# Patient Record
Sex: MALE | Race: WHITE | Employment: OTHER | URBAN - METROPOLITAN AREA
[De-identification: names, ages, dates, MRNs, and addresses within clinical notes are randomized per-mention and may not be internally consistent; named-entity substitution may affect disease eponyms.]

---

## 2017-01-19 ENCOUNTER — TRANSFERRED RECORDS (OUTPATIENT)
Dept: HEALTH INFORMATION MANAGEMENT | Facility: CLINIC | Age: 57
End: 2017-01-19

## 2017-01-20 DIAGNOSIS — K21.00 GASTROESOPHAGEAL REFLUX DISEASE WITH ESOPHAGITIS: ICD-10-CM

## 2017-01-20 DIAGNOSIS — M51.369 DDD (DEGENERATIVE DISC DISEASE), LUMBAR: Primary | ICD-10-CM

## 2017-01-20 NOTE — TELEPHONE ENCOUNTER
gabapentin (NEURONTIN) 600 MG tablet      Last Written Prescription Date: 2/19/16  Last Quantity: 90, # refills: 1  Last Office Visit with [facility], [facility] or [facility] prescribing provider: 11/29/16   Next 5 appointments (look out 90 days)     Feb 07, 2017 10:00 AM   [facility] Visit with Cat Shaw MD   [facility]    [address] [place] [id number] [phone]                   CREATININE   Date Value Ref Range Status   04/21/2016 0.96 mg/dL Final     AST       23   3/26/2010  ALT       26   3/26/2010  BP Readings from Last 3 Encounters:   11/29/16 116/80   11/04/16 106/80   10/04/16 122/75

## 2017-01-23 DIAGNOSIS — M51.369 DDD (DEGENERATIVE DISC DISEASE), LUMBAR: Primary | ICD-10-CM

## 2017-01-23 RX ORDER — DIAZEPAM 5 MG
5 TABLET ORAL
Qty: 30 TABLET | Refills: 0 | Status: SHIPPED | OUTPATIENT
Start: 2017-01-23 | End: 2017-02-07

## 2017-01-23 RX ORDER — GABAPENTIN 600 MG/1
TABLET ORAL
Qty: 90 TABLET | Refills: 5 | Status: SHIPPED | OUTPATIENT
Start: 2017-01-23 | End: 2017-08-09

## 2017-01-23 NOTE — TELEPHONE ENCOUNTER
diazepam       Last Written Prescription Date:  11/4/16  Last Fill Quantity: 30,   # refills: 0  Last Office Visit with [facility], [facility] or [facility] prescribing provider: 11/29/16  Future Office visit:    Next 5 appointments (look out 90 days)     Feb 07, 2017 10:00 AM   [facility] Visit with Cat Shaw MD   [facility]    [address] [place] [id number] [phone]                   Routing refill request to provider for review/approval because:  Drug not on the [facility], [facility] or [facility] refill protocol or controlled substance

## 2017-01-23 NOTE — TELEPHONE ENCOUNTER
Routing refill request to provider for review/approval because:  Drug not on the [facility] refill protocol   Marjorie Grajeda RN

## 2017-01-25 PROBLEM — K21.00 GASTROESOPHAGEAL REFLUX DISEASE WITH ESOPHAGITIS: Status: ACTIVE | Noted: 2017-01-25

## 2017-01-25 RX ORDER — OMEPRAZOLE 40 MG/1
CAPSULE, DELAYED RELEASE ORAL
Qty: 30 CAPSULE | Refills: 11 | Status: SHIPPED | OUTPATIENT
Start: 2017-01-25 | End: 2018-01-17

## 2017-01-25 NOTE — TELEPHONE ENCOUNTER
Routing refill request to provider for review/approval because:  Dx does not match protocol  Kiera Hebert RN

## 2017-01-25 NOTE — TELEPHONE ENCOUNTER
omeprazole      Last Written Prescription Date: 11/29/16  Last Fill Quantity: 30,  # refills: 2   Last Office Visit with [facility] or [facility] prescribing provider: 11/29/16 Dr. Shaw                                           Next 5 appointments (look out 90 days)     Feb 07, 2017 10:00 AM   Office Visit with Cat Shaw MD   [facility]    [address] [place] [id number] [phone]

## 2017-02-07 ENCOUNTER — OFFICE VISIT (OUTPATIENT)
Dept: FAMILY MEDICINE | Facility: CLINIC | Age: 57
End: 2017-02-07
Payer: COMMERCIAL

## 2017-02-07 VITALS
HEART RATE: 66 BPM | SYSTOLIC BLOOD PRESSURE: 132 MMHG | OXYGEN SATURATION: 99 % | BODY MASS INDEX: 36.91 KG/M2 | DIASTOLIC BLOOD PRESSURE: 86 MMHG | WEIGHT: 272.5 LBS | TEMPERATURE: 97.7 F | HEIGHT: 72 IN | RESPIRATION RATE: 16 BRPM

## 2017-02-07 DIAGNOSIS — G89.4 CHRONIC PAIN SYNDROME: Primary | ICD-10-CM

## 2017-02-07 DIAGNOSIS — Z98.1 S/P LUMBAR FUSION: ICD-10-CM

## 2017-02-07 DIAGNOSIS — M51.369 DDD (DEGENERATIVE DISC DISEASE), LUMBAR: ICD-10-CM

## 2017-02-07 PROCEDURE — 99213 OFFICE O/P EST LOW 20 MIN: CPT | Performed by: FAMILY MEDICINE

## 2017-02-07 RX ORDER — HYDROMORPHONE HYDROCHLORIDE 4 MG/1
8 TABLET ORAL 4 TIMES DAILY PRN
Qty: 180 TABLET | Refills: 0 | Status: SHIPPED | OUTPATIENT
Start: 2017-02-13 | End: 2017-03-07

## 2017-02-07 RX ORDER — TRIAMCINOLONE ACETONIDE 1 MG/G
CREAM TOPICAL 3 TIMES DAILY PRN
Qty: 30 G | Refills: 0 | Status: SHIPPED | OUTPATIENT
Start: 2017-02-07 | End: 2017-10-04

## 2017-02-07 RX ORDER — MORPHINE SULFATE 30 MG/1
30 TABLET, FILM COATED, EXTENDED RELEASE ORAL 3 TIMES DAILY
Qty: 90 TABLET | Refills: 0 | Status: SHIPPED | OUTPATIENT
Start: 2017-02-13 | End: 2017-03-07

## 2017-02-07 RX ORDER — AZITHROMYCIN 250 MG/1
TABLET, FILM COATED ORAL
COMMUNITY
Start: 2017-01-21 | End: 2017-03-07

## 2017-02-07 RX ORDER — DIAZEPAM 5 MG
5 TABLET ORAL
Qty: 30 TABLET | Refills: 0 | Status: SHIPPED | OUTPATIENT
Start: 2017-02-13 | End: 2017-03-07

## 2017-02-07 ASSESSMENT — PAIN SCALES - GENERAL: PAINLEVEL: MODERATE PAIN (5)

## 2017-02-07 ASSESSMENT — ANXIETY QUESTIONNAIRES
2. NOT BEING ABLE TO STOP OR CONTROL WORRYING: NEARLY EVERY DAY
IF YOU CHECKED OFF ANY PROBLEMS ON THIS QUESTIONNAIRE, HOW DIFFICULT HAVE THESE PROBLEMS MADE IT FOR YOU TO DO YOUR WORK, TAKE CARE OF THINGS AT HOME, OR GET ALONG WITH OTHER PEOPLE: EXTREMELY DIFFICULT
1. FEELING NERVOUS, ANXIOUS, OR ON EDGE: NEARLY EVERY DAY
7. FEELING AFRAID AS IF SOMETHING AWFUL MIGHT HAPPEN: NEARLY EVERY DAY
6. BECOMING EASILY ANNOYED OR IRRITABLE: SEVERAL DAYS
GAD7 TOTAL SCORE: 19
3. WORRYING TOO MUCH ABOUT DIFFERENT THINGS: NEARLY EVERY DAY
5. BEING SO RESTLESS THAT IT IS HARD TO SIT STILL: NEARLY EVERY DAY

## 2017-02-07 ASSESSMENT — PATIENT HEALTH QUESTIONNAIRE - PHQ9: 5. POOR APPETITE OR OVEREATING: NEARLY EVERY DAY

## 2017-02-07 NOTE — Clinical Note
Please abstract the following data from this visit with this patient into the appropriate field in Epic:  Colonoscopy done on this date: 1/15/17 (approximately), by this group: [facility], results were normal.

## 2017-02-07 NOTE — PROGRESS NOTES
SUBJECTIVE:                                                    Oleg Ford is a 56 year old male who presents to clinic today for the following health issues:      Depression Followup    Status since last visit: Stable up and down    See PHQ-9 for current symptoms.  Other associated symptoms: restless    Complicating factors:   Significant life event:  Surgery - 1/30/17   Current substance abuse:  None  Anxiety or Panic symptoms:  No    PHQ-9  English PHQ-9   Any Language            Chronic Pain Follow-Up       Type / Location of Pain: back  Analgesia/pain control:       Recent changes:  Up and down      Overall control: Tolerable with discomfort  Activity level/function:      Daily activities:  Some days    Work:  NA  Adverse effects:  No  Adherance    Taking medication as directed?  Yes    Participating in other treatments: None  Risk Factors:    Sleep:  Poor    Mood/anxiety:  Up and down    Recent family or social stressors:  none noted    Other aggravating factors: none  PHQ-9 SCORE 7/18/2016 8/10/2016 9/9/2016   Total Score - - -   Total Score MyChart - - -   Total Score 26 27 27     GINI-7 SCORE 7/18/2016 8/10/2016 9/9/2016   Total Score - - -   Total Score 21 21 21     Encounter-Level CSA - 12/1/15:               Controlled Substance Agreement - Scan on 12/14/2015 11:49 AM : CONTROLLED SUBSTANCE AGREEMENT (below)                 Amount of exercise or physical activity: None - trying to stay active    Problems taking medications regularly: does forget doses - has situations where he forgot if he took doses as well    Medication side effects: Constipation, Diarrhea, can't concentrate, confused - depends on what he is taking    Diet: regular (no restrictions)    SUBJECTIVE:  Here today in follow-up of chronic back pain. Generally doing well overall. Medications do make him constipated and after some bright red blood per rectum the patient was seen through the emergency department last month. Full  colonoscopy performed revealing a few benign polyps, but otherwise normal. Had a recent procedure to remove the battery of his spinal stimulator from the back to the front. Time to get his staples out.    Review of systems otherwise negative.  Past medical, family, and social history reviewed and updated in chart.    OBJECTIVE:  /86 mmHg  Pulse 66  Temp(Src) 97.7  F (36.5  C) (Oral)  Resp 16  Ht 1.829 m (6')  Wt 123.605 kg (272 lb 8 oz)  BMI 36.95 kg/m2  SpO2 99%  Alert, pleasant, upbeat, and in no apparent discomfort.  S1 and S2 normal, no murmurs, clicks, gallops or rubs. Regular rate and rhythm. Chest is clear; no wheezes or rales. No edema or JVD.  3 separate well-healing incisions with staples in place. 35 staples removed without difficulty  Past labs reviewed with the patient.     ASSESSMENT / PLAN:  (G89.4) Chronic pain syndrome  (primary encounter diagnosis)  Comment: Stable. Refilled   Plan: HYDROmorphone (DILAUDID) 4 MG tablet, morphine         (MS CONTIN) 30 MG 12 hr tablet            (Z98.1) S/P lumbar fusion  Comment: Stable. Refilled.  Plan: HYDROmorphone (DILAUDID) 4 MG tablet, morphine         (MS CONTIN) 30 MG 12 hr tablet            (M51.36) DDD (degenerative disc disease), lumbar  Comment:   Plan: diazepam (VALIUM) 5 MG tablet, triamcinolone         (KENALOG) 0.1 % cream            Follow up 1 month   SMaureen Shaw MD    (Chart documentation completed in part with Dragon voice-recognition software.  Even though reviewed some grammatical, spelling, and word errors may remain.)

## 2017-02-07 NOTE — NURSING NOTE
Chief Complaint   Patient presents with     Recheck Medication       Initial /86 mmHg  Pulse 66  Temp(Src) 97.7  F (36.5  C) (Oral)  Resp 16  Ht 1.829 m (6')  Wt 123.605 kg (272 lb 8 oz)  BMI 36.95 kg/m2  SpO2 99% Estimated body mass index is 36.95 kg/(m^2) as calculated from the following:    Height as of this encounter: 1.829 m (6').    Weight as of this encounter: 123.605 kg (272 lb 8 oz).  Medication Reconciliation: complete     Will Nati READ

## 2017-02-08 ASSESSMENT — PATIENT HEALTH QUESTIONNAIRE - PHQ9: SUM OF ALL RESPONSES TO PHQ QUESTIONS 1-9: 27

## 2017-02-08 ASSESSMENT — ANXIETY QUESTIONNAIRES: GAD7 TOTAL SCORE: 19

## 2017-03-07 ENCOUNTER — OFFICE VISIT (OUTPATIENT)
Dept: FAMILY MEDICINE | Facility: CLINIC | Age: 57
End: 2017-03-07
Payer: MEDICAID

## 2017-03-07 VITALS
OXYGEN SATURATION: 99 % | HEIGHT: 72 IN | RESPIRATION RATE: 16 BRPM | HEART RATE: 68 BPM | DIASTOLIC BLOOD PRESSURE: 82 MMHG | WEIGHT: 285.4 LBS | TEMPERATURE: 97.8 F | BODY MASS INDEX: 38.66 KG/M2 | SYSTOLIC BLOOD PRESSURE: 118 MMHG

## 2017-03-07 DIAGNOSIS — Z98.1 S/P LUMBAR FUSION: ICD-10-CM

## 2017-03-07 DIAGNOSIS — G89.4 CHRONIC PAIN SYNDROME: ICD-10-CM

## 2017-03-07 DIAGNOSIS — M51.369 DDD (DEGENERATIVE DISC DISEASE), LUMBAR: ICD-10-CM

## 2017-03-07 PROCEDURE — 99213 OFFICE O/P EST LOW 20 MIN: CPT | Performed by: FAMILY MEDICINE

## 2017-03-07 RX ORDER — DIAZEPAM 5 MG
5 TABLET ORAL
Qty: 30 TABLET | Refills: 0 | Status: SHIPPED | OUTPATIENT
Start: 2017-03-10 | End: 2017-04-04

## 2017-03-07 RX ORDER — MORPHINE SULFATE 30 MG/1
30 TABLET, FILM COATED, EXTENDED RELEASE ORAL 3 TIMES DAILY
Qty: 90 TABLET | Refills: 0 | Status: SHIPPED | OUTPATIENT
Start: 2017-03-10 | End: 2017-04-04

## 2017-03-07 RX ORDER — HYDROMORPHONE HYDROCHLORIDE 4 MG/1
8 TABLET ORAL 4 TIMES DAILY PRN
Qty: 180 TABLET | Refills: 0 | Status: SHIPPED | OUTPATIENT
Start: 2017-03-10 | End: 2017-04-04

## 2017-03-07 ASSESSMENT — PAIN SCALES - GENERAL: PAINLEVEL: MODERATE PAIN (5)

## 2017-03-07 ASSESSMENT — ANXIETY QUESTIONNAIRES
3. WORRYING TOO MUCH ABOUT DIFFERENT THINGS: NEARLY EVERY DAY
5. BEING SO RESTLESS THAT IT IS HARD TO SIT STILL: NEARLY EVERY DAY
7. FEELING AFRAID AS IF SOMETHING AWFUL MIGHT HAPPEN: NEARLY EVERY DAY
1. FEELING NERVOUS, ANXIOUS, OR ON EDGE: NEARLY EVERY DAY
GAD7 TOTAL SCORE: 21
6. BECOMING EASILY ANNOYED OR IRRITABLE: NEARLY EVERY DAY
IF YOU CHECKED OFF ANY PROBLEMS ON THIS QUESTIONNAIRE, HOW DIFFICULT HAVE THESE PROBLEMS MADE IT FOR YOU TO DO YOUR WORK, TAKE CARE OF THINGS AT HOME, OR GET ALONG WITH OTHER PEOPLE: VERY DIFFICULT
2. NOT BEING ABLE TO STOP OR CONTROL WORRYING: NEARLY EVERY DAY

## 2017-03-07 ASSESSMENT — PATIENT HEALTH QUESTIONNAIRE - PHQ9: 5. POOR APPETITE OR OVEREATING: NEARLY EVERY DAY

## 2017-03-07 NOTE — NURSING NOTE
Chief Complaint   Patient presents with     Recheck Medication       Initial /82 (BP Location: Right arm, Patient Position: Right side, Cuff Size: Adult Regular)  Pulse 68  Temp 97.8  F (36.6  C) (Oral)  Resp 16  Ht 1.829 m (6')  Wt 129.5 kg (285 lb 6.4 oz)  SpO2 99%  BMI 38.71 kg/m2 Estimated body mass index is 38.71 kg/(m^2) as calculated from the following:    Height as of this encounter: 1.829 m (6').    Weight as of this encounter: 129.5 kg (285 lb 6.4 oz).  Medication Reconciliation: complete     Bhupendra Damian MA

## 2017-03-07 NOTE — MR AVS SNAPSHOT
After Visit Summary   3/7/2017    Oleg Ford    MRN: [id number]           Patient Information     Date Of Birth          1960        Visit Information        Provider Department      3/7/2017 10:00 AM Cat Shaw MD [facility]        Today's Diagnoses     Chronic pain syndrome        S/P lumbar fusion        DDD (degenerative disc disease), lumbar           Follow-ups after your visit        Follow-up notes from your care team     Return in about 1 month (around 4/7/2017).      Your next 10 appointments already scheduled     Mar 07, 2017 10:00 AM CST   [facility]   [facility]    [address] [facility] [id number] [phone]            Apr 04, 2017  9:40 AM CDT   Office Visit with Cat Shaw MD   [facility]    [address] [place] [id number] [phone]           Bring a current list of meds and any records pertaining to this visit.  For Physicals, please bring immunization records and any forms needing to be filled out.  Please arrive 10 minutes early to complete paperwork.              Who to contact     If you have questions or need follow up information about today's clinic visit or your schedule please contact [facility] directly at [phone].  Normal or non-critical lab and imaging results will be communicated to you by MyChart, letter or phone within 4 business days after the clinic has received the results. If you do not hear from us within 7 days, please contact the clinic through MyChart or phone. If you have a critical or abnormal lab result, we will notify you by phone as soon as possible.  Submit refill requests through Epivios or call your pharmacy and they will forward the refill request to us. Please allow 3 business days for your refill to be completed.           Additional Information About Your Visit        Lookinhotelshart Information     Escom gives you secure access to your electronic health record. If you see a primary care provider, you can also send messages to your care team and make appointments. If you have questions, please call your primary care clinic.  If you do not have a primary care provider, please call [phone] and they will assist you.        Care EveryWhere ID     This is your Care EveryWhere ID. This could be used by other organizations to access your [facility] medical records  [id number]        Your Vitals Were     Pulse Temperature Respirations Height Pulse Oximetry BMI (Body Mass Index)    68 97.8  F (36.6  C) (Oral) 16 1.829 m (6') 99% 38.71 kg/m2       Blood Pressure from Last 3 Encounters:   03/07/17 118/82   02/07/17 132/86   11/29/16 116/80    Weight from Last 3 Encounters:   03/07/17 129.5 kg (285 lb 6.4 oz)   02/07/17 123.6 kg (272 lb 8 oz)   11/29/16 116.9 kg (257 lb 11.2 oz)              Today, you had the following     No orders found for display         Where to get your medicines      Some of these will need a paper prescription and others can be bought over the counter.  Ask your nurse if you have questions.     Bring a paper prescription for each of these medications     diazepam 5 MG tablet    HYDROmorphone 4 MG tablet    morphine 30 MG 12 hr tablet          Primary Care Provider Office Phone # Fax #    Cat Tenzin Shaw [phone]       [address] [address]        Thank you!     Thank you for choosing [facility]  for your care. Our goal is always to provide you with excellent care. Hearing back from our patients is one way we can continue to improve our services. Please take a few minutes to complete the written survey that you may receive in the mail after your visit with us. Thank you!             Your Updated Medication List - Protect others  around you: Learn how to safely use, store and throw away your medicines at www.disposemymeds.org.          This list is accurate as of: 3/7/17  9:57 AM.  Always use your most recent med list.                   Brand Name Dispense Instructions for use    ABILIFY 10 MG tablet   Generic drug:  ARIPiprazole      Take 10 mg by mouth daily       buPROPion 150 MG 24 hr tablet    WELLBUTRIN XL     Take 450 mg by mouth       cyclobenzaprine 10 MG tablet    FLEXERIL    90 tablet    Take 1 tablet (10 mg) by mouth 3 times daily as needed for muscle spasms       diazepam 5 MG tablet   Start taking on:  3/10/2017    VALIUM    30 tablet    Take 1 tablet (5 mg) by mouth nightly as needed       EPINEPHrine 0.3 MG/0.3ML injection     1 each    Inject 0.3 mLs (0.3 mg) into the muscle once as needed for anaphylaxis       gabapentin 600 MG tablet    NEURONTIN    90 tablet    TAKE ONE TABLET BY MOUTH THREE TIMES DAILY       HYDROmorphone 4 MG tablet   Start taking on:  3/10/2017    DILAUDID    180 tablet    Take 2 tablets (8 mg) by mouth 4 times daily as needed for moderate to severe pain (every 4-6hour prn)       hydrOXYzine 50 MG capsule    VISTARIL     Take 50 mg by mouth       LAMICTAL 150 MG tablet   Generic drug:  lamoTRIgine      Take 250 mg by mouth daily       morphine 30 MG 12 hr tablet   Start taking on:  3/10/2017    MS CONTIN    90 tablet    Take 1 tablet (30 mg) by mouth 3 times daily       omeprazole 40 MG capsule    priLOSEC    30 capsule    TAKE 1 CAPSULE (40 MG) BY MOUTH DAILY TAKE 30-60 MINUTES BEFORE A MEAL.       ondansetron 4 MG ODT tab    ZOFRAN-ODT    120 tablet    Place 1-2 tablets (4-8 mg) under the tongue 3 times daily as needed for nausea       rOPINIRole 0.25 MG tablet    REQUIP    60 tablet    Take 1-2 tablets (0.25-0.5 mg) by mouth At Bedtime       traZODone 150 MG tablet    DESYREL     Take 150 mg by mouth       triamcinolone 0.1 % cream    KENALOG    30 g    Apply topically 3 times daily as needed (itch)        zolpidem 10 MG tablet    AMBIEN    30 tablet    Take 1 tablet (10 mg) by mouth nightly as needed for sleep

## 2017-03-08 ASSESSMENT — ANXIETY QUESTIONNAIRES: GAD7 TOTAL SCORE: 21

## 2017-03-08 ASSESSMENT — PATIENT HEALTH QUESTIONNAIRE - PHQ9: SUM OF ALL RESPONSES TO PHQ QUESTIONS 1-9: 27

## 2017-03-10 DIAGNOSIS — M51.369 DDD (DEGENERATIVE DISC DISEASE), LUMBAR: ICD-10-CM

## 2017-03-10 RX ORDER — GABAPENTIN 600 MG/1
600 TABLET ORAL 3 TIMES DAILY
Qty: 90 TABLET | Refills: 5 | Status: CANCELLED | OUTPATIENT
Start: 2017-03-10

## 2017-03-10 NOTE — TELEPHONE ENCOUNTER
Gabapentin      Last Written Prescription Date:  01/23/17  Last Fill Quantity: 90,   # refills: 5  Last Office Visit with [facility], [facility] or [facility] prescribing provider: 03/7/17 Dr. Shaw    Future Office visit:    Next 5 appointments (look out 90 days)     Apr 04, 2017  9:40 AM CDT   Office Visit with Cat Shaw MD   [facility]    [address] [place] [id number] [phone]                   Routing refill request to provider for review/approval because:  Drug not on the [facility], [facility] or [facility] refill protocol or controlled substance    Ins PREFERS CAPLETS VS TABLETS, NEED NEW SCRIPT FOR CAPSULES.

## 2017-03-15 ENCOUNTER — TRANSFERRED RECORDS (OUTPATIENT)
Dept: HEALTH INFORMATION MANAGEMENT | Facility: CLINIC | Age: 57
End: 2017-03-15

## 2017-03-29 DIAGNOSIS — K21.00 GASTROESOPHAGEAL REFLUX DISEASE WITH ESOPHAGITIS: ICD-10-CM

## 2017-03-29 NOTE — TELEPHONE ENCOUNTER
omeprazole (PRILOSEC) 40 MG capsule      Last Written Prescription Date: 1/25/17  Last Fill Quantity: 30 capsules,  # refills: 11   Last Office Visit with [facility], [facility] or [facility] prescribing provider: 3/7/17 Dr. Shaw                                           Next 5 appointments (look out 90 days)     Apr 04, 2017  9:40 AM CDT   Office Visit with Cat Shaw MD   [facility]    [address] [place] [id number]   [phone]                    Mell Peguero

## 2017-04-01 RX ORDER — OMEPRAZOLE 40 MG/1
CAPSULE, DELAYED RELEASE ORAL
Qty: 30 CAPSULE | Refills: 11 | OUTPATIENT
Start: 2017-04-01

## 2017-04-02 NOTE — TELEPHONE ENCOUNTER
The following prescriptions was sent to [facility] HOLLIE Reddy:   omeprazole (PRILOSEC) 40 MG capsule 30 capsule 11 1/25/2017  No   Sig: TAKE 1 CAPSULE (40 MG) BY MOUTH DAILY TAKE 30-60 MINUTES BEFORE A MEAL.   Class: E-Prescribe   Order: [id number]   E-Prescribing Status: Receipt confirmed by pharmacy (1/25/2017  2:16 PM CST)     Request denied.  Too soon to fill  Marjorie Grajeda RN

## 2017-04-04 ENCOUNTER — OFFICE VISIT (OUTPATIENT)
Dept: FAMILY MEDICINE | Facility: CLINIC | Age: 57
End: 2017-04-04
Payer: MEDICAID

## 2017-04-04 VITALS
TEMPERATURE: 97.7 F | RESPIRATION RATE: 16 BRPM | DIASTOLIC BLOOD PRESSURE: 86 MMHG | WEIGHT: 280.8 LBS | SYSTOLIC BLOOD PRESSURE: 124 MMHG | HEART RATE: 72 BPM | HEIGHT: 72 IN | OXYGEN SATURATION: 99 % | BODY MASS INDEX: 38.03 KG/M2

## 2017-04-04 DIAGNOSIS — Z98.1 S/P LUMBAR FUSION: ICD-10-CM

## 2017-04-04 DIAGNOSIS — F33.2 MAJOR DEPRESSIVE DISORDER, RECURRENT, SEVERE WITHOUT PSYCHOTIC FEATURES (H): ICD-10-CM

## 2017-04-04 DIAGNOSIS — G89.4 CHRONIC PAIN SYNDROME: Primary | ICD-10-CM

## 2017-04-04 DIAGNOSIS — M51.369 DDD (DEGENERATIVE DISC DISEASE), LUMBAR: ICD-10-CM

## 2017-04-04 PROCEDURE — 99213 OFFICE O/P EST LOW 20 MIN: CPT | Performed by: FAMILY MEDICINE

## 2017-04-04 RX ORDER — DIAZEPAM 5 MG
5 TABLET ORAL
Qty: 30 TABLET | Refills: 0 | Status: SHIPPED | OUTPATIENT
Start: 2017-04-07 | End: 2017-04-12

## 2017-04-04 RX ORDER — HYDROMORPHONE HYDROCHLORIDE 4 MG/1
8 TABLET ORAL 4 TIMES DAILY PRN
Qty: 180 TABLET | Refills: 0 | Status: SHIPPED | OUTPATIENT
Start: 2017-04-07 | End: 2017-05-02

## 2017-04-04 RX ORDER — MORPHINE SULFATE 30 MG/1
30 TABLET, FILM COATED, EXTENDED RELEASE ORAL 3 TIMES DAILY
Qty: 90 TABLET | Refills: 0 | Status: SHIPPED | OUTPATIENT
Start: 2017-04-07 | End: 2017-05-02

## 2017-04-04 ASSESSMENT — ANXIETY QUESTIONNAIRES
GAD7 TOTAL SCORE: 17
1. FEELING NERVOUS, ANXIOUS, OR ON EDGE: NEARLY EVERY DAY
3. WORRYING TOO MUCH ABOUT DIFFERENT THINGS: NEARLY EVERY DAY
2. NOT BEING ABLE TO STOP OR CONTROL WORRYING: NEARLY EVERY DAY
5. BEING SO RESTLESS THAT IT IS HARD TO SIT STILL: SEVERAL DAYS
6. BECOMING EASILY ANNOYED OR IRRITABLE: SEVERAL DAYS
IF YOU CHECKED OFF ANY PROBLEMS ON THIS QUESTIONNAIRE, HOW DIFFICULT HAVE THESE PROBLEMS MADE IT FOR YOU TO DO YOUR WORK, TAKE CARE OF THINGS AT HOME, OR GET ALONG WITH OTHER PEOPLE: VERY DIFFICULT
7. FEELING AFRAID AS IF SOMETHING AWFUL MIGHT HAPPEN: NEARLY EVERY DAY

## 2017-04-04 ASSESSMENT — PATIENT HEALTH QUESTIONNAIRE - PHQ9: 5. POOR APPETITE OR OVEREATING: NEARLY EVERY DAY

## 2017-04-04 ASSESSMENT — PAIN SCALES - GENERAL: PAINLEVEL: EXTREME PAIN (8)

## 2017-04-04 NOTE — MR AVS SNAPSHOT
After Visit Summary   4/4/2017    Oleg Ford    MRN: [id number]           Patient Information     Date Of Birth          1960        Visit Information        Provider Department      4/4/2017 9:40 AM Cat Shaw MD [facility]        Today's Diagnoses     Chronic pain syndrome    -  1    Major depressive disorder, recurrent, severe without psychotic features (H)        S/P lumbar fusion        DDD (degenerative disc disease), lumbar           Follow-ups after your visit        Follow-up notes from your care team     Return in about 1 month (around 5/4/2017).      Your next 10 appointments already scheduled     May 02, 2017 10:20 AM CDT   SHORT with Cat Shaw MD   [facility]    [address] [place] [id number] [phone]              Who to contact     If you have questions or need follow up information about today's clinic visit or your schedule please contact [facility] directly at [phone].  Normal or non-critical lab and imaging results will be communicated to you by Rapid Pathogen Screeninghart, letter or phone within 4 business days after the clinic has received the results. If you do not hear from us within 7 days, please contact the clinic through Rapid Pathogen Screeninghart or phone. If you have a critical or abnormal lab result, we will notify you by phone as soon as possible.  Submit refill requests through Texifter or call your pharmacy and they will forward the refill request to us. Please allow 3 business days for your refill to be completed.          Additional Information About Your Visit        MyChart Information     [employer]ifter gives you secure access to your electronic health record. If you see a primary care provider, you can also send messages to your care team and make appointments. If you have questions, please call your primary care clinic.  If you do not have a primary care provider,  please call [phone] and they will assist you.        Care EveryWhere ID     This is your Care EveryWhere ID. This could be used by other organizations to access your [facility] medical records  [id number]        Your Vitals Were     Pulse Temperature Respirations Height Pulse Oximetry BMI (Body Mass Index)    72 97.7  F (36.5  C) (Oral) 16 1.829 m (6') 99% 38.08 kg/m2       Blood Pressure from Last 3 Encounters:   04/04/17 124/86   03/07/17 118/82   02/07/17 132/86    Weight from Last 3 Encounters:   04/04/17 127.4 kg (280 lb 12.8 oz)   03/07/17 129.5 kg (285 lb 6.4 oz)   02/07/17 123.6 kg (272 lb 8 oz)              Today, you had the following     No orders found for display         Where to get your medicines      Some of these will need a paper prescription and others can be bought over the counter.  Ask your nurse if you have questions.     Bring a paper prescription for each of these medications     diazepam 5 MG tablet    HYDROmorphone 4 MG tablet    morphine 30 MG 12 hr tablet          Primary Care Provider Office Phone # Fax #    Cat Tenzin Shaw [phone]       [place] [id number]        Thank you!     Thank you for choosing [facility]  for your care. Our goal is always to provide you with excellent care. Hearing back from our patients is one way we can continue to improve our services. Please take a few minutes to complete the written survey that you may receive in the mail after your visit with us. Thank you!             Your Updated Medication List - Protect others around you: Learn how to safely use, store and throw away your medicines at www.disposemymeds.org.          This list is accurate as of: 4/4/17 11:15 AM.  Always use your most recent med list.                   Brand Name Dispense Instructions for use    ABILIFY 10 MG tablet   Generic drug:  ARIPiprazole      Take 10 mg by mouth daily        buPROPion 150 MG 24 hr tablet    WELLBUTRIN XL     Take 450 mg by mouth       cyclobenzaprine 10 MG tablet    FLEXERIL    90 tablet    Take 1 tablet (10 mg) by mouth 3 times daily as needed for muscle spasms       diazepam 5 MG tablet   Start taking on:  4/7/2017    VALIUM    30 tablet    Take 1 tablet (5 mg) by mouth nightly as needed       EPINEPHrine 0.3 MG/0.3ML injection     1 each    Inject 0.3 mLs (0.3 mg) into the muscle once as needed for anaphylaxis       gabapentin 600 MG tablet    NEURONTIN    90 tablet    TAKE ONE TABLET BY MOUTH THREE TIMES DAILY       HYDROmorphone 4 MG tablet   Start taking on:  4/7/2017    DILAUDID    180 tablet    Take 2 tablets (8 mg) by mouth 4 times daily as needed for moderate to severe pain (every 4-6hour prn)       hydrOXYzine 50 MG capsule    VISTARIL     Take 50 mg by mouth       LAMICTAL 150 MG tablet   Generic drug:  lamoTRIgine      Take 250 mg by mouth daily       morphine 30 MG 12 hr tablet   Start taking on:  4/7/2017    MS CONTIN    90 tablet    Take 1 tablet (30 mg) by mouth 3 times daily       omeprazole 40 MG capsule    priLOSEC    30 capsule    TAKE 1 CAPSULE (40 MG) BY MOUTH DAILY TAKE 30-60 MINUTES BEFORE A MEAL.       ondansetron 4 MG ODT tab    ZOFRAN-ODT    120 tablet    Place 1-2 tablets (4-8 mg) under the tongue 3 times daily as needed for nausea       rOPINIRole 0.25 MG tablet    REQUIP    60 tablet    Take 1-2 tablets (0.25-0.5 mg) by mouth At Bedtime       traZODone 150 MG tablet    DESYREL     Take 150 mg by mouth       triamcinolone 0.1 % cream    KENALOG    30 g    Apply topically 3 times daily as needed (itch)       zolpidem 10 MG tablet    AMBIEN    30 tablet    Take 1 tablet (10 mg) by mouth nightly as needed for sleep

## 2017-04-04 NOTE — NURSING NOTE
Chief Complaint   Patient presents with     Recheck Medication       Initial /86 (BP Location: Right arm, Patient Position: Right side, Cuff Size: Adult Large)  Pulse 72  Temp 97.7  F (36.5  C) (Oral)  Resp 16  Ht 1.829 m (6')  Wt 127.4 kg (280 lb 12.8 oz)  SpO2 99%  BMI 38.08 kg/m2 Estimated body mass index is 38.08 kg/(m^2) as calculated from the following:    Height as of this encounter: 1.829 m (6').    Weight as of this encounter: 127.4 kg (280 lb 12.8 oz).  Medication Reconciliation: complete     Bhupendra Damian MA

## 2017-04-04 NOTE — PROGRESS NOTES
SUBJECTIVE:                                                    Oleg Ford is a 57 year old male who presents to clinic today for the following health issues:      Depression Followup    Status since last visit: Stable     See PHQ-9 for current symptoms.  Other associated symptoms: Anxiety    Complicating factors:   Significant life event:  No   Current substance abuse:  None  Anxiety or Panic symptoms:  Yes-  Up and down    PHQ-9  English PHQ-9   Any Language            Chronic Pain Follow-Up       Type / Location of Pain: back  Analgesia/pain control:       Recent changes:  Up and down      Overall control: Tolerable with discomfort  Activity level/function:      Daily activities:  Most days    Work:  not applicable  Adverse effects:  No  Adherance    Taking medication as directed?  Yes    Participating in other treatments: None  Risk Factors:    Sleep:  Fair    Mood/anxiety:  controlled    Recent family or social stressors:  None    Other aggravating factors: none  PHQ-9 SCORE 9/9/2016 2/7/2017 3/7/2017   Total Score - - -   Total Score MyChart - - -   Total Score 27 27 27     GINI-7 SCORE 9/9/2016 2/7/2017 3/7/2017   Total Score - - -   Total Score 21 19 21     Encounter-Level CSA - 12/01/2015:                 Controlled Substance Agreement - Scan on 12/14/2015 11:49 AM : CONTROLLED SUBSTANCE AGREEMENT (below)               SUBJECTIVE:  Here today in follow-up of chronic back pain. Things in this area fairly stable. He recently saw his surgeon, Dr. Vasquez. He is now on the list for an SI joint fusion. Probably cannot take place for 3-4 months due to backlog. The hope is that this will really stabilize his situation and reduce his pain. Still trying to stay as physically active as possible. Has upcoming follow-up with his psychiatrist who he sees through [facility]. He thinks his medications for depression are pretty good right now.  Most days he is feeling fairly stable if not up beat.    Review of systems  otherwise negative.  Past medical, family, and social history reviewed and updated in chart.    OBJECTIVE:  /86 (BP Location: Right arm, Patient Position: Right side, Cuff Size: Adult Large)  Pulse 72  Temp 97.7  F (36.5  C) (Oral)  Resp 16  Ht 1.829 m (6')  Wt 127.4 kg (280 lb 12.8 oz)  SpO2 99%  BMI 38.08 kg/m2  Alert, pleasant, upbeat, and in no apparent discomfort.  S1 and S2 normal, no murmurs, clicks, gallops or rubs. Regular rate and rhythm. Chest is clear; no wheezes or rales. No edema or JVD.  Past labs reviewed with the patient.     ASSESSMENT / PLAN:  (G89.4) Chronic pain syndrome  (primary encounter diagnosis)  Comment: Stable on current dosage. Refilled.  Plan: HYDROmorphone (DILAUDID) 4 MG tablet, morphine         (MS CONTIN) 30 MG 12 hr tablet            (F33.2) Major depressive disorder, recurrent, severe without psychotic features (H)  Comment: Doing actually well on his current regimen. Continue active follow-up with his psychiatrist as his situations fairly severe  Plan:     (Z98.1) S/P lumbar fusion  Comment: Stable. Refilled.  Plan: HYDROmorphone (DILAUDID) 4 MG tablet, morphine         (MS CONTIN) 30 MG 12 hr tablet            (M51.36) DDD (degenerative disc disease), lumbar  Comment: Stable. Refilled.  Plan: diazepam (VALIUM) 5 MG tablet            Follow up 1 month  SMaureen Shaw MD    (Chart documentation completed in part with Dragon voice-recognition software.  Even though reviewed some grammatical, spelling, and word errors may remain.)

## 2017-04-05 ASSESSMENT — PATIENT HEALTH QUESTIONNAIRE - PHQ9: SUM OF ALL RESPONSES TO PHQ QUESTIONS 1-9: 24

## 2017-04-05 ASSESSMENT — ANXIETY QUESTIONNAIRES: GAD7 TOTAL SCORE: 17

## 2017-04-07 DIAGNOSIS — R11.0 NAUSEA: ICD-10-CM

## 2017-04-07 NOTE — TELEPHONE ENCOUNTER
ondansetron      Last Written Prescription Date: 11/4/16  Last Fill Quantity: 120,  # refills: 0   Last Office Visit with [facility], P or [facility] prescribing provider: 4/4/17 Dr. Shaw                                           Next 5 appointments (look out 90 days)     May 02, 2017 10:20 AM CDT   SHORT with Cat Shaw MD   [facility] ([facility])    [address] [place] [id number]   [phone]                  Mery QUEVEDO)

## 2017-04-11 RX ORDER — ONDANSETRON 4 MG/1
4-8 TABLET, ORALLY DISINTEGRATING ORAL 3 TIMES DAILY PRN
Qty: 120 TABLET | Refills: 0 | Status: SHIPPED | OUTPATIENT
Start: 2017-04-11 | End: 2017-04-27

## 2017-04-12 DIAGNOSIS — M51.369 DDD (DEGENERATIVE DISC DISEASE), LUMBAR: ICD-10-CM

## 2017-04-12 RX ORDER — DIAZEPAM 5 MG
5 TABLET ORAL
Qty: 30 TABLET | Refills: 0 | Status: SHIPPED | OUTPATIENT
Start: 2017-04-12 | End: 2017-05-02

## 2017-04-12 NOTE — TELEPHONE ENCOUNTER
..Reason for Call:  Medication or medication refill:    Do you use a [facility]?  Name of the pharmacy and phone number for the current request:  [facility] [phone]/fax [phone]    Name of the medication requested: diazepam    Other request: sent request last week 04-04 and 04-07    Can we leave a detailed message on this number? YES    Phone number patient can be reached at:   phone number:  [phone]    Best Time: anytime    Call taken on 4/12/2017 at 10:14 AM by Magy Rodriguez

## 2017-04-12 NOTE — TELEPHONE ENCOUNTER
Valium      Last Written Prescription Date: 04/07/17  Last Fill Quantity: 30,  # refills: 0   Last Office Visit with [facility], [facility] or [facility] prescribing provider: 04/04/17                                         Next 5 appointments (look out 90 days)     May 02, 2017 10:20 AM CDT   SHORT with Cat Shaw MD   [facility] ([facility])    [address] [place] [id number]   [phone]                    Routing refill request to provider for review/approval because:  Drug not on the [facility] refill protocol   Marjorie Grajeda RN

## 2017-04-27 DIAGNOSIS — R11.0 NAUSEA: ICD-10-CM

## 2017-04-27 NOTE — TELEPHONE ENCOUNTER
ondansetron (ZOFRAN-ODT) 4 MG ODT tab      Last Written Prescription Date: 4/11/17  Last Fill Quantity: 120,  # refills: 0   Last Office Visit with [facility], P or [facility] prescribing provider: 4/4/17 Dr. Shaw                                           Next 5 appointments (look out 90 days)     May 02, 2017 10:20 AM CDT   SHORT with Cat Shaw MD   [facility] ([facility])    [address] [place] [id number]   [phone]                 Mell Peguero

## 2017-05-01 RX ORDER — ONDANSETRON 4 MG/1
TABLET, ORALLY DISINTEGRATING ORAL
Qty: 20 TABLET | Refills: 0 | Status: SHIPPED | OUTPATIENT
Start: 2017-05-01 | End: 2017-06-02

## 2017-05-01 NOTE — TELEPHONE ENCOUNTER
Prescription approved per [facility] Refill Protocol.    Kiera Hebert RN

## 2017-05-02 ENCOUNTER — OFFICE VISIT (OUTPATIENT)
Dept: FAMILY MEDICINE | Facility: CLINIC | Age: 57
End: 2017-05-02
Payer: MEDICAID

## 2017-05-02 VITALS
WEIGHT: 282.8 LBS | DIASTOLIC BLOOD PRESSURE: 86 MMHG | OXYGEN SATURATION: 95 % | RESPIRATION RATE: 16 BRPM | HEART RATE: 80 BPM | TEMPERATURE: 98.7 F | BODY MASS INDEX: 38.31 KG/M2 | SYSTOLIC BLOOD PRESSURE: 128 MMHG | HEIGHT: 72 IN

## 2017-05-02 DIAGNOSIS — G89.4 CHRONIC PAIN SYNDROME: ICD-10-CM

## 2017-05-02 DIAGNOSIS — M51.369 DDD (DEGENERATIVE DISC DISEASE), LUMBAR: Primary | ICD-10-CM

## 2017-05-02 PROCEDURE — 99213 OFFICE O/P EST LOW 20 MIN: CPT | Performed by: FAMILY MEDICINE

## 2017-05-02 RX ORDER — HYDROMORPHONE HYDROCHLORIDE 4 MG/1
8 TABLET ORAL 4 TIMES DAILY PRN
Qty: 180 TABLET | Refills: 0 | Status: SHIPPED | OUTPATIENT
Start: 2017-05-02 | End: 2017-06-02

## 2017-05-02 RX ORDER — DIAZEPAM 5 MG
5 TABLET ORAL
Qty: 30 TABLET | Refills: 0 | Status: SHIPPED | OUTPATIENT
Start: 2017-05-02 | End: 2017-06-02

## 2017-05-02 RX ORDER — MORPHINE SULFATE 30 MG/1
30 TABLET, FILM COATED, EXTENDED RELEASE ORAL 3 TIMES DAILY
Qty: 90 TABLET | Refills: 0 | Status: SHIPPED | OUTPATIENT
Start: 2017-05-02 | End: 2017-06-02

## 2017-05-02 ASSESSMENT — ANXIETY QUESTIONNAIRES
7. FEELING AFRAID AS IF SOMETHING AWFUL MIGHT HAPPEN: NEARLY EVERY DAY
IF YOU CHECKED OFF ANY PROBLEMS ON THIS QUESTIONNAIRE, HOW DIFFICULT HAVE THESE PROBLEMS MADE IT FOR YOU TO DO YOUR WORK, TAKE CARE OF THINGS AT HOME, OR GET ALONG WITH OTHER PEOPLE: EXTREMELY DIFFICULT
6. BECOMING EASILY ANNOYED OR IRRITABLE: NEARLY EVERY DAY
1. FEELING NERVOUS, ANXIOUS, OR ON EDGE: NEARLY EVERY DAY
2. NOT BEING ABLE TO STOP OR CONTROL WORRYING: NEARLY EVERY DAY
5. BEING SO RESTLESS THAT IT IS HARD TO SIT STILL: NEARLY EVERY DAY
3. WORRYING TOO MUCH ABOUT DIFFERENT THINGS: NEARLY EVERY DAY
GAD7 TOTAL SCORE: 21

## 2017-05-02 ASSESSMENT — PATIENT HEALTH QUESTIONNAIRE - PHQ9: 5. POOR APPETITE OR OVEREATING: NEARLY EVERY DAY

## 2017-05-02 ASSESSMENT — PAIN SCALES - GENERAL: PAINLEVEL: SEVERE PAIN (6)

## 2017-05-02 NOTE — PROGRESS NOTES
SUBJECTIVE:                                                    Oleg Ford is a 57 year old male who presents to clinic today for the following health issues:      Depression Followup    Status since last visit: Stable     See PHQ-9 for current symptoms.  Other associated symptoms: trouble sleeping    Complicating factors:   Significant life event:  No   Current substance abuse:  None  Anxiety or Panic symptoms:  No    PHQ-9  English PHQ-9   Any Language          Chronic Pain Follow-Up       Type / Location of Pain: back, R knee  Analgesia/pain control:       Recent changes:  Worse - possibly related to activity      Overall control: Tolerable with discomfort  Activity level/function:      Daily activities:  Unable to perform most daily activities some days    Work:  not applicable  Adverse effects:  No  Adherance    Taking medication as directed?  Yes    Participating in other treatments: PT - R knee  Risk Factors:    Sleep:  Poor    Mood/anxiety:  Up and down    Recent family or social stressors:  none noted    Other aggravating factors: none  PHQ-9 SCORE 2/7/2017 3/7/2017 4/4/2017   Total Score - - -   Total Score MyChart - - -   Total Score 27 27 24     GINI-7 SCORE 2/7/2017 3/7/2017 4/4/2017   Total Score - - -   Total Score 19 21 17     Encounter-Level CSA - 12/01/2015:                 Controlled Substance Agreement - Scan on 12/14/2015 11:49 AM : CONTROLLED SUBSTANCE AGREEMENT (below)               SUBJECTIVE:  Here today in follow-up of chronic back pain. Things in this area fairly stable. He recently saw his surgeon, Dr. Vasquez. He is now on the list for an SI joint fusion. Probably cannot take place for 3-4 months due to backlog. The hope is that this will really stabilize his situation and reduce his pain. Still trying to stay as physically active as possible. Has upcoming follow-up with his psychiatrist who he sees through [facility]. He thinks his medications for depression are pretty good right now.   Most days he is feeling fairly stable if not up beat.       Review of systems otherwise negative.  Past medical, family, and social history reviewed and updated in chart.    OBJECTIVE:  /86 (BP Location: Right arm, Patient Position: Right side, Cuff Size: Adult Regular)  Pulse 80  Temp 98.7  F (37.1  C) (Oral)  Resp 16  Ht 1.829 m (6')  Wt 128.3 kg (282 lb 12.8 oz)  SpO2 95%  BMI 38.35 kg/m2  Alert, pleasant, upbeat, and in no apparent discomfort.  S1 and S2 normal, no murmurs, clicks, gallops or rubs. Regular rate and rhythm. Chest is clear; no wheezes or rales. No edema or JVD.  Past labs reviewed with the patient.     ASSESSMENT / PLAN:  (M51.36) DDD (degenerative disc disease), lumbar  (primary encounter diagnosis)  Comment: doing well current dosage - refilled   Plan: HYDROmorphone (DILAUDID) 4 MG tablet, morphine         (MS CONTIN) 30 MG 12 hr tablet, diazepam         (VALIUM) 5 MG tablet            (G89.4) Chronic pain syndrome  Comment: as above   Plan: HYDROmorphone (DILAUDID) 4 MG tablet, morphine         (MS CONTIN) 30 MG 12 hr tablet, diazepam         (VALIUM) 5 MG tablet            Follow up 1 month   SANTIAGO Shaw MD    (Chart documentation completed in part with Dragon voice-recognition software.  Even though reviewed some grammatical, spelling, and word errors may remain.)

## 2017-05-02 NOTE — NURSING NOTE
Chief Complaint   Patient presents with     Recheck Medication       Initial /86 (BP Location: Right arm, Patient Position: Right side, Cuff Size: Adult Regular)  Pulse 80  Temp 98.7  F (37.1  C) (Oral)  Resp 16  Ht 1.829 m (6')  Wt 128.3 kg (282 lb 12.8 oz)  SpO2 95%  BMI 38.35 kg/m2 Estimated body mass index is 38.35 kg/(m^2) as calculated from the following:    Height as of this encounter: 1.829 m (6').    Weight as of this encounter: 128.3 kg (282 lb 12.8 oz).  Medication Reconciliation: complete   Bhupendra Damian MA

## 2017-05-02 NOTE — MR AVS SNAPSHOT
After Visit Summary   5/2/2017    Oleg Ford    MRN: [id number]           Patient Information     Date Of Birth          1960        Visit Information        Provider Department      5/2/2017 10:20 AM Cat Shaw MD [facility]        Today's Diagnoses     DDD (degenerative disc disease), lumbar    -  1    Chronic pain syndrome           Follow-ups after your visit        Follow-up notes from your care team     Return in about 1 month (around 6/2/2017).      Your next 10 appointments already scheduled     Jun 06, 2017 10:00 AM CDT   [facility]    [address] [facility] [address] [phone]              Who to contact     If you have questions or need follow up information about today's clinic visit or your schedule please contact [facility] directly at [phone].  Normal or non-critical lab and imaging results will be communicated to you by MyChart, letter or phone within 4 business days after the clinic has received the results. If you do not hear from us within 7 days, please contact the clinic through [employer] or phone. If you have a critical or abnormal lab result, we will notify you by phone as soon as possible.  Submit refill requests through [employer] or call your pharmacy and they will forward the refill request to us. Please allow 3 business days for your refill to be completed.          Additional Information About Your Visit        MyChart Information     [employer] gives you secure access to your electronic health record. If you see a primary care provider, you can also send messages to your care team and make appointments. If you have questions, please call your primary care clinic.  If you do not have a primary care provider, please call [phone] and they will assist you.        Care EveryWhere ID     This is your Care EveryWhere  ID. This could be used by other organizations to access your [place] medical records  [id number]        Your Vitals Were     Pulse Temperature Respirations Height Pulse Oximetry BMI (Body Mass Index)    80 98.7  F (37.1  C) (Oral) 16 1.829 m (6') 95% 38.35 kg/m2       Blood Pressure from Last 3 Encounters:   05/02/17 128/86   04/04/17 124/86   03/07/17 118/82    Weight from Last 3 Encounters:   05/02/17 128.3 kg (282 lb 12.8 oz)   04/04/17 127.4 kg (280 lb 12.8 oz)   03/07/17 129.5 kg (285 lb 6.4 oz)              Today, you had the following     No orders found for display         Where to get your medicines      Some of these will need a paper prescription and others can be bought over the counter.  Ask your nurse if you have questions.     Bring a paper prescription for each of these medications     diazepam 5 MG tablet    HYDROmorphone 4 MG tablet    morphine 30 MG 12 hr tablet          Primary Care Provider Office Phone # Fax #    Cat Tenzin Shaw [phone]       [place] [id number]        Thank you!     Thank you for choosing [facility]  for your care. Our goal is always to provide you with excellent care. Hearing back from our patients is one way we can continue to improve our services. Please take a few minutes to complete the written survey that you may receive in the mail after your visit with us. Thank you!             Your Updated Medication List - Protect others around you: Learn how to safely use, store and throw away your medicines at www.disposemymeds.org.          This list is accurate as of: 5/2/17 11:03 AM.  Always use your most recent med list.                   Brand Name Dispense Instructions for use    ABILIFY 10 MG tablet   Generic drug:  ARIPiprazole      Take 10 mg by mouth daily       buPROPion 150 MG 24 hr tablet    WELLBUTRIN XL     Take 450 mg by mouth       cyclobenzaprine 10 MG tablet     FLEXERIL    90 tablet    Take 1 tablet (10 mg) by mouth 3 times daily as needed for muscle spasms       diazepam 5 MG tablet    VALIUM    30 tablet    Take 1 tablet (5 mg) by mouth nightly as needed       EPINEPHrine 0.3 MG/0.3ML injection     1 each    Inject 0.3 mLs (0.3 mg) into the muscle once as needed for anaphylaxis       gabapentin 600 MG tablet    NEURONTIN    90 tablet    TAKE ONE TABLET BY MOUTH THREE TIMES DAILY       HYDROmorphone 4 MG tablet    DILAUDID    180 tablet    Take 2 tablets (8 mg) by mouth 4 times daily as needed for moderate to severe pain (every 4-6hour prn)       hydrOXYzine 50 MG capsule    VISTARIL     Take 50 mg by mouth       LAMICTAL 150 MG tablet   Generic drug:  lamoTRIgine      Take 250 mg by mouth daily       morphine 30 MG 12 hr tablet    MS CONTIN    90 tablet    Take 1 tablet (30 mg) by mouth 3 times daily       omeprazole 40 MG capsule    priLOSEC    30 capsule    TAKE 1 CAPSULE (40 MG) BY MOUTH DAILY TAKE 30-60 MINUTES BEFORE A MEAL.       ondansetron 4 MG ODT tab    ZOFRAN-ODT    20 tablet    PLACE 1 OR 2 TABLETS UNDER THE TONGUE 3 TIMES A DAY AS NEEDED FOR NAUSEA       rOPINIRole 0.25 MG tablet    REQUIP    60 tablet    Take 1-2 tablets (0.25-0.5 mg) by mouth At Bedtime       traZODone 150 MG tablet    DESYREL     Take 150 mg by mouth       triamcinolone 0.1 % cream    KENALOG    30 g    Apply topically 3 times daily as needed (itch)       zolpidem 10 MG tablet    AMBIEN    30 tablet    Take 1 tablet (10 mg) by mouth nightly as needed for sleep

## 2017-05-03 ASSESSMENT — ANXIETY QUESTIONNAIRES: GAD7 TOTAL SCORE: 21

## 2017-05-03 ASSESSMENT — PATIENT HEALTH QUESTIONNAIRE - PHQ9: SUM OF ALL RESPONSES TO PHQ QUESTIONS 1-9: 26

## 2017-05-10 ENCOUNTER — TRANSFERRED RECORDS (OUTPATIENT)
Dept: HEALTH INFORMATION MANAGEMENT | Facility: CLINIC | Age: 57
End: 2017-05-10

## 2017-05-22 DIAGNOSIS — M51.369 DDD (DEGENERATIVE DISC DISEASE), LUMBAR: ICD-10-CM

## 2017-05-22 RX ORDER — CYCLOBENZAPRINE HCL 10 MG
TABLET ORAL
Qty: 90 TABLET | Refills: 5 | Status: SHIPPED | OUTPATIENT
Start: 2017-05-22 | End: 2017-11-14

## 2017-05-22 NOTE — TELEPHONE ENCOUNTER
cyclobenzaprine (FLEXERIL) 10 MG tablet      Last Written Prescription Date:  10/26/16  Last Fill Quantity: 90,   # refills: 5  Last Office Visit with [facility], [facility] or [facility] prescribing provider: 5/2/17 Dr. Shaw    Future Office visit:    Next 5 appointments (look out 90 days)     Jun 02, 2017 10:00 AM CDT   SHORT with Cat Shaw MD   [facility] ([address] [id number] [phone]                   Routing refill request to provider for review/approval because:  Drug not on the [facility], [facility] or [facility] refill protocol or controlled substance

## 2017-06-02 ENCOUNTER — OFFICE VISIT (OUTPATIENT)
Dept: FAMILY MEDICINE | Facility: CLINIC | Age: 57
End: 2017-06-02
Payer: MEDICAID

## 2017-06-02 VITALS
BODY MASS INDEX: 38.28 KG/M2 | RESPIRATION RATE: 12 BRPM | HEIGHT: 72 IN | TEMPERATURE: 97.6 F | OXYGEN SATURATION: 98 % | DIASTOLIC BLOOD PRESSURE: 88 MMHG | WEIGHT: 282.6 LBS | SYSTOLIC BLOOD PRESSURE: 118 MMHG | HEART RATE: 82 BPM

## 2017-06-02 DIAGNOSIS — M51.369 DDD (DEGENERATIVE DISC DISEASE), LUMBAR: Primary | ICD-10-CM

## 2017-06-02 DIAGNOSIS — G89.4 CHRONIC PAIN SYNDROME: ICD-10-CM

## 2017-06-02 DIAGNOSIS — R11.0 NAUSEA: ICD-10-CM

## 2017-06-02 PROCEDURE — 99213 OFFICE O/P EST LOW 20 MIN: CPT | Performed by: FAMILY MEDICINE

## 2017-06-02 RX ORDER — MORPHINE SULFATE 30 MG/1
30 TABLET, FILM COATED, EXTENDED RELEASE ORAL 3 TIMES DAILY
Qty: 90 TABLET | Refills: 0 | Status: SHIPPED | OUTPATIENT
Start: 2017-06-02 | End: 2017-07-05

## 2017-06-02 RX ORDER — DIAZEPAM 5 MG
5 TABLET ORAL
Qty: 30 TABLET | Refills: 0 | Status: SHIPPED | OUTPATIENT
Start: 2017-06-02 | End: 2017-07-05

## 2017-06-02 RX ORDER — HYDROMORPHONE HYDROCHLORIDE 4 MG/1
8 TABLET ORAL 4 TIMES DAILY PRN
Qty: 180 TABLET | Refills: 0 | Status: SHIPPED | OUTPATIENT
Start: 2017-06-02 | End: 2017-07-05

## 2017-06-02 NOTE — PROGRESS NOTES
SUBJECTIVE:                                                    Oleg Ford is a 57 year old male who presents to clinic today for the following health issues:      Back Pain Follow Up       Description:   Location of pain:  right  Character of pain: sharp and burning  Pain radiation: radiates into the right buttocks and and leg  Since last visit, pain is:  worsened  New numbness or weakness in legs, not attributed to pain:  YES    Intensity: Currently 8/10    History:   Pain interferes with job: Not applicable  Therapies tried without relief: ibuprofen  Therapies tried with relief: dilaudid           SUBJECTIVE:  Here today in follow-up of chronic back pain. Well-known significant degeneration including SI dysfunction. Still awaiting insurance approval on SI joint fusion. Pain medication is helping and making him functional. Able to fish, walk, perform other activities with the help of the medication. No significant side effects.    Review of systems otherwise negative.  Past medical, family, and social history reviewed and updated in chart.    OBJECTIVE:  /88 (BP Location: Right arm, Patient Position: Chair, Cuff Size: Adult Regular)  Pulse 82  Temp 97.6  F (36.4  C) (Oral)  Resp 12  Ht 1.829 m (6')  Wt 128.2 kg (282 lb 9.6 oz)  SpO2 98%  BMI 38.33 kg/m2  Alert, pleasant, upbeat, and in no apparent discomfort.  S1 and S2 normal, no murmurs, clicks, gallops or rubs. Regular rate and rhythm. Chest is clear; no wheezes or rales. No edema or JVD.  Past labs reviewed with the patient.     ASSESSMENT / PLAN:  (M51.36) DDD (degenerative disc disease), lumbar  (primary encounter diagnosis)  Comment: Doing well on current dosage. Continue  Plan: HYDROmorphone (DILAUDID) 4 MG tablet, morphine         (MS CONTIN) 30 MG 12 hr tablet, diazepam         (VALIUM) 5 MG tablet            (G89.4) Chronic pain syndrome  Comment:   Plan: HYDROmorphone (DILAUDID) 4 MG tablet, morphine         (MS CONTIN) 30 MG 12 hr  tablet, diazepam         (VALIUM) 5 MG tablet            Follow up one month  SANTIAGO Shaw MD    (Chart documentation completed in part with Dragon voice-recognition software.  Even though reviewed some grammatical, spelling, and word errors may remain.)

## 2017-06-02 NOTE — TELEPHONE ENCOUNTER
ondansetron (ZOFRAN-ODT) 4 MG ODT tab      Last Written Prescription Date: 5/1/17  Last Fill Quantity: 20,  # refills: 0   Last Office Visit with [facility], [facility] or [facility] prescribing provider: 5/2/17                                         Next 5 appointments (look out 90 days)     Jun 02, 2017 10:00 AM CDT   [facility] Cat Shaw MD   [facility]    [address] [place] [id number] [phone]

## 2017-06-02 NOTE — MR AVS SNAPSHOT
After Visit Summary   6/2/2017    Oleg Ford    MRN: [id number]           Patient Information     Date Of Birth          1960        Visit Information        Provider Department      6/2/2017 10:00 AM Cat Shaw MD [facility]        Today's Diagnoses     DDD (degenerative disc disease), lumbar    -  1    Chronic pain syndrome           Follow-ups after your visit        Follow-up notes from your care team     Return in about 1 month (around 7/2/2017).      Your next 10 appointments already scheduled     Jul 05, 2017 10:00 AM CDT   [facility]    [address] [place] [id number] [phone]           Bring a current list of meds and any records pertaining to this visit.  For Physicals, please bring immunization records and any forms needing to be filled out.  Please arrive 10 minutes early to complete paperwork.              Who to contact     If you have questions or need follow up information about today's clinic visit or your schedule please contact [facility] directly at [phone].  Normal or non-critical lab and imaging results will be communicated to you by MyChart, letter or phone within 4 business days after the clinic has received the results. If you do not hear from us within 7 days, please contact the clinic through [employer] or phone. If you have a critical or abnormal lab result, we will notify you by phone as soon as possible.  Submit refill requests through [employer] or call your pharmacy and they will forward the refill request to us. Please allow 3 business days for your refill to be completed.          Additional Information About Your Visit        MyChart Information     [employer] gives you secure access to your electronic health record. If you see a primary care provider, you can also send messages to your care team and  make appointments. If you have questions, please call your primary care clinic.  If you do not have a primary care provider, please call [phone] and they will assist you.        Care EveryWhere ID     This is your Care EveryWhere ID. This could be used by other organizations to access your [place] [facility] medical records  [id number]        Your Vitals Were     Pulse Temperature Respirations Height Pulse Oximetry BMI (Body Mass Index)    82 97.6  F (36.4  C) (Oral) 12 1.829 m (6') 98% 38.33 kg/m2       Blood Pressure from Last 3 Encounters:   06/02/17 118/88   05/02/17 128/86   04/04/17 124/86    Weight from Last 3 Encounters:   06/02/17 128.2 kg (282 lb 9.6 oz)   05/02/17 128.3 kg (282 lb 12.8 oz)   04/04/17 127.4 kg (280 lb 12.8 oz)              Today, you had the following     No orders found for display         Where to get your medicines      Some of these will need a paper prescription and others can be bought over the counter.  Ask your nurse if you have questions.     Bring a paper prescription for each of these medications     diazepam 5 MG tablet    HYDROmorphone 4 MG tablet    morphine 30 MG 12 hr tablet          Primary Care Provider Office Phone # Fax #    Cat Tenzin Shaw [phone]       [address] [address]        Thank you!     Thank you for choosing [facility]  for your care. Our goal is always to provide you with excellent care. Hearing back from our patients is one way we can continue to improve our services. Please take a few minutes to complete the written survey that you may receive in the mail after your visit with us. Thank you!             Your Updated Medication List - Protect others around you: Learn how to safely use, store and throw away your medicines at www.disposemymeds.org.          This list is accurate as of: 6/2/17 10:33 AM.  Always use your most recent med list.                   Brand Name  Dispense Instructions for use    ABILIFY 10 MG tablet   Generic drug:  ARIPiprazole      Take 10 mg by mouth daily       buPROPion 150 MG 24 hr tablet    WELLBUTRIN XL     Take 450 mg by mouth       cyclobenzaprine 10 MG tablet    FLEXERIL    90 tablet    TAKE ONE TABLET BY MOUTH 3 TIMES A DAY AS NEEDED FOR MUSCLE SPASMS       diazepam 5 MG tablet    VALIUM    30 tablet    Take 1 tablet (5 mg) by mouth nightly as needed       EPINEPHrine 0.3 MG/0.3ML injection     1 each    Inject 0.3 mLs (0.3 mg) into the muscle once as needed for anaphylaxis       gabapentin 600 MG tablet    NEURONTIN    90 tablet    TAKE ONE TABLET BY MOUTH THREE TIMES DAILY       HYDROmorphone 4 MG tablet    DILAUDID    180 tablet    Take 2 tablets (8 mg) by mouth 4 times daily as needed for moderate to severe pain (every 4-6hour prn)       hydrOXYzine 50 MG capsule    VISTARIL     Take 50 mg by mouth       LAMICTAL 150 MG tablet   Generic drug:  lamoTRIgine      Take 250 mg by mouth daily       morphine 30 MG 12 hr tablet    MS CONTIN    90 tablet    Take 1 tablet (30 mg) by mouth 3 times daily       omeprazole 40 MG capsule    priLOSEC    30 capsule    TAKE 1 CAPSULE (40 MG) BY MOUTH DAILY TAKE 30-60 MINUTES BEFORE A MEAL.       ondansetron 4 MG ODT tab    ZOFRAN-ODT    20 tablet    PLACE 1 OR 2 TABLETS UNDER THE TONGUE 3 TIMES A DAY AS NEEDED FOR NAUSEA       rOPINIRole 0.25 MG tablet    REQUIP    60 tablet    Take 1-2 tablets (0.25-0.5 mg) by mouth At Bedtime       traZODone 150 MG tablet    DESYREL     Take 150 mg by mouth       triamcinolone 0.1 % cream    KENALOG    30 g    Apply topically 3 times daily as needed (itch)       zolpidem 10 MG tablet    AMBIEN    30 tablet    Take 1 tablet (10 mg) by mouth nightly as needed for sleep

## 2017-06-02 NOTE — NURSING NOTE
Chief Complaint   Patient presents with     Back Pain       Initial /88 (BP Location: Right arm, Patient Position: Chair, Cuff Size: Adult Regular)  Pulse 82  Temp 97.6  F (36.4  C) (Oral)  Resp 12  Ht 1.829 m (6')  Wt 128.2 kg (282 lb 9.6 oz)  SpO2 98%  BMI 38.33 kg/m2 Estimated body mass index is 38.33 kg/(m^2) as calculated from the following:    Height as of this encounter: 1.829 m (6').    Weight as of this encounter: 128.2 kg (282 lb 9.6 oz).  Medication Reconciliation: complete     Chapis Javier

## 2017-06-06 RX ORDER — ONDANSETRON 4 MG/1
TABLET, ORALLY DISINTEGRATING ORAL
Qty: 20 TABLET | Refills: 1 | Status: SHIPPED | OUTPATIENT
Start: 2017-06-06 | End: 2017-09-15

## 2017-07-05 ENCOUNTER — OFFICE VISIT (OUTPATIENT)
Dept: FAMILY MEDICINE | Facility: CLINIC | Age: 57
End: 2017-07-05
Payer: MEDICAID

## 2017-07-05 VITALS
DIASTOLIC BLOOD PRESSURE: 80 MMHG | RESPIRATION RATE: 16 BRPM | WEIGHT: 281.5 LBS | OXYGEN SATURATION: 97 % | TEMPERATURE: 98 F | HEIGHT: 72 IN | BODY MASS INDEX: 38.13 KG/M2 | HEART RATE: 76 BPM | SYSTOLIC BLOOD PRESSURE: 126 MMHG

## 2017-07-05 DIAGNOSIS — G89.4 CHRONIC PAIN SYNDROME: ICD-10-CM

## 2017-07-05 DIAGNOSIS — M51.369 DDD (DEGENERATIVE DISC DISEASE), LUMBAR: ICD-10-CM

## 2017-07-05 DIAGNOSIS — Z23 NEED FOR TDAP VACCINATION: Primary | ICD-10-CM

## 2017-07-05 PROCEDURE — 90715 TDAP VACCINE 7 YRS/> IM: CPT | Performed by: FAMILY MEDICINE

## 2017-07-05 PROCEDURE — 90471 IMMUNIZATION ADMIN: CPT | Performed by: FAMILY MEDICINE

## 2017-07-05 PROCEDURE — 99213 OFFICE O/P EST LOW 20 MIN: CPT | Mod: 25 | Performed by: FAMILY MEDICINE

## 2017-07-05 RX ORDER — DIAZEPAM 5 MG
5 TABLET ORAL
Qty: 30 TABLET | Refills: 0 | Status: SHIPPED | OUTPATIENT
Start: 2017-07-05 | End: 2017-08-09

## 2017-07-05 RX ORDER — MORPHINE SULFATE 30 MG/1
30 TABLET, FILM COATED, EXTENDED RELEASE ORAL 3 TIMES DAILY
Qty: 90 TABLET | Refills: 0 | Status: SHIPPED | OUTPATIENT
Start: 2017-07-05 | End: 2017-08-09

## 2017-07-05 RX ORDER — HYDROMORPHONE HYDROCHLORIDE 4 MG/1
8 TABLET ORAL 4 TIMES DAILY PRN
Qty: 180 TABLET | Refills: 0 | Status: SHIPPED | OUTPATIENT
Start: 2017-07-05 | End: 2017-08-09

## 2017-07-05 ASSESSMENT — PATIENT HEALTH QUESTIONNAIRE - PHQ9: 5. POOR APPETITE OR OVEREATING: NEARLY EVERY DAY

## 2017-07-05 ASSESSMENT — ANXIETY QUESTIONNAIRES
IF YOU CHECKED OFF ANY PROBLEMS ON THIS QUESTIONNAIRE, HOW DIFFICULT HAVE THESE PROBLEMS MADE IT FOR YOU TO DO YOUR WORK, TAKE CARE OF THINGS AT HOME, OR GET ALONG WITH OTHER PEOPLE: EXTREMELY DIFFICULT
GAD7 TOTAL SCORE: 20
7. FEELING AFRAID AS IF SOMETHING AWFUL MIGHT HAPPEN: NEARLY EVERY DAY
3. WORRYING TOO MUCH ABOUT DIFFERENT THINGS: NEARLY EVERY DAY
2. NOT BEING ABLE TO STOP OR CONTROL WORRYING: NEARLY EVERY DAY
5. BEING SO RESTLESS THAT IT IS HARD TO SIT STILL: NEARLY EVERY DAY
1. FEELING NERVOUS, ANXIOUS, OR ON EDGE: NEARLY EVERY DAY
6. BECOMING EASILY ANNOYED OR IRRITABLE: MORE THAN HALF THE DAYS

## 2017-07-05 ASSESSMENT — PAIN SCALES - GENERAL: PAINLEVEL: SEVERE PAIN (7)

## 2017-07-05 NOTE — NURSING NOTE
Screening Questionnaire for Adult Immunization    Are you sick today?   No   Do you have allergies to medications, food, a vaccine component or latex?   No   Have you ever had a serious reaction after receiving a vaccination?   No   Do you have a long-term health problem with heart disease, lung disease, asthma, kidney disease, metabolic disease (e.g. diabetes), anemia, or other blood disorder?   No   Do you have cancer, leukemia, HIV/AIDS, or any other immune system problem?   No   In the past 3 months, have you taken medications that affect  your immune system, such as prednisone, other steroids, or anticancer drugs; drugs for the treatment of rheumatoid arthritis, Crohn s disease, or psoriasis; or have you had radiation treatments?   No   Have you had a seizure, or a brain or other nervous system problem?   No   During the past year, have you received a transfusion of blood or blood     products, or been given immune (gamma) globulin or antiviral drug?   No   For women: Are you pregnant or is there a chance you could become        pregnant during the next month?   No   Have you received any vaccinations in the past 4 weeks?   No     Immunization questionnaire answers were all negative.      MNVFC doesn't apply on this patient    Per orders of Dr. Shaw, injection of tdap given by Jeovanny Damian. Patient instructed to remain in clinic for 15 minutes afterwards, and to report any adverse reaction to me immediately.       Screening performed by Jeovanny Damian on 7/5/2017 at 10:31 AM.

## 2017-07-05 NOTE — NURSING NOTE
Chief Complaint   Patient presents with     RECHECK     L knee surgery on 630/17 -   [facility] [place], [place]       Initial /80 (BP Location: Right arm, Patient Position: Right side, Cuff Size: Adult Regular)  Pulse 76  Temp 98  F (36.7  C) (Oral)  Resp 16  Ht 1.829 m (6')  Wt 127.7 kg (281 lb 8 oz)  SpO2 97%  BMI 38.18 kg/m2 Estimated body mass index is 38.18 kg/(m^2) as calculated from the following:    Height as of this encounter: 1.829 m (6').    Weight as of this encounter: 127.7 kg (281 lb 8 oz).  Medication Reconciliation: complete     Bhupendra Damian MA

## 2017-07-05 NOTE — PROGRESS NOTES
SUBJECTIVE:                                                    Oleg Ford is a 57 year old male who presents to clinic today for the following health issues:      Chronic Pain Follow-Up       Type / Location of Pain: L knee, back  Analgesia/pain control:       Recent changes:  same      Overall control: Tolerable with discomfort  Activity level/function:      Daily activities:  Unable to perform most daily activities - chores, hobbies, social activities, driving    Work:  not applicable  Adverse effects:  No  Adherance    Taking medication as directed?  Yes    Participating in other treatments: None   Risk Factors:    Sleep:  Fair    Mood/anxiety:  controlled    Recent family or social stressors:  none noted    Other aggravating factors: none  PHQ-9 SCORE 3/7/2017 4/4/2017 5/2/2017   Total Score MyChart - - -   Total Score 27 24 26     GINI-7 SCORE 3/7/2017 4/4/2017 5/2/2017   Total Score - - -   Total Score 21 17 21     Encounter-Level CSA - 12/01/2015:                 Controlled Substance Agreement - Scan on 12/14/2015 11:49 AM : CONTROLLED SUBSTANCE AGREEMENT (below)            SUBJECTIVE:  Here today in follow-up of chronic back pain related to significant lumbar degeneration and SI joint dysfunction. The surgeon, Dr. Vasquez, that he thought was going to operate on his SI joint is evidently retired. Luckily the patient was able to find another individual out of [place] to perform similar procedures and is undergoing evaluation for this.    Review of systems otherwise negative.  Past medical, family, and social history reviewed and updated in chart.    OBJECTIVE:  /80 (BP Location: Right arm, Patient Position: Right side, Cuff Size: Adult Regular)  Pulse 76  Temp 98  F (36.7  C) (Oral)  Resp 16  Ht 1.829 m (6')  Wt 127.7 kg (281 lb 8 oz)  SpO2 97%  BMI 38.18 kg/m2  Alert, pleasant, upbeat, and in no apparent discomfort.  S1 and S2 normal, no murmurs, clicks, gallops or rubs. Regular rate and  rhythm. Chest is clear; no wheezes or rales. No edema or JVD.  Past labs reviewed with the patient.     ASSESSMENT / PLAN:  (G89.4) Chronic pain syndrome  Comment: Doing well on current dosage of medication. Refilled.  Plan: HYDROmorphone (DILAUDID) 4 MG tablet, morphine         (MS CONTIN) 30 MG 12 hr tablet, diazepam         (VALIUM) 5 MG tablet            (M51.36) DDD (degenerative disc disease), lumbar  Comment: As above  Plan: HYDROmorphone (DILAUDID) 4 MG tablet, morphine         (MS CONTIN) 30 MG 12 hr tablet, diazepam         (VALIUM) 5 MG tablet             Follow up monthly  S. Tenzin Shaw MD    (Chart documentation completed in part with Dragon voice-recognition software.  Even though reviewed some grammatical, spelling, and word errors may remain.)

## 2017-07-05 NOTE — MR AVS SNAPSHOT
After Visit Summary   7/5/2017    Oleg Ford    MRN: [id number]           Patient Information     Date Of Birth          1960        Visit Information        Provider Department      7/5/2017 10:00 AM Cat Shaw MD [facility]        Today's Diagnoses     Need for Tdap vaccination    -  1    Chronic pain syndrome        DDD (degenerative disc disease), lumbar           Follow-ups after your visit        Follow-up notes from your care team     Return in about 1 month (around 8/5/2017).      Your next 10 appointments already scheduled     Aug 09, 2017 10:00 AM CDT   Office Visit with Cat Shaw MD   [facility]    [address] [place] [id number] [phone]           Bring a current list of meds and any records pertaining to this visit.  For Physicals, please bring immunization records and any forms needing to be filled out.  Please arrive 10 minutes early to complete paperwork.              Who to contact     If you have questions or need follow up information about today's clinic visit or your schedule please contact [facility] directly at [phone].  Normal or non-critical lab and imaging results will be communicated to you by MyChart, letter or phone within 4 business days after the clinic has received the results. If you do not hear from us within 7 days, please contact the clinic through [employer] or phone. If you have a critical or abnormal lab result, we will notify you by phone as soon as possible.  Submit refill requests through [employer] or call your pharmacy and they will forward the refill request to us. Please allow 3 business days for your refill to be completed.          Additional Information About Your Visit        MyChart Information     [employer] gives you secure access to your electronic health record. If you see a primary care provider, you can also send  messages to your care team and make appointments. If you have questions, please call your primary care clinic.  If you do not have a primary care provider, please call [phone] and they will assist you.        Care EveryWhere ID     This is your Care EveryWhere ID. This could be used by other organizations to access your [facility] medical records  [id number]        Your Vitals Were     Pulse Temperature Respirations Height Pulse Oximetry BMI (Body Mass Index)    76 98  F (36.7  C) (Oral) 16 1.829 m (6') 97% 38.18 kg/m2       Blood Pressure from Last 3 Encounters:   07/05/17 126/80   06/02/17 118/88   05/02/17 128/86    Weight from Last 3 Encounters:   07/05/17 127.7 kg (281 lb 8 oz)   06/02/17 128.2 kg (282 lb 9.6 oz)   05/02/17 128.3 kg (282 lb 12.8 oz)              We Performed the Following     TDAP (ADACEL)     VACCINE ADMINISTRATION, INITIAL          Where to get your medicines      Some of these will need a paper prescription and others can be bought over the counter.  Ask your nurse if you have questions.     Bring a paper prescription for each of these medications     diazepam 5 MG tablet    HYDROmorphone 4 MG tablet    morphine 30 MG 12 hr tablet          Primary Care Provider Office Phone # Fax #    Cat Tenzin Shaw [phone]       [place] [address]        Equal Access to Services     [facility] : Hadii mayuri daigleo Soelizabeth, waaxda luqadaha, qaybta kaalmada lina, kamala smith. So [facility] [phone].    ATENCIÓN: Si habla español, tiene a gongora disposición servicios gratuitos de asistencia lingüística. Llame al [phone].    We comply with applicable federal civil rights laws and [place] laws. We do not discriminate on the basis of race, color, national origin, age, disability sex, sexual orientation or gender identity.            Thank you!     Thank you for choosing [facility]  for your care. Our goal is always to provide you with excellent care. Hearing back from our patients is one way we can continue to improve our services. Please take a few minutes to complete the written survey that you may receive in the mail after your visit with us. Thank you!             Your Updated Medication List - Protect others around you: Learn how to safely use, store and throw away your medicines at www.disposemymeds.org.          This list is accurate as of: 7/5/17 10:29 AM.  Always use your most recent med list.                   Brand Name Dispense Instructions for use Diagnosis    ABILIFY 10 MG tablet   Generic drug:  ARIPiprazole      Take 10 mg by mouth daily        buPROPion 150 MG 24 hr tablet    WELLBUTRIN XL     Take 450 mg by mouth        cyclobenzaprine 10 MG tablet    FLEXERIL    90 tablet    TAKE ONE TABLET BY MOUTH 3 TIMES A DAY AS NEEDED FOR MUSCLE SPASMS    DDD (degenerative disc disease), lumbar       diazepam 5 MG tablet    VALIUM    30 tablet    Take 1 tablet (5 mg) by mouth nightly as needed    DDD (degenerative disc disease), lumbar, Chronic pain syndrome       EPINEPHrine 0.3 MG/0.3ML injection     1 each    Inject 0.3 mLs (0.3 mg) into the muscle once as needed for anaphylaxis    Bee sting allergy       gabapentin 600 MG tablet    NEURONTIN    90 tablet    TAKE ONE TABLET BY MOUTH THREE TIMES DAILY    DDD (degenerative disc disease), lumbar       HYDROmorphone 4 MG tablet    DILAUDID    180 tablet    Take 2 tablets (8 mg) by mouth 4 times daily as needed for moderate to severe pain (every 4-6hour prn)    Chronic pain syndrome, DDD (degenerative disc disease), lumbar       hydrOXYzine 50 MG capsule    VISTARIL     Take 50 mg by mouth        LAMICTAL 150 MG tablet   Generic drug:  lamoTRIgine      Take 250 mg by mouth daily        morphine 30 MG 12 hr tablet    MS CONTIN    90 tablet    Take 1 tablet (30 mg) by mouth 3 times daily    Chronic pain syndrome, DDD  (degenerative disc disease), lumbar       omeprazole 40 MG capsule    priLOSEC    30 capsule    TAKE 1 CAPSULE (40 MG) BY MOUTH DAILY TAKE 30-60 MINUTES BEFORE A MEAL.    Gastroesophageal reflux disease with esophagitis       ondansetron 4 MG ODT tab    ZOFRAN-ODT    20 tablet    PLACE 1 OR 2 TABLETS UNDER THE TONGUE 3 TIMES A DAY AS NEEDED FOR NAUSEA    Nausea       rOPINIRole 0.25 MG tablet    REQUIP    60 tablet    Take 1-2 tablets (0.25-0.5 mg) by mouth At Bedtime    RLS (restless legs syndrome)       traZODone 150 MG tablet    DESYREL     Take 150 mg by mouth        triamcinolone 0.1 % cream    KENALOG    30 g    Apply topically 3 times daily as needed (itch)    DDD (degenerative disc disease), lumbar       zolpidem 10 MG tablet    AMBIEN    30 tablet    Take 1 tablet (10 mg) by mouth nightly as needed for sleep    Insomnia, unspecified insomnia

## 2017-07-06 ASSESSMENT — ANXIETY QUESTIONNAIRES: GAD7 TOTAL SCORE: 20

## 2017-07-06 ASSESSMENT — PATIENT HEALTH QUESTIONNAIRE - PHQ9: SUM OF ALL RESPONSES TO PHQ QUESTIONS 1-9: 24

## 2017-08-09 ENCOUNTER — OFFICE VISIT (OUTPATIENT)
Dept: FAMILY MEDICINE | Facility: CLINIC | Age: 57
End: 2017-08-09
Payer: MEDICAID

## 2017-08-09 VITALS
HEART RATE: 87 BPM | WEIGHT: 282.2 LBS | SYSTOLIC BLOOD PRESSURE: 120 MMHG | HEIGHT: 72 IN | OXYGEN SATURATION: 97 % | DIASTOLIC BLOOD PRESSURE: 88 MMHG | BODY MASS INDEX: 38.22 KG/M2 | TEMPERATURE: 97.9 F

## 2017-08-09 DIAGNOSIS — M51.369 DDD (DEGENERATIVE DISC DISEASE), LUMBAR: ICD-10-CM

## 2017-08-09 DIAGNOSIS — G89.4 CHRONIC PAIN SYNDROME: Primary | ICD-10-CM

## 2017-08-09 PROCEDURE — 99213 OFFICE O/P EST LOW 20 MIN: CPT | Performed by: FAMILY MEDICINE

## 2017-08-09 RX ORDER — MORPHINE SULFATE 30 MG/1
30 TABLET, FILM COATED, EXTENDED RELEASE ORAL 3 TIMES DAILY
Qty: 90 TABLET | Refills: 0 | Status: SHIPPED | OUTPATIENT
Start: 2017-08-09 | End: 2017-09-06

## 2017-08-09 RX ORDER — GABAPENTIN 600 MG/1
600 TABLET ORAL 3 TIMES DAILY
Qty: 90 TABLET | Refills: 5 | Status: SHIPPED | OUTPATIENT
Start: 2017-08-09 | End: 2017-09-25

## 2017-08-09 RX ORDER — HYDROMORPHONE HYDROCHLORIDE 4 MG/1
8 TABLET ORAL 4 TIMES DAILY PRN
Qty: 180 TABLET | Refills: 0 | Status: SHIPPED | OUTPATIENT
Start: 2017-08-09 | End: 2017-09-06

## 2017-08-09 RX ORDER — DIAZEPAM 5 MG
5 TABLET ORAL
Qty: 30 TABLET | Refills: 0 | Status: SHIPPED | OUTPATIENT
Start: 2017-08-09 | End: 2017-09-06

## 2017-08-09 ASSESSMENT — PAIN SCALES - GENERAL: PAINLEVEL: NO PAIN (0)

## 2017-08-09 NOTE — LETTER
[address] [address] [phone]        August 9, 2017  Oleg Ford  1960        To whom it may concern:    I am the primary physician for Mr. Oleg Ford, and appeared for him for the past 10 years.  In regards to his mental health status as it relates to upcoming surgical procedures, he has a long history of significant depression. But this is actually under very good control on his current regimen of medications. Much of his depression is directly related to the amount of pain and dysfunction from his chronic back issues. The proposed surgical procedures, if successful, will likely help contribute to improvement in overall affect. I feel there are no mental health restrictions for any upcoming surgical procedures.        Sincerely,        SANTIAGO Shaw M.D.

## 2017-08-09 NOTE — PROGRESS NOTES
SUBJECTIVE:                                                    Oleg Ford is a 57 year old male who presents to clinic today for the following health issues:      Medication Followup of all medication    Taking Medication as prescribed: yes    Side Effects:  None    Medication Helping Symptoms:  yes     SUBJECTIVE:  Here today in follow-up on chronic back issues. The ball is actually rolling on SI joint fusion. Recently had bilateral SI joint injections these have been successful in relieving some of the pain. There is a system of prior authorization that he will need to go through including an attestation from me on his mental health status. Things are doing well on his current regimen of medications.  No side effects. Take medications as prescribed    Review of systems otherwise negative.  Past medical, family, and social history reviewed and updated in chart.    OBJECTIVE:  /88 (BP Location: Right arm, Patient Position: Chair, Cuff Size: Adult Large)  Pulse 87  Temp 97.9  F (36.6  C) (Oral)  Ht 1.829 m (6')  Wt 128 kg (282 lb 3.2 oz)  SpO2 97%  BMI 38.27 kg/m2  Alert, pleasant, upbeat, and in no apparent discomfort.  S1 and S2 normal, no murmurs, clicks, gallops or rubs. Regular rate and rhythm. Chest is clear; no wheezes or rales. No edema or JVD.  Past labs reviewed with the patient.     ASSESSMENT / PLAN:  (G89.4) Chronic pain syndrome  (primary encounter diagnosis)  Comment: Doing well on current regimen. Continu  Plan: morphine (MS CONTIN) 30 MG 12 hr tablet,         HYDROmorphone (DILAUDID) 4 MG tablet, diazepam         (VALIUM) 5 MG tablet            (M51.36) DDD (degenerative disc disease), lumbar  Comment: As above  Plan: morphine (MS CONTIN) 30 MG 12 hr tablet,         HYDROmorphone (DILAUDID) 4 MG tablet, diazepam         (VALIUM) 5 MG tablet, gabapentin (NEURONTIN)         600 MG tablet            Follow up monthly  SMaureen Shaw MD    (Chart documentation completed in part  with Dragon voice-recognition software.  Even though reviewed some grammatical, spelling, and word errors may remain.)

## 2017-08-09 NOTE — MR AVS SNAPSHOT
After Visit Summary   8/9/2017    Oleg Ford    MRN: [id number]           Patient Information     Date Of Birth          1960        Visit Information        Provider Department      8/9/2017 10:00 AM Cat Shaw MD [facility]        Today's Diagnoses     Chronic pain syndrome    -  1    DDD (degenerative disc disease), lumbar           Follow-ups after your visit        Follow-up notes from your care team     Return in about 1 month (around 9/9/2017).      Your next 10 appointments already scheduled     Sep 06, 2017 10:00 AM CDT   [facility] with Cat Shaw MD   [facility]    [address] [place] [address] [phone]           Bring a current list of meds and any records pertaining to this visit. For Physicals, please bring immunization records and any forms needing to be filled out. Please arrive 10 minutes early to complete paperwork.              Who to contact     If you have questions or need follow up information about today's clinic visit or your schedule please contact [facility] directly at [phone].  Normal or non-critical lab and imaging results will be communicated to you by MyChart, letter or phone within 4 business days after the clinic has received the results. If you do not hear from us within 7 days, please contact the clinic through [employer] or phone. If you have a critical or abnormal lab result, we will notify you by phone as soon as possible.  Submit refill requests through [employer] or call your pharmacy and they will forward the refill request to us. Please allow 3 business days for your refill to be completed.          Additional Information About Your Visit        MyChart Information     [employer] gives you secure access to your electronic health record. If you see a primary care provider, you can also send messages to your care team and  make appointments. If you have questions, please call your primary care clinic.  If you do not have a primary care provider, please call [phone] and they will assist you.        Care EveryWhere ID     This is your Care EveryWhere ID. This could be used by other organizations to access your [place] medical records  [id number]        Your Vitals Were     Pulse Temperature Height Pulse Oximetry BMI (Body Mass Index)       87 97.9  F (36.6  C) (Oral) 1.829 m (6') 97% 38.27 kg/m2        Blood Pressure from Last 3 Encounters:   08/09/17 120/88   07/05/17 126/80   06/02/17 118/88    Weight from Last 3 Encounters:   08/09/17 128 kg (282 lb 3.2 oz)   07/05/17 127.7 kg (281 lb 8 oz)   06/02/17 128.2 kg (282 lb 9.6 oz)              Today, you had the following     No orders found for display         Today's Medication Changes          These changes are accurate as of: 8/9/17 11:17 AM.  If you have any questions, ask your nurse or doctor.               These medicines have changed or have updated prescriptions.        Dose/Directions    gabapentin 600 MG tablet   Commonly known as:  NEURONTIN   This may have changed:  See the new instructions.   Used for:  DDD (degenerative disc disease), lumbar   Changed by:  Cat Shaw MD        Dose:  600 mg   Take 1 tablet (600 mg) by mouth 3 times daily   Quantity:  90 tablet   Refills:  5            Where to get your medicines      These medications were sent to [facility]/pharmacy #[id number] [place], [place] - [address] [facility] [address], [address] [place], [facility] [address]     Phone:  [phone]     gabapentin 600 MG tablet         Some of these will need a paper prescription and others can be bought over the counter.  Ask your nurse if you have questions.     Bring a paper prescription for each of these medications     diazepam 5 MG tablet    HYDROmorphone 4 MG tablet    morphine 30 MG 12 hr tablet                Primary Care Provider  Office Phone # Fax #    Cat Tenzin Shaw [phone] [address] [facility] [address]        Equal Access to Services     ALANA HANSON : Hadarmando mayuri dumas coco Soelizabeth, waaxda luqadaha, qaybta kaalmada lina, kamala pérez laBethderick smith. So [facility] [phone].    ATENCIÓN: Si habla español, tiene a gongora disposición servicios gratuitos de asistencia lingüística. Llame al [phone].    We comply with applicable federal civil rights laws and [place] laws. We do not discriminate on the basis of race, color, national origin, age, disability sex, sexual orientation or gender identity.            Thank you!     Thank you for choosing [facility]  for your care. Our goal is always to provide you with excellent care. Hearing back from our patients is one way we can continue to improve our services. Please take a few minutes to complete the written survey that you may receive in the mail after your visit with us. Thank you!             Your Updated Medication List - Protect others around you: Learn how to safely use, store and throw away your medicines at www.disposemymeds.org.          This list is accurate as of: 8/9/17 11:17 AM.  Always use your most recent med list.                   Brand Name Dispense Instructions for use Diagnosis    ABILIFY 10 MG tablet   Generic drug:  ARIPiprazole      Take 10 mg by mouth daily        buPROPion 150 MG 24 hr tablet    WELLBUTRIN XL     Take 450 mg by mouth        cyclobenzaprine 10 MG tablet    FLEXERIL    90 tablet    TAKE ONE TABLET BY MOUTH 3 TIMES A DAY AS NEEDED FOR MUSCLE SPASMS    DDD (degenerative disc disease), lumbar       diazepam 5 MG tablet    VALIUM    30 tablet    Take 1 tablet (5 mg) by mouth nightly as needed    DDD (degenerative disc disease), lumbar, Chronic pain syndrome       EPINEPHrine 0.3 MG/0.3ML injection 2-pack    EPIPEN/ADRENACLICK/or ANY BX GENERIC EQUIV    1 each    Inject 0.3 mLs  (0.3 mg) into the muscle once as needed for anaphylaxis    Bee sting allergy       gabapentin 600 MG tablet    NEURONTIN    90 tablet    Take 1 tablet (600 mg) by mouth 3 times daily    DDD (degenerative disc disease), lumbar       HYDROmorphone 4 MG tablet    DILAUDID    180 tablet    Take 2 tablets (8 mg) by mouth 4 times daily as needed for moderate to severe pain (every 4-6hour prn)    Chronic pain syndrome, DDD (degenerative disc disease), lumbar       hydrOXYzine 50 MG capsule    VISTARIL     Take 50 mg by mouth        LAMICTAL 150 MG tablet   Generic drug:  lamoTRIgine      Take 250 mg by mouth daily        morphine 30 MG 12 hr tablet    MS CONTIN    90 tablet    Take 1 tablet (30 mg) by mouth 3 times daily    Chronic pain syndrome, DDD (degenerative disc disease), lumbar       omeprazole 40 MG capsule    priLOSEC    30 capsule    TAKE 1 CAPSULE (40 MG) BY MOUTH DAILY TAKE 30-60 MINUTES BEFORE A MEAL.    Gastroesophageal reflux disease with esophagitis       ondansetron 4 MG ODT tab    ZOFRAN-ODT    20 tablet    PLACE 1 OR 2 TABLETS UNDER THE TONGUE 3 TIMES A DAY AS NEEDED FOR NAUSEA    Nausea       rOPINIRole 0.25 MG tablet    REQUIP    60 tablet    Take 1-2 tablets (0.25-0.5 mg) by mouth At Bedtime    RLS (restless legs syndrome)       traZODone 150 MG tablet    DESYREL     Take 150 mg by mouth        triamcinolone 0.1 % cream    KENALOG    30 g    Apply topically 3 times daily as needed (itch)    DDD (degenerative disc disease), lumbar       zolpidem 10 MG tablet    AMBIEN    30 tablet    Take 1 tablet (10 mg) by mouth nightly as needed for sleep    Insomnia, unspecified insomnia

## 2017-09-06 ENCOUNTER — OFFICE VISIT (OUTPATIENT)
Dept: FAMILY MEDICINE | Facility: CLINIC | Age: 57
End: 2017-09-06
Payer: MEDICAID

## 2017-09-06 VITALS
HEIGHT: 72 IN | RESPIRATION RATE: 16 BRPM | OXYGEN SATURATION: 98 % | SYSTOLIC BLOOD PRESSURE: 120 MMHG | WEIGHT: 287 LBS | HEART RATE: 76 BPM | DIASTOLIC BLOOD PRESSURE: 78 MMHG | TEMPERATURE: 98.1 F | BODY MASS INDEX: 38.87 KG/M2

## 2017-09-06 DIAGNOSIS — G89.4 CHRONIC PAIN SYNDROME: ICD-10-CM

## 2017-09-06 DIAGNOSIS — M51.369 DDD (DEGENERATIVE DISC DISEASE), LUMBAR: ICD-10-CM

## 2017-09-06 PROCEDURE — 99213 OFFICE O/P EST LOW 20 MIN: CPT | Performed by: FAMILY MEDICINE

## 2017-09-06 RX ORDER — MORPHINE SULFATE 30 MG/1
30 TABLET, FILM COATED, EXTENDED RELEASE ORAL 3 TIMES DAILY
Qty: 90 TABLET | Refills: 0 | Status: SHIPPED | OUTPATIENT
Start: 2017-09-06 | End: 2017-09-25

## 2017-09-06 RX ORDER — HYDROMORPHONE HYDROCHLORIDE 4 MG/1
8 TABLET ORAL 4 TIMES DAILY PRN
Qty: 180 TABLET | Refills: 0 | Status: SHIPPED | OUTPATIENT
Start: 2017-09-06 | End: 2017-09-25

## 2017-09-06 RX ORDER — DIAZEPAM 5 MG
5 TABLET ORAL
Qty: 30 TABLET | Refills: 0 | Status: SHIPPED | OUTPATIENT
Start: 2017-09-06 | End: 2017-09-25

## 2017-09-06 ASSESSMENT — ANXIETY QUESTIONNAIRES
1. FEELING NERVOUS, ANXIOUS, OR ON EDGE: NEARLY EVERY DAY
2. NOT BEING ABLE TO STOP OR CONTROL WORRYING: NEARLY EVERY DAY
IF YOU CHECKED OFF ANY PROBLEMS ON THIS QUESTIONNAIRE, HOW DIFFICULT HAVE THESE PROBLEMS MADE IT FOR YOU TO DO YOUR WORK, TAKE CARE OF THINGS AT HOME, OR GET ALONG WITH OTHER PEOPLE: EXTREMELY DIFFICULT
GAD7 TOTAL SCORE: 20
3. WORRYING TOO MUCH ABOUT DIFFERENT THINGS: NEARLY EVERY DAY
5. BEING SO RESTLESS THAT IT IS HARD TO SIT STILL: MORE THAN HALF THE DAYS
7. FEELING AFRAID AS IF SOMETHING AWFUL MIGHT HAPPEN: NEARLY EVERY DAY
6. BECOMING EASILY ANNOYED OR IRRITABLE: NEARLY EVERY DAY

## 2017-09-06 ASSESSMENT — PATIENT HEALTH QUESTIONNAIRE - PHQ9
SUM OF ALL RESPONSES TO PHQ QUESTIONS 1-9: 22
5. POOR APPETITE OR OVEREATING: NEARLY EVERY DAY

## 2017-09-06 ASSESSMENT — PAIN SCALES - GENERAL: PAINLEVEL: MODERATE PAIN (5)

## 2017-09-06 NOTE — MR AVS SNAPSHOT
After Visit Summary   9/6/2017    Oleg Ford    MRN: [id number]           Patient Information     Date Of Birth          1960        Visit Information        Provider Department      9/6/2017 10:00 AM Cat Shaw MD [facility]        Today's Diagnoses     Chronic pain syndrome        DDD (degenerative disc disease), lumbar           Follow-ups after your visit        Follow-up notes from your care team     Return in about 1 month (around 10/6/2017).      Your next 10 appointments already scheduled     Oct 09, 2017 10:00 AM CDT   Office Visit with Cat Shaw MD   [facility] ([facility])    [address] [facility] [address] [phone]           Bring a current list of meds and any records pertaining to this visit. For Physicals, please bring immunization records and any forms needing to be filled out. Please arrive 10 minutes early to complete paperwork.              Who to contact     If you have questions or need follow up information about today's clinic visit or your schedule please contact [facility] directly at [phone].  Normal or non-critical lab and imaging results will be communicated to you by MyChart, letter or phone within 4 business days after the clinic has received the results. If you do not hear from us within 7 days, please contact the clinic through [employer] or phone. If you have a critical or abnormal lab result, we will notify you by phone as soon as possible.  Submit refill requests through [employer] or call your pharmacy and they will forward the refill request to us. Please allow 3 business days for your refill to be completed.          Additional Information About Your Visit        MyChart Information     [employer] gives you secure access to your electronic health record. If you see a primary care provider, you can also send messages to your care team and make  appointments. If you have questions, please call your primary care clinic.  If you do not have a primary care provider, please call [phone] and they will assist you.        Care EveryWhere ID     This is your Care EveryWhere ID. This could be used by other organizations to access your [place] medical records  [id number]        Your Vitals Were     Pulse Temperature Respirations Height Pulse Oximetry BMI (Body Mass Index)    76 98.1  F (36.7  C) (Oral) 16 6' (1.829 m) 98% 38.92 kg/m2       Blood Pressure from Last 3 Encounters:   09/06/17 120/78   08/09/17 120/88   07/05/17 126/80    Weight from Last 3 Encounters:   09/06/17 287 lb (130.2 kg)   08/09/17 282 lb 3.2 oz (128 kg)   07/05/17 281 lb 8 oz (127.7 kg)              Today, you had the following     No orders found for display         Where to get your medicines      Some of these will need a paper prescription and others can be bought over the counter.  Ask your nurse if you have questions.     Bring a paper prescription for each of these medications     diazepam 5 MG tablet    HYDROmorphone 4 MG tablet    morphine 30 MG 12 hr tablet          Primary Care Provider Office Phone # Fax #    Cat Tenzin Shaw [phone] [address] [facility] [address]        Equal Access to Services     ALANA HANSON : Hadii mayuri ku hadasho Soomaali, waaxda luqadaha, qaybta kaalmada kamala mills. So [facility] [phone].    ATENCIÓN: Si habla español, tiene a gongora disposición servicios gratuitos de asistencia lingüística. Llame al [phone].    We comply with applicable federal civil rights laws and [place] laws. We do not discriminate on the basis of race, color, national origin, age, disability sex, sexual orientation or gender identity.            Thank you!     Thank you for choosing [facility]  for your care. Our goal is always to provide you with excellent care. Hearing back  from our patients is one way we can continue to improve our services. Please take a few minutes to complete the written survey that you may receive in the mail after your visit with us. Thank you!             Your Updated Medication List - Protect others around you: Learn how to safely use, store and throw away your medicines at www.disposemymeds.org.          This list is accurate as of: 9/6/17 10:20 AM.  Always use your most recent med list.                   Brand Name Dispense Instructions for use Diagnosis    ABILIFY 10 MG tablet   Generic drug:  ARIPiprazole      Take 10 mg by mouth daily        buPROPion 150 MG 24 hr tablet    WELLBUTRIN XL     Take 450 mg by mouth        cyclobenzaprine 10 MG tablet    FLEXERIL    90 tablet    TAKE ONE TABLET BY MOUTH 3 TIMES A DAY AS NEEDED FOR MUSCLE SPASMS    DDD (degenerative disc disease), lumbar       diazepam 5 MG tablet    VALIUM    30 tablet    Take 1 tablet (5 mg) by mouth nightly as needed    DDD (degenerative disc disease), lumbar, Chronic pain syndrome       EPINEPHrine 0.3 MG/0.3ML injection 2-pack    EPIPEN/ADRENACLICK/or ANY BX GENERIC EQUIV    1 each    Inject 0.3 mLs (0.3 mg) into the muscle once as needed for anaphylaxis    Bee sting allergy       gabapentin 600 MG tablet    NEURONTIN    90 tablet    Take 1 tablet (600 mg) by mouth 3 times daily    DDD (degenerative disc disease), lumbar       HYDROmorphone 4 MG tablet    DILAUDID    180 tablet    Take 2 tablets (8 mg) by mouth 4 times daily as needed for moderate to severe pain (every 4-6hour prn)    Chronic pain syndrome, DDD (degenerative disc disease), lumbar       hydrOXYzine 50 MG capsule    VISTARIL     Take 50 mg by mouth        LAMICTAL 150 MG tablet   Generic drug:  lamoTRIgine      Take 250 mg by mouth daily        morphine 30 MG 12 hr tablet    MS CONTIN    90 tablet    Take 1 tablet (30 mg) by mouth 3 times daily    Chronic pain syndrome, DDD (degenerative disc disease), lumbar        omeprazole 40 MG capsule    priLOSEC    30 capsule    TAKE 1 CAPSULE (40 MG) BY MOUTH DAILY TAKE 30-60 MINUTES BEFORE A MEAL.    Gastroesophageal reflux disease with esophagitis       ondansetron 4 MG ODT tab    ZOFRAN-ODT    20 tablet    PLACE 1 OR 2 TABLETS UNDER THE TONGUE 3 TIMES A DAY AS NEEDED FOR NAUSEA    Nausea       rOPINIRole 0.25 MG tablet    REQUIP    60 tablet    Take 1-2 tablets (0.25-0.5 mg) by mouth At Bedtime    RLS (restless legs syndrome)       traZODone 150 MG tablet    DESYREL     Take 150 mg by mouth        triamcinolone 0.1 % cream    KENALOG    30 g    Apply topically 3 times daily as needed (itch)    DDD (degenerative disc disease), lumbar       zolpidem 10 MG tablet    AMBIEN    30 tablet    Take 1 tablet (10 mg) by mouth nightly as needed for sleep    Insomnia, unspecified insomnia

## 2017-09-06 NOTE — PROGRESS NOTES
SUBJECTIVE:   Oleg Ford is a 57 year old male who presents to clinic today for the following health issues:      Chronic Pain Follow-Up       Type / Location of Pain: back  Analgesia/pain control:       Recent changes:  same      Overall control: Tolerable with discomfort  Activity level/function:      Daily activities:  Able to do all daily activities    Work:  not applicable  Adverse effects:  No  Adherance    Taking medication as directed?  Yes    Participating in other treatments: None  Risk Factors:    Sleep:  Fair    Mood/anxiety:  same    Recent family or social stressors:  none noted    Other aggravating factors: none  PHQ-9 SCORE 4/4/2017 5/2/2017 7/5/2017   Total Score - - -   Total Score MyChart - - -   Total Score 24 26 24     GINI-7 SCORE 4/4/2017 5/2/2017 7/5/2017   Total Score - - -   Total Score 17 21 20     Encounter-Level CSA - 12/01/2015:          Controlled Substance Agreement - Scan on 12/14/2015 11:49 AM : CONTROLLED SUBSTANCE AGREEMENT (below)              SUBJECTIVE:  Here today in follow-up of chronic back issues. Doing well on current regimen of pain medication. Will likely hear about approval or denial for SI joint fusion in the next couple of weeks.  Continue seeing his therapist for depression and anxiety. Overall doing well    Review of systems otherwise negative.  Past medical, family, and social history reviewed and updated in chart.    OBJECTIVE:  /78 (BP Location: Right arm, Patient Position: Right side, Cuff Size: Adult Regular)  Pulse 76  Temp 98.1  F (36.7  C) (Oral)  Resp 16  Ht 6' (1.829 m)  Wt 287 lb (130.2 kg)  SpO2 98%  BMI 38.92 kg/m2  Alert, pleasant, upbeat, and in no apparent discomfort.  S1 and S2 normal, no murmurs, clicks, gallops or rubs. Regular rate and rhythm. Chest is clear; no wheezes or rales. No edema or JVD.  Past labs reviewed with the patient.     ASSESSMENT / PLAN:  (G89.4) Chronic pain syndrome  Comment: Stable on current regimen.  Continue  Plan: morphine (MS CONTIN) 30 MG 12 hr tablet,         HYDROmorphone (DILAUDID) 4 MG tablet, diazepam         (VALIUM) 5 MG tablet            (M51.36) DDD (degenerative disc disease), lumbar  Comment: As above. Upcoming surgery likely  Plan: morphine (MS CONTIN) 30 MG 12 hr tablet,         HYDROmorphone (DILAUDID) 4 MG tablet, diazepam         (VALIUM) 5 MG tablet            Follow up one month  S. Tenzin Shaw MD    (Chart documentation completed in part with Dragon voice-recognition software.  Even though reviewed some grammatical, spelling, and word errors may remain.)

## 2017-09-06 NOTE — NURSING NOTE
Chief Complaint   Patient presents with     Recheck Medication       Initial /78 (BP Location: Right arm, Patient Position: Right side, Cuff Size: Adult Regular)  Pulse 76  Temp 98.1  F (36.7  C) (Oral)  Resp 16  Ht 1.829 m (6')  Wt 130.2 kg (287 lb)  SpO2 98%  BMI 38.92 kg/m2 Estimated body mass index is 38.92 kg/(m^2) as calculated from the following:    Height as of this encounter: 1.829 m (6').    Weight as of this encounter: 130.2 kg (287 lb).  Medication Reconciliation: complete     Will Nati READ

## 2017-09-07 ASSESSMENT — ANXIETY QUESTIONNAIRES: GAD7 TOTAL SCORE: 20

## 2017-09-15 DIAGNOSIS — R11.0 NAUSEA: ICD-10-CM

## 2017-09-15 NOTE — TELEPHONE ENCOUNTER
ondansetron (ZOFRAN-ODT) 4 MG ODT tab      Last Written Prescription Date: 6/6/17  Last Fill Quantity: 20,  # refills: 1   Last Office Visit with [facility] prescribing provider: 9/6/17 Dr. Shaw                                         Next 5 appointments (look out 90 days)     Sep 25, 2017  3:40 PM CDT   Pre-Op physical with Cat Shaw MD   [facility] ([facility]    [address] [place] [id number]   [phone]            Oct 09, 2017 10:00 AM CDT   Office Visit with Cat Shaw MD   [facility] ([facility]    [address] [place] [id number]   [phone]

## 2017-09-19 DIAGNOSIS — R11.0 NAUSEA: ICD-10-CM

## 2017-09-19 RX ORDER — ONDANSETRON 4 MG/1
TABLET, ORALLY DISINTEGRATING ORAL
Qty: 20 TABLET | Refills: 1 | Status: SHIPPED | OUTPATIENT
Start: 2017-09-19 | End: 2019-09-16

## 2017-09-19 NOTE — TELEPHONE ENCOUNTER
Medication Detail      Disp Refills Start End DACIA   ondansetron (ZOFRAN-ODT) 4 MG ODT tab 20 tablet 1 9/19/2017  No   Sig: PLACE 1 OR 2 TABLETS UNDER THE TONGUE 3 TIMES A DAY AS NEEDED FOR NAUSEA   Class: E-Prescribe   Order: [id number]   E-Prescribing Status: Receipt confirmed by pharmacy (9/19/2017 11:15 AM CDT)

## 2017-09-21 RX ORDER — ONDANSETRON 4 MG/1
TABLET, ORALLY DISINTEGRATING ORAL
Qty: 20 TABLET | Refills: 0 | OUTPATIENT
Start: 2017-09-21

## 2017-09-21 NOTE — TELEPHONE ENCOUNTER
Chart review confirms medication was sent 9/19/17 to requesting pharmacy. Sent back as denied/duplicate.    Rema Lema, MSN, RN-BC  Care Coordinator

## 2017-09-25 ENCOUNTER — OFFICE VISIT (OUTPATIENT)
Dept: FAMILY MEDICINE | Facility: CLINIC | Age: 57
End: 2017-09-25
Payer: MEDICAID

## 2017-09-25 VITALS
BODY MASS INDEX: 38.8 KG/M2 | DIASTOLIC BLOOD PRESSURE: 76 MMHG | HEART RATE: 74 BPM | RESPIRATION RATE: 16 BRPM | OXYGEN SATURATION: 99 % | SYSTOLIC BLOOD PRESSURE: 126 MMHG | WEIGHT: 286.5 LBS | HEIGHT: 72 IN | TEMPERATURE: 97.9 F

## 2017-09-25 DIAGNOSIS — Z79.899 HIGH RISK MEDICATION USE: ICD-10-CM

## 2017-09-25 DIAGNOSIS — Z13.6 CARDIOVASCULAR SCREENING; LDL GOAL LESS THAN 130: ICD-10-CM

## 2017-09-25 DIAGNOSIS — G89.4 CHRONIC PAIN SYNDROME: ICD-10-CM

## 2017-09-25 DIAGNOSIS — M51.369 DDD (DEGENERATIVE DISC DISEASE), LUMBAR: ICD-10-CM

## 2017-09-25 DIAGNOSIS — Z01.818 PREOP GENERAL PHYSICAL EXAM: Primary | ICD-10-CM

## 2017-09-25 DIAGNOSIS — F33.2 MAJOR DEPRESSIVE DISORDER, RECURRENT, SEVERE WITHOUT PSYCHOTIC FEATURES (H): ICD-10-CM

## 2017-09-25 PROCEDURE — 99214 OFFICE O/P EST MOD 30 MIN: CPT | Performed by: FAMILY MEDICINE

## 2017-09-25 PROCEDURE — 93000 ELECTROCARDIOGRAM COMPLETE: CPT | Performed by: FAMILY MEDICINE

## 2017-09-25 RX ORDER — DIAZEPAM 5 MG
5 TABLET ORAL
Qty: 30 TABLET | Refills: 0 | Status: SHIPPED | OUTPATIENT
Start: 2017-10-04 | End: 2017-10-31

## 2017-09-25 RX ORDER — HYDROMORPHONE HYDROCHLORIDE 4 MG/1
8 TABLET ORAL 4 TIMES DAILY PRN
Qty: 180 TABLET | Refills: 0 | Status: SHIPPED | OUTPATIENT
Start: 2017-10-04 | End: 2017-10-31

## 2017-09-25 RX ORDER — MORPHINE SULFATE 30 MG/1
30 TABLET, FILM COATED, EXTENDED RELEASE ORAL 3 TIMES DAILY
Qty: 90 TABLET | Refills: 0 | Status: SHIPPED | OUTPATIENT
Start: 2017-10-04 | End: 2017-10-31

## 2017-09-25 ASSESSMENT — PAIN SCALES - GENERAL: PAINLEVEL: SEVERE PAIN (7)

## 2017-09-25 NOTE — PROGRESS NOTES
[address] [address]  [phone]  Dept: [phone]    PRE-OP EVALUATION:  Today's date: 2017    Oleg Ford (: 1960) presents for pre-operative evaluation assessment as requested by Dr. Szymanski.  He requires evaluation and anesthesia risk assessment prior to undergoing surgery/procedure for treatment of chronic SI joint pain/instability.  Proposed procedure: FUSION SACRAL ILIAC    Date of Surgery/ Procedure: 10/19/17  Time of Surgery/ Procedure: 9:40 AM  Hospital/Surgical Facility: [facility]  Fax number for surgical facility: NA  Primary Physician: Cat Shaw  Type of Anesthesia Anticipated: General    Patient has a Health Care Directive or Living Will:  YES    1. NO - Do you have a history of heart attack, stroke, stent, bypass or surgery on an artery in the head, neck, heart or legs?  2. NO - Do you ever have any pain or discomfort in your chest?  3. NO - Do you have a history of  Heart Failure?  4. YES - Are you troubled by shortness of breath when: walking on the level, up a slight hill or at night?  5. NO - Do you currently have a cold, bronchitis or other respiratory infection?  6. YES - Do you have a cough, shortness of breath or wheezing?  7. YES - Do you sometimes get pains in the calves of your legs when you walk?  8. NO - Do you or anyone in your family have previous history of blood clots?  9. NO - Do you or does anyone in your family have a serious bleeding problem such as prolonged bleeding following surgeries or cuts?  10. NO - Have you ever had problems with anemia or been told to take iron pills?  11. NO - Have you had any abnormal blood loss such as black, tarry or bloody stools, or abnormal vaginal bleeding?  12. NO - Have you ever had a blood transfusion?  13. NO - Have you or any of your relatives ever had problems with anesthesia?  14. NO - Do you have sleep apnea, excessive snoring or daytime  drowsiness?  15. NO - Do you have any prosthetic heart valves?  16. NO - Do you have prosthetic joints?  17. NO - Is there any chance that you may be pregnant?        HPI:                                                      Brief HPI related to upcoming procedure:   Long-standing progressive pain and lower back with radiation to the buttocks. 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 Planned bilateral fusion. Unilateral initially with other side done in a number of months once healing is complete.    See problem list for active medical problems.  Problems all longstanding and stable, except as noted/documented.  See ROS for pertinent symptoms related to these conditions.                                                                                                  .    MEDICAL HISTORY:                                                    Patient Active Problem List    Diagnosis Date Noted     Gastroesophageal reflux disease with esophagitis 01/25/2017     Priority: Medium     Major depressive disorder, recurrent, severe without psychotic features (H) 02/22/2016     Priority: Medium     S/P lumbar fusion 01/12/2016     Priority: Medium     Chronic pain syndrome 12/01/2015     Priority: Medium     Patient is followed by NORY LINARES for ongoing prescription of pain medication.  All refills should be approved by this provider, or covering partner.    Medication(s): MS Contin / dilaudid.   Maximum quantity per month: 60 / 80  Clinic visit frequency required: Q 3 months     Controlled substance agreement on file: Yes       Date(s): 12/1/15    Pain Clinic evaluation in the past: Yes    DIRE Total Score(s):    12/1/2015   Total Score 18       Last [facility] website verification:  done on 12/1/15  11/4/2016     [email or web address]        Simple hepatic cyst 03/16/2015     Priority: Medium     Simple renal cyst 03/16/2015     Priority: Medium     Esophageal cyst 09/05/2014     Priority: Medium     RLS  (restless legs syndrome) 01/29/2013     Priority: Medium     DDD (degenerative disc disease), lumbar 10/18/2012     Priority: Medium     CARDIOVASCULAR SCREENING; LDL GOAL LESS THAN 130 10/31/2010     Priority: Medium     Chronic low back pain 07/19/2010     Priority: Medium      Past Medical History:   Diagnosis Date     Depression, major      Esophageal mass      History of orchiectomy 1982     History of vasectomy 1991     Low back pain      Restless leg      Sleep apnea     had surgery done to resolve sleep apnea     Past Surgical History:   Procedure Laterality Date     BACK SURGERY  2/6/2013    lumbar surgery     COLONOSCOPY       [facility] ENDOSCOPY W EUS N/A 7/31/2014    Procedure: COMBINED ENDOSCOPIC ULTRASOUND, ESOPHAGOSCOPY, GASTROSCOPY, DUODENOSCOPY (EGD);  Surgeon: Shorty Stoll MD;  Location: [facility]     ORCHIECTOMY INGUINAL      right radical orchiectomy     REMOVE STIMULATOR VAGUS NERVE  12/23/2011    Procedure:REMOVE STIMULATOR VAGUS NERVE; Vagus Nerve Stimulator Removal; Surgeon:ALEXIS BECERRA; Location:[facility] OR     REMOVE STIMULATOR VAGUS NERVE  11/8/2013    Procedure: REMOVE STIMULATOR VAGUS NERVE;  Removal Of Vagus Nerve Stimulator Lead In Left Neck ;  Surgeon: Alexis Becerra MD;  Location: [facility]     SURGICAL HISTORY OF -       vagal nerve implant; removed     SURGICAL HISTORY OF -       UPPP     VASECTOMY       Current Outpatient Prescriptions   Medication Sig Dispense Refill     ondansetron (ZOFRAN-ODT) 4 MG ODT tab PLACE 1 OR 2 TABLETS UNDER THE TONGUE 3 TIMES A DAY AS NEEDED FOR NAUSEA 20 tablet 1     morphine (MS CONTIN) 30 MG 12 hr tablet Take 1 tablet (30 mg) by mouth 3 times daily 90 tablet 0     HYDROmorphone (DILAUDID) 4 MG tablet Take 2 tablets (8 mg) by mouth 4 times daily as needed for moderate to severe pain (every 4-6hour prn) 180 tablet 0     diazepam (VALIUM) 5 MG tablet Take 1 tablet (5 mg) by mouth nightly as needed 30 tablet 0     cyclobenzaprine (FLEXERIL) 10 MG tablet TAKE  ONE TABLET BY MOUTH 3 TIMES A DAY AS NEEDED FOR MUSCLE SPASMS 90 tablet 5     triamcinolone (KENALOG) 0.1 % cream Apply topically 3 times daily as needed (itch) 30 g 0     omeprazole (PRILOSEC) 40 MG capsule TAKE 1 CAPSULE (40 MG) BY MOUTH DAILY TAKE 30-60 MINUTES BEFORE A MEAL. 30 capsule 11     ARIPiprazole (ABILIFY) 10 MG tablet Take 10 mg by mouth daily       lamoTRIgine (LAMICTAL) 150 MG tablet Take 250 mg by mouth daily        zolpidem (AMBIEN) 10 MG tablet Take 1 tablet (10 mg) by mouth nightly as needed for sleep 30 tablet 2     rOPINIRole (REQUIP) 0.25 MG tablet Take 1-2 tablets (0.25-0.5 mg) by mouth At Bedtime 60 tablet 2     hydrOXYzine (VISTARIL) 50 MG capsule Take 50 mg by mouth       traZODone (DESYREL) 150 MG tablet Take 150 mg by mouth       buPROPion (WELLBUTRIN XL) 150 MG 24 hr tablet Take 450 mg by mouth       EPINEPHrine (EPIPEN) 0.3 MG/0.3ML injection Inject 0.3 mLs (0.3 mg) into the muscle once as needed for anaphylaxis 1 each 2     OTC products: None, except as noted above    Allergies   Allergen Reactions     Amoxicillin      itching     Ciprofloxacin      Doxycycline      Septra [Bactrim]       Latex Allergy: NO    Social History   Substance Use Topics     Smoking status: Current Every Day Smoker     Packs/day: 1.00     Types: Cigarettes     Smokeless tobacco: Never Used     Alcohol use Yes      Comment: OCCASSIONALLY -4-5 DRINKS PER MONTH     History   Drug Use No       REVIEW OF SYSTEMS:                                                    C: NEGATIVE for fever, chills, change in weight  I: NEGATIVE for worrisome rashes, moles or lesions  E: NEGATIVE for vision changes or irritation  E/M: NEGATIVE for ear, mouth and throat problems  R: NEGATIVE for significant cough or SOB  B: NEGATIVE for masses, tenderness or discharge  CV: NEGATIVE for chest pain, palpitations or peripheral edema  GI: NEGATIVE for nausea, abdominal pain, heartburn, or change in bowel habits  : NEGATIVE for frequency,  dysuria, or hematuria  MUSCULOSKELETAL: As above  N: NEGATIVE for weakness, dizziness or paresthesias  E: NEGATIVE for temperature intolerance, skin/hair changes  H: NEGATIVE for bleeding problems  P: NEGATIVE for changes in mood or affect    EXAM:                                                    /76 (BP Location: Right arm, Patient Position: Right side, Cuff Size: Adult Regular)  Pulse 74  Temp 97.9  F (36.6  C) (Oral)  Resp 16  Ht 1.829 m (6')  Wt 130 kg (286 lb 8 oz)  SpO2 99%  BMI 38.86 kg/m2    GENERAL APPEARANCE: healthy, alert and no distress     EYES: EOMI,  PERRL     HENT: ear canals and TM's normal and nose and mouth without ulcers or lesions     NECK: no adenopathy, no asymmetry, masses, or scars and thyroid normal to palpation     RESP: lungs clear to auscultation - no rales, rhonchi or wheezes     CV: regular rates and rhythm, normal S1 S2, no S3 or S4 and no murmur, click or rub     ABDOMEN:  soft, nontender, no HSM or masses and bowel sounds normal     MS: extremities normal- no gross deformities noted, no evidence of inflammation in joints, FROM in all extremities.     SKIN: no suspicious lesions or rashes     NEURO: Normal strength and tone, sensory exam grossly normal, mentation intact and speech normal     PSYCH: mentation appears normal. and affect normal/bright     LYMPHATICS: No axillary, cervical, or supraclavicular nodes    DIAGNOSTICS:                                                    EKG: appears normal, NSR, normal axis, normal intervals, no acute ST/T changes c/w ischemia, no LVH by voltage criteria, unchanged from previous tracings  CBC, BMP, UA pending    Recent Labs   Lab Test 04/21/16  12/08/15   1832  03/16/15   1048  07/25/14   1534   HGB   --   15.4  15.4  14.9   PLT   --   231  225  208   INR   --    --    --   1.00   NA   --   142  141   --    POTASSIUM   --   4.4  4.1   --    CR  0.96  1.16  0.90   --         IMPRESSION:                                                     Reason for surgery/procedure: Bilateral SI joint pain/instability  Diagnosis/reason for consult: Preoperative clearance     The proposed surgical procedure is considered INTERMEDIATE risk.    REVISED CARDIAC RISK INDEX  The patient has the following serious cardiovascular risks for perioperative complications such as (MI, PE, VFib and 3  AV Block):  No serious cardiac risks  INTERPRETATION: 0 risks: Class I (very low risk - 0.4% complication rate)    The patient has the following additional risks for perioperative complications:  High tolerance to opioid analgesics due to chronic use      ICD-10-CM    1. Preop general physical exam Z01.818 EKG 12-lead complete w/read - Clinics     CBC with platelets     Basic metabolic panel     *UA reflex to Microscopic and Culture ([facility] (except [place] and [place])       RECOMMENDATIONS:                                                          --Patient is to take all scheduled medications on the day of surgery.    APPROVAL GIVEN to proceed with proposed procedure, without further diagnostic evaluation       Signed Electronically by: Cat Shaw MD    Copy of this evaluation report is provided to requesting physician.    [facility] Preop Guidelines

## 2017-09-25 NOTE — MR AVS SNAPSHOT
After Visit Summary   9/25/2017    Oleg Ford    MRN: [id number]           Patient Information     Date Of Birth          1960        Visit Information        Provider Department      9/25/2017 3:40 PM Cat Shaw MD [facility]        Today's Diagnoses     Preop general physical exam    -  1    Major depressive disorder, recurrent, severe without psychotic features (H)        High risk medication use        CARDIOVASCULAR SCREENING; LDL GOAL LESS THAN 130        Chronic pain syndrome        DDD (degenerative disc disease), lumbar          Care Instructions      Before Your Surgery      Call your surgeon if there is any change in your health. This includes signs of a cold or flu (such as a sore throat, runny nose, cough, rash or fever).    Do not smoke, drink alcohol or take over the counter medicine (unless your surgeon or primary care doctor tells you to) for the 24 hours before and after surgery.    If you take prescribed drugs: Follow your doctor s orders about which medicines to take and which to stop until after surgery.    Eating and drinking prior to surgery: follow the instructions from your surgeon    Take a shower or bath the night before surgery. Use the soap your surgeon gave you to gently clean your skin. If you do not have soap from your surgeon, use your regular soap. Do not shave or scrub the surgery site.  Wear clean pajamas and have clean sheets on your bed.           Follow-ups after your visit        Your next 10 appointments already scheduled     Oct 09, 2017 10:00 AM CDT   [facility]    [address] [place] [address] [phone]           Bring a current list of meds and any records pertaining to this visit. For Physicals, please bring immunization records and any forms needing to be filled out. Please arrive 10 minutes early to  complete paperwork.            Oct 19, 2017   Procedure with Sebastián Szymanski MD   [facility] (--)    [address]  [facility] [address] [phone]              Future tests that were ordered for you today     Open Future Orders        Priority Expected Expires Ordered    Lipid panel reflex to direct LDL Routine 9/26/2017 11/25/2017 9/25/2017    Hemoglobin A1c Routine 9/26/2017 11/25/2017 9/25/2017    Prolactin Routine 9/26/2017 11/25/2017 9/25/2017    CBC with platelets Routine 9/26/2017 11/25/2017 9/25/2017    Basic metabolic panel Routine 9/26/2017 11/25/2017 9/25/2017    *UA reflex to Microscopic and Culture ([place] [facility] (except [place] and [place]) Routine 9/26/2017 11/25/2017 9/25/2017            Who to contact     If you have questions or need follow up information about today's clinic visit or your schedule please contact [facility] directly at [phone].  Normal or non-critical lab and imaging results will be communicated to you by [email or web address], letter or phone within 4 business days after the clinic has received the results. If you do not hear from us within 7 days, please contact the clinic through [email or web address] or phone. If you have a critical or abnormal lab result, we will notify you by phone as soon as possible.  Submit refill requests through [employer] or call your pharmacy and they will forward the refill request to us. Please allow 3 business days for your refill to be completed.          Additional Information About Your Visit        [email or web address] Information     [employer] gives you secure access to your electronic health record. If you see a primary care provider, you can also send messages to your care team and make appointments. If you have questions, please call your primary care clinic.  If you do not have a primary care provider, please call [phone] and they will assist you.        Care EveryWhere ID     This is your Care EveryWhere ID.  This could be used by other organizations to access your [place] medical records  [id number]        Your Vitals Were     Pulse Temperature Respirations Height Pulse Oximetry BMI (Body Mass Index)    74 97.9  F (36.6  C) (Oral) 16 1.829 m (6') 99% 38.86 kg/m2       Blood Pressure from Last 3 Encounters:   09/25/17 126/76   09/06/17 120/78   08/09/17 120/88    Weight from Last 3 Encounters:   09/25/17 130 kg (286 lb 8 oz)   09/06/17 130.2 kg (287 lb)   08/09/17 128 kg (282 lb 3.2 oz)              We Performed the Following     EKG 12-lead complete w/read - Clinics          Where to get your medicines      Some of these will need a paper prescription and others can be bought over the counter.  Ask your nurse if you have questions.     Bring a paper prescription for each of these medications     diazepam 5 MG tablet    HYDROmorphone 4 MG tablet    morphine 30 MG 12 hr tablet          Primary Care Provider Office Phone # Fax #    Cat Tenzin Shaw [phone] [address] [facility] [address]        Equal Access to Services     [facility] AH: Hadii aad ku hadasho Soomaali, waaxda luqadaha, qaybta kaalmada adeegyada, kamala smith. So [facility] [phone].    ATENCIÓN: Si habla español, tiene a gongora disposición servicios gratuitos de asistencia lingüística. Llame al [phone].    We comply with applicable federal civil rights laws and [place] laws. We do not discriminate on the basis of race, color, national origin, age, disability sex, sexual orientation or gender identity.            Thank you!     Thank you for choosing [facility]  for your care. Our goal is always to provide you with excellent care. Hearing back from our patients is one way we can continue to improve our services. Please take a few minutes to complete the written survey that you may receive in the mail after your visit with us. Thank you!             Your Updated  Medication List - Protect others around you: Learn how to safely use, store and throw away your medicines at www.disposemymeds.org.          This list is accurate as of: 9/25/17  4:06 PM.  Always use your most recent med list.                   Brand Name Dispense Instructions for use Diagnosis    ABILIFY 10 MG tablet   Generic drug:  ARIPiprazole      Take 10 mg by mouth daily        buPROPion 150 MG 24 hr tablet    WELLBUTRIN XL     Take 450 mg by mouth        cyclobenzaprine 10 MG tablet    FLEXERIL    90 tablet    TAKE ONE TABLET BY MOUTH 3 TIMES A DAY AS NEEDED FOR MUSCLE SPASMS    DDD (degenerative disc disease), lumbar       diazepam 5 MG tablet   Start taking on:  10/4/2017    VALIUM    30 tablet    Take 1 tablet (5 mg) by mouth nightly as needed    DDD (degenerative disc disease), lumbar, Chronic pain syndrome       EPINEPHrine 0.3 MG/0.3ML injection 2-pack    EPIPEN/ADRENACLICK/or ANY BX GENERIC EQUIV    1 each    Inject 0.3 mLs (0.3 mg) into the muscle once as needed for anaphylaxis    Bee sting allergy       HYDROmorphone 4 MG tablet   Start taking on:  10/4/2017    DILAUDID    180 tablet    Take 2 tablets (8 mg) by mouth 4 times daily as needed for moderate to severe pain (every 4-6hour prn)    Chronic pain syndrome, DDD (degenerative disc disease), lumbar       hydrOXYzine 50 MG capsule    VISTARIL     Take 50 mg by mouth        LAMICTAL 150 MG tablet   Generic drug:  lamoTRIgine      Take 250 mg by mouth daily        morphine 30 MG 12 hr tablet   Start taking on:  10/4/2017    MS CONTIN    90 tablet    Take 1 tablet (30 mg) by mouth 3 times daily    Chronic pain syndrome, DDD (degenerative disc disease), lumbar       omeprazole 40 MG capsule    priLOSEC    30 capsule    TAKE 1 CAPSULE (40 MG) BY MOUTH DAILY TAKE 30-60 MINUTES BEFORE A MEAL.    Gastroesophageal reflux disease with esophagitis       ondansetron 4 MG ODT tab    ZOFRAN-ODT    20 tablet    PLACE 1 OR 2 TABLETS UNDER THE TONGUE 3 TIMES A  DAY AS NEEDED FOR NAUSEA    Nausea       rOPINIRole 0.25 MG tablet    REQUIP    60 tablet    Take 1-2 tablets (0.25-0.5 mg) by mouth At Bedtime    RLS (restless legs syndrome)       traZODone 150 MG tablet    DESYREL     Take 150 mg by mouth        triamcinolone 0.1 % cream    KENALOG    30 g    Apply topically 3 times daily as needed (itch)    DDD (degenerative disc disease), lumbar       zolpidem 10 MG tablet    AMBIEN    30 tablet    Take 1 tablet (10 mg) by mouth nightly as needed for sleep    Insomnia, unspecified insomnia

## 2017-09-25 NOTE — NURSING NOTE
Chief Complaint   Patient presents with     Pre-Op Exam       Initial /76 (BP Location: Right arm, Patient Position: Right side, Cuff Size: Adult Regular)  Pulse 74  Temp 97.9  F (36.6  C) (Oral)  Resp 16  Ht 1.829 m (6')  Wt 130 kg (286 lb 8 oz)  SpO2 99%  BMI 38.86 kg/m2 Estimated body mass index is 38.86 kg/(m^2) as calculated from the following:    Height as of this encounter: 1.829 m (6').    Weight as of this encounter: 130 kg (286 lb 8 oz).  Medication Reconciliation: complete     Will Nati READ

## 2017-09-28 DIAGNOSIS — Z79.899 HIGH RISK MEDICATION USE: Primary | ICD-10-CM

## 2017-10-06 ENCOUNTER — ALLIED HEALTH/NURSE VISIT (OUTPATIENT)
Dept: NURSING | Facility: CLINIC | Age: 57
End: 2017-10-06

## 2017-10-06 ENCOUNTER — TELEPHONE (OUTPATIENT)
Dept: FAMILY MEDICINE | Facility: CLINIC | Age: 57
End: 2017-10-06

## 2017-10-06 DIAGNOSIS — Z01.818 PRE-OP EXAM: Primary | ICD-10-CM

## 2017-10-06 DIAGNOSIS — Z79.899 HIGH RISK MEDICATION USE: ICD-10-CM

## 2017-10-06 DIAGNOSIS — Z01.818 PREOP GENERAL PHYSICAL EXAM: ICD-10-CM

## 2017-10-06 DIAGNOSIS — Z01.812 PRE-OPERATIVE LABORATORY EXAMINATION: ICD-10-CM

## 2017-10-06 DIAGNOSIS — Z13.6 CARDIOVASCULAR SCREENING; LDL GOAL LESS THAN 130: ICD-10-CM

## 2017-10-06 LAB
ALBUMIN UR-MCNC: NEGATIVE MG/DL
ANION GAP SERPL CALCULATED.3IONS-SCNC: 5 MMOL/L (ref 3–14)
APPEARANCE UR: CLEAR
APTT PPP: 38 SEC (ref 22–37)
BACTERIA #/AREA URNS HPF: ABNORMAL /HPF
BILIRUB UR QL STRIP: NEGATIVE
BUN SERPL-MCNC: 14 MG/DL (ref 7–30)
CALCIUM SERPL-MCNC: 9.2 MG/DL (ref 8.5–10.1)
CHLORIDE SERPL-SCNC: 104 MMOL/L (ref 94–109)
CO2 SERPL-SCNC: 30 MMOL/L (ref 20–32)
COLOR UR AUTO: YELLOW
CREAT SERPL-MCNC: 1.24 MG/DL (ref 0.66–1.25)
ERYTHROCYTE [DISTWIDTH] IN BLOOD BY AUTOMATED COUNT: 14.6 % (ref 10–15)
GFR SERPL CREATININE-BSD FRML MDRD: 60 ML/MIN/1.7M2
GLUCOSE SERPL-MCNC: 132 MG/DL (ref 70–99)
GLUCOSE UR STRIP-MCNC: NEGATIVE MG/DL
HBA1C MFR BLD: 5.6 % (ref 4.3–6)
HCT VFR BLD AUTO: 45.9 % (ref 40–53)
HGB BLD-MCNC: 15.4 G/DL (ref 13.3–17.7)
HGB UR QL STRIP: NEGATIVE
INR PPP: 1.06 (ref 0.86–1.14)
KETONES UR STRIP-MCNC: NEGATIVE MG/DL
LEUKOCYTE ESTERASE UR QL STRIP: ABNORMAL
MCH RBC QN AUTO: 29.3 PG (ref 26.5–33)
MCHC RBC AUTO-ENTMCNC: 33.6 G/DL (ref 31.5–36.5)
MCV RBC AUTO: 87 FL (ref 78–100)
MRSA DNA SPEC QL NAA+PROBE: NEGATIVE
MUCOUS THREADS #/AREA URNS LPF: PRESENT /LPF
NITRATE UR QL: NEGATIVE
NON-SQ EPI CELLS #/AREA URNS LPF: ABNORMAL /LPF
PH UR STRIP: 5.5 PH (ref 5–7)
PLATELET # BLD AUTO: 271 10E9/L (ref 150–450)
POTASSIUM SERPL-SCNC: 4 MMOL/L (ref 3.4–5.3)
PROLACTIN SERPL-MCNC: 6 UG/L (ref 2–18)
RBC # BLD AUTO: 5.26 10E12/L (ref 4.4–5.9)
RBC #/AREA URNS AUTO: ABNORMAL /HPF
SODIUM SERPL-SCNC: 139 MMOL/L (ref 133–144)
SOURCE: ABNORMAL
SP GR UR STRIP: 1.01 (ref 1–1.03)
SPECIMEN SOURCE: NORMAL
UROBILINOGEN UR STRIP-ACNC: 0.2 EU/DL (ref 0.2–1)
WBC # BLD AUTO: 8.9 10E9/L (ref 4–11)
WBC #/AREA URNS AUTO: ABNORMAL /HPF

## 2017-10-06 PROCEDURE — 83036 HEMOGLOBIN GLYCOSYLATED A1C: CPT | Performed by: FAMILY MEDICINE

## 2017-10-06 PROCEDURE — 87640 STAPH A DNA AMP PROBE: CPT | Mod: XU | Performed by: NEUROLOGICAL SURGERY

## 2017-10-06 PROCEDURE — 81001 URINALYSIS AUTO W/SCOPE: CPT | Performed by: FAMILY MEDICINE

## 2017-10-06 PROCEDURE — 84146 ASSAY OF PROLACTIN: CPT | Performed by: FAMILY MEDICINE

## 2017-10-06 PROCEDURE — 85027 COMPLETE CBC AUTOMATED: CPT | Performed by: FAMILY MEDICINE

## 2017-10-06 PROCEDURE — 80048 BASIC METABOLIC PNL TOTAL CA: CPT | Performed by: FAMILY MEDICINE

## 2017-10-06 PROCEDURE — 36415 COLL VENOUS BLD VENIPUNCTURE: CPT | Performed by: FAMILY MEDICINE

## 2017-10-06 PROCEDURE — 85730 THROMBOPLASTIN TIME PARTIAL: CPT | Performed by: FAMILY MEDICINE

## 2017-10-06 PROCEDURE — 85610 PROTHROMBIN TIME: CPT | Performed by: FAMILY MEDICINE

## 2017-10-06 PROCEDURE — 87641 MR-STAPH DNA AMP PROBE: CPT | Performed by: NEUROLOGICAL SURGERY

## 2017-10-06 NOTE — TELEPHONE ENCOUNTER
What type of form? DISABILITY  What day did you drop off your forms? 10/6/17  Is there a due date? ASAP (7-10 business days to compete forms)   How would you like to receive these forms? Please fax to [phone]    What is the best number to contact you? Home [phone]  What time works best to contact you with in 4 hrs? ANY  Is it okay to leave a message? Yes    Johanna Mcnulty (Auto signs name of person logged into Epic)

## 2017-10-06 NOTE — MR AVS SNAPSHOT
After Visit Summary   10/6/2017    Oleg Ford    MRN: [id number]           Patient Information     Date Of Birth          1960        Visit Information        Provider Department      10/6/2017 12:20 PM [facility]        Today's Diagnoses     Pre-op exam    -  1       Follow-ups after your visit        Your next 10 appointments already scheduled     Oct 19, 2017   Procedure with Sebastián Szymanski MD   [facility] (--)    [address]  [facility] [id number] [phone]              Who to contact     If you have questions or need follow up information about today's clinic visit or your schedule please contact [facility] directly at [phone].  Normal or non-critical lab and imaging results will be communicated to you by MyChart, letter or phone within 4 business days after the clinic has received the results. If you do not hear from us within 7 days, please contact the clinic through Justyle[employer] or phone. If you have a critical or abnormal lab result, we will notify you by phone as soon as possible.  Submit refill requests through [employer] or call your pharmacy and they will forward the refill request to us. Please allow 3 business days for your refill to be completed.          Additional Information About Your Visit        MyChart Information     [employer] gives you secure access to your electronic health record. If you see a primary care provider, you can also send messages to your care team and make appointments. If you have questions, please call your primary care clinic.  If you do not have a primary care provider, please call [phone] and they will assist you.        Care EveryWhere ID     This is your Care EveryWhere ID. This could be used by other organizations to access your [place] medical records  [id number]         Blood Pressure from Last 3 Encounters:   09/25/17 126/76   09/06/17 120/78   08/09/17 120/88     Weight from Last 3 Encounters:   09/25/17 130 kg (286 lb 8 oz)   10/04/17 130.2 kg (287 lb)   09/06/17 130.2 kg (287 lb)              Today, you had the following     No orders found for display       Primary Care Provider Office Phone # Fax #    Cat Tenzin Shaw [phone] [address] [facility] [address]        Equal Access to Services     [facility] : Hadii aad ku hadasho Soomaali, waaxda luqadaha, qaybta kaalmada adeegyada, waxay idiin hayaan adeeg tyronarajuarez lajyoti . So [facility] [phone].    ATENCIÓN: Si sorin medellin, tiene a gongora disposición servicios gratuitos de asistencia lingüística. [facility] [phone].    We comply with applicable federal civil rights laws and [place] laws. We do not discriminate on the basis of race, color, national origin, age, disability, sex, sexual orientation, or gender identity.            Thank you!     Thank you for choosing [facility]  for your care. Our goal is always to provide you with excellent care. Hearing back from our patients is one way we can continue to improve our services. Please take a few minutes to complete the written survey that you may receive in the mail after your visit with us. Thank you!             Your Updated Medication List - Protect others around you: Learn how to safely use, store and throw away your medicines at www.disposemymeds.org.          This list is accurate as of: 10/6/17  1:00 PM.  Always use your most recent med list.                   Brand Name Dispense Instructions for use Diagnosis    ABILIFY 10 MG tablet   Generic drug:  ARIPiprazole      Take 10 mg by mouth daily        buPROPion 150 MG 24 hr tablet    WELLBUTRIN XL     Take 450 mg by mouth every morning        cyclobenzaprine 10 MG tablet    FLEXERIL    90 tablet    TAKE ONE TABLET BY MOUTH 3 TIMES A DAY AS NEEDED FOR MUSCLE SPASMS    DDD (degenerative disc disease), lumbar       diazepam 5 MG tablet    VALIUM    30  tablet    Take 1 tablet (5 mg) by mouth nightly as needed    DDD (degenerative disc disease), lumbar, Chronic pain syndrome       EPINEPHrine 0.3 MG/0.3ML injection 2-pack    EPIPEN/ADRENACLICK/or ANY BX GENERIC EQUIV    1 each    Inject 0.3 mLs (0.3 mg) into the muscle once as needed for anaphylaxis    Bee sting allergy       HYDROmorphone 4 MG tablet    DILAUDID    180 tablet    Take 2 tablets (8 mg) by mouth 4 times daily as needed for moderate to severe pain (every 4-6hour prn)    Chronic pain syndrome, DDD (degenerative disc disease), lumbar       hydrOXYzine 50 MG capsule    VISTARIL     Take 50 mg by mouth 2 times daily as needed        LAMICTAL 150 MG tablet   Generic drug:  lamoTRIgine      Take 250 mg by mouth daily        morphine 30 MG 12 hr tablet    MS CONTIN    90 tablet    Take 1 tablet (30 mg) by mouth 3 times daily    Chronic pain syndrome, DDD (degenerative disc disease), lumbar       omeprazole 40 MG capsule    priLOSEC    30 capsule    TAKE 1 CAPSULE (40 MG) BY MOUTH DAILY TAKE 30-60 MINUTES BEFORE A MEAL.    Gastroesophageal reflux disease with esophagitis       ondansetron 4 MG ODT tab    ZOFRAN-ODT    20 tablet    PLACE 1 OR 2 TABLETS UNDER THE TONGUE 3 TIMES A DAY AS NEEDED FOR NAUSEA    Nausea       rOPINIRole 0.25 MG tablet    REQUIP    60 tablet    Take 1-2 tablets (0.25-0.5 mg) by mouth At Bedtime    RLS (restless legs syndrome)       zolpidem 10 MG tablet    AMBIEN    30 tablet    Take 1 tablet (10 mg) by mouth nightly as needed for sleep    Insomnia, unspecified insomnia

## 2017-10-18 NOTE — H&P (VIEW-ONLY)
[address] [address]  [phone]  Dept: [phone]    PRE-OP EVALUATION:  Today's date: 2017    Oleg Ford (: 1960) presents for pre-operative evaluation assessment as requested by Dr. Szymanski.  He requires evaluation and anesthesia risk assessment prior to undergoing surgery/procedure for treatment of chronic SI joint pain/instability.  Proposed procedure: FUSION SACRAL ILIAC    Date of Surgery/ Procedure: 10/19/17  Time of Surgery/ Procedure: 9:40 AM  Hospital/Surgical Facility: [facility]  Fax number for surgical facility: NA  Primary Physician: Cat Shaw  Type of Anesthesia Anticipated: General    Patient has a Health Care Directive or Living Will:  YES    1. NO - Do you have a history of heart attack, stroke, stent, bypass or surgery on an artery in the head, neck, heart or legs?  2. NO - Do you ever have any pain or discomfort in your chest?  3. NO - Do you have a history of  Heart Failure?  4. YES - Are you troubled by shortness of breath when: walking on the level, up a slight hill or at night?  5. NO - Do you currently have a cold, bronchitis or other respiratory infection?  6. YES - Do you have a cough, shortness of breath or wheezing?  7. YES - Do you sometimes get pains in the calves of your legs when you walk?  8. NO - Do you or anyone in your family have previous history of blood clots?  9. NO - Do you or does anyone in your family have a serious bleeding problem such as prolonged bleeding following surgeries or cuts?  10. NO - Have you ever had problems with anemia or been told to take iron pills?  11. NO - Have you had any abnormal blood loss such as black, tarry or bloody stools, or abnormal vaginal bleeding?  12. NO - Have you ever had a blood transfusion?  13. NO - Have you or any of your relatives ever had problems with anesthesia?  14. NO - Do you have sleep apnea, excessive snoring or daytime  drowsiness?  15. NO - Do you have any prosthetic heart valves?  16. NO - Do you have prosthetic joints?  17. NO - Is there any chance that you may be pregnant?        HPI:                                                      Brief HPI related to upcoming procedure:   Long-standing progressive pain and lower back with radiation to the buttocks. 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 Planned bilateral fusion. Unilateral initially with other side done in a number of months once healing is complete.    See problem list for active medical problems.  Problems all longstanding and stable, except as noted/documented.  See ROS for pertinent symptoms related to these conditions.                                                                                                  .    MEDICAL HISTORY:                                                    Patient Active Problem List    Diagnosis Date Noted     Gastroesophageal reflux disease with esophagitis 01/25/2017     Priority: Medium     Major depressive disorder, recurrent, severe without psychotic features (H) 02/22/2016     Priority: Medium     S/P lumbar fusion 01/12/2016     Priority: Medium     Chronic pain syndrome 12/01/2015     Priority: Medium     Patient is followed by NORY LINARES for ongoing prescription of pain medication.  All refills should be approved by this provider, or covering partner.    Medication(s): MS Contin / dilaudid.   Maximum quantity per month: 60 / 80  Clinic visit frequency required: Q 3 months     Controlled substance agreement on file: Yes       Date(s): 12/1/15    Pain Clinic evaluation in the past: Yes    DIRE Total Score(s):    12/1/2015   Total Score 18       Last [facility] website verification:  done on 12/1/15  11/4/2016     [email or web address]        Simple hepatic cyst 03/16/2015     Priority: Medium     Simple renal cyst 03/16/2015     Priority: Medium     Esophageal cyst 09/05/2014     Priority: Medium     RLS  (restless legs syndrome) 01/29/2013     Priority: Medium     DDD (degenerative disc disease), lumbar 10/18/2012     Priority: Medium     CARDIOVASCULAR SCREENING; LDL GOAL LESS THAN 130 10/31/2010     Priority: Medium     Chronic low back pain 07/19/2010     Priority: Medium      Past Medical History:   Diagnosis Date     Depression, major      Esophageal mass      History of orchiectomy 1982     History of vasectomy 1991     Low back pain      Restless leg      Sleep apnea     had surgery done to resolve sleep apnea     Past Surgical History:   Procedure Laterality Date     BACK SURGERY  2/6/2013    lumbar surgery     COLONOSCOPY       [facility] ENDOSCOPY W EUS N/A 7/31/2014    Procedure: COMBINED ENDOSCOPIC ULTRASOUND, ESOPHAGOSCOPY, GASTROSCOPY, DUODENOSCOPY (EGD);  Surgeon: Shorty Stoll MD;  Location: [facility]     ORCHIECTOMY INGUINAL      right radical orchiectomy     REMOVE STIMULATOR VAGUS NERVE  12/23/2011    Procedure:REMOVE STIMULATOR VAGUS NERVE; Vagus Nerve Stimulator Removal; Surgeon:ALEXIS BECERRA; Location:[facility] OR     REMOVE STIMULATOR VAGUS NERVE  11/8/2013    Procedure: REMOVE STIMULATOR VAGUS NERVE;  Removal Of Vagus Nerve Stimulator Lead In Left Neck ;  Surgeon: Alexis Becerra MD;  Location: [facility] OR     SURGICAL HISTORY OF -       vagal nerve implant; removed     SURGICAL HISTORY OF -       UPPP     VASECTOMY       Current Outpatient Prescriptions   Medication Sig Dispense Refill     ondansetron (ZOFRAN-ODT) 4 MG ODT tab PLACE 1 OR 2 TABLETS UNDER THE TONGUE 3 TIMES A DAY AS NEEDED FOR NAUSEA 20 tablet 1     morphine (MS CONTIN) 30 MG 12 hr tablet Take 1 tablet (30 mg) by mouth 3 times daily 90 tablet 0     HYDROmorphone (DILAUDID) 4 MG tablet Take 2 tablets (8 mg) by mouth 4 times daily as needed for moderate to severe pain (every 4-6hour prn) 180 tablet 0     diazepam (VALIUM) 5 MG tablet Take 1 tablet (5 mg) by mouth nightly as needed 30 tablet 0     cyclobenzaprine (FLEXERIL) 10 MG tablet TAKE  ONE TABLET BY MOUTH 3 TIMES A DAY AS NEEDED FOR MUSCLE SPASMS 90 tablet 5     triamcinolone (KENALOG) 0.1 % cream Apply topically 3 times daily as needed (itch) 30 g 0     omeprazole (PRILOSEC) 40 MG capsule TAKE 1 CAPSULE (40 MG) BY MOUTH DAILY TAKE 30-60 MINUTES BEFORE A MEAL. 30 capsule 11     ARIPiprazole (ABILIFY) 10 MG tablet Take 10 mg by mouth daily       lamoTRIgine (LAMICTAL) 150 MG tablet Take 250 mg by mouth daily        zolpidem (AMBIEN) 10 MG tablet Take 1 tablet (10 mg) by mouth nightly as needed for sleep 30 tablet 2     rOPINIRole (REQUIP) 0.25 MG tablet Take 1-2 tablets (0.25-0.5 mg) by mouth At Bedtime 60 tablet 2     hydrOXYzine (VISTARIL) 50 MG capsule Take 50 mg by mouth       traZODone (DESYREL) 150 MG tablet Take 150 mg by mouth       buPROPion (WELLBUTRIN XL) 150 MG 24 hr tablet Take 450 mg by mouth       EPINEPHrine (EPIPEN) 0.3 MG/0.3ML injection Inject 0.3 mLs (0.3 mg) into the muscle once as needed for anaphylaxis 1 each 2     OTC products: None, except as noted above    Allergies   Allergen Reactions     Amoxicillin      itching     Ciprofloxacin      Doxycycline      Septra [Bactrim]       Latex Allergy: NO    Social History   Substance Use Topics     Smoking status: Current Every Day Smoker     Packs/day: 1.00     Types: Cigarettes     Smokeless tobacco: Never Used     Alcohol use Yes      Comment: OCCASSIONALLY -4-5 DRINKS PER MONTH     History   Drug Use No       REVIEW OF SYSTEMS:                                                    C: NEGATIVE for fever, chills, change in weight  I: NEGATIVE for worrisome rashes, moles or lesions  E: NEGATIVE for vision changes or irritation  E/M: NEGATIVE for ear, mouth and throat problems  R: NEGATIVE for significant cough or SOB  B: NEGATIVE for masses, tenderness or discharge  CV: NEGATIVE for chest pain, palpitations or peripheral edema  GI: NEGATIVE for nausea, abdominal pain, heartburn, or change in bowel habits  : NEGATIVE for frequency,  dysuria, or hematuria  MUSCULOSKELETAL: As above  N: NEGATIVE for weakness, dizziness or paresthesias  E: NEGATIVE for temperature intolerance, skin/hair changes  H: NEGATIVE for bleeding problems  P: NEGATIVE for changes in mood or affect    EXAM:                                                    /76 (BP Location: Right arm, Patient Position: Right side, Cuff Size: Adult Regular)  Pulse 74  Temp 97.9  F (36.6  C) (Oral)  Resp 16  Ht 1.829 m (6')  Wt 130 kg (286 lb 8 oz)  SpO2 99%  BMI 38.86 kg/m2    GENERAL APPEARANCE: healthy, alert and no distress     EYES: EOMI,  PERRL     HENT: ear canals and TM's normal and nose and mouth without ulcers or lesions     NECK: no adenopathy, no asymmetry, masses, or scars and thyroid normal to palpation     RESP: lungs clear to auscultation - no rales, rhonchi or wheezes     CV: regular rates and rhythm, normal S1 S2, no S3 or S4 and no murmur, click or rub     ABDOMEN:  soft, nontender, no HSM or masses and bowel sounds normal     MS: extremities normal- no gross deformities noted, no evidence of inflammation in joints, FROM in all extremities.     SKIN: no suspicious lesions or rashes     NEURO: Normal strength and tone, sensory exam grossly normal, mentation intact and speech normal     PSYCH: mentation appears normal. and affect normal/bright     LYMPHATICS: No axillary, cervical, or supraclavicular nodes    DIAGNOSTICS:                                                    EKG: appears normal, NSR, normal axis, normal intervals, no acute ST/T changes c/w ischemia, no LVH by voltage criteria, unchanged from previous tracings  CBC, BMP, UA pending    Recent Labs   Lab Test 04/21/16  12/08/15   1832  03/16/15   1048  07/25/14   1534   HGB   --   15.4  15.4  14.9   PLT   --   231  225  208   INR   --    --    --   1.00   NA   --   142  141   --    POTASSIUM   --   4.4  4.1   --    CR  0.96  1.16  0.90   --         IMPRESSION:                                                     Reason for surgery/procedure: Bilateral SI joint pain/instability  Diagnosis/reason for consult: Preoperative clearance     The proposed surgical procedure is considered INTERMEDIATE risk.    REVISED CARDIAC RISK INDEX  The patient has the following serious cardiovascular risks for perioperative complications such as (MI, PE, VFib and 3  AV Block):  No serious cardiac risks  INTERPRETATION: 0 risks: Class I (very low risk - 0.4% complication rate)    The patient has the following additional risks for perioperative complications:  High tolerance to opioid analgesics due to chronic use      ICD-10-CM    1. Preop general physical exam Z01.818 EKG 12-lead complete w/read - Clinics     CBC with platelets     Basic metabolic panel     *UA reflex to Microscopic and Culture ([facility] (except [place] and [place])       RECOMMENDATIONS:                                                          --Patient is to take all scheduled medications on the day of surgery.    APPROVAL GIVEN to proceed with proposed procedure, without further diagnostic evaluation       Signed Electronically by: Cat Shaw MD    Copy of this evaluation report is provided to requesting physician.    [facility] Guidelines

## 2017-10-19 ENCOUNTER — ANESTHESIA EVENT (OUTPATIENT)
Dept: SURGERY | Facility: CLINIC | Age: 57
End: 2017-10-19
Payer: MEDICAID

## 2017-10-19 ENCOUNTER — ANESTHESIA (OUTPATIENT)
Dept: SURGERY | Facility: CLINIC | Age: 57
End: 2017-10-19
Payer: MEDICAID

## 2017-10-19 ENCOUNTER — APPOINTMENT (OUTPATIENT)
Dept: GENERAL RADIOLOGY | Facility: CLINIC | Age: 57
End: 2017-10-19
Attending: NEUROLOGICAL SURGERY
Payer: MEDICAID

## 2017-10-19 ENCOUNTER — HOSPITAL ENCOUNTER (OUTPATIENT)
Facility: CLINIC | Age: 57
Setting detail: OBSERVATION
Discharge: HOME | End: 2017-10-21
Attending: NEUROLOGICAL SURGERY | Admitting: NEUROLOGICAL SURGERY
Payer: MEDICAID

## 2017-10-19 DIAGNOSIS — F11.90 CHRONIC, CONTINUOUS USE OF OPIOIDS: ICD-10-CM

## 2017-10-19 DIAGNOSIS — G89.4 CHRONIC PAIN SYNDROME: Primary | ICD-10-CM

## 2017-10-19 LAB
ABO + RH BLD: NORMAL
ABO + RH BLD: NORMAL
BLD GP AB SCN SERPL QL: NORMAL
BLOOD BANK CMNT PATIENT-IMP: NORMAL
SPECIMEN EXP DATE BLD: NORMAL

## 2017-10-19 PROCEDURE — 25000132 ZZH RX MED GY IP 250 OP 250 PS 637: Performed by: NEUROLOGICAL SURGERY

## 2017-10-19 PROCEDURE — 25000128 H RX IP 250 OP 636: Performed by: NURSE ANESTHETIST, CERTIFIED REGISTERED

## 2017-10-19 PROCEDURE — 36415 COLL VENOUS BLD VENIPUNCTURE: CPT | Performed by: ANESTHESIOLOGY

## 2017-10-19 PROCEDURE — 25000125 ZZHC RX 250: Performed by: NEUROLOGICAL SURGERY

## 2017-10-19 PROCEDURE — 37000008 ZZH ANESTHESIA TECHNICAL FEE, 1ST 30 MIN: Performed by: NEUROLOGICAL SURGERY

## 2017-10-19 PROCEDURE — S0020 INJECTION, BUPIVICAINE HYDRO: HCPCS | Performed by: NEUROLOGICAL SURGERY

## 2017-10-19 PROCEDURE — 86901 BLOOD TYPING SEROLOGIC RH(D): CPT | Performed by: ANESTHESIOLOGY

## 2017-10-19 PROCEDURE — 25000128 H RX IP 250 OP 636: Performed by: ANESTHESIOLOGY

## 2017-10-19 PROCEDURE — 86900 BLOOD TYPING SEROLOGIC ABO: CPT | Performed by: ANESTHESIOLOGY

## 2017-10-19 PROCEDURE — 25000128 H RX IP 250 OP 636: Performed by: NEUROLOGICAL SURGERY

## 2017-10-19 PROCEDURE — 27210794 ZZH OR GENERAL SUPPLY STERILE: Performed by: NEUROLOGICAL SURGERY

## 2017-10-19 PROCEDURE — 99207 ZZC CDG-CODE CATEGORY CHANGED: CPT | Performed by: INTERNAL MEDICINE

## 2017-10-19 PROCEDURE — 37000009 ZZH ANESTHESIA TECHNICAL FEE, EACH ADDTL 15 MIN: Performed by: NEUROLOGICAL SURGERY

## 2017-10-19 PROCEDURE — 99219 ZZC INITIAL OBSERVATION CARE,LEVL II: CPT | Performed by: INTERNAL MEDICINE

## 2017-10-19 PROCEDURE — G0378 HOSPITAL OBSERVATION PER HR: HCPCS

## 2017-10-19 PROCEDURE — C1713 ANCHOR/SCREW BN/BN,TIS/BN: HCPCS | Performed by: NEUROLOGICAL SURGERY

## 2017-10-19 PROCEDURE — 25000566 ZZH SEVOFLURANE, EA 15 MIN: Performed by: NEUROLOGICAL SURGERY

## 2017-10-19 PROCEDURE — 25000125 ZZHC RX 250: Performed by: NURSE ANESTHETIST, CERTIFIED REGISTERED

## 2017-10-19 PROCEDURE — 86850 RBC ANTIBODY SCREEN: CPT | Performed by: ANESTHESIOLOGY

## 2017-10-19 PROCEDURE — 40000306 ZZH STATISTIC PRE PROC ASSESS II: Performed by: NEUROLOGICAL SURGERY

## 2017-10-19 PROCEDURE — C1763 CONN TISS, NON-HUMAN: HCPCS | Performed by: NEUROLOGICAL SURGERY

## 2017-10-19 PROCEDURE — 71000012 ZZH RECOVERY PHASE 1 LEVEL 1 FIRST HR: Performed by: NEUROLOGICAL SURGERY

## 2017-10-19 PROCEDURE — 92200047 ZZHC NEURO MONITORING SERVICE, UP TO 7 HOURS (T1FEE): Performed by: NEUROLOGICAL SURGERY

## 2017-10-19 PROCEDURE — 27211024 ZZHC OR SUPPLY OTHER OPNP: Performed by: NEUROLOGICAL SURGERY

## 2017-10-19 PROCEDURE — 36000071 ZZH SURGERY LEVEL 5 W FLUORO 1ST 30 MIN: Performed by: NEUROLOGICAL SURGERY

## 2017-10-19 PROCEDURE — 40000277 XR SURGERY CARM FLUORO LESS THAN 5 MIN W STILLS: Mod: TC

## 2017-10-19 PROCEDURE — 36000069 ZZH SURGERY LEVEL 5 EA 15 ADDTL MIN: Performed by: NEUROLOGICAL SURGERY

## 2017-10-19 PROCEDURE — 71000013 ZZH RECOVERY PHASE 1 LEVEL 1 EA ADDTL HR: Performed by: NEUROLOGICAL SURGERY

## 2017-10-19 RX ORDER — HYDROMORPHONE HYDROCHLORIDE 1 MG/ML
.3-.5 INJECTION, SOLUTION INTRAMUSCULAR; INTRAVENOUS; SUBCUTANEOUS EVERY 5 MIN PRN
Status: DISCONTINUED | OUTPATIENT
Start: 2017-10-19 | End: 2017-10-19 | Stop reason: HOSPADM

## 2017-10-19 RX ORDER — GLYCOPYRROLATE 0.2 MG/ML
INJECTION, SOLUTION INTRAMUSCULAR; INTRAVENOUS PRN
Status: DISCONTINUED | OUTPATIENT
Start: 2017-10-19 | End: 2017-10-19

## 2017-10-19 RX ORDER — ONDANSETRON 2 MG/ML
INJECTION INTRAMUSCULAR; INTRAVENOUS PRN
Status: DISCONTINUED | OUTPATIENT
Start: 2017-10-19 | End: 2017-10-19

## 2017-10-19 RX ORDER — HYDROMORPHONE HYDROCHLORIDE 4 MG/1
8 TABLET ORAL 4 TIMES DAILY PRN
Status: DISCONTINUED | OUTPATIENT
Start: 2017-10-19 | End: 2017-10-20

## 2017-10-19 RX ORDER — HYDROXYZINE HYDROCHLORIDE 50 MG/1
50 TABLET, FILM COATED ORAL EVERY 6 HOURS PRN
Status: DISCONTINUED | OUTPATIENT
Start: 2017-10-19 | End: 2017-10-21 | Stop reason: HOSPADM

## 2017-10-19 RX ORDER — HYDRALAZINE HYDROCHLORIDE 20 MG/ML
2.5-5 INJECTION INTRAMUSCULAR; INTRAVENOUS EVERY 10 MIN PRN
Status: DISCONTINUED | OUTPATIENT
Start: 2017-10-19 | End: 2017-10-19 | Stop reason: HOSPADM

## 2017-10-19 RX ORDER — ZOLPIDEM TARTRATE 5 MG/1
10 TABLET ORAL
Status: DISCONTINUED | OUTPATIENT
Start: 2017-10-19 | End: 2017-10-21 | Stop reason: HOSPADM

## 2017-10-19 RX ORDER — BUPROPION HYDROCHLORIDE 150 MG/1
450 TABLET ORAL EVERY MORNING
Status: DISCONTINUED | OUTPATIENT
Start: 2017-10-19 | End: 2017-10-21 | Stop reason: HOSPADM

## 2017-10-19 RX ORDER — FENTANYL CITRATE 50 UG/ML
25-50 INJECTION, SOLUTION INTRAMUSCULAR; INTRAVENOUS
Status: DISCONTINUED | OUTPATIENT
Start: 2017-10-19 | End: 2017-10-19 | Stop reason: HOSPADM

## 2017-10-19 RX ORDER — DIAZEPAM 5 MG
5 TABLET ORAL EVERY 6 HOURS PRN
Status: DISCONTINUED | OUTPATIENT
Start: 2017-10-19 | End: 2017-10-21 | Stop reason: HOSPADM

## 2017-10-19 RX ORDER — NALOXONE HYDROCHLORIDE 0.4 MG/ML
.1-.4 INJECTION, SOLUTION INTRAMUSCULAR; INTRAVENOUS; SUBCUTANEOUS
Status: DISCONTINUED | OUTPATIENT
Start: 2017-10-19 | End: 2017-10-21 | Stop reason: HOSPADM

## 2017-10-19 RX ORDER — ONDANSETRON 2 MG/ML
4 INJECTION INTRAMUSCULAR; INTRAVENOUS ONCE
Status: COMPLETED | OUTPATIENT
Start: 2017-10-19 | End: 2017-10-19

## 2017-10-19 RX ORDER — SODIUM CHLORIDE AND POTASSIUM CHLORIDE 150; 450 MG/100ML; MG/100ML
INJECTION, SOLUTION INTRAVENOUS CONTINUOUS
Status: DISCONTINUED | OUTPATIENT
Start: 2017-10-19 | End: 2017-10-21 | Stop reason: HOSPADM

## 2017-10-19 RX ORDER — HYDROMORPHONE HYDROCHLORIDE 1 MG/ML
.5-1 INJECTION, SOLUTION INTRAMUSCULAR; INTRAVENOUS; SUBCUTANEOUS
Status: DISCONTINUED | OUTPATIENT
Start: 2017-10-19 | End: 2017-10-21 | Stop reason: HOSPADM

## 2017-10-19 RX ORDER — ROPINIROLE 0.25 MG/1
0.25-0.5 TABLET, FILM COATED ORAL AT BEDTIME
Status: DISCONTINUED | OUTPATIENT
Start: 2017-10-19 | End: 2017-10-21 | Stop reason: HOSPADM

## 2017-10-19 RX ORDER — LIDOCAINE HYDROCHLORIDE 10 MG/ML
INJECTION, SOLUTION INFILTRATION; PERINEURAL PRN
Status: DISCONTINUED | OUTPATIENT
Start: 2017-10-19 | End: 2017-10-19

## 2017-10-19 RX ORDER — ARIPIPRAZOLE 10 MG/1
10 TABLET ORAL DAILY
Status: DISCONTINUED | OUTPATIENT
Start: 2017-10-19 | End: 2017-10-21 | Stop reason: HOSPADM

## 2017-10-19 RX ORDER — LIDOCAINE 40 MG/G
CREAM TOPICAL
Status: DISCONTINUED | OUTPATIENT
Start: 2017-10-19 | End: 2017-10-21 | Stop reason: HOSPADM

## 2017-10-19 RX ORDER — SODIUM CHLORIDE, SODIUM LACTATE, POTASSIUM CHLORIDE, CALCIUM CHLORIDE 600; 310; 30; 20 MG/100ML; MG/100ML; MG/100ML; MG/100ML
INJECTION, SOLUTION INTRAVENOUS CONTINUOUS
Status: DISCONTINUED | OUTPATIENT
Start: 2017-10-19 | End: 2017-10-19 | Stop reason: HOSPADM

## 2017-10-19 RX ORDER — PROPOFOL 10 MG/ML
INJECTION, EMULSION INTRAVENOUS PRN
Status: DISCONTINUED | OUTPATIENT
Start: 2017-10-19 | End: 2017-10-19

## 2017-10-19 RX ORDER — FENTANYL CITRATE 50 UG/ML
INJECTION, SOLUTION INTRAMUSCULAR; INTRAVENOUS PRN
Status: DISCONTINUED | OUTPATIENT
Start: 2017-10-19 | End: 2017-10-19

## 2017-10-19 RX ORDER — DIAZEPAM 10 MG/2ML
5 INJECTION, SOLUTION INTRAMUSCULAR; INTRAVENOUS ONCE
Status: COMPLETED | OUTPATIENT
Start: 2017-10-19 | End: 2017-10-19

## 2017-10-19 RX ORDER — MORPHINE SULFATE 30 MG/1
30 TABLET, FILM COATED, EXTENDED RELEASE ORAL 3 TIMES DAILY
Status: DISCONTINUED | OUTPATIENT
Start: 2017-10-19 | End: 2017-10-21 | Stop reason: HOSPADM

## 2017-10-19 RX ORDER — LABETALOL HYDROCHLORIDE 5 MG/ML
10 INJECTION, SOLUTION INTRAVENOUS
Status: DISCONTINUED | OUTPATIENT
Start: 2017-10-19 | End: 2017-10-19 | Stop reason: HOSPADM

## 2017-10-19 RX ORDER — DEXAMETHASONE SODIUM PHOSPHATE 4 MG/ML
INJECTION, SOLUTION INTRA-ARTICULAR; INTRALESIONAL; INTRAMUSCULAR; INTRAVENOUS; SOFT TISSUE PRN
Status: DISCONTINUED | OUTPATIENT
Start: 2017-10-19 | End: 2017-10-19

## 2017-10-19 RX ORDER — CEFAZOLIN SODIUM 1 G/50ML
3 SOLUTION INTRAVENOUS
Status: COMPLETED | OUTPATIENT
Start: 2017-10-19 | End: 2017-10-19

## 2017-10-19 RX ORDER — ONDANSETRON 4 MG/1
4 TABLET, ORALLY DISINTEGRATING ORAL EVERY 6 HOURS PRN
Status: DISCONTINUED | OUTPATIENT
Start: 2017-10-19 | End: 2017-10-21 | Stop reason: HOSPADM

## 2017-10-19 RX ORDER — LIDOCAINE 40 MG/G
CREAM TOPICAL
Status: DISCONTINUED | OUTPATIENT
Start: 2017-10-19 | End: 2017-10-19 | Stop reason: HOSPADM

## 2017-10-19 RX ORDER — CEFAZOLIN SODIUM 1 G/3ML
1 INJECTION, POWDER, FOR SOLUTION INTRAMUSCULAR; INTRAVENOUS SEE ADMIN INSTRUCTIONS
Status: DISCONTINUED | OUTPATIENT
Start: 2017-10-19 | End: 2017-10-19 | Stop reason: HOSPADM

## 2017-10-19 RX ORDER — LABETALOL HYDROCHLORIDE 5 MG/ML
INJECTION, SOLUTION INTRAVENOUS PRN
Status: DISCONTINUED | OUTPATIENT
Start: 2017-10-19 | End: 2017-10-19

## 2017-10-19 RX ORDER — ONDANSETRON 4 MG/1
4 TABLET, ORALLY DISINTEGRATING ORAL EVERY 30 MIN PRN
Status: DISCONTINUED | OUTPATIENT
Start: 2017-10-19 | End: 2017-10-19 | Stop reason: HOSPADM

## 2017-10-19 RX ORDER — NEOSTIGMINE METHYLSULFATE 1 MG/ML
VIAL (ML) INJECTION PRN
Status: DISCONTINUED | OUTPATIENT
Start: 2017-10-19 | End: 2017-10-19

## 2017-10-19 RX ORDER — ONDANSETRON 2 MG/ML
4 INJECTION INTRAMUSCULAR; INTRAVENOUS EVERY 30 MIN PRN
Status: DISCONTINUED | OUTPATIENT
Start: 2017-10-19 | End: 2017-10-19 | Stop reason: HOSPADM

## 2017-10-19 RX ORDER — CYCLOBENZAPRINE HCL 10 MG
10 TABLET ORAL 3 TIMES DAILY
Status: DISCONTINUED | OUTPATIENT
Start: 2017-10-19 | End: 2017-10-21 | Stop reason: HOSPADM

## 2017-10-19 RX ADMIN — ONDANSETRON 4 MG: 2 INJECTION INTRAMUSCULAR; INTRAVENOUS at 09:02

## 2017-10-19 RX ADMIN — SODIUM CHLORIDE, POTASSIUM CHLORIDE, SODIUM LACTATE AND CALCIUM CHLORIDE: 600; 310; 30; 20 INJECTION, SOLUTION INTRAVENOUS at 10:30

## 2017-10-19 RX ADMIN — FENTANYL CITRATE 50 MCG: 50 INJECTION INTRAMUSCULAR; INTRAVENOUS at 11:45

## 2017-10-19 RX ADMIN — HYDROMORPHONE HYDROCHLORIDE 8 MG: 4 TABLET ORAL at 15:24

## 2017-10-19 RX ADMIN — ROPINIROLE HYDROCHLORIDE 0.5 MG: 0.25 TABLET, FILM COATED ORAL at 22:07

## 2017-10-19 RX ADMIN — LIDOCAINE HYDROCHLORIDE 40 MG: 10 INJECTION, SOLUTION INFILTRATION; PERINEURAL at 10:07

## 2017-10-19 RX ADMIN — Medication 3 G: at 10:30

## 2017-10-19 RX ADMIN — HYDROMORPHONE HYDROCHLORIDE 1 MG: 1 INJECTION, SOLUTION INTRAMUSCULAR; INTRAVENOUS; SUBCUTANEOUS at 19:17

## 2017-10-19 RX ADMIN — MORPHINE SULFATE 30 MG: 30 TABLET, EXTENDED RELEASE ORAL at 15:43

## 2017-10-19 RX ADMIN — HYDROMORPHONE HYDROCHLORIDE 0.5 MG: 1 INJECTION, SOLUTION INTRAMUSCULAR; INTRAVENOUS; SUBCUTANEOUS at 12:00

## 2017-10-19 RX ADMIN — LAMOTRIGINE 250 MG: 100 TABLET ORAL at 16:44

## 2017-10-19 RX ADMIN — CYCLOBENZAPRINE HYDROCHLORIDE 10 MG: 10 TABLET, FILM COATED ORAL at 15:42

## 2017-10-19 RX ADMIN — FENTANYL CITRATE 50 MCG: 50 INJECTION INTRAMUSCULAR; INTRAVENOUS at 12:43

## 2017-10-19 RX ADMIN — GLYCOPYRROLATE 0.8 MG: 0.2 INJECTION, SOLUTION INTRAMUSCULAR; INTRAVENOUS at 11:15

## 2017-10-19 RX ADMIN — ONDANSETRON 4 MG: 2 INJECTION INTRAMUSCULAR; INTRAVENOUS at 11:10

## 2017-10-19 RX ADMIN — ROCURONIUM BROMIDE 50 MG: 10 INJECTION INTRAVENOUS at 10:07

## 2017-10-19 RX ADMIN — DEXAMETHASONE SODIUM PHOSPHATE 4 MG: 4 INJECTION, SOLUTION INTRA-ARTICULAR; INTRALESIONAL; INTRAMUSCULAR; INTRAVENOUS; SOFT TISSUE at 10:07

## 2017-10-19 RX ADMIN — FENTANYL CITRATE 75 MCG: 50 INJECTION, SOLUTION INTRAMUSCULAR; INTRAVENOUS at 11:11

## 2017-10-19 RX ADMIN — MORPHINE SULFATE 30 MG: 30 TABLET, EXTENDED RELEASE ORAL at 22:58

## 2017-10-19 RX ADMIN — HYDROMORPHONE HYDROCHLORIDE 0.5 MG: 1 INJECTION, SOLUTION INTRAMUSCULAR; INTRAVENOUS; SUBCUTANEOUS at 11:52

## 2017-10-19 RX ADMIN — HYDROMORPHONE HYDROCHLORIDE 0.5 MG: 1 INJECTION, SOLUTION INTRAMUSCULAR; INTRAVENOUS; SUBCUTANEOUS at 11:38

## 2017-10-19 RX ADMIN — ARIPIPRAZOLE 10 MG: 10 TABLET ORAL at 16:44

## 2017-10-19 RX ADMIN — HYDROMORPHONE HYDROCHLORIDE 8 MG: 4 TABLET ORAL at 20:25

## 2017-10-19 RX ADMIN — PROPOFOL 270 MG: 10 INJECTION, EMULSION INTRAVENOUS at 10:07

## 2017-10-19 RX ADMIN — HYDROMORPHONE HYDROCHLORIDE 0.5 MG: 1 INJECTION, SOLUTION INTRAMUSCULAR; INTRAVENOUS; SUBCUTANEOUS at 12:30

## 2017-10-19 RX ADMIN — FENTANYL CITRATE 100 MCG: 50 INJECTION, SOLUTION INTRAMUSCULAR; INTRAVENOUS at 10:07

## 2017-10-19 RX ADMIN — HYDROMORPHONE HYDROCHLORIDE 1 MG: 1 INJECTION, SOLUTION INTRAMUSCULAR; INTRAVENOUS; SUBCUTANEOUS at 21:40

## 2017-10-19 RX ADMIN — CEFAZOLIN SODIUM 1 G: 1 INJECTION, SOLUTION INTRAVENOUS at 18:15

## 2017-10-19 RX ADMIN — OMEPRAZOLE 40 MG: 20 CAPSULE, DELAYED RELEASE ORAL at 15:43

## 2017-10-19 RX ADMIN — FENTANYL CITRATE 50 MCG: 50 INJECTION INTRAMUSCULAR; INTRAVENOUS at 11:39

## 2017-10-19 RX ADMIN — CYCLOBENZAPRINE HYDROCHLORIDE 10 MG: 10 TABLET, FILM COATED ORAL at 20:26

## 2017-10-19 RX ADMIN — FENTANYL CITRATE 50 MCG: 50 INJECTION INTRAMUSCULAR; INTRAVENOUS at 12:20

## 2017-10-19 RX ADMIN — GLYCOPYRROLATE 0.2 MG: 0.2 INJECTION, SOLUTION INTRAMUSCULAR; INTRAVENOUS at 10:07

## 2017-10-19 RX ADMIN — BUPROPION HYDROCHLORIDE 450 MG: 150 TABLET, FILM COATED, EXTENDED RELEASE ORAL at 15:43

## 2017-10-19 RX ADMIN — DIAZEPAM 5 MG: 5 INJECTION, SOLUTION INTRAMUSCULAR; INTRAVENOUS at 12:09

## 2017-10-19 RX ADMIN — HYDROMORPHONE HYDROCHLORIDE 0.5 MG: 1 INJECTION, SOLUTION INTRAMUSCULAR; INTRAVENOUS; SUBCUTANEOUS at 11:45

## 2017-10-19 RX ADMIN — Medication 5 MG: at 11:15

## 2017-10-19 RX ADMIN — HYDROMORPHONE HYDROCHLORIDE 0.5 MG: 1 INJECTION, SOLUTION INTRAMUSCULAR; INTRAVENOUS; SUBCUTANEOUS at 12:52

## 2017-10-19 RX ADMIN — HYDROMORPHONE HYDROCHLORIDE 0.5 MG: 1 INJECTION, SOLUTION INTRAMUSCULAR; INTRAVENOUS; SUBCUTANEOUS at 13:26

## 2017-10-19 RX ADMIN — FENTANYL CITRATE 25 MCG: 50 INJECTION, SOLUTION INTRAMUSCULAR; INTRAVENOUS at 11:17

## 2017-10-19 RX ADMIN — SODIUM CHLORIDE, POTASSIUM CHLORIDE, SODIUM LACTATE AND CALCIUM CHLORIDE: 600; 310; 30; 20 INJECTION, SOLUTION INTRAVENOUS at 08:46

## 2017-10-19 RX ADMIN — PROPOFOL 30 MG: 10 INJECTION, EMULSION INTRAVENOUS at 11:19

## 2017-10-19 RX ADMIN — SODIUM CHLORIDE, POTASSIUM CHLORIDE, SODIUM LACTATE AND CALCIUM CHLORIDE: 600; 310; 30; 20 INJECTION, SOLUTION INTRAVENOUS at 12:01

## 2017-10-19 RX ADMIN — LABETALOL HYDROCHLORIDE 5 MG: 5 INJECTION, SOLUTION INTRAVENOUS at 10:10

## 2017-10-19 RX ADMIN — POTASSIUM CHLORIDE AND SODIUM CHLORIDE: 450; 150 INJECTION, SOLUTION INTRAVENOUS at 15:34

## 2017-10-19 NOTE — ANESTHESIA CARE TRANSFER NOTE
Patient: Oleg Ford    Procedure(s):  Right SI joint fusion  - Wound Class: I-Clean    Diagnosis: bilateral SI joint instability  Diagnosis Additional Information: No value filed.    Anesthesia Type:   General, ETT     Note:  Airway :Face Mask  Patient transferred to:PACU  Comments: Pt SV good tidal volume, op sxn cuff down extubated.  Transfer to pacu.  Report to PACU RN transfer care. Handoff Report: Identifed the Patient, Identified the Reponsible Provider, Reviewed the pertinent medical history, Discussed the surgical course, Reviewed Intra-OP anesthesia mangement and issues during anesthesia, Set expectations for post-procedure period and Allowed opportunity for questions and acknowledgement of understanding      Vitals: (Last set prior to Anesthesia Care Transfer)    CRNA VITALS  10/19/2017 1057 - 10/19/2017 1129      10/19/2017             Pulse: 86    SpO2: 95 %                Electronically Signed By: LIBBY Sheikh CRNA  October 19, 2017  11:29 AM

## 2017-10-19 NOTE — OR NURSING
Patient stated he voided just prior to transferring to operating room and Dr. Szymanski communicated this information, declining placement of choudhury catheter for procedure.
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
## 2017-10-19 NOTE — OP NOTE
REPORT OF OPERATION  Oleg Ford is a 57 year old old male admitted on 10/19/2017  6:45 AM.  ?  Operative Date:  10/19/2017  PRE-PROCEDURE DIAGNOSIS:  1) Right  side sacroiliac joint dysfunction.  POST-PROCEDURE DIAGNOSIS:  1) Same as above  PROCEDURE PERFORMED:  1) Right  side sacroiliac joint fusion with 2 implants   2) extraction and use of illac bone graft   3) Intraoperative fluoroscopic imaging and electrophysiological monitoring.        Surgeon: Sebastián Szymanski MD  ASSISTANT: Сергей Fisher DO   HISTORY: Please refer to my clinic note for full details, but in short ,Patient hasright  side sacroiliac joint dysfunction refractory to non-operative management. Repeated diagnostic blocks were confirmatory for sacroiliac joint as pain generator. Patient now indicated for surgical treatment with fixation and arthrodesis. Patient was set up for the surgery as mentioned above and was taken to surgery as mentioned above after all risks and benefits were explained.  PROCEDURE:  The patient was taken to surgery. After general anesthesia was applied, SCDs and Sy placed and preoperative antibiotic given, then patient has been positioned on the Filemon table and Sreekanth frame in a prone. The right  side of the pelvis was then prepped and draped for a lateral incision on the buttock.  One C-arm image intensifier was alternately positioned for lateral, AP, pelvic inlet, and pelvic outlet projections throughout the procedure.  A guide pin was placed to liana the posterior buttock in an AP projection of the anticipated transiliosacral position for the 12.5mm implant across the SI joint. An intersecting liana was then drawn in a lateral projection of the anticipated position for the 12.5mm implant across the SI joint. The skin at the intersection, along with the adjacent tissues, was injected with a lidocaine and epinephrine solution to minimize bleeding and improve hemostasis. A 2cm longitudinal skin incision was made.  A  sequence of pins and dilators were used to gently spare the gluteus muscles and other soft tissues down to the outer table of the ilium. Attention was paid to a proper intraosseous trajectory in the lateral, inlet, and outlet projections. The ilium bone was drilled down to the SI joint. The ilium spongiosa bone drillings were collected for later grafting. A cannula was then placed into the ilium bone to create a tissue guarding working channel.  The joint cartilage was excised using a series of three perpendicular reaching decorticating instruments. The denuded space was then packed with autologous bone from the ilium mixed with Tammy DBM to facilitate bony fusion.  A guide pin was then drilled into the proximal sacral ala. Attention was paid to proper positioning in both the inlet and outlet projections. A drill was used to prepare the path for the 12.5mm threaded implant. A final sleeve replaced the working cannula, and the length of the implant was determined by markings on the guide pin. The appropriate length implant was then placed with final seating noted on imaging. Attention was paid at all points to verify no significant advancement of the guide pin. The instruments were withdrawn.  A tissue guide system was then used in a similar fashion for the second threaded implant. A guide wire was then drilled across the ilium and SI joints and into the proximal sacral ala. Attention was paid to proper positioning in both the inlet and outlet projections. A drill was then used to prepare the path for the 6.5mm implant. The length of the implant was determined by markings on the guide pin where it intersected the final sleeve. The appropriate length implant was then placed with final seating noted on imaging. Attention was paid at all points to verify no significant advancement of the guide wire. The instruments were withdrawn.  Upon completion of placement of the 2 implants, final inlet, outlet, and lateral images  were obtained.  Irrigation performed. No significant bleeding identified. The deep tissues were infiltrated with Marcaine and epinephrine for postoperative pain control. The skin incision was also infiltrated subcutaneously with Marcaine for postoperative pain control. A subcuticular skin closure with 2-0 Vicryl was performed. Steri-Strips were used for  the skin. The patient was then turned off the prone position onto supine where patient was reversed, extubated, and sent for recovery. The patient tolerated this procedure well.  IMPLANTS: Two threaded implants: 12mm x 50  mm and 9mm x 50 mm.  Additional finding:  No complications  Estimated blood: 10cc.      DISPOSITION: To PACU with postoperative antibiotic. All counts are correct at the end of the surgery.  Sebastián Szymanski MD    cc: Sebastián Szymanski MD

## 2017-10-19 NOTE — IP AVS SNAPSHOT
MRN:[id number]                      After Visit Summary   10/19/2017    Oleg Ford    MRN: [id number]           Thank you!     Thank you for choosing [facility] for your care. Our goal is always to provide you with excellent care. Hearing back from our patients is one way we can continue to improve our services. Please take a few minutes to complete the written survey that you may receive in the mail after you visit. If you would like to speak to someone directly about your visit please contact Patient Relations at [phone]. Thank you!          Patient Information     Date Of Birth          1960        Designated Caregiver       Most Recent Value    Caregiver    Will someone help with your care after discharge? yes    Name of designated caregiver Leticia Cerna    Phone number of caregiver     Caregiver address same address as patient on [address]      About your hospital stay     You were admitted on:  October 19, 2017 You last received care in the:  [facility]    You were discharged on:  October 21, 2017        Reason for your hospital stay       Spine surgery                  Who to Call     For medical emergencies, please call 911.  For non-urgent questions about your medical care, please call your primary care provider or clinic, [phone]  For questions related to your surgery, please call your surgery clinic        Attending Provider     Provider Specialty    Sebastián Szymanski MD Neurosurgery       Primary Care Provider Office Phone # Fax #    Cat Tenzin Shaw [phone]      After Care Instructions     Activity       Your activity upon discharge: Ad michael within following limitations:  No excessive activities   No Bending, Twisting, climbing, Crawling,   No lifting more than 8 lb for 2 weeks, or 15 lb for 2 months or 25 lb for 4 months or 35 lb for 6 months  Brace for riding cars for 4-6 months            Diet        Follow this diet upon discharge: resume prior to admission diet            Discharge Instructions       Refer to [facility] spine handbood or online at [email or web address]  Or print it for patient at [email or web address]                  Follow-up Appointments     Follow-up and recommended labs and tests        Follow up in 2 weeks with me or PCP for wound check ( patient's choice) if patients want to go to PCP for wound check, then f/u in my office  With one month                  Further instructions from your care team       Opioid Medication Information    You have been given a prescription for an opioid (narcotic) pain medicine and/or have received a pain medicine. These medicines can make you drowsy or impaired. You must not drive, operate dangerous equipment, or engage in any other dangerous activities while taking these medications. If you drive while taking these medications, you could be arrested for DUI, or driving under the influence. Do not drink any alcohol while you are taking these medications.   Opioid pain medications can cause addiction. If you have a history of chemical dependency of any type, you are at a higher risk of becoming addicted to pain medications.  Only take these prescribed medications to treat your pain when all other options have been tried. Take it for as short a time and as few doses as possible. Store your pain pills in a secure place, as they are frequently stolen and provide a dangerous opportunity for children or visitors in your house to start abusing these powerful medications. We will not replace any lost or stolen medicine.  As soon as your pain is better, you should seek out a drug take back program (see your local police department) to dispose of them.   Over-the-counter medications and prescription drugs can pollute liu and be harmful to humans, fish, and other wildlife when disposed of  improperly -- do not flush medications down the toilet or place in the trash.  Properly disposing of medicines is important to prevent abuse or poisoning and protect the environment.     Prescription and over-the-counter medications are collected anonymously from residents for free at [facility] drop-off locations. Visit the [facility] Prescription Drug Drop-Off page for the list of drop-off sites and information about the program.       [place]  Police Department [phone] Mon-Fri  8am to 4:30pm     [place]  Police Department [phone] 24hrs a day    [place]  Police Department [phone] Mon-Fri  8am to 6:00pm     [place]  Police Department [phone] Mon-Fri  8am to 4:30pm     [place]  Police Department [phone] 24hrs a day      [place]  Police Department [phone] Mon-Fri  8am to 4:30pm   Many prescription pain medications contain Tylenol  (acetaminophen), including Vicodin , Tylenol #3 , Norco , Lortab , and Percocet .  You should not take any extra pills of Tylenol  if you are using these prescription medications or you can get very sick.  Do not ever take more than 3000 mg of acetaminophen in any 24 hour period.  All opioids tend to cause constipation. Drink plenty of water and eat foods that have a lot of fiber, such as fruits, vegetables, prune juice, apple juice and high fiber cereal.  Take a laxative if you don t move your bowels at least every other day. Miralax , Milk of Magnesia, Colace , or Senna  can be used to keep you regular.  You will likely need to continue stool softeners and stimulants while taking opioids.     Discharge Instructions: After Your Surgery  You ve just had surgery. During surgery, you were given medicine called anesthesia to keep you relaxed and free of pain. After surgery, you may have some pain or nausea. This is common. Here are some tips for feeling better and getting well after surgery.     Stay on  schedule with your medicine.   Going home  Your healthcare provider will show you how to take care of yourself when you go home. He or she will also answer your questions. Have an adult family member or friend drive you home. For the first 24 hours after your surgery:    Do not drive or use heavy equipment.    Do not make important decisions or sign legal papers.    Do not drink alcohol.    Have someone stay with you, if needed. He or she can watch for problems and help keep you safe.  Be sure to go to all follow-up visits with your healthcare provider. And rest after your surgery for as long as your healthcare provider tells you to.  Coping with pain  If you have pain after surgery, pain medicine will help you feel better. Take it as told, before pain becomes severe. Also, ask your healthcare provider or pharmacist about other ways to control pain. This might be with heat, ice, or relaxation. And follow any other instructions your surgeon or nurse gives you.  Tips for taking pain medicine  To get the best relief possible, remember these points:    Pain medicines can upset your stomach. Taking them with a little food may help.    Most pain relievers taken by mouth need at least 20 to 30 minutes to start to work.    Taking medicine on a schedule can help you remember to take it. Try to time your medicine so that you can take it before starting an activity. This might be before you get dressed, go for a walk, or sit down for dinner.    Constipation is a common side effect of pain medicines. Call your healthcare provider before taking any medicines such as laxatives or stool softeners to help ease constipation. Also ask if you should skip any foods. Drinking lots of fluids and eating foods such as fruits and vegetables that are high in fiber can also help. Remember, do not take laxatives unless your surgeon has prescribed them.    Drinking alcohol and taking pain medicine can cause dizziness and slow your breathing. It  can even be deadly. Do not drink alcohol while taking pain medicine.    Pain medicine can make you react more slowly to things. Do not drive or run machinery while taking pain medicine.  Your healthcare provider may tell you to take acetaminophen to help ease your pain. Ask him or her how much you are supposed to take each day. Acetaminophen or other pain relievers may interact with your prescription medicines or other over-the-counter (OTC) medicines. Some prescription medicines have acetaminophen and other ingredients. Using both prescription and OTC acetaminophen for pain can cause you to overdose. Read the labels on your OTC medicines with care. This will help you to clearly know the list of ingredients, how much to take, and any warnings. It may also help you not take too much acetaminophen. If you have questions or do not understand the information, ask your pharmacist or healthcare provider to explain it to you before you take the OTC medicine.  Managing nausea  Some people have an upset stomach after surgery. This is often because of anesthesia, pain, or pain medicine, or the stress of surgery. These tips will help you handle nausea and eat healthy foods as you get better. If you were on a special food plan before surgery, ask your healthcare provider if you should follow it while you get better. These tips may help:    Do not push yourself to eat. Your body will tell you when to eat and how much.    Start off with clear liquids and soup. They are easier to digest.    Next try semi-solid foods, such as mashed potatoes, applesauce, and gelatin, as you feel ready.    Slowly move to solid foods. Don t eat fatty, rich, or spicy foods at first.    Do not force yourself to have 3 large meals a day. Instead eat smaller amounts more often.    Take pain medicines with a small amount of solid food, such as crackers or toast, to avoid nausea.     Call your surgeon if     You still have pain an hour after taking  medicine. The medicine may not be strong enough.    You feel too sleepy, dizzy, or groggy. The medicine may be too strong.    You have side effects like nausea, vomiting, or skin changes, such as rash, itching, or hives.       If you have obstructive sleep apnea  You were given anesthesia medicine during surgery to keep you comfortable and free of pain. After surgery, you may have more apnea spells because of this medicine and other medicines you were given. The spells may last longer than usual.    At home:    Keep using the continuous positive airway pressure (CPAP) device when you sleep. Unless your healthcare provider tells you not to, use it when you sleep, day or night. CPAP is a common device used to treat obstructive sleep apnea.    Talk with your provider before taking any pain medicine, muscle relaxants, or sedatives. Your provider will tell you about the possible dangers of taking these medicines.  Date Last Reviewed: 12/1/2016 2000-2017 [employer] [address] [place] [address]. All rights reserved. This information is not intended as a substitute for professional medical care. Always follow your healthcare professional's instructions.    You were seen by the Pain and Palliative Care inpatient service to assist in optimizing your pain control. You are at risk for unintended respiratory suppression due to the amount of opioids you are on. Your risk is further increased with the concurrent medical conditions of sleep apnea and high opioid tolerance. Please be advised that you should follow up with your care provider to  discuss this further to reduce your risk factors.      You have been given a prescription for Naloxone. It is a Narcotic reversal and used if you suspect that you are experiencing an overdose of your opioids  Give one dose,  Nasally and call 911, this medication is short acting and an additional dose is needed.  Even if the person is becoming more awake  "following the first dose of narcan nasal spray, it is still necessary to call 911.   After use, bring the treated person call 911. Make sure the treating health care professional knows that the person has received an injection of this medicine. You will receive additional instructions on what to do during and after use of this medicine before an emergency occurs.   DO NOT HAVE SOME DRIVE YOU TO THE EMERGENCY ROOM.            We have also included additional information on managing your opioid medications safely.     If you have questions or concerns with your pain management please contact our office at this number .   I or one of my associates can assist you further.  I has been a pleasure assisting you in improving your pain management.     LIBBY Olivo, CNP  Pain Management and Palliative Care  [facility]  [phone]             Pending Results     No orders found from 10/17/2017 to 10/20/2017.            Statement of Approval     Ordered          10/21/17 7298  I have reviewed and agree with all the recommendations and orders detailed in this document.  EFFECTIVE NOW     Approved and electronically signed by:  Sebastián Szymanski MD             Admission Information     Date & Time Provider Department Dept. Phone    10/19/2017 Sebastián Szymanski MD [facility] [phone]      Your Vitals Were     Blood Pressure Pulse Temperature Respirations Height Weight    122/79 90 98.9  F (37.2  C) (Oral) 16 1.829 m (6' 0.01\") 129.5 kg (285 lb 9.6 oz)    Pulse Oximetry BMI (Body Mass Index)                95% 38.73 kg/m2          MyChart Information     [employer] gives you secure access to your electronic health record. If you see a primary care provider, you can also send messages to your care team and make appointments. If you have questions, please call your primary care clinic.  If you do not have a primary care provider, please call [phone] and they will assist " you.        Care EveryWhere ID     This is your Care EveryWhere ID. This could be used by other organizations to access your [place] medical records  [id number]        Equal Access to Services     ALANA HANSON : Josi Callejas, nick galvan, shirin domenicreji josé miguelcristi, waxstanley francescain hayaatrinity valdezyovanny pérez angel smith. So [facility] [phone].    ATENCIÓN: Si habla español, tiene a gongora disposición servicios gratuitos de asistencia lingüística. Llame al [phone].    We comply with applicable federal civil rights laws and [place] laws. We do not discriminate on the basis of race, color, national origin, age, disability, sex, sexual orientation, or gender identity.               Review of your medicines      START taking        Dose / Directions    hydrOXYzine 50 MG tablet   Commonly known as:  ATARAX   Used for:  Chronic pain syndrome        Dose:  50 mg   Take 1 tablet (50 mg) by mouth every 6 hours as needed for anxiety   Quantity:  120 tablet   Refills:  3       naloxone nasal spray   Commonly known as:  NARCAN   Used for:  Chronic pain syndrome, Chronic, continuous use of opioids        Dose:  4 mg   Spray 1 spray (4 mg) into one nostril alternating nostrils as needed for opioid reversal every 2-3 minutes until assistance arrives   Quantity:  0.2 mL   Refills:  0         CONTINUE these medicines which have NOT CHANGED        Dose / Directions    ABILIFY 10 MG tablet   Generic drug:  ARIPiprazole        Dose:  10 mg   Take 10 mg by mouth daily   Refills:  0       buPROPion 150 MG 24 hr tablet   Commonly known as:  WELLBUTRIN XL        Dose:  450 mg   Take 450 mg by mouth every morning   Refills:  0       cyclobenzaprine 10 MG tablet   Commonly known as:  FLEXERIL   Used for:  DDD (degenerative disc disease), lumbar        TAKE ONE TABLET BY MOUTH 3 TIMES A DAY AS NEEDED FOR MUSCLE SPASMS   Quantity:  90 tablet   Refills:  5       diazepam 5 MG tablet   Commonly known as:  VALIUM   Used for:  DDD  (degenerative disc disease), lumbar, Chronic pain syndrome        Dose:  5 mg   Take 1 tablet (5 mg) by mouth nightly as needed   Quantity:  30 tablet   Refills:  0       EPINEPHrine 0.3 MG/0.3ML injection 2-pack   Commonly known as:  EPIPEN/ADRENACLICK/or ANY BX GENERIC EQUIV   Used for:  Bee sting allergy        Dose:  0.3 mg   Inject 0.3 mLs (0.3 mg) into the muscle once as needed for anaphylaxis   Quantity:  1 each   Refills:  2       HYDROmorphone 4 MG tablet   Commonly known as:  DILAUDID   Used for:  Chronic pain syndrome, DDD (degenerative disc disease), lumbar        Dose:  8 mg   Take 2 tablets (8 mg) by mouth 4 times daily as needed for moderate to severe pain (every 4-6hour prn)   Quantity:  180 tablet   Refills:  0       hydrOXYzine 50 MG capsule   Commonly known as:  VISTARIL        Dose:  50 mg   Take 50 mg by mouth 2 times daily as needed   Refills:  0       LAMICTAL 150 MG tablet   Generic drug:  lamoTRIgine        Dose:  250 mg   Take 250 mg by mouth daily   Refills:  0       morphine 30 MG 12 hr tablet   Commonly known as:  MS CONTIN   Used for:  Chronic pain syndrome, DDD (degenerative disc disease), lumbar        Dose:  30 mg   Take 1 tablet (30 mg) by mouth 3 times daily   Quantity:  90 tablet   Refills:  0       omeprazole 40 MG capsule   Commonly known as:  priLOSEC   Used for:  Gastroesophageal reflux disease with esophagitis        TAKE 1 CAPSULE (40 MG) BY MOUTH DAILY TAKE 30-60 MINUTES BEFORE A MEAL.   Quantity:  30 capsule   Refills:  11       ondansetron 4 MG ODT tab   Commonly known as:  ZOFRAN-ODT   Used for:  Nausea        PLACE 1 OR 2 TABLETS UNDER THE TONGUE 3 TIMES A DAY AS NEEDED FOR NAUSEA   Quantity:  20 tablet   Refills:  1       rOPINIRole 0.25 MG tablet   Commonly known as:  REQUIP   Used for:  RLS (restless legs syndrome)        Dose:  0.25-0.5 mg   Take 1-2 tablets (0.25-0.5 mg) by mouth At Bedtime   Quantity:  60 tablet   Refills:  2       zolpidem 10 MG tablet   Commonly  known as:  AMBIEN   Used for:  Insomnia, unspecified insomnia        Dose:  10 mg   Take 1 tablet (10 mg) by mouth nightly as needed for sleep   Quantity:  30 tablet   Refills:  2            Where to get your medicines      These medications were sent to [place], [place] - [address] [facility]  [address] [facility] [address]     Phone:  [phone]     hydrOXYzine 50 MG tablet    naloxone nasal spray                Protect others around you: Learn how to safely use, store and throw away your medicines at www.disposemymeds.org.             Medication List: This is a list of all your medications and when to take them. Check marks below indicate your daily home schedule. Keep this list as a reference.      Medications           Morning Afternoon Evening Bedtime As Needed    ABILIFY 10 MG tablet   Take 10 mg by mouth daily   Last time this was given:  10 mg on 10/21/2017  8:17 AM   Generic drug:  ARIPiprazole                                buPROPion 150 MG 24 hr tablet   Commonly known as:  WELLBUTRIN XL   Take 450 mg by mouth every morning   Last time this was given:  450 mg on 10/21/2017  8:17 AM                                cyclobenzaprine 10 MG tablet   Commonly known as:  FLEXERIL   TAKE ONE TABLET BY MOUTH 3 TIMES A DAY AS NEEDED FOR MUSCLE SPASMS   Last time this was given:  10 mg on 10/21/2017  2:27 PM                                diazepam 5 MG tablet   Commonly known as:  VALIUM   Take 1 tablet (5 mg) by mouth nightly as needed   Last time this was given:  5 mg on 10/21/2017 11:33 AM                                EPINEPHrine 0.3 MG/0.3ML injection 2-pack   Commonly known as:  EPIPEN/ADRENACLICK/or ANY BX GENERIC EQUIV   Inject 0.3 mLs (0.3 mg) into the muscle once as needed for anaphylaxis                                HYDROmorphone 4 MG tablet   Commonly known as:  DILAUDID   Take 2 tablets (8 mg) by mouth 4 times daily as needed for moderate to severe pain  (every 4-6hour prn)   Last time this was given:  8 mg on 10/21/2017 12:51 PM                                hydrOXYzine 50 MG capsule   Commonly known as:  VISTARIL   Take 50 mg by mouth 2 times daily as needed                                hydrOXYzine 50 MG tablet   Commonly known as:  ATARAX   Take 1 tablet (50 mg) by mouth every 6 hours as needed for anxiety   Last time this was given:  50 mg on 10/21/2017  1:06 AM                                LAMICTAL 150 MG tablet   Take 250 mg by mouth daily   Last time this was given:  250 mg on 10/21/2017  8:17 AM   Generic drug:  lamoTRIgine                                morphine 30 MG 12 hr tablet   Commonly known as:  MS CONTIN   Take 1 tablet (30 mg) by mouth 3 times daily   Last time this was given:  30 mg on 10/21/2017  2:27 PM                                naloxone nasal spray   Commonly known as:  NARCAN   Spray 1 spray (4 mg) into one nostril alternating nostrils as needed for opioid reversal every 2-3 minutes until assistance arrives                                omeprazole 40 MG capsule   Commonly known as:  priLOSEC   TAKE 1 CAPSULE (40 MG) BY MOUTH DAILY TAKE 30-60 MINUTES BEFORE A MEAL.   Last time this was given:  40 mg on 10/21/2017  8:17 AM                                ondansetron 4 MG ODT tab   Commonly known as:  ZOFRAN-ODT   PLACE 1 OR 2 TABLETS UNDER THE TONGUE 3 TIMES A DAY AS NEEDED FOR NAUSEA   Last time this was given:  4 mg on 10/21/2017  8:24 AM                                rOPINIRole 0.25 MG tablet   Commonly known as:  REQUIP   Take 1-2 tablets (0.25-0.5 mg) by mouth At Bedtime   Last time this was given:  0.5 mg on 10/20/2017 10:49 PM                                zolpidem 10 MG tablet   Commonly known as:  AMBIEN   Take 1 tablet (10 mg) by mouth nightly as needed for sleep

## 2017-10-19 NOTE — ANESTHESIA POSTPROCEDURE EVALUATION
Patient: Oleg Ford    Procedure(s):  Right SI joint fusion  - Wound Class: I-Clean    Diagnosis:bilateral SI joint instability  Diagnosis Additional Information: No value filed.    Anesthesia Type:  General, ETT    Note:  Anesthesia Post Evaluation    Patient location during evaluation: PACU  Patient participation: Able to fully participate in evaluation  Level of consciousness: awake  Pain management: adequate  Airway patency: patent  Cardiovascular status: acceptable  Respiratory status: acceptable  Hydration status: acceptable  PONV: none     Anesthetic complications: None          Last vitals:  Vitals:    10/19/17 1145 10/19/17 1150 10/19/17 1155   BP: 111/76 113/83 113/85   Resp: 13 12 12   Temp:      SpO2: 99% 98% 99%         Electronically Signed By: Esequiel Neri MD  October 19, 2017  12:05 PM

## 2017-10-19 NOTE — ANESTHESIA PREPROCEDURE EVALUATION
Anesthesia Evaluation     . Pt has had prior anesthetic.            ROS/MED HX    ENT/Pulmonary:     (+)sleep apnea, , . .    Neurologic:       Cardiovascular:         METS/Exercise Tolerance:     Hematologic:         Musculoskeletal:   (+) , , other musculoskeletal- LBP/DDD      GI/Hepatic:     (+) GERD       Renal/Genitourinary:         Endo:     (+) Obesity, .      Psychiatric:     (+) psychiatric history depression      Infectious Disease:         Malignancy:         Other:    (+) H/O Chronic Pain,                   Physical Exam  Normal systems: cardiovascular and pulmonary    Airway   Mallampati: II  TM distance: >3 FB  Neck ROM: full    Dental     Cardiovascular       Pulmonary                     Anesthesia Plan      History & Physical Review  History and physical reviewed and following examination; no interval change.    ASA Status:  2 .    NPO Status:  > 8 hours    Plan for General and ETT with Intravenous and Propofol induction. Maintenance will be Balanced.    PONV prophylaxis:  Ondansetron (or other 5HT-3) and Dexamethasone or Solumedrol       Postoperative Care  Postoperative pain management:  IV analgesics.      Consents  Anesthetic plan, risks, benefits and alternatives discussed with:  Patient.  Use of blood products discussed: Yes.   Use of blood products discussed with Patient.  Consented to blood products.  .                          .

## 2017-10-19 NOTE — PHARMACY-ADMISSION MEDICATION HISTORY
Medication reconciliation completed by pre-admitting(Cinthya Goins).    Prior to Admission medications    Medication Sig Last Dose Taking? Auth Provider   morphine (MS CONTIN) 30 MG 12 hr tablet Take 1 tablet (30 mg) by mouth 3 times daily Past Week at Unknown time Yes Cat Shaw MD   HYDROmorphone (DILAUDID) 4 MG tablet Take 2 tablets (8 mg) by mouth 4 times daily as needed for moderate to severe pain (every 4-6hour prn) 10/18/2017 at Unknown time Yes Cat Shaw MD   diazepam (VALIUM) 5 MG tablet Take 1 tablet (5 mg) by mouth nightly as needed 10/18/2017 at Unknown time Yes Cat Shaw MD   ondansetron (ZOFRAN-ODT) 4 MG ODT tab PLACE 1 OR 2 TABLETS UNDER THE TONGUE 3 TIMES A DAY AS NEEDED FOR NAUSEA 10/18/2017 at Unknown time Yes Cat Shaw MD   cyclobenzaprine (FLEXERIL) 10 MG tablet TAKE ONE TABLET BY MOUTH 3 TIMES A DAY AS NEEDED FOR MUSCLE SPASMS Past Week at Unknown time Yes Cat Shaw MD   omeprazole (PRILOSEC) 40 MG capsule TAKE 1 CAPSULE (40 MG) BY MOUTH DAILY TAKE 30-60 MINUTES BEFORE A MEAL. 10/18/2017 at Unknown time Yes Cat Shaw MD   ARIPiprazole (ABILIFY) 10 MG tablet Take 10 mg by mouth daily 10/18/2017 at Unknown time Yes Reported, Patient   lamoTRIgine (LAMICTAL) 150 MG tablet Take 250 mg by mouth daily  10/18/2017 at Unknown time Yes Reported, Patient   hydrOXYzine (VISTARIL) 50 MG capsule Take 50 mg by mouth 2 times daily as needed  10/18/2017 at Unknown time Yes Reported, Patient   buPROPion (WELLBUTRIN XL) 150 MG 24 hr tablet Take 450 mg by mouth every morning  10/18/2017 at Unknown time Yes Reported, Patient   zolpidem (AMBIEN) 10 MG tablet Take 1 tablet (10 mg) by mouth nightly as needed for sleep More than a month at Unknown time  Cat hSaw MD   rOPINIRole (REQUIP) 0.25 MG tablet Take 1-2 tablets (0.25-0.5 mg) by mouth At Bedtime More than a month at Unknown time  Cat Shaw MD    EPINEPHrine (EPIPEN) 0.3 MG/0.3ML injection Inject 0.3 mLs (0.3 mg) into the muscle once as needed for anaphylaxis Unknown at Unknown time  Cat Shaw MD

## 2017-10-19 NOTE — IP AVS SNAPSHOT
[facility]    [address]    [facility] [address]    Phone:  [phone]    Fax:  [phone]                                       After Visit Summary   10/19/2017    Oleg Ford    MRN: [id number]           After Visit Summary Signature Page     I have received my discharge instructions, and my questions have been answered. I have discussed any challenges I see with this plan with the nurse or doctor.    ..........................................................................................................................................  Patient/Patient Representative Signature      ..........................................................................................................................................  Patient Representative Print Name and Relationship to Patient    ..................................................               ................................................  Date                                            Time    ..........................................................................................................................................  Reviewed by Signature/Title    ...................................................              ..............................................  Date                                                            Time

## 2017-10-19 NOTE — CONSULTS
[facility]  Hospitalist Consult Note  Name: Oleg Ford    MRN: [id number]  YOB: 1960    Age: 57 year old  Date of admission: 10/19/2017  Primary care provider: Cat Shaw     Requesting Physician:  Nessa  Reason for consult:  Post-operative medical management         Assessment and Plan:   Oleg Ford is a 57 year old male with a history of chronic pain on narcotics, depression, GERD and RLS who was admitted 10/19/17 after fusion of bilateral SI joints.    1. Chronic Pain s/p Fusion Bilateral SI Joints on 10/19/17: The patient is doing well, currently has well controlled pain and is hemodynamically stable. Will defer diet, activity, DVT prophylaxis, and pain control to the primary team. Continue physical and occupational therapy. Social work consulted for possible placement needs. Continue incentive spirometry.   - Check Hgb in the AM    2. Depression: Continue PTA Seroquel, Wellbutrin, Lamictal and Vistaril.    3. GERD: Continue Omeprazole.    4. RLS: Continue PTA Requip.    Code status: Full Code  Prophylaxis: Defer to primary team  Disposition: Defer to primary team    Thank you for the consultation, we will continue to follow along during the hospitalization. Please page with any questions or concerns.         History of Present Illness:   Oleg Ford is a 57 year old male with a history of chronic pain on narcotics, depression, GERD and RLS who was admitted 10/19/17 after fusion of bilateral SI joints. Pre-operative note was fully reviewed and recommendations acknowledged. Op note and anesthesia notes and flow sheets reviewed.     The patient had no complications related to the procedure and has had an unremarkable post-operative course to this point. Currently pain is adequately controlled. No nausea, vomiting, diarrhea or constipation. No fevers, chills, diaphoresis. No chest pain, palpitations, dyspnea. Sy catheter still in place. Tolerating oral  intake. No excessive somnolence and patient is fully alert and oriented. The patient has no other complaints at this time.      Patient states he is feeling fine at this time.  Pain starting to  but so far tolerable.  Eating crackers without nausea or emesis at this time.  Has no concerns other than his pain.              Past Medical History:     Past Medical History:   Diagnosis Date     Depression, major      Esophageal mass      History of orchiectomy 1982     History of vasectomy 1991     Low back pain      Other chronic pain     Chronic low back pain.     Restless leg      Sleep apnea     had surgery done to resolve sleep apnea             Past Surgical History:     Past Surgical History:   Procedure Laterality Date     BACK SURGERY  2/6/2013    lumbar surgery     COLONOSCOPY       [facility] ENDOSCOPY W EUS N/A 7/31/2014    Procedure: COMBINED ENDOSCOPIC ULTRASOUND, ESOPHAGOSCOPY, GASTROSCOPY, DUODENOSCOPY (EGD);  Surgeon: Shorty Stoll MD;  Location: [facility]     ORCHIECTOMY INGUINAL      right radical orchiectomy     REMOVE STIMULATOR VAGUS NERVE  12/23/2011    Procedure:REMOVE STIMULATOR VAGUS NERVE; Vagus Nerve Stimulator Removal; Surgeon:ALEXIS BECERRA; Location:[facility] OR     REMOVE STIMULATOR VAGUS NERVE  11/8/2013    Procedure: REMOVE STIMULATOR VAGUS NERVE;  Removal Of Vagus Nerve Stimulator Lead In Left Neck ;  Surgeon: Alexis Becerra MD;  Location: [facility] OR     SURGICAL HISTORY OF -       vagal nerve implant; removed     SURGICAL HISTORY OF -       UPPP     VASECTOMY                 Social History:     Social History   Substance Use Topics     Smoking status: Current Every Day Smoker     Packs/day: 0.50     Years: 40.00     Types: Cigarettes     Smokeless tobacco: Never Used     Alcohol use Yes      Comment: 1 drink every 3 months             Family History:   Family history was fully reviewed and non-contributory in this case.          Allergies:     Allergies   Allergen Reactions      Ciprofloxacin Anaphylaxis     Doxycycline Anaphylaxis     Septra [Bactrim] Anaphylaxis     Amoxicillin      itching             Medications:     Prior to Admission medications    Medication Sig Last Dose Taking? Auth Provider   morphine (MS CONTIN) 30 MG 12 hr tablet Take 1 tablet (30 mg) by mouth 3 times daily Past Week at Unknown time Yes Cat Shaw MD   HYDROmorphone (DILAUDID) 4 MG tablet Take 2 tablets (8 mg) by mouth 4 times daily as needed for moderate to severe pain (every 4-6hour prn) 10/18/2017 at Unknown time Yes Cat Shaw MD   diazepam (VALIUM) 5 MG tablet Take 1 tablet (5 mg) by mouth nightly as needed 10/18/2017 at Unknown time Yes Cat Shaw MD   ondansetron (ZOFRAN-ODT) 4 MG ODT tab PLACE 1 OR 2 TABLETS UNDER THE TONGUE 3 TIMES A DAY AS NEEDED FOR NAUSEA 10/18/2017 at Unknown time Yes Cat Shaw MD   cyclobenzaprine (FLEXERIL) 10 MG tablet TAKE ONE TABLET BY MOUTH 3 TIMES A DAY AS NEEDED FOR MUSCLE SPASMS Past Week at Unknown time Yes Cat Shaw MD   omeprazole (PRILOSEC) 40 MG capsule TAKE 1 CAPSULE (40 MG) BY MOUTH DAILY TAKE 30-60 MINUTES BEFORE A MEAL. 10/18/2017 at Unknown time Yes Cat Shaw MD   ARIPiprazole (ABILIFY) 10 MG tablet Take 10 mg by mouth daily 10/18/2017 at Unknown time Yes Reported, Patient   lamoTRIgine (LAMICTAL) 150 MG tablet Take 250 mg by mouth daily  10/18/2017 at Unknown time Yes Reported, Patient   hydrOXYzine (VISTARIL) 50 MG capsule Take 50 mg by mouth 2 times daily as needed  10/18/2017 at Unknown time Yes Reported, Patient   buPROPion (WELLBUTRIN XL) 150 MG 24 hr tablet Take 450 mg by mouth every morning  10/18/2017 at Unknown time Yes Reported, Patient   zolpidem (AMBIEN) 10 MG tablet Take 1 tablet (10 mg) by mouth nightly as needed for sleep More than a month at Unknown time  Cat Shaw MD   rOPINIRole (REQUIP) 0.25 MG tablet Take 1-2 tablets (0.25-0.5 mg) by mouth At Bedtime  "More than a month at Unknown time  Cat Shaw MD   EPINEPHrine (EPIPEN) 0.3 MG/0.3ML injection Inject 0.3 mLs (0.3 mg) into the muscle once as needed for anaphylaxis Unknown at Unknown time  Cat Shaw MD       Current hospital administered medication list (MAR) also reviewed.          Review of Systems:   A comprehensive greater than 10 system review of systems was carried out.  Pertinent positives and negatives are noted above.  Otherwise negative for contributory info.            Physical Exam:   Blood pressure 119/80, pulse 66, temperature 96.4  F (35.8  C), temperature source Axillary, resp. rate 14, height 1.829 m (6' 0.01\"), weight 129.5 kg (285 lb 9.6 oz), SpO2 99 %.  Exam:  General: Alert, awake, no acute distress.  HEENT: Normocephalic and atraumatic, eyes anicteric and without scleral injection, EOMI, face symmetric, MMM.  Cardiac: RRR, normal S1, S2. No m/g/r, no LE edema.  Pulmonary: Normal chest rise, normal work of breathing.  Lungs CTAB without crackles or wheezing.  Abdomen: soft, non-tender, non-distended.  Normoactive bowel sounds, no guarding or rebound tenderness.  Extremities: no deformities.  Warm, well perfused.  Skin: no rashes or lesions.  Warm and Dry.  Neuro: No focal deficits.  Speech clear.  Spontaneously moving all extremities in bed.  Psych: Alert and oriented x3. Appropriate affect.          Data:   Imaging:  Reviewed.    Labs: Reviewed from pre operative appointment.    Philly Quintero MD  [facility] Hospitalist  October 19, 2017     "

## 2017-10-19 NOTE — PLAN OF CARE
Problem: Patient Care Overview  Goal: Plan of Care/Patient Progress Review  Outcome: Improving  To rm 608 from PACU 1415. A/O. Pain goal 5/10. CMS intact ex baseline mild numbness in feet. Dressings CDI and abdominal binder in place. DTV. IV infusing. Pt when asked did state that he has had suicidal thoughts in past with plan but  Currently feeling ok and no current plan and he says he has good support at home from his roommates.  consult offered and pt Declined.

## 2017-10-20 ENCOUNTER — APPOINTMENT (OUTPATIENT)
Dept: PHYSICAL THERAPY | Facility: CLINIC | Age: 57
End: 2017-10-20
Attending: NEUROLOGICAL SURGERY
Payer: MEDICAID

## 2017-10-20 ENCOUNTER — APPOINTMENT (OUTPATIENT)
Dept: OCCUPATIONAL THERAPY | Facility: CLINIC | Age: 57
End: 2017-10-20
Attending: NEUROLOGICAL SURGERY
Payer: MEDICAID

## 2017-10-20 LAB
ANION GAP SERPL CALCULATED.3IONS-SCNC: 2 MMOL/L (ref 3–14)
BASOPHILS # BLD AUTO: 0 10E9/L (ref 0–0.2)
BASOPHILS NFR BLD AUTO: 0.3 %
BUN SERPL-MCNC: 10 MG/DL (ref 7–30)
CALCIUM SERPL-MCNC: 8.1 MG/DL (ref 8.5–10.1)
CHLORIDE SERPL-SCNC: 103 MMOL/L (ref 94–109)
CO2 SERPL-SCNC: 28 MMOL/L (ref 20–32)
CREAT SERPL-MCNC: 1.08 MG/DL (ref 0.66–1.25)
DIFFERENTIAL METHOD BLD: ABNORMAL
EOSINOPHIL # BLD AUTO: 0.2 10E9/L (ref 0–0.7)
EOSINOPHIL NFR BLD AUTO: 1.4 %
ERYTHROCYTE [DISTWIDTH] IN BLOOD BY AUTOMATED COUNT: 13.9 % (ref 10–15)
GFR SERPL CREATININE-BSD FRML MDRD: 71 ML/MIN/1.7M2
GLUCOSE SERPL-MCNC: 107 MG/DL (ref 70–99)
HCT VFR BLD AUTO: 38.8 % (ref 40–53)
HGB BLD-MCNC: 12.8 G/DL (ref 13.3–17.7)
IMM GRANULOCYTES # BLD: 0 10E9/L (ref 0–0.4)
IMM GRANULOCYTES NFR BLD: 0.3 %
LYMPHOCYTES # BLD AUTO: 3.8 10E9/L (ref 0.8–5.3)
LYMPHOCYTES NFR BLD AUTO: 31.7 %
MCH RBC QN AUTO: 29.1 PG (ref 26.5–33)
MCHC RBC AUTO-ENTMCNC: 33 G/DL (ref 31.5–36.5)
MCV RBC AUTO: 88 FL (ref 78–100)
MONOCYTES # BLD AUTO: 1.3 10E9/L (ref 0–1.3)
MONOCYTES NFR BLD AUTO: 11.1 %
NEUTROPHILS # BLD AUTO: 6.6 10E9/L (ref 1.6–8.3)
NEUTROPHILS NFR BLD AUTO: 55.2 %
NRBC # BLD AUTO: 0 10*3/UL
NRBC BLD AUTO-RTO: 0 /100
PLATELET # BLD AUTO: 211 10E9/L (ref 150–450)
POTASSIUM SERPL-SCNC: 3.8 MMOL/L (ref 3.4–5.3)
RBC # BLD AUTO: 4.4 10E12/L (ref 4.4–5.9)
SODIUM SERPL-SCNC: 133 MMOL/L (ref 133–144)
WBC # BLD AUTO: 11.9 10E9/L (ref 4–11)

## 2017-10-20 PROCEDURE — 36415 COLL VENOUS BLD VENIPUNCTURE: CPT | Performed by: NEUROLOGICAL SURGERY

## 2017-10-20 PROCEDURE — 97116 GAIT TRAINING THERAPY: CPT | Mod: GP | Performed by: PHYSICAL THERAPIST

## 2017-10-20 PROCEDURE — 40000133 ZZH STATISTIC OT WARD VISIT: Performed by: REHABILITATION PRACTITIONER

## 2017-10-20 PROCEDURE — 85025 COMPLETE CBC W/AUTO DIFF WBC: CPT | Performed by: NEUROLOGICAL SURGERY

## 2017-10-20 PROCEDURE — G0009 ADMIN PNEUMOCOCCAL VACCINE: HCPCS

## 2017-10-20 PROCEDURE — 80048 BASIC METABOLIC PNL TOTAL CA: CPT | Performed by: NEUROLOGICAL SURGERY

## 2017-10-20 PROCEDURE — 25000128 H RX IP 250 OP 636: Performed by: NEUROLOGICAL SURGERY

## 2017-10-20 PROCEDURE — 99224 ZZC SUBSEQUENT OBSERVATION CARE,LEVEL I: CPT | Performed by: INTERNAL MEDICINE

## 2017-10-20 PROCEDURE — 97165 OT EVAL LOW COMPLEX 30 MIN: CPT | Mod: GO | Performed by: REHABILITATION PRACTITIONER

## 2017-10-20 PROCEDURE — 90732 PPSV23 VACC 2 YRS+ SUBQ/IM: CPT | Performed by: INTERNAL MEDICINE

## 2017-10-20 PROCEDURE — 97535 SELF CARE MNGMENT TRAINING: CPT | Mod: GO | Performed by: REHABILITATION PRACTITIONER

## 2017-10-20 PROCEDURE — 97161 PT EVAL LOW COMPLEX 20 MIN: CPT | Mod: GP | Performed by: PHYSICAL THERAPIST

## 2017-10-20 PROCEDURE — 97530 THERAPEUTIC ACTIVITIES: CPT | Mod: GP | Performed by: PHYSICAL THERAPIST

## 2017-10-20 PROCEDURE — G0378 HOSPITAL OBSERVATION PER HR: HCPCS

## 2017-10-20 PROCEDURE — 99223 1ST HOSP IP/OBS HIGH 75: CPT | Performed by: NURSE PRACTITIONER

## 2017-10-20 PROCEDURE — 25000132 ZZH RX MED GY IP 250 OP 250 PS 637: Performed by: NEUROLOGICAL SURGERY

## 2017-10-20 PROCEDURE — 40000193 ZZH STATISTIC PT WARD VISIT: Performed by: PHYSICAL THERAPIST

## 2017-10-20 PROCEDURE — 90686 IIV4 VACC NO PRSV 0.5 ML IM: CPT | Performed by: INTERNAL MEDICINE

## 2017-10-20 PROCEDURE — G0008 ADMIN INFLUENZA VIRUS VAC: HCPCS

## 2017-10-20 PROCEDURE — 25000128 H RX IP 250 OP 636: Performed by: INTERNAL MEDICINE

## 2017-10-20 PROCEDURE — 25000132 ZZH RX MED GY IP 250 OP 250 PS 637: Performed by: NURSE PRACTITIONER

## 2017-10-20 PROCEDURE — 25000125 ZZHC RX 250: Performed by: NEUROLOGICAL SURGERY

## 2017-10-20 PROCEDURE — 99207 ZZC CDG-CODE CATEGORY CHANGED: CPT | Performed by: INTERNAL MEDICINE

## 2017-10-20 RX ORDER — GABAPENTIN 300 MG/1
300 CAPSULE ORAL ONCE
Status: COMPLETED | OUTPATIENT
Start: 2017-10-20 | End: 2017-10-20

## 2017-10-20 RX ORDER — LIDOCAINE 50 MG/G
1-2 PATCH TOPICAL
Status: DISCONTINUED | OUTPATIENT
Start: 2017-10-20 | End: 2017-10-21 | Stop reason: HOSPADM

## 2017-10-20 RX ORDER — HYDROMORPHONE HYDROCHLORIDE 2 MG/1
8-10 TABLET ORAL 4 TIMES DAILY PRN
Status: DISCONTINUED | OUTPATIENT
Start: 2017-10-20 | End: 2017-10-21 | Stop reason: HOSPADM

## 2017-10-20 RX ORDER — GABAPENTIN 300 MG/1
300 CAPSULE ORAL 2 TIMES DAILY
Status: DISCONTINUED | OUTPATIENT
Start: 2017-10-21 | End: 2017-10-21 | Stop reason: HOSPADM

## 2017-10-20 RX ADMIN — CYCLOBENZAPRINE HYDROCHLORIDE 10 MG: 10 TABLET, FILM COATED ORAL at 14:58

## 2017-10-20 RX ADMIN — HYDROMORPHONE HYDROCHLORIDE 8 MG: 4 TABLET ORAL at 02:36

## 2017-10-20 RX ADMIN — HYDROMORPHONE HYDROCHLORIDE 8 MG: 4 TABLET ORAL at 21:07

## 2017-10-20 RX ADMIN — GABAPENTIN 300 MG: 300 CAPSULE ORAL at 14:58

## 2017-10-20 RX ADMIN — HYDROMORPHONE HYDROCHLORIDE 1 MG: 1 INJECTION, SOLUTION INTRAMUSCULAR; INTRAVENOUS; SUBCUTANEOUS at 00:05

## 2017-10-20 RX ADMIN — ROPINIROLE HYDROCHLORIDE 0.5 MG: 0.25 TABLET, FILM COATED ORAL at 22:49

## 2017-10-20 RX ADMIN — HYDROMORPHONE HYDROCHLORIDE 8 MG: 4 TABLET ORAL at 08:18

## 2017-10-20 RX ADMIN — CEFAZOLIN SODIUM 1 G: 1 INJECTION, SOLUTION INTRAVENOUS at 02:36

## 2017-10-20 RX ADMIN — PNEUMOCOCCAL VACCINE POLYVALENT 0.5 ML
25; 25; 25; 25; 25; 25; 25; 25; 25; 25; 25; 25; 25; 25; 25; 25; 25; 25; 25; 25; 25; 25; 25 INJECTION, SOLUTION INTRAMUSCULAR; SUBCUTANEOUS at 10:27

## 2017-10-20 RX ADMIN — INFLUENZA A VIRUS A/MICHIGAN/45/2015 X-275 (H1N1) ANTIGEN (FORMALDEHYDE INACTIVATED), INFLUENZA A VIRUS A/HONG KONG/4801/2014 X-263B (H3N2) ANTIGEN (FORMALDEHYDE INACTIVATED), INFLUENZA B VIRUS B/PHUKET/3073/2013 ANTIGEN (FORMALDEHYDE INACTIVATED), AND INFLUENZA B VIRUS B/BRISBANE/60/2008 ANTIGEN (FORMALDEHYDE INACTIVATED) 0.5 ML: 15; 15; 15; 15 INJECTION, SUSPENSION INTRAMUSCULAR at 10:31

## 2017-10-20 RX ADMIN — LAMOTRIGINE 250 MG: 100 TABLET ORAL at 08:18

## 2017-10-20 RX ADMIN — HYDROXYZINE HYDROCHLORIDE 50 MG: 50 TABLET, FILM COATED ORAL at 12:49

## 2017-10-20 RX ADMIN — MORPHINE SULFATE 30 MG: 30 TABLET, EXTENDED RELEASE ORAL at 14:58

## 2017-10-20 RX ADMIN — ONDANSETRON 4 MG: 4 TABLET, ORALLY DISINTEGRATING ORAL at 08:26

## 2017-10-20 RX ADMIN — DIAZEPAM 5 MG: 5 TABLET ORAL at 03:45

## 2017-10-20 RX ADMIN — ONDANSETRON 4 MG: 4 TABLET, ORALLY DISINTEGRATING ORAL at 21:06

## 2017-10-20 RX ADMIN — POTASSIUM CHLORIDE AND SODIUM CHLORIDE: 450; 150 INJECTION, SOLUTION INTRAVENOUS at 02:37

## 2017-10-20 RX ADMIN — DICLOFENAC SODIUM 4 G: 10 GEL TOPICAL at 22:49

## 2017-10-20 RX ADMIN — OMEPRAZOLE 40 MG: 20 CAPSULE, DELAYED RELEASE ORAL at 08:18

## 2017-10-20 RX ADMIN — CYCLOBENZAPRINE HYDROCHLORIDE 10 MG: 10 TABLET, FILM COATED ORAL at 08:18

## 2017-10-20 RX ADMIN — LIDOCAINE 1 PATCH: 50 PATCH CUTANEOUS at 22:49

## 2017-10-20 RX ADMIN — HYDROMORPHONE HYDROCHLORIDE 1 MG: 1 INJECTION, SOLUTION INTRAMUSCULAR; INTRAVENOUS; SUBCUTANEOUS at 04:10

## 2017-10-20 RX ADMIN — MORPHINE SULFATE 30 MG: 30 TABLET, EXTENDED RELEASE ORAL at 06:06

## 2017-10-20 RX ADMIN — HYDROMORPHONE HYDROCHLORIDE 8 MG: 4 TABLET ORAL at 17:41

## 2017-10-20 RX ADMIN — CYCLOBENZAPRINE HYDROCHLORIDE 10 MG: 10 TABLET, FILM COATED ORAL at 20:51

## 2017-10-20 RX ADMIN — DIAZEPAM 5 MG: 5 TABLET ORAL at 10:26

## 2017-10-20 RX ADMIN — ARIPIPRAZOLE 10 MG: 10 TABLET ORAL at 10:26

## 2017-10-20 RX ADMIN — DICLOFENAC SODIUM 4 G: 10 GEL TOPICAL at 17:41

## 2017-10-20 RX ADMIN — HYDROMORPHONE HYDROCHLORIDE 8 MG: 4 TABLET ORAL at 12:48

## 2017-10-20 RX ADMIN — PSYLLIUM HUSK 1 PACKET: 3.4 GRANULE ORAL at 14:58

## 2017-10-20 RX ADMIN — BUPROPION HYDROCHLORIDE 450 MG: 150 TABLET, FILM COATED, EXTENDED RELEASE ORAL at 08:18

## 2017-10-20 RX ADMIN — MORPHINE SULFATE 30 MG: 30 TABLET, EXTENDED RELEASE ORAL at 22:49

## 2017-10-20 ASSESSMENT — ACTIVITIES OF DAILY LIVING (ADL): PREVIOUS_RESPONSIBILITIES: MEAL PREP;HOUSEKEEPING;LAUNDRY;SHOPPING;MEDICATION MANAGEMENT;FINANCES;DRIVING

## 2017-10-20 NOTE — PROGRESS NOTES
10/20/17 1009   Quick Adds   Type of Visit Initial Occupational Therapy Evaluation   Living Environment   Lives With friend(s)   Living Arrangements house   Home Accessibility stairs to enter home;tub/shower is not walk in;stairs within home   Number of Stairs to Enter Home 4   Number of Stairs Within Home 12   Stair Railings at Home none   Transportation Available car;family or friend will provide   Living Environment Comment Pt reported to be independent in mobility at baseline.  Shares cooking and homemaking duties with friends.   Self-Care   Dominant Hand right   Usual Activity Tolerance moderate   Current Activity Tolerance fair   Regular Exercise no   Equipment Currently Used at Home crutches;cane, straight;walker, rolling;shower chair;raised toilet  (long handled reacher)   Activity/Exercise/Self-Care Comment Pt reported to be independent in ADLs at baseline.  Has been limited by pain and using motorized cart to go grocery shopping.   Functional Level Prior   Ambulation 1-->assistive equipment   Transferring 1-->assistive equipment   Toileting 1-->assistive equipment   Bathing 1-->assistive equipment   Dressing 0-->independent   Eating 0-->independent   Communication 0-->understands/communicates without difficulty   Swallowing 0-->swallows foods/liquids without difficulty   Cognition 0 - no cognition issues reported   Fall history within last six months yes   Number of times patient has fallen within last six months 1   Which of the above functional risks had a recent onset or change? ambulation;transferring;toileting;bathing;dressing       Present no   General Information   Onset of Illness/Injury or Date of Surgery - Date 10/19/17   Referring Physician Dr. Sebastián Lopez   Patient/Family Goals Statement Return to home   Additional Occupational Profile Info/Pertinent History of Current Problem Pt is a 57 year old male with a history of chronic pain on narcotics, depression, GERD and RLS  who was admitted 10/19/17 after fusion of bilateral SI joints.   Precautions/Limitations spinal precautions   General Info Comments activity:  up with assist, ambulate   Cognitive Status Examination   Orientation orientation to person, place and time   Level of Consciousness alert   Able to Follow Commands WNL/WFL   Personal Safety (Cognitive) WNL/WFL   Memory intact   Attention No deficits were identified   Organization/Problem Solving No deficits were identified   Executive Function No deficits were identified   Visual Perception   Visual Perception No deficits were identified   Pain Assessment   Patient Currently in Pain Yes, see Vital Sign flowsheet   Range of Motion (ROM)   ROM Quick Adds No deficits were identified   ROM Comment B UE AROM WFL   Strength   Manual Muscle Testing Quick Adds No deficits were identified   Strength Comments B UE strength WFL per observation   Hand Strength   Hand Strength Comments WFL   Muscle Tone Assessment   Muscle Tone Quick Adds No deficits were identified   Coordination   Upper Extremity Coordination No deficits were identified   Mobility   Bed Mobility Comments SBA   Transfer Skills   Transfer Comments supine<>sit with SBA and cueing for log roll technique   Transfer Skill: Sit to Stand   Level of [place]: Sit/Stand contact guard   Physical Assist/Nonphysical Assist: Sit/Stand verbal cues   Lower Body Dressing   Level of [place]: Dress Lower Body maximum assist (25% patients effort)   Physical Assist/Nonphysical Assist: Dress Lower Body set-up required   Grooming   Level of [place]: Grooming stand-by assist   Physical Assist/Nonphysical Assist: Grooming set-up required  (sitting up in bed)   Eating/Self Feeding   Level of [place]: Eating independent   Physical Assist/Nonphysical Assist: Eating set-up required   Instrumental Activities of Daily Living (IADL)   Previous Responsibilities meal prep;housekeeping;laundry;shopping;medication  "management;finances;driving   Activities of Daily Living Analysis   Impairments Contributing to Impaired Activities of Daily Living pain;post surgical precautions   General Therapy Interventions   Planned Therapy Interventions ADL retraining;transfer training   Clinical Impression   Criteria for Skilled Therapeutic Interventions Met yes, treatment indicated   OT Diagnosis decreased ADL performance   Influenced by the following impairments pain, spinal precautions, limited mobility   Assessment of Occupational Performance 5 or more Performance Deficits   Identified Performance Deficits Pt with decreased ability to manage dressing, bathing, toileting, cooking, homemaking, driving   Clinical Decision Making (Complexity) Low complexity   Therapy Frequency daily   Predicted Duration of Therapy Intervention (days/wks) 5   Anticipated Equipment Needs at Discharge sock aide;toileting equipment   Anticipated Discharge Disposition Home with Assist;Transitional Care Facility   Risks and Benefits of Treatment have been explained. Yes   Patient, Family & other staff in agreement with plan of care Yes   [facility] TM \"6 Clicks\"   2016, Trustees of [facility], under license to [employer].  All rights reserved.   6 Clicks Short Forms Daily Activity Inpatient Short Form   [facility]  \"6 Clicks\" Daily Activity Inpatient Short Form   1. Putting on and taking off regular lower body clothing? 2 - A Lot   2. Bathing (including washing, rinsing, drying)? 2 - A Lot   3. Toileting, which includes using toilet, bedpan or urinal? 2 - A Lot   4. Putting on and taking off regular upper body clothing? 3 - A Little   5. Taking care of personal grooming such as brushing teeth? 3 - A Little   6. Eating meals? 4 - None   Daily Activity Raw Score (Score out of 24.Lower scores equate to lower levels of function) 16   Total Evaluation Time   Total Evaluation Time (Minutes) 8     "

## 2017-10-20 NOTE — PLAN OF CARE
Problem: Patient Care Overview  Goal: Plan of Care/Patient Progress Review  Outcome: Improving  VSS: A&O, CMS intact  LS: clear on 1L NC  GI: hypoactive, -flatus, last BM 10/19  : in urinal  Skin: right side/back incision, CDI  Activity: x2, walker, dangled at bedside this shift  Diet: reg   Pain: c/o 7-9/10, taking dilaudid p.o and IV, morphine, vistaril  Plan: IV ancef

## 2017-10-20 NOTE — PLAN OF CARE
Problem: Patient Care Overview  Goal: Plan of Care/Patient Progress Review  OT:  eval complete and treatment initiated.  Pt is a 57 year old male with a history of chronic pain on narcotics, depression, GERD and RLS who was admitted 10/19/17 after fusion of bilateral SI joints.  He reported to be independent in ADLs at baseline while living in a single level house with 4 step entrance.  He does have a RTS, reacher and walker at home with Friends/Roommates to assist with cooking, homemaking and laundry.     Discharge Planner OT   Patient plan for discharge: home vs TCU  Current status: Pt alert, oriented and able to follow commands.  OT issued Spine Handout and provided education in precautions, home safety modifications and proper body mechanics.  Pt able to roll and complete supine<>sit with SBA.  CGA for sit<>stand using FWW with cueing for upright posture and proper hand placement.  Pt unable to tolerate sitting for participation in LE dressing.  OT provided education/demonstration and issued handout for making his own sock aide as finances are a concern.  He does have a long handled reacher.  Barriers to return to prior living situation: 4 step entrance, mobility limited by pain  Recommendations for discharge: home with increased support of friends/roommates vs TCU  Rationale for recommendations: Pt would benefit from continued OT to maximize safety and independence in ADLs and functional transfers with improved strength and endurance while maintaining spinal precautions.       Entered by: Ca Garcia 10/20/2017 10:35 AM

## 2017-10-20 NOTE — DISCHARGE INSTRUCTIONS
Opioid Medication Information    You have been given a prescription for an opioid (narcotic) pain medicine and/or have received a pain medicine. These medicines can make you drowsy or impaired. You must not drive, operate dangerous equipment, or engage in any other dangerous activities while taking these medications. If you drive while taking these medications, you could be arrested for DUI, or driving under the influence. Do not drink any alcohol while you are taking these medications.   Opioid pain medications can cause addiction. If you have a history of chemical dependency of any type, you are at a higher risk of becoming addicted to pain medications.  Only take these prescribed medications to treat your pain when all other options have been tried. Take it for as short a time and as few doses as possible. Store your pain pills in a secure place, as they are frequently stolen and provide a dangerous opportunity for children or visitors in your house to start abusing these powerful medications. We will not replace any lost or stolen medicine.  As soon as your pain is better, you should seek out a drug take back program (see your local police department) to dispose of them.   Over-the-counter medications and prescription drugs can pollute liu and be harmful to humans, fish, and other wildlife when disposed of improperly -- do not flush medications down the toilet or place in the trash.  Properly disposing of medicines is important to prevent abuse or poisoning and protect the environment.     Prescription and over-the-counter medications are collected anonymously from residents for free at [facility] drop-off locations. Visit the [facility]'s Office Prescription Drug Drop-Off page for the list of drop-off sites and information about the program.       [place]  Police Department [phone] Mon-Fri  8am to 4:30pm     [place]  Police Department [phone] 24hrs a day    [place]  Police  Department [phone] Mon-Fri  8am to 6:00pm     [place]  Police Department [phone] Mon-Fri  8am to 4:30pm     [place]  Police Department [phone] 24hrs a day      [place]  Police Department [phone] Mon-Fri  8am to 4:30pm   Many prescription pain medications contain Tylenol  (acetaminophen), including Vicodin , Tylenol #3 , Norco , Lortab , and Percocet .  You should not take any extra pills of Tylenol  if you are using these prescription medications or you can get very sick.  Do not ever take more than 3000 mg of acetaminophen in any 24 hour period.  All opioids tend to cause constipation. Drink plenty of water and eat foods that have a lot of fiber, such as fruits, vegetables, prune juice, apple juice and high fiber cereal.  Take a laxative if you don t move your bowels at least every other day. Miralax , Milk of Magnesia, Colace , or Senna  can be used to keep you regular.  You will likely need to continue stool softeners and stimulants while taking opioids.     Discharge Instructions: After Your Surgery  You ve just had surgery. During surgery, you were given medicine called anesthesia to keep you relaxed and free of pain. After surgery, you may have some pain or nausea. This is common. Here are some tips for feeling better and getting well after surgery.     Stay on schedule with your medicine.   Going home  Your healthcare provider will show you how to take care of yourself when you go home. He or she will also answer your questions. Have an adult family member or friend drive you home. For the first 24 hours after your surgery:    Do not drive or use heavy equipment.    Do not make important decisions or sign legal papers.    Do not drink alcohol.    Have someone stay with you, if needed. He or she can watch for problems and help keep you safe.  Be sure to go to all follow-up visits with your healthcare provider. And rest after your surgery for as long as your  healthcare provider tells you to.  Coping with pain  If you have pain after surgery, pain medicine will help you feel better. Take it as told, before pain becomes severe. Also, ask your healthcare provider or pharmacist about other ways to control pain. This might be with heat, ice, or relaxation. And follow any other instructions your surgeon or nurse gives you.  Tips for taking pain medicine  To get the best relief possible, remember these points:    Pain medicines can upset your stomach. Taking them with a little food may help.    Most pain relievers taken by mouth need at least 20 to 30 minutes to start to work.    Taking medicine on a schedule can help you remember to take it. Try to time your medicine so that you can take it before starting an activity. This might be before you get dressed, go for a walk, or sit down for dinner.    Constipation is a common side effect of pain medicines. Call your healthcare provider before taking any medicines such as laxatives or stool softeners to help ease constipation. Also ask if you should skip any foods. Drinking lots of fluids and eating foods such as fruits and vegetables that are high in fiber can also help. Remember, do not take laxatives unless your surgeon has prescribed them.    Drinking alcohol and taking pain medicine can cause dizziness and slow your breathing. It can even be deadly. Do not drink alcohol while taking pain medicine.    Pain medicine can make you react more slowly to things. Do not drive or run machinery while taking pain medicine.  Your healthcare provider may tell you to take acetaminophen to help ease your pain. Ask him or her how much you are supposed to take each day. Acetaminophen or other pain relievers may interact with your prescription medicines or other over-the-counter (OTC) medicines. Some prescription medicines have acetaminophen and other ingredients. Using both prescription and OTC acetaminophen for pain can cause you to  overdose. Read the labels on your OTC medicines with care. This will help you to clearly know the list of ingredients, how much to take, and any warnings. It may also help you not take too much acetaminophen. If you have questions or do not understand the information, ask your pharmacist or healthcare provider to explain it to you before you take the OTC medicine.  Managing nausea  Some people have an upset stomach after surgery. This is often because of anesthesia, pain, or pain medicine, or the stress of surgery. These tips will help you handle nausea and eat healthy foods as you get better. If you were on a special food plan before surgery, ask your healthcare provider if you should follow it while you get better. These tips may help:    Do not push yourself to eat. Your body will tell you when to eat and how much.    Start off with clear liquids and soup. They are easier to digest.    Next try semi-solid foods, such as mashed potatoes, applesauce, and gelatin, as you feel ready.    Slowly move to solid foods. Don t eat fatty, rich, or spicy foods at first.    Do not force yourself to have 3 large meals a day. Instead eat smaller amounts more often.    Take pain medicines with a small amount of solid food, such as crackers or toast, to avoid nausea.     Call your surgeon if     You still have pain an hour after taking medicine. The medicine may not be strong enough.    You feel too sleepy, dizzy, or groggy. The medicine may be too strong.    You have side effects like nausea, vomiting, or skin changes, such as rash, itching, or hives.       If you have obstructive sleep apnea  You were given anesthesia medicine during surgery to keep you comfortable and free of pain. After surgery, you may have more apnea spells because of this medicine and other medicines you were given. The spells may last longer than usual.    At home:    Keep using the continuous positive airway pressure (CPAP) device when you sleep. Unless  your healthcare provider tells you not to, use it when you sleep, day or night. CPAP is a common device used to treat obstructive sleep apnea.    Talk with your provider before taking any pain medicine, muscle relaxants, or sedatives. Your provider will tell you about the possible dangers of taking these medicines.  Date Last Reviewed: 12/1/2016 2000-2017 [employer]. [address] [place] [address], [place] [address]. All rights reserved. This information is not intended as a substitute for professional medical care. Always follow your healthcare professional's instructions.    You were seen by the Pain and Palliative Care inpatient service to assist in optimizing your pain control. You are at risk for unintended respiratory suppression due to the amount of opioids you are on. Your risk is further increased with the concurrent medical conditions of sleep apnea and high opioid tolerance. Please be advised that you should follow up with your care provider to  discuss this further to reduce your risk factors.      You have been given a prescription for Naloxone. It is a Narcotic reversal and used if you suspect that you are experiencing an overdose of your opioids  Give one dose,  Nasally and call 911, this medication is short acting and an additional dose is needed.  Even if the person is becoming more awake following the first dose of narcan nasal spray, it is still necessary to call 911.   After use, bring the treated person call 911. Make sure the treating health care professional knows that the person has received an injection of this medicine. You will receive additional instructions on what to do during and after use of this medicine before an emergency occurs.   DO NOT HAVE SOME DRIVE YOU TO THE EMERGENCY ROOM.            We have also included additional information on managing your opioid medications safely.     If you have questions or concerns with your pain management please contact our office at  this number .   I or one of my associates can assist you further.  I has been a pleasure assisting you in improving your pain management.     LIBBY Olivo, CNP  Pain Management and Palliative Care  [facility]  [phone]

## 2017-10-20 NOTE — CONSULTS
[facility]  Pain Service Consultation   Text Page    Date of Admission:  10/19/2017    Assessment & Plan   Oleg Ford is a 57 year old male who was admitted on 10/19/2017. I was asked to see the patient for acute post operative pain, POD1.    1) Acute pain s/p  Right S I joint fusion with instrumentation.  Musculoskeletal     2)  Patient with chronic low back pain, on chronic opioid therapy  [place]  database review:database review:  Morphine ER 90 mg /day hydromorphone 4 mg 6 per day.  Valium 5 mg #30 per month   Last fill date 10/6/17  Single pharmacy at [facility]   prescriber:Cat Shaw MD   186 mg Daily Morphine Equivalent based on amount dispensed  Patient has a  high opioid tolerance.     Patient's opioid use thus far: dilaudid 24 mg PO, IV 3 mg  = 210 mg Daily Morphine Equivalent    3)  Opioid induced side-effects:  -Constipation  Take Metamucil    4) Other/Related:    -Depression/anxietyon chronic Abilify Lamictal and buproprion  -Deconditioning sedintary   -Sleep Apnea/ RLS post uvuloplasty   -high opioid tolerance     Compliance opioid agreement on file  Urine drug screen not on file      PLAN:   1)Multimodal Medication Therapy  Topical: Lidoderm patch to area around incision apply evening    Diclofenac gel 4 gm. Tid to area around incision.  NSAIDS: none   Muscle Relaxants: Flexeril 10 mg tid prn   Adjuvants: Gabapentin 300  Mg now and then bid starting 10/21  Antidepresants/anxiolytics: continue chronic Abilify Lamictal and buproprion    2)Opioids: continue chronic Long acting   MS Contin 30 mg tid Dilaudid 8- 10 mg qid   Ok to use IV dilaudid but try to use sparingly   3)Non-medication interventions  Physical Therapy, mobilize, ice   Cognitive restructuring, essential oils  4)Constipation Prophylaxis   Metamucil daily 1 tbsp.   5) DC safety  -Opioid Safety information included in After Visit Summary (AVS)  -Recommend short supply of Opioids only at discharge (3 days),  anticipate d/c to today orTCU   -advise Naxolone at time of discharge,Rx written and sent to [facility].     Time Spent on this Encounter   I spent  20 minutes in assessment of the patient and discussion with the patient.  Another 45 minutes in review of chart, documentation and discussion with the health care team.    Johanna Tinoco-Maxwell SOUZA CNP  Pain Management and Palliative Care  [facility]  Pgr: [phone]      Reason for Consult   Reason for consult: I was asked by Sebastián Lopez MD to evaluate this patient for pain management related to acute postoperative state.    Primary Care Physician   Primary Care Physician:Cat Shaw  Pain Specialist: same    Chief Complaint   S I joint  Pain     History is obtained from the patient and electronic health record    History of Present Illness   Oleg Ford is a 57 year old male who presents with  Acute post operative pain from S I joint fusion and instrumentation on 10/19/17.  He has had a long history of chronic pain low back and S I joints in the setting of periods of poorly controlled depression and anxiety.  The patient also has a h/o ADAL and is post uvuloplasty, not on CPAP.    The patient is concerned about discharge today.  He has trouble tolerating chair sitting. He is also concerned about the 2 hr car ride to the [facility] and has trouble navigating stairs.    He denies headache, dizziness, lightheadedness, chest pain, SOB, calve pain.      CURRENT PAIN:  His pain is located in the  Right S I joint, both legs to feet right >left   It is described as Sharp, Shooting and Tender  He rates it as ranging between 7 /10 and 8/10  The average is 8/10 on a scale of 0-10  Currently it is rated as 8/10  It improves by  Pain medication  It worsens by movement   He been compliant with the recommendations while in the hospital.      PAIN HISTORY:  The pain is mainly located in the  Right S I joint ,radiating to right hip, right  inguinal, right leg   It is described as Sharp, Shooting and Tender  Rates it as ranging between 8/10 and 10/10  Currently it is rated as 8/10  It improves by  Medications   It worsens by  Movement, stairs   He  been compliant with the recommendations while in the hospital.    PAST PAIN TREATMENT:   Medications: opioids, gabapentin   Non-phamacologic modalities: Physical Therapy    Previous interventions/surgeries: Si joint injections           D.I.R.E Score: Patient Selection for Chronic Opioid Analgesia    For each factor, rate the patient's score from 1 - 3 based on the explanations on the right.       Diagnosis             2         1 = Benign chronic condition with minimal objective findings or no definite medical diagnosis.  Examples:  fibromyalgia, migraine, headaches, non-specific back pain.  2 = Slowly progressive condition concordant with moderate pain, or fixed condition with moderate objective findings.  Examples: failed back surgery syndrome, back pain with moderate degenerative changes, neuropathic pain.  3 = Advanced condition concordant with severe pain with objective findings.  Examples: severe ischemic vascular disease, advanced neuropathy, severe spinal stenosis.    Intractability             2         1 = Few therapies have been tried and the patient takes a passive role in his/her pain management process.   2 = Most costomary treatments have been tried but the patient is not fully engaged in the pain management process, or barriers prevent (insurance, transportation, medical illness)  3 = Patient fully engaged in a spectrum of appropriate treatments but with inadequate response.    Risk   (Risk = Total of P+C+R+S below)       Psychological             2         1 = Serious personality dysfunction or mental illness interfering with care.  Examples: personality disorder, severe affective disorder, significant personality issues.  2 = Personality or mental health interferes moderately.  Example:  depression or anxiety disorder.  3 = Good communication with the clinic.  No significant personality dysfunction or mental illness.       Chemical      Health             2         1 = Active or very recent use of illicit drugs, excessive alcohol, or prescription drug abuse.  2 = Chemical coper (uses medications to cope with stress) or history of chemical dependency in remission.  3 = No CD history.  Not drug-focused or chemically reliant       Reliability             2         1 = History of numerous problems: medication misuse, missed appointments, rarely follows through.  2 = Occasional difficulties with compliance, but generally reliable.  3 = Highly reliable patient with medications, appointments and treatment.       Social      Support             2         1= Life in chaos.  Little family support and few close relationships.  Loss of most normal life roles.  2 = Reduction in some relationships and life roles.  3 = Supportive family/close relationships.  Involved in work or school and no social isolation.    Efficacy score             2         1 = Poor function or minimal pain relief despite moderate to high doses.  2 = Moderate benefit with function improved in a number of ways (or insufficient info - hasn't tried opioid yet or very low doses or too short a trial.  3 = Good improvement in pain and function and quality of life with stable doses over time.                                    14    Total score = D + I + R + E    Score 7-13: Not a suitable candidate for long-term opioid analgesia  Score 14-21: May be a good candidate for long-term opioid analgesia    Copyright 2013 Los Carrera MD, The DIRE Score: Predicting Outcomes of Opioid Prescribing for Chronic Pain. The Journal of Pain. 7(9) (September), 2006:671-681    Past Medical History   I have reviewed this patient's medical history and updated it with pertinent information if needed.   Past Medical History:   Diagnosis Date     Depression, major       Esophageal mass      History of orchiectomy 1982     History of vasectomy 1991     Low back pain      Other chronic pain     Chronic low back pain.     Restless leg      Sleep apnea     had surgery done to resolve sleep apnea       Past Surgical History   I have reviewed this patient's surgical history and updated it with pertinent information if needed.  Past Surgical History:   Procedure Laterality Date     BACK SURGERY  2/6/2013    lumbar surgery     COLONOSCOPY       [facility] UGI ENDOSCOPY W EUS N/A 7/31/2014    Procedure: COMBINED ENDOSCOPIC ULTRASOUND, ESOPHAGOSCOPY, GASTROSCOPY, DUODENOSCOPY (EGD);  Surgeon: Shorty Stoll MD;  [facility]: [facility]     ORCHIECTOMY INGUINAL      right radical orchiectomy     REMOVE STIMULATOR VAGUS NERVE  12/23/2011    Procedure:REMOVE STIMULATOR VAGUS NERVE; Vagus Nerve Stimulator Removal; Surgeon:ALEXIS BECERRA; Location:[facility]     REMOVE STIMULATOR VAGUS NERVE  11/8/2013    Procedure: REMOVE STIMULATOR VAGUS NERVE;  Removal Of Vagus Nerve Stimulator Lead In Left Neck ;  Surgeon: Alexis Becerra MD;  Location: [facility]     SURGICAL HISTORY OF -       vagal nerve implant; removed     SURGICAL HISTORY OF -       UPPP     VASECTOMY           Prior to Admission Medications   Prior to Admission Medications   Prescriptions Last Dose Informant Patient Reported? Taking?   ARIPiprazole (ABILIFY) 10 MG tablet 10/18/2017 at Unknown time  Yes Yes   Sig: Take 10 mg by mouth daily   EPINEPHrine (EPIPEN) 0.3 MG/0.3ML injection Unknown at Unknown time  No No   Sig: Inject 0.3 mLs (0.3 mg) into the muscle once as needed for anaphylaxis   HYDROmorphone (DILAUDID) 4 MG tablet 10/18/2017 at Unknown time  No Yes   Sig: Take 2 tablets (8 mg) by mouth 4 times daily as needed for moderate to severe pain (every 4-6hour prn)   buPROPion (WELLBUTRIN XL) 150 MG 24 hr tablet 10/18/2017 at Unknown time  Yes Yes   Sig: Take 450 mg by mouth every morning    cyclobenzaprine (FLEXERIL) 10 MG tablet Past Week at  Unknown time  No Yes   Sig: TAKE ONE TABLET BY MOUTH 3 TIMES A DAY AS NEEDED FOR MUSCLE SPASMS   diazepam (VALIUM) 5 MG tablet 10/18/2017 at Unknown time  No Yes   Sig: Take 1 tablet (5 mg) by mouth nightly as needed   hydrOXYzine (VISTARIL) 50 MG capsule 10/18/2017 at Unknown time  Yes Yes   Sig: Take 50 mg by mouth 2 times daily as needed    lamoTRIgine (LAMICTAL) 150 MG tablet 10/18/2017 at Unknown time  Yes Yes   Sig: Take 250 mg by mouth daily    morphine (MS CONTIN) 30 MG 12 hr tablet Past Week at Unknown time  No Yes   Sig: Take 1 tablet (30 mg) by mouth 3 times daily   omeprazole (PRILOSEC) 40 MG capsule 10/18/2017 at Unknown time  No Yes   Sig: TAKE 1 CAPSULE (40 MG) BY MOUTH DAILY TAKE 30-60 MINUTES BEFORE A MEAL.   ondansetron (ZOFRAN-ODT) 4 MG ODT tab 10/18/2017 at Unknown time  No Yes   Sig: PLACE 1 OR 2 TABLETS UNDER THE TONGUE 3 TIMES A DAY AS NEEDED FOR NAUSEA   rOPINIRole (REQUIP) 0.25 MG tablet More than a month at Unknown time  No No   Sig: Take 1-2 tablets (0.25-0.5 mg) by mouth At Bedtime   zolpidem (AMBIEN) 10 MG tablet More than a month at Unknown time  No No   Sig: Take 1 tablet (10 mg) by mouth nightly as needed for sleep      Facility-Administered Medications: None     Allergies   Allergies   Allergen Reactions     Ciprofloxacin Anaphylaxis     Doxycycline Anaphylaxis     Septra [Bactrim] Anaphylaxis     Amoxicillin      itching       Social History   I have reviewed this patient's social history and updated it with pertinent information if needed. Oleg Ford  reports that he has been smoking Cigarettes.  He has a 20.00 pack-year smoking history. He has never used smokeless tobacco. He reports that he drinks alcohol. He reports that he does not use illicit drugs.    Family History   I have reviewed this patient's family history and updated it with pertinent information if needed.   Family History   Problem Relation Age of Onset     DIABETES Mother      C.A.D. Mother      C.A.D.  Maternal Grandmother      DIABETES Maternal Grandmother      Hypertension Maternal Grandmother      CANCER Maternal Grandfather      C.A.D. Maternal Grandfather        Family history of addiction  no  Review of Systems   The 10 point Review of Systems is negative other than noted in the HPI or here.   Denies Bowel or bladder dysfunction    Physical Exam   Temp:  [96.4  F (35.8  C)-98.7  F (37.1  C)] 97.1  F (36.2  C)  Pulse:  [66-87] 78  Heart Rate:  [51-79] 69  Resp:  [10-18] 18  BP: ()/(66-95) 95/67  SpO2:  [94 %-99 %] 96 %  285 lbs 9.6 oz  GEN:  Alert, oriented x 3, appears comfortable, NAD.  HEENT:  Normocephalic/atraumatic, no scleral icterus, no nasal discharge, mouth moist.  CV:  RRR, S1, S2; no murmurs or other irregularities noted.  +3 DP/PT pulses bilatererally; no edema BLE.  RESP:  Clear to auscultation bilaterally without rales/rhonchi/wheezing/retractions.  Symmetric chest rise on inhalation noted.  Normal respiratory effort.  ABD:  Rounded, soft, non-tender/non-distended.  +BS  EXT:  Edema & pulses as noted above.  CMS intact x 4.   M/S:   Tender to palpation  Right S I joint. SEPULVEDA X4   SKIN:  Dry to touch, no exanthems noted in the visualized areas.    NEURO: Symmetric strength +5/5.  Sensation to touch intact all extremities.   There is no area of allodynia or hyperesthesia.  PAIN BEHAVIOR: Cooperative  Psych:  Normal affect.  Calm, cooperative, conversant appropriately.       Data   Results for orders placed or performed during the hospital encounter of 10/19/17 (from the past 24 hour(s))   Hospitalist IP Consult: Patient to be seen: Routine - within 24 hours; Hospitalist Consult; Consultant may enter orders: Yes    Narrative    Philly Quintero MD     10/19/2017  2:54 PM  [facility]  Hospitalist Consult Note  Name: Oleg Ford    MRN: [id number]  YOB: 1960    Age: 57 year old  Date of admission: 10/19/2017  Primary care provider: Cat Shaw      Requesting Physician:  Nessa  Reason for consult:  Post-operative medical management         Assessment and Plan:   Oleg Ford is a 57 year old male with a history of chronic   pain on narcotics, depression, GERD and RLS who was admitted   10/19/17 after fusion of bilateral SI joints.    1. Chronic Pain s/p Fusion Bilateral SI Joints on 10/19/17: The   patient is doing well, currently has well controlled pain and is   hemodynamically stable. Will defer diet, activity, DVT   prophylaxis, and pain control to the primary team. Continue   physical and occupational therapy. Social work consulted for   possible placement needs. Continue incentive spirometry.   - Check Hgb in the AM    2. Depression: Continue PTA Seroquel, Wellbutrin, Lamictal and   Vistaril.    3. GERD: Continue Omeprazole.    4. RLS: Continue PTA Requip.    Code status: Full Code  Prophylaxis: Defer to primary team  Disposition: Defer to primary team    Thank you for the consultation, we will continue to follow along   during the hospitalization. Please page with any questions or   concerns.         History of Present Illness:   Oleg Ford is a 57 year old male with a history of chronic   pain on narcotics, depression, GERD and RLS who was admitted   10/19/17 after fusion of bilateral SI joints. Pre-operative note   was fully reviewed and recommendations acknowledged. Op note and   anesthesia notes and flow sheets reviewed.     The patient had no complications related to the procedure and has   had an unremarkable post-operative course to this point.   Currently pain is adequately controlled. No nausea, vomiting,   diarrhea or constipation. No fevers, chills, diaphoresis. No   chest pain, palpitations, dyspnea. Sy catheter still in place.   Tolerating oral intake. No excessive somnolence and patient is   fully alert and oriented. The patient has no other complaints at   this time.      Patient states he is feeling fine at this time.  Pain  starting to    but so far tolerable.  Eating crackers without nausea or   emesis at this time.  Has no concerns other than his pain.              Past Medical History:     Past Medical History:   Diagnosis Date     Depression, major      Esophageal mass      History of orchiectomy 1982     History of vasectomy 1991     Low back pain      Other chronic pain     Chronic low back pain.     Restless leg      Sleep apnea     had surgery done to resolve sleep apnea             Past Surgical History:     Past Surgical History:   Procedure Laterality Date     BACK SURGERY  2/6/2013    lumbar surgery     COLONOSCOPY       [facility] UGI ENDOSCOPY W EUS N/A 7/31/2014    Procedure: COMBINED ENDOSCOPIC ULTRASOUND, ESOPHAGOSCOPY,   GASTROSCOPY, DUODENOSCOPY (EGD);  Surgeon: Shorty Stoll MD;  [facility]: [facility]     ORCHIECTOMY INGUINAL      right radical orchiectomy     REMOVE STIMULATOR VAGUS NERVE  12/23/2011    Procedure:REMOVE STIMULATOR VAGUS NERVE; Vagus Nerve Stimulator   Removal; Surgeon:ALEXIS BECERRA; Location[facility]     REMOVE STIMULATOR VAGUS NERVE  11/8/2013    Procedure: REMOVE STIMULATOR VAGUS NERVE;  Removal Of Vagus   Nerve Stimulator Lead In Left Neck ;  Surgeon: Alexis Becerra MD;  [facility]     SURGICAL HISTORY OF -       vagal nerve implant; removed     SURGICAL HISTORY OF -       UPPP     VASECTOMY                 Social History:     Social History   Substance Use Topics     Smoking status: Current Every Day Smoker     Packs/day: 0.50     Years: 40.00     Types: Cigarettes     Smokeless tobacco: Never Used     Alcohol use Yes      Comment: 1 drink every 3 months             Family History:   Family history was fully reviewed and non-contributory in this   case.          Allergies:     Allergies   Allergen Reactions     Ciprofloxacin Anaphylaxis     Doxycycline Anaphylaxis     Septra [Bactrim] Anaphylaxis     Amoxicillin      itching             Medications:     Prior to Admission medications     Medication Sig Last Dose Taking? Auth Provider   morphine (MS CONTIN) 30 MG 12 hr tablet Take 1 tablet (30 mg) by   mouth 3 times daily Past Week at Unknown time Yes Cat Shaw MD   HYDROmorphone (DILAUDID) 4 MG tablet Take 2 tablets (8 mg) by   mouth 4 times daily as needed for moderate to severe pain (every   4-6hour prn) 10/18/2017 at Unknown time Yes Cat Shaw MD   diazepam (VALIUM) 5 MG tablet Take 1 tablet (5 mg) by mouth   nightly as needed 10/18/2017 at Unknown time Yes Cat Shaw MD   ondansetron (ZOFRAN-ODT) 4 MG ODT tab PLACE 1 OR 2 TABLETS UNDER   THE TONGUE 3 TIMES A DAY AS NEEDED FOR NAUSEA 10/18/2017 at   Unknown time Yes Cat Shaw MD   cyclobenzaprine (FLEXERIL) 10 MG tablet TAKE ONE TABLET BY MOUTH   3 TIMES A DAY AS NEEDED FOR MUSCLE SPASMS Past Week at Unknown   time Yes Cat Shaw MD   omeprazole (PRILOSEC) 40 MG capsule TAKE 1 CAPSULE (40 MG) BY   MOUTH DAILY TAKE 30-60 MINUTES BEFORE A MEAL. 10/18/2017 at   Unknown time Yes aCt Shaw MD   ARIPiprazole (ABILIFY) 10 MG tablet Take 10 mg by mouth daily   10/18/2017 at Unknown time Yes Reported, Patient   lamoTRIgine (LAMICTAL) 150 MG tablet Take 250 mg by mouth daily    10/18/2017 at Unknown time Yes Reported, Patient   hydrOXYzine (VISTARIL) 50 MG capsule Take 50 mg by mouth 2 times   daily as needed  10/18/2017 at Unknown time Yes Reported, Patient     buPROPion (WELLBUTRIN XL) 150 MG 24 hr tablet Take 450 mg by   mouth every morning  10/18/2017 at Unknown time Yes Reported,   Patient   zolpidem (AMBIEN) 10 MG tablet Take 1 tablet (10 mg) by mouth   nightly as needed for sleep More than a month at Unknown time    Cat Shaw MD   rOPINIRole (REQUIP) 0.25 MG tablet Take 1-2 tablets (0.25-0.5 mg)   by mouth At Bedtime More than a month at Unknown time  Cat Shaw MD   EPINEPHrine (EPIPEN) 0.3 MG/0.3ML injection Inject 0.3 mLs (0.3  "  mg) into the muscle once as needed for anaphylaxis Unknown at   Unknown time  Cat Shaw MD       Current hospital administered medication list (MAR) also   reviewed.          Review of Systems:   A comprehensive greater than 10 system review of systems was   carried out.  Pertinent positives and negatives are noted above.    Otherwise negative for contributory info.            Physical Exam:   Blood pressure 119/80, pulse 66, temperature 96.4  F (35.8  C),   temperature source Axillary, resp. rate 14, height 1.829 m (6'   0.01\"), weight 129.5 kg (285 lb 9.6 oz), SpO2 99 %.  Exam:  General: Alert, awake, no acute distress.  HEENT: Normocephalic and atraumatic, eyes anicteric and without   scleral injection, EOMI, face symmetric, MMM.  Cardiac: RRR, normal S1, S2. No m/g/r, no LE edema.  Pulmonary: Normal chest rise, normal work of breathing.  Lungs   CTAB without crackles or wheezing.  Abdomen: soft, non-tender, non-distended.  Normoactive bowel   sounds, no guarding or rebound tenderness.  Extremities: no deformities.  Warm, well perfused.  Skin: no rashes or lesions.  Warm and Dry.  Neuro: No focal deficits.  Speech clear.  Spontaneously moving   all extremities in bed.  Psych: Alert and oriented x3. Appropriate affect.          Data:   Imaging:  Reviewed.    Labs: Reviewed from pre operative appointment.    Philly Quintero MD  [facility] Hospitalist  October 19, 2017      Basic metabolic panel   Result Value Ref Range    Sodium 133 133 - 144 mmol/L    Potassium 3.8 3.4 - 5.3 mmol/L    Chloride 103 94 - 109 mmol/L    Carbon Dioxide 28 20 - 32 mmol/L    Anion Gap 2 (L) 3 - 14 mmol/L    Glucose 107 (H) 70 - 99 mg/dL    Urea Nitrogen 10 7 - 30 mg/dL    Creatinine 1.08 0.66 - 1.25 mg/dL    GFR Estimate 71 >60 mL/min/1.7m2    GFR Estimate If Black 86 >60 mL/min/1.7m2    Calcium 8.1 (L) 8.5 - 10.1 mg/dL   CBC with platelets differential   Result Value Ref Range    WBC 11.9 (H) 4.0 - 11.0 10e9/L    RBC Count 4.40 4.4 - " 5.9 10e12/L    Hemoglobin 12.8 (L) 13.3 - 17.7 g/dL    Hematocrit 38.8 (L) 40.0 - 53.0 %    MCV 88 78 - 100 fl    MCH 29.1 26.5 - 33.0 pg    MCHC 33.0 31.5 - 36.5 g/dL    RDW 13.9 10.0 - 15.0 %    Platelet Count 211 150 - 450 10e9/L    Diff Method Automated Method     % Neutrophils 55.2 %    % Lymphocytes 31.7 %    % Monocytes 11.1 %    % Eosinophils 1.4 %    % Basophils 0.3 %    % Immature Granulocytes 0.3 %    Nucleated RBCs 0 0 /100    Absolute Neutrophil 6.6 1.6 - 8.3 10e9/L    Absolute Lymphocytes 3.8 0.8 - 5.3 10e9/L    Absolute Monocytes 1.3 0.0 - 1.3 10e9/L    Absolute Eosinophils 0.2 0.0 - 0.7 10e9/L    Absolute Basophils 0.0 0.0 - 0.2 10e9/L    Abs Immature Granulocytes 0.0 0 - 0.4 10e9/L    Absolute Nucleated RBC 0.0

## 2017-10-20 NOTE — PLAN OF CARE
Problem: Patient Care Overview  Goal: Plan of Care/Patient Progress Review  PT: Patient seen for physical therapy evaluation and treatment. Pt is a 57 year old male s/p fusion of B SI joints. Pt has  PMH of chronic pain on narcotics, depression, GERD and RLS. Pt lives in a home with a friend, he has 4 steps to enter and an additional flight inside to reach the kitchen/bedroom area. Pt reports that at baseline he was very limited in his ability to tolerate mobility due to high pain levels.      Discharge Planner PT   Patient plan for discharge: Home or TCU  Current status: Pt on 3L O2 throughout session. Pt rolls side to side with use of bed rail. Supine to sit with SBA and verbal cues for technique. Pt reports some dizziness upon sitting, passes in 30 sec-1 minute. Sit to stand with CGA and 2WW. Pt participated in pre-gait activities side stepping/ steps in place. Pt able to put limited weight though R LE, heavy UE reliance. Pt unable to tolerate sitting in bedside chair, limited tolerance to sitting EOB.  Returned supine with Min A at LE.   Barriers to return to prior living situation: stairs, limited tolerance to mobility  Recommendations for discharge: TCU vs Home pending progress in therapy and pain control.   Rationale for recommendations: Pt will benefit from continued skilled services to improve strength and functional mobility.        Entered by: Josiane Torres 10/20/2017 8:41 AM

## 2017-10-20 NOTE — PROGRESS NOTES
[facility]      OUTPATIENT PHYSICAL THERAPY EVALUATION  PLAN OF TREATMENT FOR OUTPATIENT REHABILITATION  (COMPLETE FOR INITIAL CLAIMS ONLY)  Patient's Last Name, First Name, M.I.  YOB: 1960  Oleg Ford                        Provider's Name  [facility] Medical Record No.  [id number]                               Onset Date:  10/19/17   Start of Care Date:  10/20/17      Type:     _X_PT   ___OT   ___SLP Medical Diagnosis:  SI fusion                        PT Diagnosis:  Decreased funtional mobility   Visits from SOC:  1   _________________________________________________________________________________  Plan of Treatment/Functional Goals    Planned Interventions:  ,    bed mobility training, gait training, transfer training, home program guidelines, progressive activity/exercise,       Goals: See Physical Therapy Goals on Care Plan in [facility] electronic health record.    Therapy Frequency: 2 times/day  Predicted Duration of Therapy Intervention: 3 days  _________________________________________________________________________________    I CERTIFY THE NEED FOR THESE SERVICES FURNISHED UNDER        THIS PLAN OF TREATMENT AND WHILE UNDER MY CARE     (Physician co-signature of this document indicates review and certification of the therapy plan).                  Certification date from: 10/20/17, Certification date to: 10/22/17    Referring Physician: Sebastián Szymanski MD            Initial Assessment        See Physical Therapy evaluation dated 10/20/17 in Epic electronic health record.

## 2017-10-20 NOTE — PROGRESS NOTES
10/20/17 0803   Quick Adds   Type of Visit Initial PT Evaluation   Living Environment   Lives With friend(s)   Living Arrangements house   Number of Stairs to Enter Home 4   Number of Stairs Within Home 12   Stair Railings at Home none   Living Environment Comment Pt shares cooking cleaning duties with friend   Self-Care   Usual Activity Tolerance moderate   Current Activity Tolerance fair   Regular Exercise no   Equipment Currently Used at Home crutches;cane, straight;walker, standard   Activity/Exercise/Self-Care Comment Pt reports he was very limited in mobility prior to admission, mostly in bed due to pain. Used motorized carts to do grocery shopping.   Functional Level Prior   Ambulation 1-->assistive equipment   Transferring 0-->independent   Toileting 0-->independent   Bathing 0-->independent   Dressing 0-->independent   Eating 0-->independent   Communication 0-->understands/communicates without difficulty   Swallowing 0-->swallows foods/liquids without difficulty   Cognition 0 - no cognition issues reported   Fall history within last six months yes   Number of times patient has fallen within last six months 1   Which of the above functional risks had a recent onset or change? ambulation;transferring   Prior Functional Level Comment Pt reports slipping on a step and falling.    General Information   Onset of Illness/Injury or Date of Surgery - Date 10/19/17   Referring Physician Sebastián Szymanski MD   Patient/Family Goals Statement Pt would like to return home, open to TCU stay if needed   Pertinent History of Current Problem (include personal factors and/or comorbidities that impact the POC) Pt is a 57 year old male s/p fusion of B SI joints. Pt has  PMH of chronic pain on narcotics, depression, GERD and RLS.    Precautions/Limitations spinal precautions   Cognitive Status Examination   Orientation orientation to person, place and time   Follows Commands and Answers Questions 100% of the time   Personal Safety  and Judgment intact   Memory intact   Pain Assessment   Patient Currently in Pain Yes, see Vital Sign flowsheet   Integumentary/Edema   Integumentary/Edema Comments mild edema at surgical site   Posture    Posture Forward head position;Protracted shoulders   Range of Motion (ROM)   ROM Comment WFL   Strength   Strength Comments Mild-moderate strength deficits as noted with difficulty performing transfers supine to sit and sit to stand (May be impacted by pain levels at this time)   Bed Mobility   Bed Mobility Comments Pt rolls with assistance from bed rail. Sidelying to sit with SBA and verbal cues.    Transfer Skills   Transfer Comments Sit to stand with CGA and 2WW. Pt not tolerating seated postion well.    Gait   Gait Comments Pt participated in pre-gait activities. Amb ~2 ft to bedside chair. Pt having difficulty putting weight through R LE, heavy UE reliance.    Balance   Balance Comments No LOB during session   Sensory Examination   Sensory Perception Comments Pt reports tingling in B feet, states this is a little worse than prior to surgery.    Coordination   Coordination no deficits were identified   Muscle Tone   Muscle Tone no deficits were identified   General Therapy Interventions   Planned Therapy Interventions bed mobility training;gait training;transfer training;home program guidelines;progressive activity/exercise   Clinical Impression   Criteria for Skilled Therapeutic Intervention yes, treatment indicated   PT Diagnosis Decreased funtional mobility   Influenced by the following impairments Pain, weakness   Functional limitations due to impairments transfers, ambulation, stairs   Clinical Presentation Stable/Uncomplicated   Clinical Presentation Rationale Clinical Judgement   Clinical Decision Making (Complexity) Low complexity   Therapy Frequency` 2 times/day   Predicted Duration of Therapy Intervention (days/wks) 3 days   Anticipated Discharge Disposition Home;Transitional Care Facility   Risk &  "Benefits of therapy have been explained Yes   Patient, Family & other staff in agreement with plan of care Yes   [facility] AM-PAC TM \"6 Clicks\"   2016, Trustees of [facility], under license to [employer].  All rights reserved.   6 Clicks Short Forms Basic Mobility Inpatient Short Form   [facility] AM-PAC  \"6 Clicks\" V.2 Basic Mobility Inpatient Short Form   1. Turning from your back to your side while in a flat bed without using bedrails? 3 - A Little   2. Moving from lying on your back to sitting on the side of a flat bed without using bedrails? 3 - A Little   3. Moving to and from a bed to a chair (including a wheelchair)? 3 - A Little   4. Standing up from a chair using your arms (e.g., wheelchair, or bedside chair)? 3 - A Little   5. To walk in hospital room? 2 - A Lot   6. Climbing 3-5 steps with a railing? 2 - A Lot   Basic Mobility Raw Score (Score out of 24.Lower scores equate to lower levels of function) 16   Total Evaluation Time   Total Evaluation Time (Minutes) 5     "

## 2017-10-20 NOTE — PLAN OF CARE
Problem: Patient Care Overview  Goal: Plan of Care/Patient Progress Review  Outcome: Improving  RN:pt vss, doing well.   Lungs:clear, 02 stable, encouraged IS hourly  BS: active   Diet: Regular  CMS +, encouraged D/P flexion, and PCD's are in place  Dressing: CDI  Activity: pt log rolled q2 hours, up with A2 with pt, painful with movement,.   Pain: painful, using oral dilaudid, vistaril, valium, tylenol and gabapentin along with topicals as well.   Output: voiding  Hold dc for today, due to mobility, hoping home tomorrow. Encouragement needed. Pt anxious at times.  Will cont to monitor.

## 2017-10-20 NOTE — PROGRESS NOTES
"[facility]  Hospitalist Progress Note  Philly Quintero MD 10/20/17  Text Page  Pager: [phone] (7am-6pm)    Reason for Stay (Diagnosis): Fusion SI Joints         Assessment and Plan:      Summary of Stay: Oleg Ford is a 57 year old male with a history of chronic pain on narcotics, depression, GERD and RLS who was admitted 10/19/17 after fusion of bilateral SI joints.    Problem List/Assessment and Plan:     1. Chronic Pain s/p Fusion Bilateral SI Joints on 10/19/17: The patient is doing well, currently has well controlled pain and is hemodynamically stable. Will defer diet, activity, DVT prophylaxis, and pain control to the primary team.   Pain team also following. Continue physical and occupational therapy. Social work consulted for possible placement needs. Continue incentive spirometry.      2. Depression: Continue PTA Seroquel, Wellbutrin, Lamictal and Vistaril.     3. GERD: Continue Omeprazole.     4. RLS: Continue PTA Requip.    5. Acute Blood Loss Anemia: Pre operative Hgb was 15.4, today 12.8.  No indication for transfusion.    DVT Prophylaxis: Defer to primary service  Code Status: Full Code  Discharge Dispo: Defer to primary team  Estimated Disch Date / # of Days until Disch: Defer to primary team    Hospitalist service will sign off at this time.  Please call if there are any further concerns prior to discharge.        Interval History (Subjective):      Patient was seen with his bedside nurse.  He is eating fine, does have some nausea but no emesis.  Has intermittent nausea at baseline and takes Zofran for this.  Denies CP or SOB.  His biggest concern is persistent pain and he does not feel that he is ready to go home because of pain and difficulty with movements.                  Physical Exam:      Last Vital Signs:  BP 95/67 (BP Location: Right arm)  Pulse 78  Temp 97.1  F (36.2  C) (Oral)  Resp 18  Ht 1.829 m (6' 0.01\")  Wt 129.5 kg (285 lb 9.6 oz)  SpO2 96%  BMI 38.73 " kg/m2    General: Alert, awake, no acute distress.  HEENT: Normocephalic and atraumatic, eyes anicteric and without scleral injection, EOMI, face symmetric, MMM.  Cardiac: RRR, normal S1, S2. No m/g/r, no LE edema.  Pulmonary: Normal chest rise, normal work of breathing.  Lungs CTAB without crackles or wheezing.  Abdomen: soft, non-tender, non-distended.  Normoactive bowel sounds, no guarding or rebound tenderness.  Extremities: no deformities.  Warm, well perfused.  Skin: no rashes or lesions.  Warm and Dry.  Neuro: No focal deficits.  Speech clear.  Spontaneously moving all extremities in bed.  Psych: Alert and oriented x3. Appropriate affect.          Medications:      All current medications were reviewed with changes reflected in problem list.         Data:      All new lab and imaging data was reviewed.   Labs:    Recent Labs  Lab 10/20/17  0708      POTASSIUM 3.8   CHLORIDE 103   CO2 28   ANIONGAP 2*   *   BUN 10   CR 1.08   GFRESTIMATED 71   GFRESTBLACK 86   ANDER 8.1*       Recent Labs  Lab 10/20/17  0708   WBC 11.9*   HGB 12.8*   HCT 38.8*   MCV 88         Imaging:   Recent Results (from the past 24 hour(s))   XR Surgery ELENA L/T 5 Min Fluoro w Stills    Narrative    This exam was marked as non-reportable because it will not be read by a   radiologist or a [place] non-radiologist provider.                 Philly Quintero MD.

## 2017-10-20 NOTE — PLAN OF CARE
Problem: Patient Care Overview  Goal: Plan of Care/Patient Progress Review       Discharge Planner PT   Patient plan for discharge: Home or TCU  Current status: Pt on 1.5L O2 throughout session. Supine to sit with SBA and use of bed rail. Pt transferred sit to stand with CGA and verbal cues for hand placement. Pt ambulated ~20 ft in room with 2WW and CGA. Pt able to tolerate sitting in bedside chair this PM.    Barriers to return to prior living situation: stairs, limited tolerance to mobility  Recommendations for discharge: TCU vs Home pending progress in therapy and pain control.   Rationale for recommendations: Pt will benefit from continued skilled services to improve strength and functional mobility.        Entered by: Josiane Torres 10/20/2017 1:47 PM

## 2017-10-20 NOTE — PROGRESS NOTES
DAILY PROGRESS NOTE    Oleg Ford is a 57 year old old male admitted on 10/19/2017  6:45 AM.    Subjective  Comfortable      Objective    AAOx3, SEPULVEDA , f/c 4/4   Ambulating,     Hemoglobin   Date Value Ref Range Status   10/20/2017 12.8 (L) 13.3 - 17.7 g/dL Final   ]      Impression / Plan       Plan for today:    Patient doing well  Ambulate with help with walker toe touch  Weight bearing   PT OT, clearance  Then d/c home today   D/c choudhury this am  Full diet  Today  Wean and d/c PCA and transition to PO meds today

## 2017-10-20 NOTE — PLAN OF CARE
Problem: Patient Care Overview  Goal: Plan of Care/Patient Progress Review  A/O, VSS pain in right hip and back 8/10 on scheduled mscontin, oral and IV dilaudid and valium as needed for pain.  Dressing cdi.  Binder in place.  Complains of tingling bilateral feet right greater then left. OBS Plans on discharging home today

## 2017-10-20 NOTE — PROGRESS NOTES
[facility]    Pain Management Consultation without direct patient contact    This patient's H+P, most recent hospitalist note, labs from this admition and outside records have been reviewed at the request of Merlin Szymanski MD  for acute postoperative pain. He is s/p right S I joint fusion for S I joint  Instability.    The patient was expressing concern that he is not receiving enough pain medications as only his chronic dosing has been ordered with IV for breakthrough. The nurse is concern, given high opioid tolerance for unintended adverse events such as excess sedation and respiratory suppression.   [place]:  Morphine ER 90 mg /day hydromorphone 4 mg 6 per day.  Valium 5 mg #30 per month   Last fill date 10/6/17  Single pharmacy at [facility]   prescriber:Cat Shaw MD   186 mg Daily Morphine Equivalent based on amount dispensed    Relative risk R/T opioids:    -obesity  -high opioid tolerance  -depression   Compliance   Urine drug screen none on file  Opioid agreeement dated 12/14/15    Recommendation:   It has been determined that no change is necessary to the current plan of care at this time.   Patient  Was seen and no examined.  He was satisfied with plan outline.  Continue opioids as prescribed . Ok to use Dilaudid IV for BTP.  His estimated need is 2/3 more than baseline opioid use.  Which would be an increase by 63 mg MME per 24 hr period.    Consider adding Gabapentin in am. Topicals once dressing removed. Should use ice as need to S I joint area.    Will see patient in am and do full evaluation   Monitor for sedation, continuous P02,   The chart will be reviewed regularly that the following recommendation are appropriate for this patient  If you would like the patient to be seen, please contact the service at [phone] and ask to have the patient seen.  Time Spent 20 minutes, none in direct contact with patient or family.    Thank you!    Johanna Tsang  [place]   Pain Management and Palliative Care  [facility]  Pgr: [phone]

## 2017-10-21 ENCOUNTER — APPOINTMENT (OUTPATIENT)
Dept: PHYSICAL THERAPY | Facility: CLINIC | Age: 57
End: 2017-10-21
Attending: NEUROLOGICAL SURGERY
Payer: MEDICAID

## 2017-10-21 ENCOUNTER — APPOINTMENT (OUTPATIENT)
Dept: OCCUPATIONAL THERAPY | Facility: CLINIC | Age: 57
End: 2017-10-21
Attending: NEUROLOGICAL SURGERY
Payer: MEDICAID

## 2017-10-21 VITALS
HEIGHT: 72 IN | SYSTOLIC BLOOD PRESSURE: 122 MMHG | TEMPERATURE: 98.9 F | DIASTOLIC BLOOD PRESSURE: 79 MMHG | OXYGEN SATURATION: 95 % | RESPIRATION RATE: 16 BRPM | HEART RATE: 90 BPM | WEIGHT: 285.6 LBS | BODY MASS INDEX: 38.68 KG/M2

## 2017-10-21 PROCEDURE — 97116 GAIT TRAINING THERAPY: CPT | Mod: GP,59 | Performed by: PHYSICAL THERAPY ASSISTANT

## 2017-10-21 PROCEDURE — G0378 HOSPITAL OBSERVATION PER HR: HCPCS

## 2017-10-21 PROCEDURE — 97530 THERAPEUTIC ACTIVITIES: CPT | Mod: GP | Performed by: PHYSICAL THERAPY ASSISTANT

## 2017-10-21 PROCEDURE — 97110 THERAPEUTIC EXERCISES: CPT | Mod: GP | Performed by: PHYSICAL THERAPY ASSISTANT

## 2017-10-21 PROCEDURE — 25000132 ZZH RX MED GY IP 250 OP 250 PS 637: Performed by: NEUROLOGICAL SURGERY

## 2017-10-21 PROCEDURE — 40000133 ZZH STATISTIC OT WARD VISIT: Performed by: REHABILITATION PRACTITIONER

## 2017-10-21 PROCEDURE — 25000132 ZZH RX MED GY IP 250 OP 250 PS 637: Performed by: NURSE PRACTITIONER

## 2017-10-21 PROCEDURE — 40000193 ZZH STATISTIC PT WARD VISIT: Performed by: PHYSICAL THERAPY ASSISTANT

## 2017-10-21 PROCEDURE — 25000125 ZZHC RX 250: Performed by: NEUROLOGICAL SURGERY

## 2017-10-21 PROCEDURE — 97535 SELF CARE MNGMENT TRAINING: CPT | Mod: GO,59 | Performed by: REHABILITATION PRACTITIONER

## 2017-10-21 RX ORDER — HYDROMORPHONE HYDROCHLORIDE 8 MG/1
8-10 TABLET ORAL 4 TIMES DAILY PRN
Qty: 40 TABLET | Refills: 0 | Status: SHIPPED | OUTPATIENT
Start: 2017-10-21 | End: 2017-10-21

## 2017-10-21 RX ORDER — MORPHINE SULFATE 30 MG/1
30 TABLET, FILM COATED, EXTENDED RELEASE ORAL 3 TIMES DAILY
Qty: 60 TABLET | Refills: 0 | Status: SHIPPED | OUTPATIENT
Start: 2017-10-21 | End: 2017-10-21

## 2017-10-21 RX ORDER — HYDROXYZINE HYDROCHLORIDE 50 MG/1
50 TABLET, FILM COATED ORAL EVERY 6 HOURS PRN
Qty: 120 TABLET | Refills: 3 | Status: SHIPPED | OUTPATIENT
Start: 2017-10-21 | End: 2019-07-12

## 2017-10-21 RX ADMIN — MORPHINE SULFATE 30 MG: 30 TABLET, EXTENDED RELEASE ORAL at 14:27

## 2017-10-21 RX ADMIN — DICLOFENAC SODIUM 4 G: 10 GEL TOPICAL at 12:24

## 2017-10-21 RX ADMIN — OMEPRAZOLE 40 MG: 20 CAPSULE, DELAYED RELEASE ORAL at 08:17

## 2017-10-21 RX ADMIN — DICLOFENAC SODIUM 4 G: 10 GEL TOPICAL at 08:21

## 2017-10-21 RX ADMIN — HYDROMORPHONE HYDROCHLORIDE 8 MG: 4 TABLET ORAL at 08:17

## 2017-10-21 RX ADMIN — DIAZEPAM 5 MG: 5 TABLET ORAL at 03:51

## 2017-10-21 RX ADMIN — HYDROMORPHONE HYDROCHLORIDE 8 MG: 4 TABLET ORAL at 12:51

## 2017-10-21 RX ADMIN — PSYLLIUM HUSK 1 PACKET: 3.4 GRANULE ORAL at 08:17

## 2017-10-21 RX ADMIN — ONDANSETRON 4 MG: 4 TABLET, ORALLY DISINTEGRATING ORAL at 08:24

## 2017-10-21 RX ADMIN — DICLOFENAC SODIUM 4 G: 10 GEL TOPICAL at 15:56

## 2017-10-21 RX ADMIN — MORPHINE SULFATE 30 MG: 30 TABLET, EXTENDED RELEASE ORAL at 06:28

## 2017-10-21 RX ADMIN — ARIPIPRAZOLE 10 MG: 10 TABLET ORAL at 08:17

## 2017-10-21 RX ADMIN — DIAZEPAM 5 MG: 5 TABLET ORAL at 11:33

## 2017-10-21 RX ADMIN — CYCLOBENZAPRINE HYDROCHLORIDE 10 MG: 10 TABLET, FILM COATED ORAL at 08:17

## 2017-10-21 RX ADMIN — BUPROPION HYDROCHLORIDE 450 MG: 150 TABLET, FILM COATED, EXTENDED RELEASE ORAL at 08:17

## 2017-10-21 RX ADMIN — HYDROMORPHONE HYDROCHLORIDE 8 MG: 4 TABLET ORAL at 01:00

## 2017-10-21 RX ADMIN — CYCLOBENZAPRINE HYDROCHLORIDE 10 MG: 10 TABLET, FILM COATED ORAL at 14:27

## 2017-10-21 RX ADMIN — LAMOTRIGINE 250 MG: 100 TABLET ORAL at 08:17

## 2017-10-21 RX ADMIN — HYDROMORPHONE HYDROCHLORIDE 8 MG: 4 TABLET ORAL at 17:08

## 2017-10-21 RX ADMIN — GABAPENTIN 300 MG: 300 CAPSULE ORAL at 08:17

## 2017-10-21 RX ADMIN — HYDROXYZINE HYDROCHLORIDE 50 MG: 50 TABLET, FILM COATED ORAL at 01:06

## 2017-10-21 NOTE — PROGRESS NOTES
DAILY PROGRESS NOTE    Oleg Ford is a 57 year old old male admitted on 10/19/2017  6:45 AM.    Subjective  Comfortable      Objective    AAOx3, SEPULVEDA , f/c 4/4   Ambulating butr still needs help     Hemoglobin   Date Value Ref Range Status   10/20/2017 12.8 (L) 13.3 - 17.7 g/dL Final   ]      Impression / Plan       Plan for today:  From my sidce patient is doing very well   Ambulation still difficult    From my side when ever patient is cleared by PT he can and should be discharged

## 2017-10-21 NOTE — PROGRESS NOTES
Social Work Note:    SW consult acknowledged for pt. Per conversation with Charge RN, pt will be d/c today. No SW needs indicated.    Weekend SW: ESTER Fontanez, LICSW

## 2017-10-21 NOTE — PLAN OF CARE
Problem: Patient Care Overview  Goal: Plan of Care/Patient Progress Review  Outcome: Improving  RN:pt vss, doing well.   Lungs:clear, 02 stable, encouraged IS hourly  BS: active   Diet: Regular  CMS +, encouraged D/P flexion, and PCD's are in place  Dressing: CDI  Activity: pt log rolled q2 hours, up with A1 with pt, painful with movement,.   Pain: painful, using oral dilaudid, vistaril, valium, tylenol and gabapentin along with topicals as well.   Output: voiding  Encouragement needed. Pt anxious at times.  Plan to dc home with son at 1700   Will cont to monitor.  Pharmacy called and to early to fill MsContin and Oral Dilaudid. Will update patient.

## 2017-10-21 NOTE — PLAN OF CARE
Problem: Patient Care Overview  Goal: Plan of Care/Patient Progress Review  Outcome: Improving  Pt alert and oriented. Taking po dilaudid and valium and vistaril for pain.  Rates pain 7-8. Notes feels pain is high and moving is difficult. Cms+.  Pt notes some numbness right foot. Will continue to monitor.

## 2017-10-21 NOTE — PROGRESS NOTES
Reviewed discharge instructions and medications with patient and son. Questions answered. Saline lock removed. Patient discharged to home with son, discharge instructions, medications (Narcan and Atarax), scripts (Morphine and Dilaudid-instructions not to fill until 11/1/17) and belongings at this time.

## 2017-10-21 NOTE — PLAN OF CARE
Problem: Patient Care Overview  Goal: Plan of Care/Patient Progress Review  Discharge Planner PT   Patient plan for discharge: home, I need to be more mobile  Current status: CGA to Min A for transfers is limited by pain this AM, gait with RW to 35 feet slower pace and R side toe walking noted, was too tired to go further distance this AM  Barriers to return to prior living situation: mobility limitations, stairs greater then ten  Recommendations for discharge: PT- Per plan established by the Physical Therapist, according to functional mobility the discharge recommendation is home but needs to greatly increase mobility and Ind for this to be a safe plan.     Rationale for recommendations:moves well but needs to move more to met his goals for return home.       Entered by: Teri Matthews 10/21/2017 8:23 AM           PT- now plans to leave this PM to sones home able to walk 125 feet and do stairs as needed at his home and his sons. No DME needs goals are adequate for discharge per his plan. PT will sign off

## 2017-10-21 NOTE — PLAN OF CARE
Problem: Patient Care Overview  Goal: Plan of Care/Patient Progress Review  Discharge Planner OT   Patient plan for discharge: home with friends, will need to be I with all personal cares and ADL's  Current status: CGA, fww for sit<>stand, heavily relies on walker for support for transfers and functional mobility. Stood sinkside to complete light grooming cares with cues to complete cares with min vc's to maintain spine precautions. Completed toilet transfer with support from grab bars and fww for sit<>stand with CGA and vc's for safety with transfers and hand placement. Min A with bed mobility to lift B LE's into bed as well as using proper body mechanics for safe log roll techniques. Patient reports he will need to be fully I with personal cares, ADL's and functional mobility. Prior to start of session, patient c/o dizziness, BP was taken and 118/82 with O2 sats 89% on RA. After sinkside cares and toilet transfers, patient c/o of increased dizziness, rest breaks and cold compresses placed on forehead and neck, extra support obtained and patient transferred to chair with CGA, fww.    PM- SBA, vc's for bed mobility, SBA to CGA, fww for sit<>stand transfers. After instruction, patient was mod I with with donning clothing for TB dressing, CGA, fww to stand to fully don clothing. SBA for tub transfers and standard height toilet after instruction and several attempts for practice of techniques. Goals met, patient to dc later today.  Barriers to return to prior living situation: lives with friends, however needs to be I with personal cares and ADL's, increased pain, dizziness with activity, limited mobility  Recommendations for discharge: if mobility and ADL  I does not improve, patient may need to consider TCU  Rationale for recommendations: will continue with OT for ADL's and functional transfer training as patient is below baseline       Entered by: Teri Oropeza 10/21/2017 9:51 AM         Occupational Therapy Discharge  Summary    Reason for therapy discharge:    All goals and outcomes met, no further needs identified.    Progress towards therapy goal(s). See goals on Care Plan in [facility] electronic health record for goal details.  Goals met    Therapy recommendation(s):    No further therapy is recommended.

## 2017-10-21 NOTE — PLAN OF CARE
Problem: Patient Care Overview  Goal: Plan of Care/Patient Progress Review  Outcome: Improving  VSS: A&O x4. CMS intact.   LS: clear on 1L NC  GI: active, +flatus, last BM 10/19  : in urinal  Skin: right side/back incision, dressing CDI  Activity: x2, walker, gait belt. Pt ambulated neil way to nursing pod and back, tolerated fair.   Diet: reg   Pain: c/o 7-9/10, taking PO dilaudid, scheduled MS Contin, flexeril, voltaren gel, lidocaine patches  Plan: pain control, encourage ambulation

## 2017-10-24 ENCOUNTER — TELEPHONE (OUTPATIENT)
Dept: FAMILY MEDICINE | Facility: CLINIC | Age: 57
End: 2017-10-24

## 2017-10-24 NOTE — TELEPHONE ENCOUNTER
Patient discharged from Inpatient.      Discharge location: [facility]  Discharge date: 10/21/17  Diagnosis: Si (Sacroilliac) Joint Dysfunction, Bilateral Si Joint Instability    Please follow up as appropriate. If no follow up required, please close encounter.      Sally ALFARO, Patient Care

## 2017-10-24 NOTE — TELEPHONE ENCOUNTER
"ED/Discharge Protocol    \"Hi, my name is Marjorie Rich, a registered nurse, and I am calling on behalf of Dr. Shaw's office at [place].  I am calling to follow up and see how things are going for you after your recent visit.\"    \"I see that you were in the (ER/UC/IP) on IP.    How are you doing now that you are home?\" good    Is patient experiencing symptoms that may require a hospital visit?  ok    Discharge Instructions    \"Let's review your discharge instructions.  What is/are the follow-up recommendations?  Pt. Response: week    \"Were you instructed to make a follow-up appointment?\"  Pt. Response: Yes.  Has appointment been made?   No.  \"Can I help you schedule that appointment?\" made 10/31/17      \"When you see the provider, I would recommend that you bring your discharge instructions with you.    Medications    \"How many new medications are you on since your hospitalization/ED visit?\"    0-1  \"How many of your current medicines changed (dose, timing, name, etc.) while you were in the hospital/ED visit?\"   0-1  \"Do you have questions about your medications?\"   No  \"Were you newly diagnosed with heart failure, COPD, diabetes or did you have a heart attack?\"   No  For patients on insulin: \"Did you start on insulin in the hospital or did you have your insulin dose changed?\"   No      Call Summary    \"Do you have any questions or concerns about your condition or care plan at the moment?\"    No    \"If you have questions or things don't continue to improve, we encourage you contact us through the main clinic number,  [phone].  Even if the clinic is not open, triage nurses are available 24/7 to help you.     We would like you to know that our clinic has extended hours (provide information).  We also have urgent care (provide details on closest location and hours/contact info)\"      \"Thank you for your time and take care!\"      "

## 2017-10-31 ENCOUNTER — OFFICE VISIT (OUTPATIENT)
Dept: FAMILY MEDICINE | Facility: CLINIC | Age: 57
End: 2017-10-31
Payer: MEDICAID

## 2017-10-31 VITALS
SYSTOLIC BLOOD PRESSURE: 112 MMHG | HEART RATE: 76 BPM | RESPIRATION RATE: 16 BRPM | WEIGHT: 295 LBS | OXYGEN SATURATION: 99 % | BODY MASS INDEX: 39.96 KG/M2 | HEIGHT: 72 IN | DIASTOLIC BLOOD PRESSURE: 70 MMHG | TEMPERATURE: 98 F

## 2017-10-31 DIAGNOSIS — G89.4 CHRONIC PAIN SYNDROME: ICD-10-CM

## 2017-10-31 DIAGNOSIS — M53.3 SI (SACROILIAC) JOINT DYSFUNCTION: Primary | ICD-10-CM

## 2017-10-31 DIAGNOSIS — M51.369 DDD (DEGENERATIVE DISC DISEASE), LUMBAR: ICD-10-CM

## 2017-10-31 PROCEDURE — 99213 OFFICE O/P EST LOW 20 MIN: CPT | Performed by: FAMILY MEDICINE

## 2017-10-31 RX ORDER — DIAZEPAM 5 MG
5 TABLET ORAL
Qty: 30 TABLET | Refills: 0 | Status: SHIPPED | OUTPATIENT
Start: 2017-10-31 | End: 2017-12-01

## 2017-10-31 RX ORDER — MORPHINE SULFATE 30 MG/1
30 TABLET, FILM COATED, EXTENDED RELEASE ORAL 3 TIMES DAILY
Qty: 90 TABLET | Refills: 0 | Status: SHIPPED | OUTPATIENT
Start: 2017-10-31 | End: 2017-12-01

## 2017-10-31 RX ORDER — HYDROMORPHONE HYDROCHLORIDE 4 MG/1
8 TABLET ORAL 4 TIMES DAILY PRN
Qty: 180 TABLET | Refills: 0 | Status: SHIPPED | OUTPATIENT
Start: 2017-10-31 | End: 2017-12-01

## 2017-10-31 ASSESSMENT — PAIN SCALES - GENERAL: PAINLEVEL: SEVERE PAIN (7)

## 2017-10-31 NOTE — PROGRESS NOTES
SUBJECTIVE:   Oleg Ford is a 57 year old male who presents to clinic today for the following health issues:          Hospital Follow-up Visit:    Hospital/Nursing Home/IP Rehab Facility: [facility]  Date of Admission: 10/19/17  Date of Discharge: 10/21/17  Reason(s) for Admission: Right  side sacroiliac joint fusion with 2 implants             Problems taking medications regularly:  None       Medication changes since discharge: None       Problems adhering to non-medication therapy:  None        Summary of hospitalization:  [employer] discharge summary reviewed  Diagnostic Tests/Treatments reviewed.  Follow up needed: surgery   Other Healthcare Providers Involved in Patient s Care:         Specialist appointment - Upcoming  Update since discharge: improved.     Post Discharge Medication Reconciliation: discharge medications reconciled, continue medications without change.  Plan of care communicated with patient     Coding guidelines for this visit:  Type of Medical   Decision Making Face-to-Face Visit       within 7 Days of discharge Face-to-Face Visit        within 14 days of discharge   Moderate Complexity [id number]   High Complexity 60429 [id number]          SUBJECTIVE:  Here today in follow-up of right-sided SI joint fusion. Records reviewed with patient and in his chart. Generally doing well postoperatively. Incision has healed up nicely. Too early to tell long-term results as he is just beginning more and more ambulation. No complications encountered with the surgery.    Review of systems otherwise negative.  Past medical, family, and social history reviewed and updated in chart.    OBJECTIVE:  /70 (BP Location: Right arm, Patient Position: Right side, Cuff Size: Adult Large)  Pulse 76  Temp 98  F (36.7  C) (Oral)  Resp 16  Ht 1.829 m (6')  Wt 133.8 kg (295 lb)  SpO2 99%  BMI 40.01 kg/m2  Alert, pleasant, upbeat, and in no apparent discomfort.  S1 and S2 normal, no  murmurs, clicks, gallops or rubs. Regular rate and rhythm. Chest is clear; no wheezes or rales. No edema or JVD.  Small lateral incision that has healed nicely  Past labs reviewed with the patient.     ASSESSMENT / PLAN:  (M53.3) SI (sacroiliac) joint dysfunction  (primary encounter diagnosis)  Comment: Doing well postoperatively - has follow-up with the surgical team and a likely left-sided fusion in the relatively near future  Plan:     (G89.4) Chronic pain syndrome  Comment: Stable. Refilled.  Plan: morphine (MS CONTIN) 30 MG 12 hr tablet,         HYDROmorphone (DILAUDID) 4 MG tablet, diazepam         (VALIUM) 5 MG tablet            (M51.36) DDD (degenerative disc disease), lumbar  Comment:   Plan: morphine (MS CONTIN) 30 MG 12 hr tablet,         HYDROmorphone (DILAUDID) 4 MG tablet, diazepam         (VALIUM) 5 MG tablet            Follow up one month or as needed  S. Tenzin Shaw MD    (Chart documentation completed in part with Dragon voice-recognition software.  Even though reviewed some grammatical, spelling, and word errors may remain.)

## 2017-10-31 NOTE — MR AVS SNAPSHOT
After Visit Summary   10/31/2017    Oleg Ford    MRN: [id number]           Patient Information     Date Of Birth          1960        Visit Information        Provider Department      10/31/2017 12:20 PM Cat Shaw MD [facility]        Today's Diagnoses     SI (sacroiliac) joint dysfunction    -  1    Chronic pain syndrome        DDD (degenerative disc disease), lumbar           Follow-ups after your visit        Follow-up notes from your care team     Return in about 1 month (around 11/30/2017).      Your next 10 appointments already scheduled     Oct 31, 2017 12:20 PM CDT   Office Visit with Cat Shaw MD   [facility]    [address] [place] [id number] [phone]           Bring a current list of meds and any records pertaining to this visit. For Physicals, please bring immunization records and any forms needing to be filled out. Please arrive 10 minutes early to complete paperwork.            Dec 01, 2017 11:20 AM CST   Office Visit with Cat Shaw MD   [facility]    [address] [place] [id number] [phone]           Bring a current list of meds and any records pertaining to this visit. For Physicals, please bring immunization records and any forms needing to be filled out. Please arrive 10 minutes early to complete paperwork.              Who to contact     If you have questions or need follow up information about today's clinic visit or your schedule please contact [facility] directly at [phone].  Normal or non-critical lab and imaging results will be communicated to you by MyChart, letter or phone within 4 business days after the clinic has received the results. If you do not hear from us within 7 days, please contact the clinic through MyChart or phone. If you have a  critical or abnormal lab result, we will notify you by phone as soon as possible.  Submit refill requests through [employer] or call your pharmacy and they will forward the refill request to us. Please allow 3 business days for your refill to be completed.          Additional Information About Your Visit        [employer] Information     [employer] gives you secure access to your electronic health record. If you see a primary care provider, you can also send messages to your care team and make appointments. If you have questions, please call your primary care clinic.  If you do not have a primary care provider, please call [phone] and they will assist you.        Care EveryWhere ID     This is your Care EveryWhere ID. This could be used by other organizations to access your [facility] medical records  [id number]        Your Vitals Were     Pulse Temperature Respirations Height Pulse Oximetry BMI (Body Mass Index)    76 98  F (36.7  C) (Oral) 16 1.829 m (6') 99% 40.01 kg/m2       Blood Pressure from Last 3 Encounters:   10/31/17 112/70   10/21/17 122/79   09/25/17 126/76    Weight from Last 3 Encounters:   10/31/17 133.8 kg (295 lb)   10/19/17 129.5 kg (285 lb 9.6 oz)   09/25/17 130 kg (286 lb 8 oz)              Today, you had the following     No orders found for display         Where to get your medicines      Some of these will need a paper prescription and others can be bought over the counter.  Ask your nurse if you have questions.     Bring a paper prescription for each of these medications     diazepam 5 MG tablet    HYDROmorphone 4 MG tablet    morphine 30 MG 12 hr tablet          Primary Care Provider Office Phone # Fax #    Cat Tenzin Shaw [phone] [address] [facility] [address]        Equal Access to Services     ALANA HANSON : Josi Callejas, nick galvan, kamala delcid. [facility]  [phone].    ATENCIÓN: Si sorin medellin, tiene a gongora disposición servicios gratuitos de asistencia lingüística. Yulia claire [phone].    We comply with applicable federal civil rights laws and [place] laws. We do not discriminate on the basis of race, color, national origin, age, disability, sex, sexual orientation, or gender identity.            Thank you!     Thank you for choosing [facility]  for your care. Our goal is always to provide you with excellent care. Hearing back from our patients is one way we can continue to improve our services. Please take a few minutes to complete the written survey that you may receive in the mail after your visit with us. Thank you!             Your Updated Medication List - Protect others around you: Learn how to safely use, store and throw away your medicines at www.disposemymeds.org.          This list is accurate as of: 10/31/17 12:11 PM.  Always use your most recent med list.                   Brand Name Dispense Instructions for use Diagnosis    ABILIFY 10 MG tablet   Generic drug:  ARIPiprazole      Take 10 mg by mouth daily        buPROPion 150 MG 24 hr tablet    WELLBUTRIN XL     Take 450 mg by mouth every morning        cyclobenzaprine 10 MG tablet    FLEXERIL    90 tablet    TAKE ONE TABLET BY MOUTH 3 TIMES A DAY AS NEEDED FOR MUSCLE SPASMS    DDD (degenerative disc disease), lumbar       diazepam 5 MG tablet    VALIUM    30 tablet    Take 1 tablet (5 mg) by mouth nightly as needed    DDD (degenerative disc disease), lumbar, Chronic pain syndrome       EPINEPHrine 0.3 MG/0.3ML injection 2-pack    EPIPEN/ADRENACLICK/or ANY BX GENERIC EQUIV    1 each    Inject 0.3 mLs (0.3 mg) into the muscle once as needed for anaphylaxis    Bee sting allergy       HYDROmorphone 4 MG tablet    DILAUDID    180 tablet    Take 2 tablets (8 mg) by mouth 4 times daily as needed for moderate to severe pain (every 4-6hour prn)    Chronic pain syndrome, DDD (degenerative  disc disease), lumbar       hydrOXYzine 50 MG capsule    VISTARIL     Take 50 mg by mouth 2 times daily as needed        hydrOXYzine 50 MG tablet    ATARAX    120 tablet    Take 1 tablet (50 mg) by mouth every 6 hours as needed for anxiety    Chronic pain syndrome       LAMICTAL 150 MG tablet   Generic drug:  lamoTRIgine      Take 250 mg by mouth daily        morphine 30 MG 12 hr tablet    MS CONTIN    90 tablet    Take 1 tablet (30 mg) by mouth 3 times daily    Chronic pain syndrome, DDD (degenerative disc disease), lumbar       naloxone nasal spray    NARCAN    0.2 mL    Spray 1 spray (4 mg) into one nostril alternating nostrils as needed for opioid reversal every 2-3 minutes until assistance arrives    Chronic pain syndrome, Chronic, continuous use of opioids       omeprazole 40 MG capsule    priLOSEC    30 capsule    TAKE 1 CAPSULE (40 MG) BY MOUTH DAILY TAKE 30-60 MINUTES BEFORE A MEAL.    Gastroesophageal reflux disease with esophagitis       ondansetron 4 MG ODT tab    ZOFRAN-ODT    20 tablet    PLACE 1 OR 2 TABLETS UNDER THE TONGUE 3 TIMES A DAY AS NEEDED FOR NAUSEA    Nausea       rOPINIRole 0.25 MG tablet    REQUIP    60 tablet    Take 1-2 tablets (0.25-0.5 mg) by mouth At Bedtime    RLS (restless legs syndrome)       zolpidem 10 MG tablet    AMBIEN    30 tablet    Take 1 tablet (10 mg) by mouth nightly as needed for sleep    Insomnia, unspecified insomnia

## 2017-10-31 NOTE — NURSING NOTE
Chief Complaint   Patient presents with     Hospital F/U       Initial /70 (BP Location: Right arm, Patient Position: Right side, Cuff Size: Adult Large)  Pulse 76  Temp 98  F (36.7  C) (Oral)  Resp 16  Ht 1.829 m (6')  Wt 133.8 kg (295 lb)  SpO2 99%  BMI 40.01 kg/m2 Estimated body mass index is 40.01 kg/(m^2) as calculated from the following:    Height as of this encounter: 1.829 m (6').    Weight as of this encounter: 133.8 kg (295 lb).  Medication Reconciliation: complete     Will Nati READ

## 2017-11-01 ENCOUNTER — TELEPHONE (OUTPATIENT)
Dept: FAMILY MEDICINE | Facility: CLINIC | Age: 57
End: 2017-11-01

## 2017-11-01 NOTE — TELEPHONE ENCOUNTER
HYDROmorphone (DILAUDID) 4 MG tablet 180 tablet 0 10/31/2017  No   Sig: Take 2 tablets (8 mg) by mouth 4 times daily as needed for moderate to severe pain     Please verify dosing -     Will Nati READ

## 2017-11-01 NOTE — TELEPHONE ENCOUNTER
Reason for Call:  Other call back    Detailed comments: Pharmacy needs clarification on directions for HYDROmorphone (DILAUDID) 4 MG tablet    Please call to advise. Thanks.    Phone Number Pharmacy can be reached at: [phone]    Best Time: anytime     Can we leave a detailed message on this number? YES    Call taken on 11/1/2017 at 9:48 AM by Alexis Edwards

## 2017-11-03 NOTE — DISCHARGE SUMMARY
Discharge Summary    Attending Physician:  Sebastián Szymanski MD  Admit Date: 10/19/2017    Discharge Date: 10/21/127  Primary Care Physician: Cat Shaw    Discharge Diagnoses  [unfilled]    Discharge Exam    AAOx3 SEPULVEDA f/c all for , no weakness, No new sensory deficit, incision C/D/I        Preliminary Discharge Medications    This list of medications is preliminary and tentative.  Please see the After Visit Summary for the final and accurate medication list.    [unfilled]    Procedures Performed and Findings  Procedure(s):  Right  side sacroiliac joint fusion with 2 implants  - Wound Class: I-Clean       Consultations Obtained  HOSPITALIST IP CONSULT  OCCUPATIONAL THERAPY ADULT IP CONSULT  PHYSICAL THERAPY ADULT IP CONSULT  SOCIAL WORK IP CONSULT  SOCIAL WORK IP CONSULT    Code Status   Prior    Discharge Disposition        Diet on Discharge   Regular    Activity on Discharge   Your activity upon discharge: Ad michael within following limitations:  No excessive activities   No Bending, Twisting, climbing, Crawling,   No lifting more than 8 lb for 2 weeks, or 15 lb for 2 months or 25 lb for 4 months or 35 lb for 6 months  Brace for riding cars for 4-6 months      Discharge Instructions  Per [facility] instruction : [email or web address]        Follow-Up Scheduled    Follow up in 2 weeks with me or PCP for wound check ( patient's choice) if patients want to go to PCP for wound check, then f/u in my office  In 3 months      Hospital Course  amubaltion was difficult at first  , adequate ambulation delayed , pain controlled , cleared by PT/OT, no events

## 2017-11-14 DIAGNOSIS — M51.369 DDD (DEGENERATIVE DISC DISEASE), LUMBAR: ICD-10-CM

## 2017-11-14 RX ORDER — CYCLOBENZAPRINE HCL 10 MG
TABLET ORAL
Qty: 90 TABLET | Refills: 5 | Status: SHIPPED | OUTPATIENT
Start: 2017-11-14 | End: 2018-05-15

## 2017-11-14 NOTE — TELEPHONE ENCOUNTER
cyclobenzaprine (FLEXERIL) 10 MG tablet      Last Written Prescription Date:  05/22/17  Last Fill Quantity: 90,   # refills: 5  Future Office visit:    Next 5 appointments (look out 90 days)     Dec 01, 2017 11:20 AM CST   [facility] Visit with Cat Shaw MD   [facility]    [address] [place] [address] [phone]                   Routing refill request to provider for review/approval because:  Drug not on the [facility], [facility] or [facility] refill protocol or controlled substance

## 2017-11-17 NOTE — PROGRESS NOTES
[facility]      OUTPATIENT OCCUPATIONAL THERAPY  EVALUATION  PLAN OF TREATMENT FOR OUTPATIENT REHABILITATION  (COMPLETE FOR INITIAL CLAIMS ONLY)  Patient's Last Name, First Name, M.I.  YOB: 1960  Oleg Ford                          Provider's Name  [facility] Medical Record No.  [id number]                               Onset Date:  10/19/17   Start of Care Date:        Type:     ___PT   _X_OT   ___SLP Medical Diagnosis:                           OT Diagnosis:  decreased ADL performance   Visits from SOC:  1   _________________________________________________________________________________  Plan of Treatment/Functional Goals    Planned Interventions: ADL retraining, transfer training,       Goals: See Occupational Therapy Goals on Care Plan in [facility] electronic health record.    Therapy Frequency: daily  Predicted Duration of Therapy Intervention: 5  _________________________________________________________________________________    I CERTIFY THE NEED FOR THESE SERVICES FURNISHED UNDER        THIS PLAN OF TREATMENT AND WHILE UNDER MY CARE     (Physician co-signature of this document indicates review and certification of the therapy plan).                   ,      Referring Physician: Dr. Sebastián Lopez            Initial Assessment        See Occupational Therapy evaluation dated   in Epic electronic health record.

## 2017-12-01 ENCOUNTER — OFFICE VISIT (OUTPATIENT)
Dept: FAMILY MEDICINE | Facility: CLINIC | Age: 57
End: 2017-12-01
Payer: MEDICARE

## 2017-12-01 VITALS
TEMPERATURE: 98.6 F | RESPIRATION RATE: 16 BRPM | DIASTOLIC BLOOD PRESSURE: 74 MMHG | OXYGEN SATURATION: 98 % | SYSTOLIC BLOOD PRESSURE: 112 MMHG | BODY MASS INDEX: 40.01 KG/M2 | WEIGHT: 295.4 LBS | HEART RATE: 78 BPM | HEIGHT: 72 IN

## 2017-12-01 DIAGNOSIS — M51.369 DDD (DEGENERATIVE DISC DISEASE), LUMBAR: ICD-10-CM

## 2017-12-01 DIAGNOSIS — Z79.899 HIGH RISK MEDICATION USE: ICD-10-CM

## 2017-12-01 DIAGNOSIS — M53.3 SI (SACROILIAC) JOINT DYSFUNCTION: ICD-10-CM

## 2017-12-01 DIAGNOSIS — G89.4 CHRONIC PAIN SYNDROME: Primary | ICD-10-CM

## 2017-12-01 DIAGNOSIS — Z11.59 NEED FOR HEPATITIS C SCREENING TEST: ICD-10-CM

## 2017-12-01 LAB
CHOLEST SERPL-MCNC: 158 MG/DL
GLUCOSE SERPL-MCNC: 96 MG/DL (ref 70–99)
HBA1C MFR BLD: 5.5 % (ref 4.3–6)
HDLC SERPL-MCNC: 33 MG/DL
LDLC SERPL CALC-MCNC: 88 MG/DL
NONHDLC SERPL-MCNC: 125 MG/DL
PROLACTIN SERPL-MCNC: 5 UG/L (ref 2–18)
TRIGL SERPL-MCNC: 184 MG/DL

## 2017-12-01 PROCEDURE — 84146 ASSAY OF PROLACTIN: CPT | Performed by: PHYSICIAN ASSISTANT

## 2017-12-01 PROCEDURE — 80061 LIPID PANEL: CPT | Performed by: PHYSICIAN ASSISTANT

## 2017-12-01 PROCEDURE — 99213 OFFICE O/P EST LOW 20 MIN: CPT | Performed by: FAMILY MEDICINE

## 2017-12-01 PROCEDURE — 36415 COLL VENOUS BLD VENIPUNCTURE: CPT | Performed by: FAMILY MEDICINE

## 2017-12-01 PROCEDURE — 83036 HEMOGLOBIN GLYCOSYLATED A1C: CPT | Performed by: PHYSICIAN ASSISTANT

## 2017-12-01 PROCEDURE — G0472 HEP C SCREEN HIGH RISK/OTHER: HCPCS | Performed by: FAMILY MEDICINE

## 2017-12-01 PROCEDURE — 82947 ASSAY GLUCOSE BLOOD QUANT: CPT | Performed by: PHYSICIAN ASSISTANT

## 2017-12-01 RX ORDER — MORPHINE SULFATE 30 MG/1
30 TABLET, FILM COATED, EXTENDED RELEASE ORAL 3 TIMES DAILY
Qty: 90 TABLET | Refills: 0 | Status: SHIPPED | OUTPATIENT
Start: 2017-12-01 | End: 2018-01-05

## 2017-12-01 RX ORDER — DIAZEPAM 5 MG
5 TABLET ORAL
Qty: 30 TABLET | Refills: 0 | Status: SHIPPED | OUTPATIENT
Start: 2017-12-01 | End: 2018-01-05

## 2017-12-01 RX ORDER — HYDROMORPHONE HYDROCHLORIDE 4 MG/1
8 TABLET ORAL 4 TIMES DAILY PRN
Qty: 180 TABLET | Refills: 0 | Status: SHIPPED | OUTPATIENT
Start: 2017-12-01 | End: 2018-01-05

## 2017-12-01 ASSESSMENT — PAIN SCALES - GENERAL: PAINLEVEL: SEVERE PAIN (7)

## 2017-12-01 NOTE — PROGRESS NOTES
SUBJECTIVE:   Oleg Ford is a 57 year old male who presents to clinic today for the following health issues:      Chronic Pain Follow-Up       Type / Location of Pain: lower back, hip and right leg  Analgesia/pain control:       Recent changes:  worse      Overall control: Inadequate pain control  Activity level/function:      Daily activities:  Unable to perform most daily activities - chores, hobbies, social activities, driving    Work:  Unable to work  Adverse effects:  No  Adherance    Taking medication as directed?  Yes    Participating in other treatments: no   Risk Factors:    Sleep:  Fair    Mood/anxiety:  slightly worsened    Recent family or social stressors:  none noted    Other aggravating factors: prolonged sitting and prolonged standing  PHQ-9 SCORE 5/2/2017 7/5/2017 9/6/2017   Total Score - - -   Total Score MyChart - - -   Total Score 26 24 22     GINI-7 SCORE 5/2/2017 7/5/2017 9/6/2017   Total Score - - -   Total Score 21 20 20     Encounter-Level CSA - 12/01/2015:          Controlled Substance Agreement - Scan on 12/14/2015 11:49 AM : CONTROLLED SUBSTANCE AGREEMENT (below)                  Amount of exercise or physical activity: None    Problems taking medications regularly: No    Medication side effects: none    Diet: regular (no restrictions)    SUBJECTIVE:  Here today in follow-up of chronic back pain. Status post right-sided SI joint fusion with the left fusion planned in a couple of months. Having some increased pain on the right side shooting down his right leg. The surgeon felt that a fixative screw was pushing on a nerve. The plan is to back out the screw a little at the time that he has the left SI joint fusion. Otherwise generally doing well    Review of systems otherwise negative.  Past medical, family, and social history reviewed and updated in chart.    OBJECTIVE:  /74 (BP Location: Right arm, Patient Position: Sitting, Cuff Size: Adult Regular)  Pulse 78  Temp 98.6  F  (37  C) (Oral)  Resp 16  Ht 1.829 m (6')  Wt 134 kg (295 lb 6.4 oz)  SpO2 98%  BMI 40.06 kg/m2  Alert, pleasant, upbeat, and in no apparent discomfort.  S1 and S2 normal, no murmurs, clicks, gallops or rubs. Regular rate and rhythm. Chest is clear; no wheezes or rales. No edema or JVD.  Past labs reviewed with the patient.     ASSESSMENT / PLAN:  (G89.4) Chronic pain syndrome  (primary encounter diagnosis)  Comment: Doing well on current dosage. We will continue monitoring.  Plan: morphine (MS CONTIN) 30 MG 12 hr tablet,         HYDROmorphone (DILAUDID) 4 MG tablet, diazepam         (VALIUM) 5 MG tablet            (M51.36) DDD (degenerative disc disease), lumbar  Comment: as above   Plan: morphine (MS CONTIN) 30 MG 12 hr tablet,         HYDROmorphone (DILAUDID) 4 MG tablet, diazepam         (VALIUM) 5 MG tablet            (M53.3) SI (sacroiliac) joint dysfunction  Comment: as above   Plan:     (Z11.59) Need for hepatitis C screening test  Comment:   Plan: **Hepatitis C Screen Reflex to RNA FUTURE         anytime            Follow up 1 month   SANTIAGO Shaw MD    (Chart documentation completed in part with Dragon voice-recognition software.  Even though reviewed some grammatical, spelling, and word errors may remain.)

## 2017-12-01 NOTE — NURSING NOTE
No chief complaint on file.      Initial /74 (BP Location: Right arm, Patient Position: Sitting, Cuff Size: Adult Regular)  Pulse 78  Temp 98.6  F (37  C) (Oral)  Resp 16  Ht 1.829 m (6')  Wt 134 kg (295 lb 6.4 oz)  SpO2 98%  BMI 40.06 kg/m2 Estimated body mass index is 40.06 kg/(m^2) as calculated from the following:    Height as of this encounter: 1.829 m (6').    Weight as of this encounter: 134 kg (295 lb 6.4 oz).  Medication Reconciliation: complete     Giselle Olson

## 2017-12-01 NOTE — MR AVS SNAPSHOT
After Visit Summary   12/1/2017    Oleg Ford    MRN: [id number]           Patient Information     Date Of Birth          1960        Visit Information        Provider Department      12/1/2017 11:20 AM Cat Shaw MD [facility]        Today's Diagnoses     Chronic pain syndrome    -  1    DDD (degenerative disc disease), lumbar        SI (sacroiliac) joint dysfunction        Need for hepatitis C screening test        High risk medication use           Follow-ups after your visit        Follow-up notes from your care team     Return in about 1 month (around 1/1/2018).      Your next 10 appointments already scheduled     Jan 05, 2018 11:20 AM CST   Office Visit with Cat Shaw MD   [facility] ([facility])    [address] [place] [id number] [phone]           Bring a current list of meds and any records pertaining to this visit. For Physicals, please bring immunization records and any forms needing to be filled out. Please arrive 10 minutes early to complete paperwork.              Who to contact     If you have questions or need follow up information about today's clinic visit or your schedule please contact [facility] directly at [phone].  Normal or non-critical lab and imaging results will be communicated to you by MyChart, letter or phone within 4 business days after the clinic has received the results. If you do not hear from us within 7 days, please contact the clinic through Style[employer] or phone. If you have a critical or abnormal lab result, we will notify you by phone as soon as possible.  Submit refill requests through [employer] or call your pharmacy and they will forward the refill request to us. Please allow 3 business days for your refill to be completed.          Additional Information About Your Visit        [employer] Information     [employer] gives you secure access to  your electronic health record. If you see a primary care provider, you can also send messages to your care team and make appointments. If you have questions, please call your primary care clinic.  If you do not have a primary care provider, please call [phone] and they will assist you.        Care EveryWhere ID     This is your Care EveryWhere ID. This could be used by other organizations to access your [facility] medical records  [id number]        Your Vitals Were     Pulse Temperature Respirations Height Pulse Oximetry BMI (Body Mass Index)    78 98.6  F (37  C) (Oral) 16 1.829 m (6') 98% 40.06 kg/m2       Blood Pressure from Last 3 Encounters:   12/01/17 112/74   10/31/17 112/70   10/21/17 122/79    Weight from Last 3 Encounters:   12/01/17 134 kg (295 lb 6.4 oz)   10/31/17 133.8 kg (295 lb)   10/19/17 129.5 kg (285 lb 9.6 oz)              We Performed the Following     **Hepatitis C Screen Reflex to RNA FUTURE anytime     Glucose     Hemoglobin A1c     Lipid panel reflex to direct LDL     Prolactin          Today's Medication Changes          These changes are accurate as of: 12/1/17  1:19 PM.  If you have any questions, ask your nurse or doctor.               Stop taking these medicines if you haven't already. Please contact your care team if you have questions.     naloxone nasal spray   Commonly known as:  NARCAN   Stopped by:  Cat Shaw MD                Where to get your medicines      Some of these will need a paper prescription and others can be bought over the counter.  Ask your nurse if you have questions.     Bring a paper prescription for each of these medications     diazepam 5 MG tablet    HYDROmorphone 4 MG tablet    morphine 30 MG 12 hr tablet                Primary Care Provider Office Phone # Fax #    Cat Shaw [phone] [address] [facility] [address]        Equal Access to Services     ALANA HANSON AH: Josi sepulveda  Júnior, titoda luqadaha, qaybta kaalmada lina, kamala francescain hayaan josé miguelyovanny tyrongricelda laBethderick sarah. So [facility] [phone].    ATENCIÓN: Si sorin medellin, tiene a gongora disposición servicios gratuitos de asistencia lingüística. Yulia al [phone].    We comply with applicable federal civil rights laws and [place] laws. We do not discriminate on the basis of race, color, national origin, age, disability, sex, sexual orientation, or gender identity.            Thank you!     Thank you for choosing [facility]  for your care. Our goal is always to provide you with excellent care. Hearing back from our patients is one way we can continue to improve our services. Please take a few minutes to complete the written survey that you may receive in the mail after your visit with us. Thank you!             Your Updated Medication List - Protect others around you: Learn how to safely use, store and throw away your medicines at www.disposemymeds.org.          This list is accurate as of: 12/1/17  1:19 PM.  Always use your most recent med list.                   Brand Name Dispense Instructions for use Diagnosis    ABILIFY 10 MG tablet   Generic drug:  ARIPiprazole      Take 10 mg by mouth daily        buPROPion 150 MG 24 hr tablet    WELLBUTRIN XL     Take 450 mg by mouth every morning        cyclobenzaprine 10 MG tablet    FLEXERIL    90 tablet    TAKE ONE TABLET BY MOUTH 3 TIMES A DAY AS NEEDED FOR MUSCLE SPASMS    DDD (degenerative disc disease), lumbar       diazepam 5 MG tablet    VALIUM    30 tablet    Take 1 tablet (5 mg) by mouth nightly as needed    DDD (degenerative disc disease), lumbar, Chronic pain syndrome       EPINEPHrine 0.3 MG/0.3ML injection 2-pack    EPIPEN/ADRENACLICK/or ANY BX GENERIC EQUIV    1 each    Inject 0.3 mLs (0.3 mg) into the muscle once as needed for anaphylaxis    Bee sting allergy       HYDROmorphone 4 MG tablet    DILAUDID    180 tablet    Take 2 tablets (8 mg) by mouth 4 times  daily as needed for moderate to severe pain (every 4-6hour prn)    Chronic pain syndrome, DDD (degenerative disc disease), lumbar       hydrOXYzine 50 MG capsule    VISTARIL     Take 50 mg by mouth 2 times daily as needed        hydrOXYzine 50 MG tablet    ATARAX    120 tablet    Take 1 tablet (50 mg) by mouth every 6 hours as needed for anxiety    Chronic pain syndrome       LAMICTAL 150 MG tablet   Generic drug:  lamoTRIgine      Take 250 mg by mouth daily        morphine 30 MG 12 hr tablet    MS CONTIN    90 tablet    Take 1 tablet (30 mg) by mouth 3 times daily    Chronic pain syndrome, DDD (degenerative disc disease), lumbar       omeprazole 40 MG capsule    priLOSEC    30 capsule    TAKE 1 CAPSULE (40 MG) BY MOUTH DAILY TAKE 30-60 MINUTES BEFORE A MEAL.    Gastroesophageal reflux disease with esophagitis       ondansetron 4 MG ODT tab    ZOFRAN-ODT    20 tablet    PLACE 1 OR 2 TABLETS UNDER THE TONGUE 3 TIMES A DAY AS NEEDED FOR NAUSEA    Nausea       rOPINIRole 0.25 MG tablet    REQUIP    60 tablet    Take 1-2 tablets (0.25-0.5 mg) by mouth At Bedtime    RLS (restless legs syndrome)       zolpidem 10 MG tablet    AMBIEN    30 tablet    Take 1 tablet (10 mg) by mouth nightly as needed for sleep    Insomnia, unspecified insomnia

## 2017-12-04 LAB — HCV AB SERPL QL IA: NONREACTIVE

## 2017-12-07 ENCOUNTER — TELEPHONE (OUTPATIENT)
Dept: FAMILY MEDICINE | Facility: CLINIC | Age: 57
End: 2017-12-07

## 2017-12-07 NOTE — TELEPHONE ENCOUNTER
[place] Dept of Health forms placed on Dr. Virgil nascimento for completion.    Sally ALFARO, Patient Care

## 2017-12-27 ENCOUNTER — TELEPHONE (OUTPATIENT)
Dept: FAMILY MEDICINE | Facility: CLINIC | Age: 57
End: 2017-12-27

## 2017-12-27 NOTE — TELEPHONE ENCOUNTER
Spoke with Suma informed her that pt does not have his preop until 1/5/17.    Sally ALFARO, Patient Care

## 2017-12-27 NOTE — TELEPHONE ENCOUNTER
Reason for Call:  Other appointment    Detailed comments: Suma from [facility] calling to check to see if Pt has been cleared from his Pre-Op for Surgery.     Phone Number Latonia can be reached at: Other phone number:  [phone]    Best Time: anytime    Can we leave a detailed message on this number? YES    Call taken on 12/27/2017 at 8:24 AM by Juan Cardona

## 2018-01-05 ENCOUNTER — OFFICE VISIT (OUTPATIENT)
Dept: FAMILY MEDICINE | Facility: CLINIC | Age: 58
End: 2018-01-05
Payer: MEDICARE

## 2018-01-05 VITALS
TEMPERATURE: 97.9 F | HEART RATE: 86 BPM | SYSTOLIC BLOOD PRESSURE: 120 MMHG | HEIGHT: 72 IN | OXYGEN SATURATION: 96 % | RESPIRATION RATE: 16 BRPM | DIASTOLIC BLOOD PRESSURE: 80 MMHG | WEIGHT: 296.4 LBS | BODY MASS INDEX: 40.14 KG/M2

## 2018-01-05 DIAGNOSIS — Z01.818 PREOP GENERAL PHYSICAL EXAM: Primary | ICD-10-CM

## 2018-01-05 DIAGNOSIS — Z01.812 PRE-OPERATIVE LABORATORY EXAMINATION: ICD-10-CM

## 2018-01-05 DIAGNOSIS — M51.369 DDD (DEGENERATIVE DISC DISEASE), LUMBAR: ICD-10-CM

## 2018-01-05 DIAGNOSIS — G89.4 CHRONIC PAIN SYNDROME: ICD-10-CM

## 2018-01-05 LAB
MRSA DNA SPEC QL NAA+PROBE: NEGATIVE
SPECIMEN SOURCE: NORMAL

## 2018-01-05 PROCEDURE — 87641 MR-STAPH DNA AMP PROBE: CPT | Performed by: NEUROLOGICAL SURGERY

## 2018-01-05 PROCEDURE — 87640 STAPH A DNA AMP PROBE: CPT | Performed by: NEUROLOGICAL SURGERY

## 2018-01-05 PROCEDURE — 99214 OFFICE O/P EST MOD 30 MIN: CPT | Performed by: FAMILY MEDICINE

## 2018-01-05 RX ORDER — LAMOTRIGINE 25 MG/1
25 TABLET ORAL DAILY
Status: ON HOLD | COMMUNITY
Start: 2018-01-03 | End: 2018-01-12

## 2018-01-05 RX ORDER — MORPHINE SULFATE 30 MG/1
30 TABLET, FILM COATED, EXTENDED RELEASE ORAL 3 TIMES DAILY
Qty: 90 TABLET | Refills: 0 | Status: SHIPPED | OUTPATIENT
Start: 2018-01-05 | End: 2018-01-31

## 2018-01-05 RX ORDER — HYDROMORPHONE HYDROCHLORIDE 4 MG/1
8 TABLET ORAL 4 TIMES DAILY PRN
Qty: 180 TABLET | Refills: 0 | Status: SHIPPED | OUTPATIENT
Start: 2018-01-05 | End: 2018-01-31

## 2018-01-05 RX ORDER — LAMOTRIGINE 200 MG/1
200 TABLET ORAL DAILY
Status: ON HOLD | COMMUNITY
Start: 2018-01-03 | End: 2018-01-12

## 2018-01-05 RX ORDER — DIAZEPAM 5 MG
5 TABLET ORAL
Qty: 30 TABLET | Refills: 0 | Status: SHIPPED | OUTPATIENT
Start: 2018-01-05 | End: 2018-01-31

## 2018-01-05 ASSESSMENT — PAIN SCALES - GENERAL: PAINLEVEL: EXTREME PAIN (8)

## 2018-01-05 NOTE — PROGRESS NOTES
[address] [address]  [phone]  Dept: [phone]    PRE-OP EVALUATION:  Today's date: 2018    Oleg Ford (: 1960) presents for pre-operative evaluation assessment as requested by Dr. Szymanski.  He requires evaluation and anesthesia risk assessment prior to undergoing surgery/procedure for treatment of back pain.  Proposed procedure: SI joint fusion    Date of Surgery/ Procedure: 18  Time of Surgery/ Procedure: 12:20 PM  Hospital/Surgical Facility: [facility]  Fax number for surgical facility:   Primary Physician: Cat Shaw  Type of Anesthesia Anticipated: General    Patient has a Health Care Directive or Living Will:  NO    1. NO - Do you have a history of heart attack, stroke, stent, bypass or surgery on an artery in the head, neck, heart or legs?  2. NO - Do you ever have any pain or discomfort in your chest?  3. NO - Do you have a history of  Heart Failure?  4. YES - Are you troubled by shortness of breath when: walking on the level, up a slight hill or at night?  5. NO - Do you currently have a cold, bronchitis or other respiratory infection?  6. NO - Do you have a cough, shortness of breath or wheezing?  7. YES- Do you sometimes get pains in the calves of your legs when you walk?  8. NO - Do you or anyone in your family have previous history of blood clots?  9. NO - Do you or does anyone in your family have a serious bleeding problem such as prolonged bleeding following surgeries or cuts?  10. NO - Have you ever had problems with anemia or been told to take iron pills?  11. NO - Have you had any abnormal blood loss such as black, tarry or bloody stools, or abnormal vaginal bleeding?  12. NO - Have you ever had a blood transfusion?  13. NO - Have you or any of your relatives ever had problems with anesthesia?  14. NO - Do you have sleep apnea, excessive snoring or daytime drowsiness?  15. NO - Do you have any  prosthetic heart valves?  16. NO - Do you have prosthetic joints?  17. NO - Is there any chance that you may be pregnant?        HPI:                                                      Brief HPI related to upcoming procedure:   Long-standing progressive pain and lower back with radiation to the buttocks. 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 Planned bilateral fusion. Initial side done a few months ago. Will be completing second side with some adjustment of screws on the initial side    See problem list for active medical problems.  Problems all longstanding and stable, except as noted/documented.  See ROS for pertinent symptoms related to these conditions.                                                                                                  .    MEDICAL HISTORY:                                                    Patient Active Problem List    Diagnosis Date Noted     SI (sacroiliac) joint dysfunction 10/19/2017     Priority: Medium     Gastroesophageal reflux disease with esophagitis 01/25/2017     Priority: Medium     Major depressive disorder, recurrent, severe without psychotic features (H) 02/22/2016     Priority: Medium     S/P lumbar fusion 01/12/2016     Priority: Medium     Chronic pain syndrome 12/01/2015     Priority: Medium     Patient is followed by NORY LINARES for ongoing prescription of pain medication.  All refills should be approved by this provider, or covering partner.    Medication(s): MS Contin / dilaudid.   Maximum quantity per month: 60 / 80  Clinic visit frequency required: Q 3 months     Controlled substance agreement on file: Yes       Date(s): 12/1/15    Pain Clinic evaluation in the past: Yes    DIRE Total Score(s):    12/1/2015   Total Score 18       Last [facility] website verification:  done on 12/1/15  11/4/2016     [email or web address]        Simple hepatic cyst 03/16/2015     Priority: Medium     Simple renal cyst 03/16/2015     Priority: Medium      Esophageal cyst 09/05/2014     Priority: Medium     RLS (restless legs syndrome) 01/29/2013     Priority: Medium     DDD (degenerative disc disease), lumbar 10/18/2012     Priority: Medium     CARDIOVASCULAR SCREENING; LDL GOAL LESS THAN 130 10/31/2010     Priority: Medium     Chronic low back pain 07/19/2010     Priority: Medium      Past Medical History:   Diagnosis Date     Depression, major      Esophageal mass      History of orchiectomy 1982     History of vasectomy 1991     Low back pain      Other chronic pain     Chronic low back pain.     Restless leg      Sleep apnea     had surgery done to resolve sleep apnea     Past Surgical History:   Procedure Laterality Date     BACK SURGERY  2/6/2013    lumbar surgery     COLONOSCOPY       FUSION SACRAL ILIAC Right 10/19/2017    Procedure: FUSION SACRAL ILIAC;  Right  side sacroiliac joint fusion with 2 implants ;  Surgeon: Sebastián Szymanski MD;  Location:  OR      [facility] ENDOSCOPY W EUS N/A 7/31/2014    Procedure: COMBINED ENDOSCOPIC ULTRASOUND, ESOPHAGOSCOPY, GASTROSCOPY, DUODENOSCOPY (EGD);  Surgeon: Shorty Stoll MD;  Location: [facility] GI     ORCHIECTOMY INGUINAL      right radical orchiectomy     REMOVE STIMULATOR VAGUS NERVE  12/23/2011    Procedure:REMOVE STIMULATOR VAGUS NERVE; Vagus Nerve Stimulator Removal; Surgeon:ALEXIS BECERRA; Location:[facility] OR     REMOVE STIMULATOR VAGUS NERVE  11/8/2013    Procedure: REMOVE STIMULATOR VAGUS NERVE;  Removal Of Vagus Nerve Stimulator Lead In Left Neck ;  Surgeon: Alexis Becerra MD;  Location: [facility] OR     SURGICAL HISTORY OF -       vagal nerve implant; removed     SURGICAL HISTORY OF -       UPPP     VASECTOMY       Current Outpatient Prescriptions   Medication Sig Dispense Refill     morphine (MS CONTIN) 30 MG 12 hr tablet Take 1 tablet (30 mg) by mouth 3 times daily 90 tablet 0     HYDROmorphone (DILAUDID) 4 MG tablet Take 2 tablets (8 mg) by mouth 4 times daily as needed for moderate to severe pain (every 4-6hour  prn) 180 tablet 0     diazepam (VALIUM) 5 MG tablet Take 1 tablet (5 mg) by mouth nightly as needed 30 tablet 0     cyclobenzaprine (FLEXERIL) 10 MG tablet TAKE ONE TABLET BY MOUTH 3 TIMES A DAY AS NEEDED FOR MUSCLE SPASMS 90 tablet 5     hydrOXYzine (ATARAX) 50 MG tablet Take 1 tablet (50 mg) by mouth every 6 hours as needed for anxiety 120 tablet 3     ondansetron (ZOFRAN-ODT) 4 MG ODT tab PLACE 1 OR 2 TABLETS UNDER THE TONGUE 3 TIMES A DAY AS NEEDED FOR NAUSEA 20 tablet 1     omeprazole (PRILOSEC) 40 MG capsule TAKE 1 CAPSULE (40 MG) BY MOUTH DAILY TAKE 30-60 MINUTES BEFORE A MEAL. 30 capsule 11     ARIPiprazole (ABILIFY) 10 MG tablet Take 10 mg by mouth daily       lamoTRIgine (LAMICTAL) 150 MG tablet Take 250 mg by mouth daily        zolpidem (AMBIEN) 10 MG tablet Take 1 tablet (10 mg) by mouth nightly as needed for sleep 30 tablet 2     rOPINIRole (REQUIP) 0.25 MG tablet Take 1-2 tablets (0.25-0.5 mg) by mouth At Bedtime 60 tablet 2     hydrOXYzine (VISTARIL) 50 MG capsule Take 50 mg by mouth 2 times daily as needed        buPROPion (WELLBUTRIN XL) 150 MG 24 hr tablet Take 450 mg by mouth every morning        EPINEPHrine (EPIPEN) 0.3 MG/0.3ML injection Inject 0.3 mLs (0.3 mg) into the muscle once as needed for anaphylaxis 1 each 2     lamoTRIgine (LAMICTAL) 200 MG tablet Take 200 mg by mouth daily       lamoTRIgine (LAMICTAL) 25 MG tablet Take 25 mg by mouth daily       OTC products: None, except as noted above    Allergies   Allergen Reactions     Ciprofloxacin Anaphylaxis     Doxycycline Anaphylaxis     Septra [Bactrim] Anaphylaxis     Amoxicillin      itching      Latex Allergy: NO    Social History   Substance Use Topics     Smoking status: Current Every Day Smoker     Packs/day: 0.00     Years: 40.00     Types: Cigarettes     Smokeless tobacco: Never Used      Comment: 1-2 daily     Alcohol use Yes      Comment: 1 drink every 3 months     History   Drug Use No       REVIEW OF SYSTEMS:                                                     C: NEGATIVE for fever, chills, change in weight  I: NEGATIVE for worrisome rashes, moles or lesions  E: NEGATIVE for vision changes or irritation  E/M: NEGATIVE for ear, mouth and throat problems  R: NEGATIVE for significant cough or SOB  B: NEGATIVE for masses, tenderness or discharge  CV: NEGATIVE for chest pain, palpitations or peripheral edema  GI: NEGATIVE for nausea, abdominal pain, heartburn, or change in bowel habits  : NEGATIVE for frequency, dysuria, or hematuria  MUSCULOSKELETAL: Back pain as above  N: NEGATIVE for weakness, dizziness or paresthesias  E: NEGATIVE for temperature intolerance, skin/hair changes  H: NEGATIVE for bleeding problems  P: NEGATIVE for changes in mood or affect    EXAM:                                                    /80 (BP Location: Right arm, Patient Position: Right side, Cuff Size: Adult Large)  Pulse 86  Temp 97.9  F (36.6  C) (Oral)  Resp 16  Ht 1.829 m (6')  Wt 134.4 kg (296 lb 6.4 oz)  SpO2 96%  BMI 40.2 kg/m2    GENERAL APPEARANCE: healthy, alert and no distress     EYES: EOMI,  PERRL     HENT: ear canals and TM's normal and nose and mouth without ulcers or lesions     NECK: no adenopathy, no asymmetry, masses, or scars and thyroid normal to palpation     RESP: lungs clear to auscultation - no rales, rhonchi or wheezes     CV: regular rates and rhythm, normal S1 S2, no S3 or S4 and no murmur, click or rub     ABDOMEN:  soft, nontender, no HSM or masses and bowel sounds normal     MS: extremities normal- no gross deformities noted, no evidence of inflammation in joints, FROM in all extremities.     SKIN: no suspicious lesions or rashes     NEURO: Normal strength and tone, sensory exam grossly normal, mentation intact and speech normal     PSYCH: mentation appears normal. and affect normal/bright     LYMPHATICS: No axillary, cervical, or supraclavicular nodes    DIAGNOSTICS:                                                     No new EKG needed.  MRSA swab pending    Recent Labs   Lab Test  12/01/17   1145  10/20/17   0708  10/06/17   1318   07/25/14   1534   HGB   --   12.8*  15.4   < >  14.9   PLT   --   211  271   < >  208   INR   --    --   1.06   --   1.00   NA   --   133  139   < >   --    POTASSIUM   --   3.8  4.0   < >   --    CR   --   1.08  1.24   < >   --    A1C  5.5   --   5.6   --    --     < > = values in this interval not displayed.        IMPRESSION:                                                    Reason for surgery/procedure: SI joint instability  Diagnosis/reason for consult: Preoperative clearance     The proposed surgical procedure is considered INTERMEDIATE risk.    REVISED CARDIAC RISK INDEX  The patient has the following serious cardiovascular risks for perioperative complications such as (MI, PE, VFib and 3  AV Block):  No serious cardiac risks  INTERPRETATION: 0 risks: Class I (very low risk - 0.4% complication rate)    The patient has the following additional risks for perioperative complications:  High tolerance to opioid analgesics due to chronic use      ICD-10-CM    1. Preop general physical exam Z01.818        RECOMMENDATIONS:                                                          --Patient is to take all scheduled medications on the day of surgery.    APPROVAL GIVEN to proceed with proposed procedure, without further diagnostic evaluation       Signed Electronically by: Cat Shaw MD    Copy of this evaluation report is provided to requesting physician.    [facility] Preop Guidelines

## 2018-01-05 NOTE — NURSING NOTE
Chief Complaint   Patient presents with     Pre-Op Exam       Initial /80 (BP Location: Right arm, Patient Position: Right side, Cuff Size: Adult Large)  Pulse 86  Temp 97.9  F (36.6  C) (Oral)  Resp 16  Ht 1.829 m (6')  Wt 134.4 kg (296 lb 6.4 oz)  SpO2 96%  BMI 40.2 kg/m2 Estimated body mass index is 40.2 kg/(m^2) as calculated from the following:    Height as of this encounter: 1.829 m (6').    Weight as of this encounter: 134.4 kg (296 lb 6.4 oz).  Medication Reconciliation: complete     Bhupendra Damian MA

## 2018-01-05 NOTE — MR AVS SNAPSHOT
After Visit Summary   1/5/2018    Oleg Ford    MRN: [id number]           Patient Information     Date Of Birth          1960        Visit Information        Provider Department      1/5/2018 2:00 PM Cat Shaw MD [facility]        Today's Diagnoses     Preop general physical exam    -  1    Chronic pain syndrome        DDD (degenerative disc disease), lumbar          Care Instructions      Before Your Surgery      Call your surgeon if there is any change in your health. This includes signs of a cold or flu (such as a sore throat, runny nose, cough, rash or fever).    Do not smoke, drink alcohol or take over the counter medicine (unless your surgeon or primary care doctor tells you to) for the 24 hours before and after surgery.    If you take prescribed drugs: Follow your doctor s orders about which medicines to take and which to stop until after surgery.    Eating and drinking prior to surgery: follow the instructions from your surgeon    Take a shower or bath the night before surgery. Use the soap your surgeon gave you to gently clean your skin. If you do not have soap from your surgeon, use your regular soap. Do not shave or scrub the surgery site.  Wear clean pajamas and have clean sheets on your bed.           Follow-ups after your visit        Your next 10 appointments already scheduled     Jan 05, 2018  2:00 PM CST   [facility] with [facility]    [address] [facility] [address] [phone]            Jan 11, 2018   Procedure with Sebastián Szymanski MD   [facility] (--)    [address] [facility] [address] [phone]              Who to contact     If you have questions or need follow up information about today's clinic visit or your schedule please contact [facility] directly at [phone].  Normal or  non-critical lab and imaging results will be communicated to you by MyChart, letter or phone within 4 business days after the clinic has received the results. If you do not hear from us within 7 days, please contact the clinic through [employer] or phone. If you have a critical or abnormal lab result, we will notify you by phone as soon as possible.  Submit refill requests through [employer] or call your pharmacy and they will forward the refill request to us. Please allow 3 business days for your refill to be completed.          Additional Information About Your Visit        Deepclass Information     [employer] gives you secure access to your electronic health record. If you see a primary care provider, you can also send messages to your care team and make appointments. If you have questions, please call your primary care clinic.  If you do not have a primary care provider, please call [phone] and they will assist you.        Care EveryWhere ID     This is your Care EveryWhere ID. This could be used by other organizations to access your [place] medical records  [id number]        Your Vitals Were     Pulse Temperature Respirations Height Pulse Oximetry BMI (Body Mass Index)    86 97.9  F (36.6  C) (Oral) 16 1.829 m (6') 96% 40.2 kg/m2       Blood Pressure from Last 3 Encounters:   01/05/18 120/80   12/01/17 112/74   10/31/17 112/70    Weight from Last 3 Encounters:   01/05/18 134.4 kg (296 lb 6.4 oz)   12/01/17 134 kg (295 lb 6.4 oz)   10/31/17 133.8 kg (295 lb)              We Performed the Following     DEPRESSION ACTION PLAN (DAP)          Where to get your medicines      Some of these will need a paper prescription and others can be bought over the counter.  Ask your nurse if you have questions.     Bring a paper prescription for each of these medications     diazepam 5 MG tablet    HYDROmorphone 4 MG tablet    morphine 30 MG 12 hr tablet          Primary Care Provider Office Phone # Fax #    Cat Shaw,  [phone]       [address] [facility] [address]        Equal Access to Services     ALANA HANSON : Hadii aad ku hadcandelariaparish Júnior, wabethanieda lutristin, qapacota kachaseda lina, kamala francescain hayaan josé miguelyovanny pérez laBethderick smithMaureen So [facility] [phone].    ATENCIÓN: Si habla español, tiene a gongora disposición servicios gratuitos de asistencia lingüística. Llame al [phone].    We comply with applicable federal civil rights laws and [place] laws. We do not discriminate on the basis of race, color, national origin, age, disability, sex, sexual orientation, or gender identity.            Thank you!     Thank you for choosing [facility]  for your care. Our goal is always to provide you with excellent care. Hearing back from our patients is one way we can continue to improve our services. Please take a few minutes to complete the written survey that you may receive in the mail after your visit with us. Thank you!             Your Updated Medication List - Protect others around you: Learn how to safely use, store and throw away your medicines at www.disposemymeds.org.          This list is accurate as of: 1/5/18  1:51 PM.  Always use your most recent med list.                   Brand Name Dispense Instructions for use Diagnosis    ABILIFY 10 MG tablet   Generic drug:  ARIPiprazole      Take 10 mg by mouth daily        buPROPion 150 MG 24 hr tablet    WELLBUTRIN XL     Take 450 mg by mouth every morning        cyclobenzaprine 10 MG tablet    FLEXERIL    90 tablet    TAKE ONE TABLET BY MOUTH 3 TIMES A DAY AS NEEDED FOR MUSCLE SPASMS    DDD (degenerative disc disease), lumbar       diazepam 5 MG tablet    VALIUM    30 tablet    Take 1 tablet (5 mg) by mouth nightly as needed    DDD (degenerative disc disease), lumbar, Chronic pain syndrome       EPINEPHrine 0.3 MG/0.3ML injection 2-pack    EPIPEN/ADRENACLICK/or ANY BX GENERIC EQUIV    1 each    Inject 0.3 mLs (0.3 mg) into the muscle once  as needed for anaphylaxis    Bee sting allergy       HYDROmorphone 4 MG tablet    DILAUDID    180 tablet    Take 2 tablets (8 mg) by mouth 4 times daily as needed for moderate to severe pain (every 4-6hour prn)    Chronic pain syndrome, DDD (degenerative disc disease), lumbar       hydrOXYzine 50 MG capsule    VISTARIL     Take 50 mg by mouth 2 times daily as needed        hydrOXYzine 50 MG tablet    ATARAX    120 tablet    Take 1 tablet (50 mg) by mouth every 6 hours as needed for anxiety    Chronic pain syndrome       * LAMICTAL 150 MG tablet   Generic drug:  lamoTRIgine      Take 250 mg by mouth daily        * lamoTRIgine 200 MG tablet    LaMICtal     Take 200 mg by mouth daily        * lamoTRIgine 25 MG tablet    LaMICtal     Take 25 mg by mouth daily        morphine 30 MG 12 hr tablet    MS CONTIN    90 tablet    Take 1 tablet (30 mg) by mouth 3 times daily    Chronic pain syndrome, DDD (degenerative disc disease), lumbar       omeprazole 40 MG capsule    priLOSEC    30 capsule    TAKE 1 CAPSULE (40 MG) BY MOUTH DAILY TAKE 30-60 MINUTES BEFORE A MEAL.    Gastroesophageal reflux disease with esophagitis       ondansetron 4 MG ODT tab    ZOFRAN-ODT    20 tablet    PLACE 1 OR 2 TABLETS UNDER THE TONGUE 3 TIMES A DAY AS NEEDED FOR NAUSEA    Nausea       rOPINIRole 0.25 MG tablet    REQUIP    60 tablet    Take 1-2 tablets (0.25-0.5 mg) by mouth At Bedtime    RLS (restless legs syndrome)       zolpidem 10 MG tablet    AMBIEN    30 tablet    Take 1 tablet (10 mg) by mouth nightly as needed for sleep    Insomnia, unspecified insomnia       * Notice:  This list has 3 medication(s) that are the same as other medications prescribed for you. Read the directions carefully, and ask your doctor or other care provider to review them with you.

## 2018-01-10 NOTE — H&P (VIEW-ONLY)
[address] [address]  [phone]  Dept: [phone]    PRE-OP EVALUATION:  Today's date: 2018    Oleg Ford (: 1960) presents for pre-operative evaluation assessment as requested by Dr. Szymanski.  He requires evaluation and anesthesia risk assessment prior to undergoing surgery/procedure for treatment of back pain.  Proposed procedure: SI joint fusion    Date of Surgery/ Procedure: 18  Time of Surgery/ Procedure: 12:20 PM  Hospital/Surgical Facility: [facility]  Fax number for surgical facility:   Primary Physician: Cat Shaw  Type of Anesthesia Anticipated: General    Patient has a Health Care Directive or Living Will:  NO    1. NO - Do you have a history of heart attack, stroke, stent, bypass or surgery on an artery in the head, neck, heart or legs?  2. NO - Do you ever have any pain or discomfort in your chest?  3. NO - Do you have a history of  Heart Failure?  4. YES - Are you troubled by shortness of breath when: walking on the level, up a slight hill or at night?  5. NO - Do you currently have a cold, bronchitis or other respiratory infection?  6. NO - Do you have a cough, shortness of breath or wheezing?  7. YES- Do you sometimes get pains in the calves of your legs when you walk?  8. NO - Do you or anyone in your family have previous history of blood clots?  9. NO - Do you or does anyone in your family have a serious bleeding problem such as prolonged bleeding following surgeries or cuts?  10. NO - Have you ever had problems with anemia or been told to take iron pills?  11. NO - Have you had any abnormal blood loss such as black, tarry or bloody stools, or abnormal vaginal bleeding?  12. NO - Have you ever had a blood transfusion?  13. NO - Have you or any of your relatives ever had problems with anesthesia?  14. NO - Do you have sleep apnea, excessive snoring or daytime drowsiness?  15. NO - Do you have any  prosthetic heart valves?  16. NO - Do you have prosthetic joints?  17. NO - Is there any chance that you may be pregnant?        HPI:                                                      Brief HPI related to upcoming procedure:   Long-standing progressive pain and lower back with radiation to the buttocks. 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 Planned bilateral fusion. Initial side done a few months ago. Will be completing second side with some adjustment of screws on the initial side    See problem list for active medical problems.  Problems all longstanding and stable, except as noted/documented.  See ROS for pertinent symptoms related to these conditions.                                                                                                  .    MEDICAL HISTORY:                                                    Patient Active Problem List    Diagnosis Date Noted     SI (sacroiliac) joint dysfunction 10/19/2017     Priority: Medium     Gastroesophageal reflux disease with esophagitis 01/25/2017     Priority: Medium     Major depressive disorder, recurrent, severe without psychotic features (H) 02/22/2016     Priority: Medium     S/P lumbar fusion 01/12/2016     Priority: Medium     Chronic pain syndrome 12/01/2015     Priority: Medium     Patient is followed by NORY LINARES for ongoing prescription of pain medication.  All refills should be approved by this provider, or covering partner.    Medication(s): MS Contin / dilaudid.   Maximum quantity per month: 60 / 80  Clinic visit frequency required: Q 3 months     Controlled substance agreement on file: Yes       Date(s): 12/1/15    Pain Clinic evaluation in the past: Yes    DIRE Total Score(s):    12/1/2015   Total Score 18       Last [facility] website verification:  done on 12/1/15  11/4/2016     [email or web address]        Simple hepatic cyst 03/16/2015     Priority: Medium     Simple renal cyst 03/16/2015     Priority: Medium      Esophageal cyst 09/05/2014     Priority: Medium     RLS (restless legs syndrome) 01/29/2013     Priority: Medium     DDD (degenerative disc disease), lumbar 10/18/2012     Priority: Medium     CARDIOVASCULAR SCREENING; LDL GOAL LESS THAN 130 10/31/2010     Priority: Medium     Chronic low back pain 07/19/2010     Priority: Medium      Past Medical History:   Diagnosis Date     Depression, major      Esophageal mass      History of orchiectomy 1982     History of vasectomy 1991     Low back pain      Other chronic pain     Chronic low back pain.     Restless leg      Sleep apnea     had surgery done to resolve sleep apnea     Past Surgical History:   Procedure Laterality Date     BACK SURGERY  2/6/2013    lumbar surgery     COLONOSCOPY       FUSION SACRAL ILIAC Right 10/19/2017    Procedure: FUSION SACRAL ILIAC;  Right  side sacroiliac joint fusion with 2 implants ;  Surgeon: Sebastián Szymanski MD;  Location:  OR      [facility] ENDOSCOPY W EUS N/A 7/31/2014    Procedure: COMBINED ENDOSCOPIC ULTRASOUND, ESOPHAGOSCOPY, GASTROSCOPY, DUODENOSCOPY (EGD);  Surgeon: Shorty Stoll MD;  Location: [facility] GI     ORCHIECTOMY INGUINAL      right radical orchiectomy     REMOVE STIMULATOR VAGUS NERVE  12/23/2011    Procedure:REMOVE STIMULATOR VAGUS NERVE; Vagus Nerve Stimulator Removal; Surgeon:ALEXIS BECERRA; Location:[facility] OR     REMOVE STIMULATOR VAGUS NERVE  11/8/2013    Procedure: REMOVE STIMULATOR VAGUS NERVE;  Removal Of Vagus Nerve Stimulator Lead In Left Neck ;  Surgeon: Alexis Becerra MD;  Location: [facility] OR     SURGICAL HISTORY OF -       vagal nerve implant; removed     SURGICAL HISTORY OF -       UPPP     VASECTOMY       Current Outpatient Prescriptions   Medication Sig Dispense Refill     morphine (MS CONTIN) 30 MG 12 hr tablet Take 1 tablet (30 mg) by mouth 3 times daily 90 tablet 0     HYDROmorphone (DILAUDID) 4 MG tablet Take 2 tablets (8 mg) by mouth 4 times daily as needed for moderate to severe pain (every 4-6hour  prn) 180 tablet 0     diazepam (VALIUM) 5 MG tablet Take 1 tablet (5 mg) by mouth nightly as needed 30 tablet 0     cyclobenzaprine (FLEXERIL) 10 MG tablet TAKE ONE TABLET BY MOUTH 3 TIMES A DAY AS NEEDED FOR MUSCLE SPASMS 90 tablet 5     hydrOXYzine (ATARAX) 50 MG tablet Take 1 tablet (50 mg) by mouth every 6 hours as needed for anxiety 120 tablet 3     ondansetron (ZOFRAN-ODT) 4 MG ODT tab PLACE 1 OR 2 TABLETS UNDER THE TONGUE 3 TIMES A DAY AS NEEDED FOR NAUSEA 20 tablet 1     omeprazole (PRILOSEC) 40 MG capsule TAKE 1 CAPSULE (40 MG) BY MOUTH DAILY TAKE 30-60 MINUTES BEFORE A MEAL. 30 capsule 11     ARIPiprazole (ABILIFY) 10 MG tablet Take 10 mg by mouth daily       lamoTRIgine (LAMICTAL) 150 MG tablet Take 250 mg by mouth daily        zolpidem (AMBIEN) 10 MG tablet Take 1 tablet (10 mg) by mouth nightly as needed for sleep 30 tablet 2     rOPINIRole (REQUIP) 0.25 MG tablet Take 1-2 tablets (0.25-0.5 mg) by mouth At Bedtime 60 tablet 2     hydrOXYzine (VISTARIL) 50 MG capsule Take 50 mg by mouth 2 times daily as needed        buPROPion (WELLBUTRIN XL) 150 MG 24 hr tablet Take 450 mg by mouth every morning        EPINEPHrine (EPIPEN) 0.3 MG/0.3ML injection Inject 0.3 mLs (0.3 mg) into the muscle once as needed for anaphylaxis 1 each 2     lamoTRIgine (LAMICTAL) 200 MG tablet Take 200 mg by mouth daily       lamoTRIgine (LAMICTAL) 25 MG tablet Take 25 mg by mouth daily       OTC products: None, except as noted above    Allergies   Allergen Reactions     Ciprofloxacin Anaphylaxis     Doxycycline Anaphylaxis     Septra [Bactrim] Anaphylaxis     Amoxicillin      itching      Latex Allergy: NO    Social History   Substance Use Topics     Smoking status: Current Every Day Smoker     Packs/day: 0.00     Years: 40.00     Types: Cigarettes     Smokeless tobacco: Never Used      Comment: 1-2 daily     Alcohol use Yes      Comment: 1 drink every 3 months     History   Drug Use No       REVIEW OF SYSTEMS:                                                     C: NEGATIVE for fever, chills, change in weight  I: NEGATIVE for worrisome rashes, moles or lesions  E: NEGATIVE for vision changes or irritation  E/M: NEGATIVE for ear, mouth and throat problems  R: NEGATIVE for significant cough or SOB  B: NEGATIVE for masses, tenderness or discharge  CV: NEGATIVE for chest pain, palpitations or peripheral edema  GI: NEGATIVE for nausea, abdominal pain, heartburn, or change in bowel habits  : NEGATIVE for frequency, dysuria, or hematuria  MUSCULOSKELETAL: Back pain as above  N: NEGATIVE for weakness, dizziness or paresthesias  E: NEGATIVE for temperature intolerance, skin/hair changes  H: NEGATIVE for bleeding problems  P: NEGATIVE for changes in mood or affect    EXAM:                                                    /80 (BP Location: Right arm, Patient Position: Right side, Cuff Size: Adult Large)  Pulse 86  Temp 97.9  F (36.6  C) (Oral)  Resp 16  Ht 1.829 m (6')  Wt 134.4 kg (296 lb 6.4 oz)  SpO2 96%  BMI 40.2 kg/m2    GENERAL APPEARANCE: healthy, alert and no distress     EYES: EOMI,  PERRL     HENT: ear canals and TM's normal and nose and mouth without ulcers or lesions     NECK: no adenopathy, no asymmetry, masses, or scars and thyroid normal to palpation     RESP: lungs clear to auscultation - no rales, rhonchi or wheezes     CV: regular rates and rhythm, normal S1 S2, no S3 or S4 and no murmur, click or rub     ABDOMEN:  soft, nontender, no HSM or masses and bowel sounds normal     MS: extremities normal- no gross deformities noted, no evidence of inflammation in joints, FROM in all extremities.     SKIN: no suspicious lesions or rashes     NEURO: Normal strength and tone, sensory exam grossly normal, mentation intact and speech normal     PSYCH: mentation appears normal. and affect normal/bright     LYMPHATICS: No axillary, cervical, or supraclavicular nodes    DIAGNOSTICS:                                                     No new EKG needed.  MRSA swab pending    Recent Labs   Lab Test  12/01/17   1145  10/20/17   0708  10/06/17   1318   07/25/14   1534   HGB   --   12.8*  15.4   < >  14.9   PLT   --   211  271   < >  208   INR   --    --   1.06   --   1.00   NA   --   133  139   < >   --    POTASSIUM   --   3.8  4.0   < >   --    CR   --   1.08  1.24   < >   --    A1C  5.5   --   5.6   --    --     < > = values in this interval not displayed.        IMPRESSION:                                                    Reason for surgery/procedure: SI joint instability  Diagnosis/reason for consult: Preoperative clearance     The proposed surgical procedure is considered INTERMEDIATE risk.    REVISED CARDIAC RISK INDEX  The patient has the following serious cardiovascular risks for perioperative complications such as (MI, PE, VFib and 3  AV Block):  No serious cardiac risks  INTERPRETATION: 0 risks: Class I (very low risk - 0.4% complication rate)    The patient has the following additional risks for perioperative complications:  High tolerance to opioid analgesics due to chronic use      ICD-10-CM    1. Preop general physical exam Z01.818        RECOMMENDATIONS:                                                          --Patient is to take all scheduled medications on the day of surgery.    APPROVAL GIVEN to proceed with proposed procedure, without further diagnostic evaluation       Signed Electronically by: Cat Shaw MD    Copy of this evaluation report is provided to requesting physician.    [facility] Preop Guidelines

## 2018-01-11 ENCOUNTER — APPOINTMENT (OUTPATIENT)
Dept: GENERAL RADIOLOGY | Facility: CLINIC | Age: 58
End: 2018-01-11
Attending: NEUROLOGICAL SURGERY
Payer: MEDICARE

## 2018-01-11 ENCOUNTER — HOSPITAL ENCOUNTER (OUTPATIENT)
Facility: CLINIC | Age: 58
Discharge: HOME | End: 2018-01-12
Attending: NEUROLOGICAL SURGERY | Admitting: NEUROLOGICAL SURGERY
Payer: MEDICARE

## 2018-01-11 ENCOUNTER — ANESTHESIA (OUTPATIENT)
Dept: SURGERY | Facility: CLINIC | Age: 58
End: 2018-01-11
Payer: MEDICARE

## 2018-01-11 ENCOUNTER — ANESTHESIA EVENT (OUTPATIENT)
Dept: SURGERY | Facility: CLINIC | Age: 58
End: 2018-01-11
Payer: MEDICARE

## 2018-01-11 ENCOUNTER — SURGERY (OUTPATIENT)
Age: 58
End: 2018-01-11
Payer: MEDICARE

## 2018-01-11 DIAGNOSIS — M53.3 SI (SACROILIAC) JOINT DYSFUNCTION: Primary | ICD-10-CM

## 2018-01-11 LAB
ABO + RH BLD: NORMAL
ABO + RH BLD: NORMAL
BLD GP AB SCN SERPL QL: NORMAL
BLOOD BANK CMNT PATIENT-IMP: NORMAL
HGB BLD-MCNC: 14.7 G/DL (ref 13.3–17.7)
SPECIMEN EXP DATE BLD: NORMAL

## 2018-01-11 PROCEDURE — 25000128 H RX IP 250 OP 636: Performed by: NEUROLOGICAL SURGERY

## 2018-01-11 PROCEDURE — 71000013 ZZH RECOVERY PHASE 1 LEVEL 1 EA ADDTL HR: Performed by: NEUROLOGICAL SURGERY

## 2018-01-11 PROCEDURE — 25000128 H RX IP 250 OP 636: Performed by: NURSE ANESTHETIST, CERTIFIED REGISTERED

## 2018-01-11 PROCEDURE — 25000132 ZZH RX MED GY IP 250 OP 250 PS 637: Performed by: NEUROLOGICAL SURGERY

## 2018-01-11 PROCEDURE — 25000125 ZZHC RX 250: Performed by: NURSE ANESTHETIST, CERTIFIED REGISTERED

## 2018-01-11 PROCEDURE — 37000009 ZZH ANESTHESIA TECHNICAL FEE, EACH ADDTL 15 MIN: Performed by: NEUROLOGICAL SURGERY

## 2018-01-11 PROCEDURE — 86850 RBC ANTIBODY SCREEN: CPT | Performed by: ANESTHESIOLOGY

## 2018-01-11 PROCEDURE — 40000936 ZZH STATISTIC OUTPATIENT (NON-OBS) NIGHT

## 2018-01-11 PROCEDURE — 27210794 ZZH OR GENERAL SUPPLY STERILE: Performed by: NEUROLOGICAL SURGERY

## 2018-01-11 PROCEDURE — 71000012 ZZH RECOVERY PHASE 1 LEVEL 1 FIRST HR: Performed by: NEUROLOGICAL SURGERY

## 2018-01-11 PROCEDURE — 92200047 ZZHC NEURO MONITORING SERVICE, UP TO 7 HOURS (T1FEE): Performed by: NEUROLOGICAL SURGERY

## 2018-01-11 PROCEDURE — 86901 BLOOD TYPING SEROLOGIC RH(D): CPT | Performed by: ANESTHESIOLOGY

## 2018-01-11 PROCEDURE — C1763 CONN TISS, NON-HUMAN: HCPCS | Performed by: NEUROLOGICAL SURGERY

## 2018-01-11 PROCEDURE — 40000306 ZZH STATISTIC PRE PROC ASSESS II: Performed by: NEUROLOGICAL SURGERY

## 2018-01-11 PROCEDURE — 36000071 ZZH SURGERY LEVEL 5 W FLUORO 1ST 30 MIN: Performed by: NEUROLOGICAL SURGERY

## 2018-01-11 PROCEDURE — 27211024 ZZHC OR SUPPLY OTHER OPNP: Performed by: NEUROLOGICAL SURGERY

## 2018-01-11 PROCEDURE — 25000125 ZZHC RX 250: Performed by: NEUROLOGICAL SURGERY

## 2018-01-11 PROCEDURE — 37000008 ZZH ANESTHESIA TECHNICAL FEE, 1ST 30 MIN: Performed by: NEUROLOGICAL SURGERY

## 2018-01-11 PROCEDURE — 25000566 ZZH SEVOFLURANE, EA 15 MIN: Performed by: NEUROLOGICAL SURGERY

## 2018-01-11 PROCEDURE — 40000277 XR SURGERY CARM FLUORO LESS THAN 5 MIN W STILLS: Mod: TC

## 2018-01-11 PROCEDURE — 36415 COLL VENOUS BLD VENIPUNCTURE: CPT | Performed by: ANESTHESIOLOGY

## 2018-01-11 PROCEDURE — 85018 HEMOGLOBIN: CPT | Performed by: ANESTHESIOLOGY

## 2018-01-11 PROCEDURE — 95940 IONM IN OPERATNG ROOM 15 MIN: CPT | Performed by: NEUROLOGICAL SURGERY

## 2018-01-11 PROCEDURE — 25000128 H RX IP 250 OP 636: Performed by: ANESTHESIOLOGY

## 2018-01-11 PROCEDURE — 86900 BLOOD TYPING SEROLOGIC ABO: CPT | Performed by: ANESTHESIOLOGY

## 2018-01-11 PROCEDURE — 36000069 ZZH SURGERY LEVEL 5 EA 15 ADDTL MIN: Performed by: NEUROLOGICAL SURGERY

## 2018-01-11 PROCEDURE — C1713 ANCHOR/SCREW BN/BN,TIS/BN: HCPCS | Performed by: NEUROLOGICAL SURGERY

## 2018-01-11 DEVICE — IMPLANTABLE DEVICE: Type: IMPLANTABLE DEVICE | Site: SACRUM | Status: FUNCTIONAL

## 2018-01-11 RX ORDER — ARIPIPRAZOLE 10 MG/1
10 TABLET ORAL DAILY
Status: DISCONTINUED | OUTPATIENT
Start: 2018-01-12 | End: 2018-01-12 | Stop reason: HOSPADM

## 2018-01-11 RX ORDER — NALOXONE HYDROCHLORIDE 0.4 MG/ML
.1-.4 INJECTION, SOLUTION INTRAMUSCULAR; INTRAVENOUS; SUBCUTANEOUS
Status: DISCONTINUED | OUTPATIENT
Start: 2018-01-11 | End: 2018-01-11 | Stop reason: HOSPADM

## 2018-01-11 RX ORDER — GLYCOPYRROLATE 0.2 MG/ML
INJECTION, SOLUTION INTRAMUSCULAR; INTRAVENOUS PRN
Status: DISCONTINUED | OUTPATIENT
Start: 2018-01-11 | End: 2018-01-11

## 2018-01-11 RX ORDER — ZOLPIDEM TARTRATE 5 MG/1
10 TABLET ORAL
Status: DISCONTINUED | OUTPATIENT
Start: 2018-01-11 | End: 2018-01-12 | Stop reason: HOSPADM

## 2018-01-11 RX ORDER — SODIUM CHLORIDE, SODIUM LACTATE, POTASSIUM CHLORIDE, CALCIUM CHLORIDE 600; 310; 30; 20 MG/100ML; MG/100ML; MG/100ML; MG/100ML
INJECTION, SOLUTION INTRAVENOUS CONTINUOUS
Status: DISCONTINUED | OUTPATIENT
Start: 2018-01-11 | End: 2018-01-11 | Stop reason: HOSPADM

## 2018-01-11 RX ORDER — ONDANSETRON 4 MG/1
4 TABLET, ORALLY DISINTEGRATING ORAL EVERY 6 HOURS PRN
Status: DISCONTINUED | OUTPATIENT
Start: 2018-01-11 | End: 2018-01-12 | Stop reason: HOSPADM

## 2018-01-11 RX ORDER — OXYCODONE AND ACETAMINOPHEN 10; 325 MG/1; MG/1
1-2 TABLET ORAL EVERY 4 HOURS PRN
Qty: 60 TABLET | Refills: 0 | Status: SHIPPED | OUTPATIENT
Start: 2018-01-11 | End: 2018-08-03

## 2018-01-11 RX ORDER — NALOXONE HYDROCHLORIDE 0.4 MG/ML
.1-.4 INJECTION, SOLUTION INTRAMUSCULAR; INTRAVENOUS; SUBCUTANEOUS
Status: DISCONTINUED | OUTPATIENT
Start: 2018-01-11 | End: 2018-01-12 | Stop reason: HOSPADM

## 2018-01-11 RX ORDER — DEXAMETHASONE SODIUM PHOSPHATE 4 MG/ML
INJECTION, SOLUTION INTRA-ARTICULAR; INTRALESIONAL; INTRAMUSCULAR; INTRAVENOUS; SOFT TISSUE PRN
Status: DISCONTINUED | OUTPATIENT
Start: 2018-01-11 | End: 2018-01-11

## 2018-01-11 RX ORDER — NALOXONE HYDROCHLORIDE 0.4 MG/ML
.1-.4 INJECTION, SOLUTION INTRAMUSCULAR; INTRAVENOUS; SUBCUTANEOUS
Status: DISCONTINUED | OUTPATIENT
Start: 2018-01-11 | End: 2018-01-11

## 2018-01-11 RX ORDER — MORPHINE SULFATE 30 MG/1
30 TABLET, FILM COATED, EXTENDED RELEASE ORAL 3 TIMES DAILY
Status: DISCONTINUED | OUTPATIENT
Start: 2018-01-11 | End: 2018-01-12 | Stop reason: HOSPADM

## 2018-01-11 RX ORDER — ONDANSETRON 2 MG/ML
4 INJECTION INTRAMUSCULAR; INTRAVENOUS EVERY 6 HOURS PRN
Status: DISCONTINUED | OUTPATIENT
Start: 2018-01-11 | End: 2018-01-12 | Stop reason: HOSPADM

## 2018-01-11 RX ORDER — ROPINIROLE 0.25 MG/1
0.25-0.5 TABLET, FILM COATED ORAL AT BEDTIME
Status: DISCONTINUED | OUTPATIENT
Start: 2018-01-11 | End: 2018-01-12 | Stop reason: HOSPADM

## 2018-01-11 RX ORDER — ONDANSETRON 2 MG/ML
INJECTION INTRAMUSCULAR; INTRAVENOUS PRN
Status: DISCONTINUED | OUTPATIENT
Start: 2018-01-11 | End: 2018-01-11

## 2018-01-11 RX ORDER — ONDANSETRON 4 MG/1
4 TABLET, ORALLY DISINTEGRATING ORAL EVERY 6 HOURS PRN
Status: DISCONTINUED | OUTPATIENT
Start: 2018-01-11 | End: 2018-01-11

## 2018-01-11 RX ORDER — MEPERIDINE HYDROCHLORIDE 25 MG/ML
12.5 INJECTION INTRAMUSCULAR; INTRAVENOUS; SUBCUTANEOUS
Status: DISCONTINUED | OUTPATIENT
Start: 2018-01-11 | End: 2018-01-11 | Stop reason: HOSPADM

## 2018-01-11 RX ORDER — CEFAZOLIN SODIUM 1 G/50ML
3 SOLUTION INTRAVENOUS
Status: COMPLETED | OUTPATIENT
Start: 2018-01-11 | End: 2018-01-11

## 2018-01-11 RX ORDER — LIDOCAINE 40 MG/G
CREAM TOPICAL
Status: DISCONTINUED | OUTPATIENT
Start: 2018-01-11 | End: 2018-01-11 | Stop reason: HOSPADM

## 2018-01-11 RX ORDER — OXYCODONE HYDROCHLORIDE 5 MG/1
5 TABLET ORAL EVERY 4 HOURS PRN
Status: DISCONTINUED | OUTPATIENT
Start: 2018-01-11 | End: 2018-01-12 | Stop reason: HOSPADM

## 2018-01-11 RX ORDER — LAMOTRIGINE 25 MG/1
25 TABLET ORAL DAILY
Status: DISCONTINUED | OUTPATIENT
Start: 2018-01-11 | End: 2018-01-11

## 2018-01-11 RX ORDER — METHADONE HYDROCHLORIDE 10 MG/ML
30 INJECTION, SOLUTION INTRAMUSCULAR; INTRAVENOUS; SUBCUTANEOUS ONCE
Status: COMPLETED | OUTPATIENT
Start: 2018-01-11 | End: 2018-01-11

## 2018-01-11 RX ORDER — CYCLOBENZAPRINE HCL 10 MG
10 TABLET ORAL 3 TIMES DAILY PRN
Status: DISCONTINUED | OUTPATIENT
Start: 2018-01-11 | End: 2018-01-12 | Stop reason: HOSPADM

## 2018-01-11 RX ORDER — ONDANSETRON 2 MG/ML
4 INJECTION INTRAMUSCULAR; INTRAVENOUS EVERY 30 MIN PRN
Status: DISCONTINUED | OUTPATIENT
Start: 2018-01-11 | End: 2018-01-11 | Stop reason: HOSPADM

## 2018-01-11 RX ORDER — EPHEDRINE SULFATE 50 MG/ML
INJECTION, SOLUTION INTRAMUSCULAR; INTRAVENOUS; SUBCUTANEOUS PRN
Status: DISCONTINUED | OUTPATIENT
Start: 2018-01-11 | End: 2018-01-11

## 2018-01-11 RX ORDER — ONDANSETRON 2 MG/ML
4 INJECTION INTRAMUSCULAR; INTRAVENOUS ONCE
Status: COMPLETED | OUTPATIENT
Start: 2018-01-11 | End: 2018-01-11

## 2018-01-11 RX ORDER — HYDRALAZINE HYDROCHLORIDE 20 MG/ML
2.5-5 INJECTION INTRAMUSCULAR; INTRAVENOUS EVERY 10 MIN PRN
Status: DISCONTINUED | OUTPATIENT
Start: 2018-01-11 | End: 2018-01-11 | Stop reason: HOSPADM

## 2018-01-11 RX ORDER — METOPROLOL TARTRATE 1 MG/ML
1-2 INJECTION, SOLUTION INTRAVENOUS EVERY 5 MIN PRN
Status: DISCONTINUED | OUTPATIENT
Start: 2018-01-11 | End: 2018-01-11 | Stop reason: HOSPADM

## 2018-01-11 RX ORDER — LAMOTRIGINE 200 MG/1
200 TABLET ORAL DAILY
Status: DISCONTINUED | OUTPATIENT
Start: 2018-01-11 | End: 2018-01-11

## 2018-01-11 RX ORDER — FENTANYL CITRATE 50 UG/ML
25-50 INJECTION, SOLUTION INTRAMUSCULAR; INTRAVENOUS EVERY 5 MIN PRN
Status: DISCONTINUED | OUTPATIENT
Start: 2018-01-11 | End: 2018-01-11 | Stop reason: HOSPADM

## 2018-01-11 RX ORDER — ALBUTEROL SULFATE 0.83 MG/ML
2.5 SOLUTION RESPIRATORY (INHALATION) EVERY 4 HOURS PRN
Status: DISCONTINUED | OUTPATIENT
Start: 2018-01-11 | End: 2018-01-11 | Stop reason: HOSPADM

## 2018-01-11 RX ORDER — HYDROMORPHONE HYDROCHLORIDE 4 MG/1
8 TABLET ORAL 4 TIMES DAILY PRN
Status: DISCONTINUED | OUTPATIENT
Start: 2018-01-11 | End: 2018-01-12 | Stop reason: HOSPADM

## 2018-01-11 RX ORDER — BUPROPION HYDROCHLORIDE 150 MG/1
450 TABLET ORAL EVERY MORNING
Status: DISCONTINUED | OUTPATIENT
Start: 2018-01-12 | End: 2018-01-12 | Stop reason: HOSPADM

## 2018-01-11 RX ORDER — PROPOFOL 10 MG/ML
INJECTION, EMULSION INTRAVENOUS PRN
Status: DISCONTINUED | OUTPATIENT
Start: 2018-01-11 | End: 2018-01-11

## 2018-01-11 RX ORDER — CEFAZOLIN SODIUM 1 G/3ML
1 INJECTION, POWDER, FOR SOLUTION INTRAMUSCULAR; INTRAVENOUS SEE ADMIN INSTRUCTIONS
Status: DISCONTINUED | OUTPATIENT
Start: 2018-01-11 | End: 2018-01-11 | Stop reason: HOSPADM

## 2018-01-11 RX ORDER — ACETAMINOPHEN 10 MG/ML
1000 INJECTION, SOLUTION INTRAVENOUS ONCE
Status: COMPLETED | OUTPATIENT
Start: 2018-01-11 | End: 2018-01-11

## 2018-01-11 RX ORDER — HYDROMORPHONE HYDROCHLORIDE 1 MG/ML
.3-.5 INJECTION, SOLUTION INTRAMUSCULAR; INTRAVENOUS; SUBCUTANEOUS EVERY 10 MIN PRN
Status: DISCONTINUED | OUTPATIENT
Start: 2018-01-11 | End: 2018-01-11 | Stop reason: HOSPADM

## 2018-01-11 RX ORDER — HYDROXYZINE HYDROCHLORIDE 25 MG/1
50 TABLET, FILM COATED ORAL EVERY 6 HOURS PRN
Status: DISCONTINUED | OUTPATIENT
Start: 2018-01-11 | End: 2018-01-12 | Stop reason: HOSPADM

## 2018-01-11 RX ORDER — ONDANSETRON 4 MG/1
4 TABLET, ORALLY DISINTEGRATING ORAL EVERY 30 MIN PRN
Status: DISCONTINUED | OUTPATIENT
Start: 2018-01-11 | End: 2018-01-11 | Stop reason: HOSPADM

## 2018-01-11 RX ORDER — LIDOCAINE HYDROCHLORIDE 10 MG/ML
INJECTION, SOLUTION INFILTRATION; PERINEURAL PRN
Status: DISCONTINUED | OUTPATIENT
Start: 2018-01-11 | End: 2018-01-11

## 2018-01-11 RX ORDER — FENTANYL CITRATE 50 UG/ML
25-50 INJECTION, SOLUTION INTRAMUSCULAR; INTRAVENOUS
Status: DISCONTINUED | OUTPATIENT
Start: 2018-01-11 | End: 2018-01-11 | Stop reason: HOSPADM

## 2018-01-11 RX ADMIN — HYDROMORPHONE HYDROCHLORIDE 8 MG: 4 TABLET ORAL at 21:05

## 2018-01-11 RX ADMIN — ONDANSETRON 4 MG: 2 INJECTION INTRAMUSCULAR; INTRAVENOUS at 13:02

## 2018-01-11 RX ADMIN — ACETAMINOPHEN 1000 MG: 10 INJECTION, SOLUTION INTRAVENOUS at 15:42

## 2018-01-11 RX ADMIN — DEXAMETHASONE SODIUM PHOSPHATE 4 MG: 4 INJECTION, SOLUTION INTRA-ARTICULAR; INTRALESIONAL; INTRAMUSCULAR; INTRAVENOUS; SOFT TISSUE at 14:13

## 2018-01-11 RX ADMIN — Medication 100 MG: at 14:13

## 2018-01-11 RX ADMIN — FENTANYL CITRATE 50 MCG: 50 INJECTION INTRAMUSCULAR; INTRAVENOUS at 15:48

## 2018-01-11 RX ADMIN — PROPOFOL 200 MG: 10 INJECTION, EMULSION INTRAVENOUS at 14:13

## 2018-01-11 RX ADMIN — FENTANYL CITRATE 50 MCG: 50 INJECTION INTRAMUSCULAR; INTRAVENOUS at 16:13

## 2018-01-11 RX ADMIN — HYDROXYZINE HYDROCHLORIDE 50 MG: 25 TABLET ORAL at 21:10

## 2018-01-11 RX ADMIN — BUPIVACAINE HYDROCHLORIDE 4 ML: 2.5 INJECTION, SOLUTION INFILTRATION; PERINEURAL at 15:01

## 2018-01-11 RX ADMIN — PHENYLEPHRINE HYDROCHLORIDE 100 MCG: 10 INJECTION, SOLUTION INTRAMUSCULAR; INTRAVENOUS; SUBCUTANEOUS at 14:45

## 2018-01-11 RX ADMIN — HYDROMORPHONE HYDROCHLORIDE 8 MG: 4 TABLET ORAL at 18:51

## 2018-01-11 RX ADMIN — PHENYLEPHRINE HYDROCHLORIDE 100 MCG: 10 INJECTION, SOLUTION INTRAMUSCULAR; INTRAVENOUS; SUBCUTANEOUS at 14:53

## 2018-01-11 RX ADMIN — Medication 3 G: at 14:06

## 2018-01-11 RX ADMIN — LIDOCAINE HYDROCHLORIDE 50 MG: 10 INJECTION, SOLUTION INFILTRATION; PERINEURAL at 14:13

## 2018-01-11 RX ADMIN — Medication 7.5 MG: at 14:41

## 2018-01-11 RX ADMIN — SODIUM CHLORIDE, POTASSIUM CHLORIDE, SODIUM LACTATE AND CALCIUM CHLORIDE: 600; 310; 30; 20 INJECTION, SOLUTION INTRAVENOUS at 15:41

## 2018-01-11 RX ADMIN — Medication 3 MG: at 15:04

## 2018-01-11 RX ADMIN — GLYCOPYRROLATE 0.4 MG: 0.2 INJECTION, SOLUTION INTRAMUSCULAR; INTRAVENOUS at 15:04

## 2018-01-11 RX ADMIN — METHADONE HYDROCHLORIDE 30 MG: 10 INJECTION, SOLUTION INTRAMUSCULAR; INTRAVENOUS; SUBCUTANEOUS at 14:13

## 2018-01-11 RX ADMIN — OMEPRAZOLE 40 MG: 20 CAPSULE, DELAYED RELEASE ORAL at 21:10

## 2018-01-11 RX ADMIN — MIDAZOLAM 2 MG: 1 INJECTION INTRAMUSCULAR; INTRAVENOUS at 14:06

## 2018-01-11 RX ADMIN — ONDANSETRON 4 MG: 2 INJECTION INTRAMUSCULAR; INTRAVENOUS at 15:01

## 2018-01-11 RX ADMIN — FENTANYL CITRATE 50 MCG: 50 INJECTION INTRAMUSCULAR; INTRAVENOUS at 15:40

## 2018-01-11 RX ADMIN — SODIUM CHLORIDE, POTASSIUM CHLORIDE, SODIUM LACTATE AND CALCIUM CHLORIDE: 600; 310; 30; 20 INJECTION, SOLUTION INTRAVENOUS at 14:06

## 2018-01-11 RX ADMIN — Medication 10 MG: at 14:32

## 2018-01-11 RX ADMIN — HYDRALAZINE HYDROCHLORIDE 5 MG: 20 INJECTION INTRAMUSCULAR; INTRAVENOUS at 17:12

## 2018-01-11 RX ADMIN — FENTANYL CITRATE 50 MCG: 50 INJECTION INTRAMUSCULAR; INTRAVENOUS at 16:41

## 2018-01-11 RX ADMIN — ROCURONIUM BROMIDE 20 MG: 10 INJECTION INTRAVENOUS at 14:20

## 2018-01-11 RX ADMIN — Medication 7.5 MG: at 14:34

## 2018-01-11 RX ADMIN — Medication 0.5 MG: at 16:09

## 2018-01-11 ASSESSMENT — PAIN DESCRIPTION - DESCRIPTORS: DESCRIPTORS: BURNING;STABBING

## 2018-01-11 NOTE — ANESTHESIA POSTPROCEDURE EVALUATION
Patient: Oleg Ford    Procedure(s):  Left SI joint fusion - Wound Class: I-Clean    Diagnosis: Left SI Joint Pain, update  Diagnosis Additional Information: PRE-PROCEDURE DIAGNOSIS:  1) Left  side sacroiliac joint dysfunction.  POST-PROCEDURE DIAGNOSIS:  1) Same as above  PROCEDURE PERFORMED:  1) left  side sacroiliac joint fusion with 2 implants.(Zyga)  2) extraction and use of illac bone graft   3)Intr, aoperative fluoroscopic imaging and electrophysiological monitoring.    Anesthesia Type:  General, ETT    Note:  Anesthesia Post Evaluation    Patient location during evaluation: PACU  Patient participation: Able to fully participate in evaluation  Level of consciousness: awake  Pain management: adequate  Airway patency: patent  Cardiovascular status: acceptable  Respiratory status: acceptable  Hydration status: acceptable  PONV: controlled     Anesthetic complications: None          Last vitals:  Vitals:    01/11/18 1525 01/11/18 1530 01/11/18 1545   BP: 127/83 (!) 121/93 (!) 131/95   Resp: 12 8 14   Temp:   98.2  F (36.8  C)   SpO2: 98% 98% 99%         Electronically Signed By: Neal Locke MD  January 11, 2018  3:53 PM

## 2018-01-11 NOTE — IP AVS SNAPSHOT
[facility] Observation Department    [address]    [facility] [address]    Phone:  [phone]                                       After Visit Summary   1/11/2018    Oleg Ford    MRN: [id number]           After Visit Summary Signature Page     I have received my discharge instructions, and my questions have been answered. I have discussed any challenges I see with this plan with the nurse or doctor.    ..........................................................................................................................................  Patient/Patient Representative Signature      ..........................................................................................................................................  Patient Representative Print Name and Relationship to Patient    ..................................................               ................................................  Date                                            Time    ..........................................................................................................................................  Reviewed by Signature/Title    ...................................................              ..............................................  Date                                                            Time

## 2018-01-11 NOTE — IP AVS SNAPSHOT
MRN:[id number]                      After Visit Summary   1/11/2018    Oleg Ford    MRN: [id number]           Thank you!     Thank you for choosing [facility] for your care. Our goal is always to provide you with excellent care. Hearing back from our patients is one way we can continue to improve our services. Please take a few minutes to complete the written survey that you may receive in the mail after you visit. If you would like to speak to someone directly about your visit please contact Patient Relations at [phone]. Thank you!          Patient Information     Date Of Birth          1960        About your hospital stay     You were admitted on:  January 11, 2018 You last received care in the:  [facility] Observation Department    You were discharged on:  January 12, 2018        Reason for your hospital stay       Spine surgery                  Who to Call     For medical emergencies, please call [phone].  For non-urgent questions about your medical care, please call your primary care provider or clinic, [phone]  For questions related to your surgery, please call your surgery clinic        Attending Provider     Provider Specialty    Sebastián Szymanski MD Neurosurgery       Primary Care Provider Office Phone # Fax #    Cat Tenzin Shaw [phone]      After Care Instructions     Activity       Your activity upon discharge: Ad michael within following limitations:  No excessive activities   No Bending, Twisting, climbing, Crawling,   No lifting more than 8 lb for 2 weeks, or 15 lb for 2 months or 25 lb for 4 months or 35 lb for 6 months  Brace for riding cars for 4-6 months            Diet       Follow this diet upon discharge: resume prior to admission diet            Discharge Instructions       Refer to [facility] spine handbood or online at [email or web address]  Or print it for patient at  [email or web address]                             Pending Results     No orders found for last 3 day(s).            Statement of Approval     Ordered          01/12/18 5249  I have reviewed and agree with all the recommendations and orders detailed in this document.  EFFECTIVE NOW     Approved and electronically signed by:  Sebastián Szymanski MD             Admission Information     Date & Time Provider Department Dept. Phone    1/11/2018 Sebastián Szymanski MD [facility] Observation Department [phone]      Your Vitals Were     Blood Pressure Temperature Respirations Height Weight Pulse Oximetry    102/72 (BP Location: Left arm) 97.4  F (36.3  C) 16 1.829 m (6') 132.9 kg (293 lb) 94%    BMI (Body Mass Index)                   39.74 kg/m2           MyChart Information     [employer] gives you secure access to your electronic health record. If you see a primary care provider, you can also send messages to your care team and make appointments. If you have questions, please call your primary care clinic.  If you do not have a primary care provider, please call [phone] and they will assist you.        Care EveryWhere ID     This is your Care EveryWhere ID. This could be used by other organizations to access your [place] medical records  [id number]        Equal Access to Services     ALANA HANSON : Josi daigleo Soelizabeth, waaxda luqadaha, qaybta kaalmada adeegyada, kamala smith. So [facility] [phone].    ATENCIÓN: Si habla español, tiene a gongora disposición servicios gratuitos de asistencia lingüística. Llame al [phone].    We comply with applicable federal civil rights laws and [place] laws. We do not discriminate on the basis of race, color, national origin, age, disability, sex, sexual orientation, or gender identity.               Review of your medicines      START taking        Dose / Directions     oxyCODONE-acetaminophen  MG per tablet   Commonly known as:  PERCOCET   Used for:  SI (sacroiliac) joint dysfunction        Dose:  1-2 tablet   Take 1-2 tablets by mouth every 4 hours as needed for pain (moderate to severe)   Quantity:  60 tablet   Refills:  0         CONTINUE these medicines which have NOT CHANGED        Dose / Directions    ABILIFY 10 MG tablet   Generic drug:  ARIPiprazole        Dose:  10 mg   Take 10 mg by mouth daily   Refills:  0       buPROPion 150 MG 24 hr tablet   Commonly known as:  WELLBUTRIN XL        Dose:  450 mg   Take 450 mg by mouth every morning   Refills:  0       cyclobenzaprine 10 MG tablet   Commonly known as:  FLEXERIL   Used for:  DDD (degenerative disc disease), lumbar        TAKE ONE TABLET BY MOUTH 3 TIMES A DAY AS NEEDED FOR MUSCLE SPASMS   Quantity:  90 tablet   Refills:  5       diazepam 5 MG tablet   Commonly known as:  VALIUM   Used for:  DDD (degenerative disc disease), lumbar, Chronic pain syndrome        Dose:  5 mg   Take 1 tablet (5 mg) by mouth nightly as needed   Quantity:  30 tablet   Refills:  0       EPINEPHrine 0.3 MG/0.3ML injection 2-pack   Commonly known as:  EPIPEN/ADRENACLICK/or ANY BX GENERIC EQUIV   Used for:  Bee sting allergy        Dose:  0.3 mg   Inject 0.3 mLs (0.3 mg) into the muscle once as needed for anaphylaxis   Quantity:  1 each   Refills:  2       HYDROmorphone 4 MG tablet   Commonly known as:  DILAUDID   Used for:  Chronic pain syndrome, DDD (degenerative disc disease), lumbar        Dose:  8 mg   Take 2 tablets (8 mg) by mouth 4 times daily as needed for moderate to severe pain (every 4-6hour prn)   Quantity:  180 tablet   Refills:  0       hydrOXYzine 50 MG capsule   Commonly known as:  VISTARIL        Dose:  50 mg   Take 50 mg by mouth 2 times daily as needed   Refills:  0       hydrOXYzine 50 MG tablet   Commonly known as:  ATARAX   Used for:  Chronic pain syndrome        Dose:  50 mg   Take 1 tablet (50 mg) by mouth every 6  hours as needed for anxiety   Quantity:  120 tablet   Refills:  3       LAMICTAL 150 MG tablet   Generic drug:  lamoTRIgine        Dose:  250 mg   Take 250 mg by mouth daily   Refills:  0       morphine 30 MG 12 hr tablet   Commonly known as:  MS CONTIN   Used for:  Chronic pain syndrome, DDD (degenerative disc disease), lumbar        Dose:  30 mg   Take 1 tablet (30 mg) by mouth 3 times daily   Quantity:  90 tablet   Refills:  0       omeprazole 40 MG capsule   Commonly known as:  priLOSEC   Used for:  Gastroesophageal reflux disease with esophagitis        TAKE 1 CAPSULE (40 MG) BY MOUTH DAILY TAKE 30-60 MINUTES BEFORE A MEAL.   Quantity:  30 capsule   Refills:  11       ondansetron 4 MG ODT tab   Commonly known as:  ZOFRAN-ODT   Used for:  Nausea        PLACE 1 OR 2 TABLETS UNDER THE TONGUE 3 TIMES A DAY AS NEEDED FOR NAUSEA   Quantity:  20 tablet   Refills:  1       rOPINIRole 0.25 MG tablet   Commonly known as:  REQUIP   Used for:  RLS (restless legs syndrome)        Dose:  0.25-0.5 mg   Take 1-2 tablets (0.25-0.5 mg) by mouth At Bedtime   Quantity:  60 tablet   Refills:  2       zolpidem 10 MG tablet   Commonly known as:  AMBIEN   Used for:  Insomnia, unspecified insomnia        Dose:  10 mg   Take 1 tablet (10 mg) by mouth nightly as needed for sleep   Quantity:  30 tablet   Refills:  2            Where to get your medicines      Some of these will need a paper prescription and others can be bought over the counter. Ask your nurse if you have questions.     Bring a paper prescription for each of these medications     oxyCODONE-acetaminophen  MG per tablet                Protect others around you: Learn how to safely use, store and throw away your medicines at www.disposemymeds.org.             Medication List: This is a list of all your medications and when to take them. Check marks below indicate your daily home schedule. Keep this list as a reference.      Medications           Morning Afternoon  Evening Bedtime As Needed    ABILIFY 10 MG tablet   Take 10 mg by mouth daily   Last time this was given:  10 mg on 1/12/2018  8:14 AM   Generic drug:  ARIPiprazole                                buPROPion 150 MG 24 hr tablet   Commonly known as:  WELLBUTRIN XL   Take 450 mg by mouth every morning   Last time this was given:  450 mg on 1/12/2018  8:14 AM                                cyclobenzaprine 10 MG tablet   Commonly known as:  FLEXERIL   TAKE ONE TABLET BY MOUTH 3 TIMES A DAY AS NEEDED FOR MUSCLE SPASMS   Last time this was given:  10 mg on 1/12/2018 11:07 AM                                diazepam 5 MG tablet   Commonly known as:  VALIUM   Take 1 tablet (5 mg) by mouth nightly as needed                                EPINEPHrine 0.3 MG/0.3ML injection 2-pack   Commonly known as:  EPIPEN/ADRENACLICK/or ANY BX GENERIC EQUIV   Inject 0.3 mLs (0.3 mg) into the muscle once as needed for anaphylaxis                                HYDROmorphone 4 MG tablet   Commonly known as:  DILAUDID   Take 2 tablets (8 mg) by mouth 4 times daily as needed for moderate to severe pain (every 4-6hour prn)   Last time this was given:  8 mg on 1/12/2018  2:39 PM                                hydrOXYzine 50 MG capsule   Commonly known as:  VISTARIL   Take 50 mg by mouth 2 times daily as needed                                hydrOXYzine 50 MG tablet   Commonly known as:  ATARAX   Take 1 tablet (50 mg) by mouth every 6 hours as needed for anxiety   Last time this was given:  50 mg on 1/12/2018  5:15 AM                                LAMICTAL 150 MG tablet   Take 250 mg by mouth daily   Last time this was given:  250 mg on 1/12/2018  8:14 AM   Generic drug:  lamoTRIgine                                morphine 30 MG 12 hr tablet   Commonly known as:  MS CONTIN   Take 1 tablet (30 mg) by mouth 3 times daily   Last time this was given:  30 mg on 1/12/2018  2:10 PM                                omeprazole 40 MG capsule   Commonly known  as:  priLOSEC   TAKE 1 CAPSULE (40 MG) BY MOUTH DAILY TAKE 30-60 MINUTES BEFORE A MEAL.   Last time this was given:  40 mg on 1/12/2018  8:13 AM                                ondansetron 4 MG ODT tab   Commonly known as:  ZOFRAN-ODT   PLACE 1 OR 2 TABLETS UNDER THE TONGUE 3 TIMES A DAY AS NEEDED FOR NAUSEA                                oxyCODONE-acetaminophen  MG per tablet   Commonly known as:  PERCOCET   Take 1-2 tablets by mouth every 4 hours as needed for pain (moderate to severe)                                rOPINIRole 0.25 MG tablet   Commonly known as:  REQUIP   Take 1-2 tablets (0.25-0.5 mg) by mouth At Bedtime                                zolpidem 10 MG tablet   Commonly known as:  AMBIEN   Take 1 tablet (10 mg) by mouth nightly as needed for sleep

## 2018-01-11 NOTE — PROCEDURES
REPORT OF OPERATION  Oleg Ford is a 57 year old old male admitted on 1/11/2018 10:51 AM.  ?  Operative Date:  1/11/2018  PRE-PROCEDURE DIAGNOSIS:  1) Left  side sacroiliac joint dysfunction.  POST-PROCEDURE DIAGNOSIS:  1) Same as above  PROCEDURE PERFORMED:  1) left  side sacroiliac joint fusion with 2 implants.(Zyga)  2) extraction and use of illac bone graft   3)Intraoperative fluoroscopic imaging and electrophysiological monitoring.        Surgeon: Sebastián Szymanski MD    HISTORY: Please refer to my clinic note for full details, but in short ,Patient hasleft  side sacroiliac joint dysfunction refractory to non-operative management. Repeated diagnostic blocks were confirmatory for sacroiliac joint as pain generator. Patient now indicated for surgical treatment with fixation and arthrodesis. Patient was set up for the surgery as mentioned above and was taken to surgery as mentioned above after all risks and benefits were explained.  PROCEDURE:  The patient was taken to surgery. After general anesthesia was applied, SCDs and Sy placed and preoperative antibiotic given, then patient has been positioned on the Filemon table and Sreekanth frame in a prone. The left  side of the pelvis was then prepped and draped for a lateral incision on the buttock.  One C-arm image intensifier was alternately positioned for lateral, AP, pelvic inlet, and pelvic outlet projections throughout the procedure.  A guide pin was placed to liana the posterior buttock in an AP projection of the anticipated transiliosacral position for the 12.5mm implant across the SI joint. An intersecting liana was then drawn in a lateral projection of the anticipated position for the 12.5mm implant across the SI joint. The skin at the intersection, along with the adjacent tissues, was injected with a lidocaine and epinephrine solution to minimize bleeding and improve hemostasis. A 2cm longitudinal skin incision was made.  A sequence of pins and dilators were  used to gently spare the gluteus muscles and other soft tissues down to the outer table of the ilium. Attention was paid to a proper intraosseous trajectory in the lateral, inlet, and outlet projections. The ilium bone was drilled down to the SI joint. The ilium spongiosa bone drillings were collected for later grafting. A cannula was then placed into the ilium bone to create a tissue guarding working channel.  The joint cartilage was excised using a series of three perpendicular reaching decorticating instruments. The denuded space was then packed with autologous bone from the ilium mixed with Tammy DBM to facilitate bony fusion.  A guide pin was then drilled into the proximal sacral ala. Attention was paid to proper positioning in both the inlet and outlet projections. A drill was used to prepare the path for the 12.5mm threaded implant. A final sleeve replaced the working cannula, and the length of the implant was determined by markings on the guide pin. The appropriate length implant was then placed with final seating noted on imaging. Attention was paid at all points to verify no significant advancement of the guide pin. The instruments were withdrawn.  A tissue guide system was then used in a similar fashion for the second threaded implant. A guide wire was then drilled across the ilium and SI joints and into the proximal sacral ala. Attention was paid to proper positioning in both the inlet and outlet projections. A drill was then used to prepare the path for the 6.5mm implant. The length of the implant was determined by markings on the guide pin where it intersected the final sleeve. The appropriate length implant was then placed with final seating noted on imaging. Attention was paid at all points to verify no significant advancement of the guide wire. The instruments were withdrawn.  Upon completion of placement of the 2 implants, final inlet, outlet, and lateral images were obtained.  Irrigation  performed. No significant bleeding identified. The deep tissues were infiltrated with Marcaine and epinephrine for postoperative pain control. The skin incision was also infiltrated subcutaneously with Marcaine for postoperative pain control. A subcuticular skin closure with 2-0 Vicryl was performed. Steri-Strips were used for  the skin. The patient was then turned off the prone position onto supine where patient was reversed, extubated, and sent for recovery. The patient tolerated this procedure well.  IMPLANTS: Two threaded implants: 12.5mm x 45 mm and 6.5mm x 45 mm.  Additional finding:  No complications  Estimated blood: 20cc.      DISPOSITION: To PACU with postoperative antibiotic. All counts are correct at the end of the surgery.  Sebastián Szymanski MD    cc: Sebastián Szymanski MD

## 2018-01-11 NOTE — ANESTHESIA PREPROCEDURE EVALUATION
Anesthesia Evaluation     .             ROS/MED HX    ENT/Pulmonary:     (+)sleep apnea, , . .    Neurologic:     (+)other neuro lumbar radiculopathy    Cardiovascular:  - neg cardiovascular ROS       METS/Exercise Tolerance:     Hematologic:         Musculoskeletal:   (+) arthritis (SI joint arthritis), , , -       GI/Hepatic:         Renal/Genitourinary:     (+) BPH,       Endo:         Psychiatric:     (+) psychiatric history anxiety and depression      Infectious Disease:         Malignancy:         Other:                     Physical Exam      Airway   Mallampati: III  TM distance: >3 FB  Neck ROM: full    Dental     Cardiovascular   Rhythm and rate: regular and normal      Pulmonary    breath sounds clear to auscultation                    Anesthesia Plan      History & Physical Review  History and physical reviewed and following examination; no interval change.    ASA Status:  3 .    NPO Status:  > 8 hours    Plan for General and ETT with Intravenous and Propofol induction. Maintenance will be Balanced.    PONV prophylaxis:  Ondansetron (or other 5HT-3) and Dexamethasone or Solumedrol       Postoperative Care  Postoperative pain management:  IV analgesics, Oral pain medications and Multi-modal analgesia.      Consents  Anesthetic plan, risks, benefits and alternatives discussed with:  Patient..                          .

## 2018-01-11 NOTE — ANESTHESIA CARE TRANSFER NOTE
Patient: Oleg Ford    Procedure(s):  Left SI joint fusion - Wound Class: I-Clean    Diagnosis:  Left SI Joint Pain, update  Diagnosis Additional Information: No value filed.    Anesthesia Type:   General, ETT     Note:  Airway :Face Mask  Patient transferred to:PACU  Handoff Report: Identifed the Patient, Identified the Reponsible Provider, Reviewed the pertinent medical history, Discussed the surgical course, Reviewed Intra-OP anesthesia mangement and issues during anesthesia, Set expectations for post-procedure period and Allowed opportunity for questions and acknowledgement of understanding      Vitals: (Last set prior to Anesthesia Care Transfer)    CRNA VITALS  1/11/2018 1442 - 1/11/2018 1520      1/11/2018             Pulse: 84    SpO2: 99 %    Resp Rate (observed): 9                Electronically Signed By: LIBBY sOcar CRNA  January 11, 2018  3:20 PM

## 2018-01-12 ENCOUNTER — APPOINTMENT (OUTPATIENT)
Dept: PHYSICAL THERAPY | Facility: CLINIC | Age: 58
End: 2018-01-12
Attending: NEUROLOGICAL SURGERY
Payer: MEDICARE

## 2018-01-12 VITALS
SYSTOLIC BLOOD PRESSURE: 102 MMHG | BODY MASS INDEX: 39.68 KG/M2 | RESPIRATION RATE: 16 BRPM | OXYGEN SATURATION: 94 % | TEMPERATURE: 97.4 F | DIASTOLIC BLOOD PRESSURE: 72 MMHG | HEIGHT: 72 IN | WEIGHT: 293 LBS

## 2018-01-12 LAB — GLUCOSE BLDC GLUCOMTR-MCNC: 145 MG/DL (ref 70–99)

## 2018-01-12 PROCEDURE — 97530 THERAPEUTIC ACTIVITIES: CPT | Mod: GP

## 2018-01-12 PROCEDURE — G8978 MOBILITY CURRENT STATUS: HCPCS | Mod: CK

## 2018-01-12 PROCEDURE — 25000132 ZZH RX MED GY IP 250 OP 250 PS 637: Performed by: ANESTHESIOLOGY

## 2018-01-12 PROCEDURE — 40000193 ZZH STATISTIC PT WARD VISIT

## 2018-01-12 PROCEDURE — 82962 GLUCOSE BLOOD TEST: CPT

## 2018-01-12 PROCEDURE — G8979 MOBILITY GOAL STATUS: HCPCS | Mod: CJ,GP

## 2018-01-12 PROCEDURE — G8980 MOBILITY D/C STATUS: HCPCS | Mod: CK,GP

## 2018-01-12 PROCEDURE — 25000128 H RX IP 250 OP 636: Performed by: NEUROLOGICAL SURGERY

## 2018-01-12 PROCEDURE — 97161 PT EVAL LOW COMPLEX 20 MIN: CPT | Mod: GP

## 2018-01-12 PROCEDURE — 97116 GAIT TRAINING THERAPY: CPT | Mod: GP

## 2018-01-12 PROCEDURE — 25000132 ZZH RX MED GY IP 250 OP 250 PS 637: Performed by: NEUROLOGICAL SURGERY

## 2018-01-12 RX ADMIN — LAMOTRIGINE 250 MG: 100 TABLET ORAL at 08:14

## 2018-01-12 RX ADMIN — BUPROPION HYDROCHLORIDE 450 MG: 150 TABLET, FILM COATED, EXTENDED RELEASE ORAL at 08:14

## 2018-01-12 RX ADMIN — OXYCODONE HYDROCHLORIDE 5 MG: 5 TABLET ORAL at 05:15

## 2018-01-12 RX ADMIN — MORPHINE SULFATE 30 MG: 30 TABLET, EXTENDED RELEASE ORAL at 08:13

## 2018-01-12 RX ADMIN — OXYCODONE HYDROCHLORIDE 5 MG: 5 TABLET ORAL at 10:03

## 2018-01-12 RX ADMIN — CYCLOBENZAPRINE HYDROCHLORIDE 10 MG: 10 TABLET, FILM COATED ORAL at 11:07

## 2018-01-12 RX ADMIN — HYDROMORPHONE HYDROCHLORIDE 8 MG: 4 TABLET ORAL at 14:39

## 2018-01-12 RX ADMIN — HYDROMORPHONE HYDROCHLORIDE 8 MG: 4 TABLET ORAL at 01:22

## 2018-01-12 RX ADMIN — MORPHINE SULFATE 30 MG: 30 TABLET, EXTENDED RELEASE ORAL at 14:10

## 2018-01-12 RX ADMIN — ONDANSETRON 4 MG: 2 INJECTION INTRAMUSCULAR; INTRAVENOUS at 12:06

## 2018-01-12 RX ADMIN — ARIPIPRAZOLE 10 MG: 10 TABLET ORAL at 08:14

## 2018-01-12 RX ADMIN — HYDROXYZINE HYDROCHLORIDE 50 MG: 25 TABLET ORAL at 05:15

## 2018-01-12 RX ADMIN — HYDROMORPHONE HYDROCHLORIDE 8 MG: 4 TABLET ORAL at 10:06

## 2018-01-12 RX ADMIN — OMEPRAZOLE 40 MG: 20 CAPSULE, DELAYED RELEASE ORAL at 08:13

## 2018-01-12 NOTE — PLAN OF CARE
Problem: Patient Care Overview  Goal: Plan of Care/Patient Progress Review  Outcome: Improving  PRIMARY DIAGNOSIS/PROCEDURE: Left side sacroiliac joint fusion with 2 implants. 2) extraction and use of illac bone graft. 3)Intraoperative fluoroscopic imaging and electrophysiological monitoring  OUTPATIENT/OBSERVATION GOALS TO BE MET BEFORE DISCHARGE:    1. Stable vital signs Yes  2. Tolerating diet:Yes  3. Pain controlled with oral pain medications:  Yes  4. Positive bowel sounds:  Yes  5. Voiding without difficulty:  Yes  6. Able to ambulate:  No  7. Provider specific discharge goals met:  No    Patient complains of pain 7-8/ to to left hip/leg, pain goal 5/10.  PO dilaudid for pain.  Wedge in place under left hip, ice removed for now.  PCDs ordered, will place once those arrive. Capnography: eCO2 35, IPI 8-10. Left wounds x 2, 4 x 4 dressings, CDI. Voiding well, PO intake good, Has not been out of bed since arriving from PACU; was hovered from bed to bed. Once ambulatory oob left light toe touch with walker.

## 2018-01-12 NOTE — PROGRESS NOTES
"SPIRITUAL HEALTH SERVICES  SPIRITUAL ASSESSMENT Progress Note  [facility] 2nd floor Obs    PRIMARY FOCUS:     Goals of care    Symptom/pain management    Emotional/spiritual/Yarsani distress    Support for coping    ILLNESS CIRCUMSTANCES:   Reviewed documentation. Reflective conversation shared with Oleg, \"Jhonathan\" which integrated elements of illness and family narratives.     Context of Serious Illness/Symptom(s) - Jhonathan was admitted for back surgery    Persons/Resources Involved - Son in [place]    DISTRESS:     Emotional/Existential/Relational Distress - Oleg talked at length about much loss he has experienced in the last 2 years. He has lost a grandmother, he has lost friends, his son,(who was 24 at the time) had a traumatic snowmobile accident and was airlifted to the hospital), and he has been living with chronic back pain. He loves to fish and has been unable to due to his back pain. His only relational connections are a son and friends. He stated that he \"often has suicidal thoughts\". He stated that he does have a therapist and said \"when I get scared that I am going to kill myself I go to the Emergency Dept and they put me in for treatment.\"    Spiritual/Catholic Distress - Jhonathan said he grew up Lutheran, going to Lutheran school, but no longer \"feels a connection with God.\" He does not attend [employer].    Social/Cultural/Economic Distress - Jhonathan stated that about 3 years ago he had to stop working due to his back pain. He had been a  for 30 years, but when the pain became so bad he was not able to sit in a truck with all of the \"bouncing\". After losing his job he said \"I lost everything I had.\" He said right now he would be homeless but a friend and his wife have opened their home to him and he is living with them. He said he was just approved for Disability.    SPIRIT (Coping):     Orthodox/Misty - Jhonathan does still claim the  Lutheran misty and did take communion from the [employer]  in " "the hospital today.    Spiritual Practice(s) - None at this time.    Emotional/Existential/Relational Connections - Jhonathan said his son lives in [place] and he has several friends.    SENSE-MAKING:    Goals of Care - Jhonathan's goal is to be relieved of his pain and be able to live as normal a life as possible.     Meaning/Sense-Making - Jhonathan said that fishing is what he most enjoys in life and he wants to get back at it. He said sometimes he would fish for 10-12 hours at a time. Jhonathan said his friend and his wife have been \"so kind to open their home.\" Jhonathan is living with them while he does not have any other place to live. Jhonathan said his relationship with his son is important to him.    PLAN: Spiritual health services remain available.      Alyssa Dawn  Chaplain Resident  Pager [phone]    "

## 2018-01-12 NOTE — PLAN OF CARE
Problem: Patient Care Overview  Goal: Plan of Care/Patient Progress Review  PRIMARY DIAGNOSIS/PROCEDURE: Left side sacroiliac joint fusion with 2 implants. 2) extraction and use of illac bone graft. 3)Intraoperative fluoroscopic imaging and electrophysiological monitoring  OUTPATIENT/OBSERVATION GOALS TO BE MET BEFORE DISCHARGE:      1. Stable vital signs Yes  2. Tolerating diet:Yes  3. Pain controlled with oral pain medications:  Yes  4. Positive bowel sounds:  Yes  5. Voiding without difficulty:  Yes  6. Able to ambulate:  No  7. Provider specific discharge goals met:  No      Patient complains of pain 7-8/ to to left hip/leg, pain goal 5/10.  PO dilaudid for pain.  Wedge in place under left hip, ice removed for now.  PCDs on. Capnography WNL.  Left wounds x 2, 4 x 4 dressings, CDI. Voiding well, PO intake good. Pt dangled feet.  Once ambulatory oob left light toe touch with walker.   Will continue to monitor and provide supportive cares.

## 2018-01-12 NOTE — PLAN OF CARE
Problem: Patient Care Overview  Goal: Plan of Care/Patient Progress Review  Outcome: Improving  PRIMARY DIAGNOSIS/PROCEDURE: Left side sacroiliac joint fusion with 2 implants. 2) extraction and use of illac bone graft. 3)Intraoperative fluoroscopic imaging and electrophysiological monitoring  OUTPATIENT/OBSERVATION GOALS TO BE MET BEFORE DISCHARGE:     1. Stable vital signs Yes  2. Tolerating diet:Yes  3. Pain controlled with oral pain medications:  Yes  4. Positive bowel sounds:  Yes  5. Voiding without difficulty:  Yes  6. Able to ambulate:  No  7. Provider specific discharge goals met:  No     Patient complains of pain 7-8/ to to left hip/leg, pain goal 5/10.  PO dilaudid for pain.  Wedge in place under left hip, ice removed for now.  PCDs on. Capnography WNL.  Left wounds x 2, 4 x 4 dressings, CDI. Voiding well, PO intake good. Pt dangled feet.  Once ambulatory oob left light toe touch with walker.   Will continue to monitor and provide supportive cares.

## 2018-01-12 NOTE — PHARMACY-ADMISSION MEDICATION HISTORY
Medication reconciliation interview completed by pre-admitting nurse Jesica Cohn, reviewed by pharmacy. Spoke to pt regarding lamictal dose, pt is on 250mg daily. Removed additional (incorrect) orders for 25mg & 200mg daily. No further clarifications needed.       Prior to Admission medications    Medication Sig Last Dose Taking? Auth Provider   oxyCODONE-acetaminophen (PERCOCET)  MG per tablet Take 1-2 tablets by mouth every 4 hours as needed for pain (moderate to severe)  Yes Sebastián Szymanski MD   morphine (MS CONTIN) 30 MG 12 hr tablet Take 1 tablet (30 mg) by mouth 3 times daily 1/10/2018 at Unknown time Yes Cat Shaw MD   HYDROmorphone (DILAUDID) 4 MG tablet Take 2 tablets (8 mg) by mouth 4 times daily as needed for moderate to severe pain (every 4-6hour prn) 1/10/2018 at Unknown time Yes Cat Shaw MD   diazepam (VALIUM) 5 MG tablet Take 1 tablet (5 mg) by mouth nightly as needed 1/10/2018 at Unknown time Yes Cat Shaw MD   cyclobenzaprine (FLEXERIL) 10 MG tablet TAKE ONE TABLET BY MOUTH 3 TIMES A DAY AS NEEDED FOR MUSCLE SPASMS 1/10/2018 at Unknown time Yes Cat Shaw MD   hydrOXYzine (ATARAX) 50 MG tablet Take 1 tablet (50 mg) by mouth every 6 hours as needed for anxiety  Yes Sebastián Szymanski MD   ondansetron (ZOFRAN-ODT) 4 MG ODT tab PLACE 1 OR 2 TABLETS UNDER THE TONGUE 3 TIMES A DAY AS NEEDED FOR NAUSEA Past Week at Unknown time Yes Cat Shaw MD   omeprazole (PRILOSEC) 40 MG capsule TAKE 1 CAPSULE (40 MG) BY MOUTH DAILY TAKE 30-60 MINUTES BEFORE A MEAL. 1/11/2018 at Unknown time Yes Cat Shaw MD   ARIPiprazole (ABILIFY) 10 MG tablet Take 10 mg by mouth daily 1/11/2018 at Unknown time Yes Reported, Patient   lamoTRIgine (LAMICTAL) 150 MG tablet Take 250 mg by mouth daily  1/11/2018 at Unknown time Yes Reported, Patient   hydrOXYzine (VISTARIL) 50 MG capsule Take 50 mg by mouth 2 times daily as needed  1/11/2018 at Unknown  time Yes Reported, Patient   buPROPion (WELLBUTRIN XL) 150 MG 24 hr tablet Take 450 mg by mouth every morning  1/11/2018 at Unknown time Yes Reported, Patient   EPINEPHrine (EPIPEN) 0.3 MG/0.3ML injection Inject 0.3 mLs (0.3 mg) into the muscle once as needed for anaphylaxis  Yes Cat Shaw MD   zolpidem (AMBIEN) 10 MG tablet Take 1 tablet (10 mg) by mouth nightly as needed for sleep More than a month at Unknown time  Cat Shaw MD   rOPINIRole (REQUIP) 0.25 MG tablet Take 1-2 tablets (0.25-0.5 mg) by mouth At Bedtime More than a month at Unknown time  Cat Shaw MD

## 2018-01-12 NOTE — PLAN OF CARE
Problem: Patient Care Overview  Goal: Plan of Care/Patient Progress Review  PT: Patient seen by physical therapy for evaluation and treatment.  Patient with PMH significant for previous L4-S1 fusion with spinal cord stimulation device placement, previous right SI joint fusion, knee repair, anxiety and depression now see POD#1 s/p left SI joint fusion.  Patient currently is homeless, staying with a friend and his wife.  Patient reported that his friend s home has 1 step to enter and then everything is on the same level (bed bath).  Patient reports there is one step between kitchen and dining room.  Patient reported that while he previously had an SI joint fusion on the right, he was WBAT with decreased pain following surgery.  Patient is concerned about level of care he may need at discharge.  Discharge Planner PT   Patient plan for discharge: return to home, patient unsure if he is ready to discharge to home today secondary to uncontrolled pain  Current status: Patient supine upon arrival; had received pain medications 30 min prior to session.  Patient is independent with bed mobility, reporting significant increase in pain with bed mobility (rating pain 10/10).  Patient transferred to standing with SBA and 2WW, verbal cueing to maintain TTWB at left LE.  Patient amb 25 feet x2 with seated rest break required secondary to increased pain.  Patient demonstrates difficulty maintaining left TTWB secondary to decreased endurance/strength for long distance.  Significant education regarding return to home; at this time, I recommend use of wheelchair for primary mode of mobility secondary to patient's increased fatigue, pain and decreased ability to maintain TTWB with ambulation distance longer than 25 feet.  Patient has a wheelchair at home that he can use.  Patient was instructed in stair negotiation (with walker) and therapist also provided alternate choice of using a chair on step (back up into chair, then swing legs  around and stand up on platform step) in order to safely enter home.  Patient's friend and friend's wife are intermittently available for assistance.  Education provided regarding car transfers.  Education provided regarding getting into home from car (approximately 100 feet to enter home; patient will need to use wheelchair to access home).    Barriers to return to prior living situation: uncontrolled pain, limited assistance, limited activity tolerance during TTWB  Recommendations for discharge: home with use of wheelchair for all long distance mobility, walker for short distance mobility  Rationale for recommendations: With use of wheelchair, walker, urinal, modified stair negotiation technique and adequate pain control, patient appropriate for discharge to home.         Entered by: Sara Koenig 01/12/2018 11:59 AM

## 2018-01-12 NOTE — PROGRESS NOTES
DAILY PROGRESS NOTE    Oleg Ford is a 57 year old old male admitted on 1/11/2018 10:51 AM.    Subjective  Overall doing well,      Objective    AAOx3, SEPULVEDA , f/c 4/4   Dangled     Hemoglobin   Date Value Ref Range Status   01/11/2018 14.7 13.3 - 17.7 g/dL Final   ]      Impression / Plan      Plan for today:    Patient doing well  Ambulate with help  PT OT, clearance d/c home

## 2018-01-12 NOTE — PROGRESS NOTES
ROOM # 212-1    Living Situation (if not independent, order SW consult): Ind  Facility name:  : [facility].  See demographics    Activity level at baseline: Ind  Activity level on admit: A x 1 with walker (toe touch on LLE)      Patient registered to observation; given Patient Bill of Rights; given the opportunity to ask questions about observation status and their plan of care.  Patient has been oriented to the observation room, bathroom and call light is in place.    Discussed discharge goals and expectations with patient/family.

## 2018-01-13 NOTE — PROGRESS NOTES
01/12/18 1205   Quick Adds   Type of Visit Initial PT Evaluation   Living Environment   Lives With friend(s)   Living Arrangements house   Home Accessibility stairs to enter home;tub/shower is not walk in;stairs within home   Number of Stairs to Enter Home 4   Number of Stairs Within Home 12   Stair Railings at Home none   Self-Care   Dominant Hand right   Usual Activity Tolerance moderate   Regular Exercise no   Equipment Currently Used at Home cane, straight;crutches;raised toilet;shower chair;walker, rolling;wheelchair, manual  (long handled reacher)   Functional Level Prior   Ambulation 0-->independent   Transferring 0-->independent   Toileting 0-->independent   Bathing 0-->independent   Dressing 0-->independent   Eating 0-->independent   Communication 0-->understands/communicates without difficulty   Swallowing 0-->swallows foods/liquids without difficulty   Cognition 0 - no cognition issues reported   Fall history within last six months no   Which of the above functional risks had a recent onset or change? none   General Information   Onset of Illness/Injury or Date of Surgery - Date 01/11/18   Patient/Family Goals Statement return to home with friends   Pertinent History of Current Problem (include personal factors and/or comorbidities that impact the POC) Patient with PMH significant for previous L4-S1 fusion with spinal cord stimulation device placement, previous right SI joint fusion, knee repair, anxiety and depression now see POD#1 s/p left SI joint fusion.     Precautions/Limitations fall precautions   Weight-Bearing Status - LLE toe touch weight-bearing   Cognitive Status Examination   Orientation orientation to person, place and time   Pain Assessment   Patient Currently in Pain Yes, see Vital Sign flowsheet  (10/10 with mobility)   Integumentary/Edema   Integumentary/Edema Comments mild edema at incision site   Posture    Posture Not impaired   Range of Motion (ROM)   ROM Comment limited active  "left hip mobility   Strength   Strength Comments strength deficits noted in UEs apparent during UE support on walker   Bed Mobility   Bed Mobility Comments independent with significant increase in pain   Transfer Skills   Transfer Comments SBA with 2WW, verbal cues for TTWB   Gait   Gait Comments Patient amb 25 feet x2 with seated rest break required secondary to increased pain.  Patient demonstrates difficulty maintaining left TTWB secondary to decreased endurance/strength for long distance.    Balance   Balance Comments SBA for mobility   Sensory Examination   Sensory Perception no deficits were identified   Coordination   Coordination no deficits were identified   Muscle Tone   Muscle Tone no deficits were identified   General Therapy Interventions   Planned Therapy Interventions bed mobility training;gait training;strengthening;transfer training;home program guidelines;progressive activity/exercise   Clinical Impression   Criteria for Skilled Therapeutic Intervention yes, treatment indicated   PT Diagnosis decreased independence with mobility   Clinical Presentation Stable/Uncomplicated   Clinical Presentation Rationale clinical judgement   Clinical Decision Making (Complexity) Low complexity   Therapy Frequency` (1x eval and treat)   Predicted Duration of Therapy Intervention (days/wks) 1x eval and treat   Anticipated Equipment Needs at Discharge (will require WC)   Anticipated Discharge Disposition Home   Risk & Benefits of therapy have been explained Yes   Patient, Family & other staff in agreement with plan of care Yes   [facility] [employer]PAC TM \"6 Clicks\"   2016, Trustees of [facility], under license to [employer].  All rights reserved.   6 Clicks Short Forms Basic Mobility Inpatient Short Form   [facility] AM-PAC  \"6 Clicks\" V.2 Basic Mobility Inpatient Short Form   1. Turning from your back to your side while in a flat bed without using bedrails? 4 - None   2. Moving from lying on your " back to sitting on the side of a flat bed without using bedrails? 4 - None   3. Moving to and from a bed to a chair (including a wheelchair)? 4 - None   4. Standing up from a chair using your arms (e.g., wheelchair, or bedside chair)? 4 - None   5. To walk in hospital room? 3 - A Little   6. Climbing 3-5 steps with a railing? 2 - A Lot   Basic Mobility Raw Score (Score out of 24.Lower scores equate to lower levels of function) 21   Total Evaluation Time   Total Evaluation Time (Minutes) 5

## 2018-01-13 NOTE — PLAN OF CARE
Problem: Patient Care Overview  Goal: Plan of Care/Patient Progress Review  Outcome: Adequate for Discharge Date Met: 01/12/18  Patient's After Visit Summary was reviewed with patient and/or family.   Patient verbalized understanding of After Visit Summary, recommended follow up and was given an opportunity to ask questions.   Discharge medications sent home with patient/family: YES, oxy   Discharged with son

## 2018-01-13 NOTE — PLAN OF CARE
Problem: Patient Care Overview  Goal: Plan of Care/Patient Progress Review  Physical Therapy Discharge Summary    Reason for therapy discharge:    Discharged to home.    Progress towards therapy goal(s). See goals on Care Plan in [facility] electronic health record for goal details.  Goals not met.  Barriers to achieving goals:   discharge from facility.    Therapy recommendation(s):    No further therapy is recommended.     Note: Pt not seen by documenting PT on this date. Information obtained from chart review.

## 2018-01-13 NOTE — PROGRESS NOTES
Patient's After Visit Summary was reviewed with patient and/or family.   Patient verbalized understanding of After Visit Summary, recommended follow up and was given an opportunity to ask questions.   Discharge medications sent home with patient/family: YES   Discharged with other: family

## 2018-01-17 DIAGNOSIS — K21.00 GASTROESOPHAGEAL REFLUX DISEASE WITH ESOPHAGITIS: ICD-10-CM

## 2018-01-17 RX ORDER — OMEPRAZOLE 40 MG/1
CAPSULE, DELAYED RELEASE ORAL
Qty: 30 CAPSULE | Refills: 10 | Status: SHIPPED | OUTPATIENT
Start: 2018-01-17 | End: 2018-12-07

## 2018-01-17 NOTE — TELEPHONE ENCOUNTER
"Requested Prescriptions   Pending Prescriptions Disp Refills     omeprazole (PRILOSEC) 40 MG capsule [Pharmacy Med Name: OMEPRAZOLE DR 40 MG CAPSULE] 30 capsule 0     Sig: TAKE ONE CAPSULE BY MOUTH EVERY DAY 30 TO 60 MINUTES BEFORE A MEAL    PPI Protocol Passed    1/17/2018  1:47 PM       Passed - Not on Clopidogrel (unless Pantoprazole ordered)       Passed - No diagnosis of osteoporosis on record       Passed - Recent or future visit with authorizing provider's specialty    Patient had office visit in the last year or has a visit in the next 30 days with authorizing provider.  See \"Patient Info\" tab in inbasket, or \"Choose Columns\" in Meds & Orders section of the refill encounter.            Passed - Patient is age 18 or older        Last OV: 1/5/18    Prescription approved per [facility] Refill Protocol.    Jeanna Moreau RN, BSN    "

## 2018-01-24 ENCOUNTER — DOCUMENTATION ONLY (OUTPATIENT)
Dept: OTHER | Facility: CLINIC | Age: 58
End: 2018-01-24

## 2018-01-24 PROBLEM — Z71.89 ADVANCED DIRECTIVES, COUNSELING/DISCUSSION: Chronic | Status: ACTIVE | Noted: 2018-01-24

## 2018-01-31 ENCOUNTER — OFFICE VISIT (OUTPATIENT)
Dept: FAMILY MEDICINE | Facility: CLINIC | Age: 58
End: 2018-01-31
Payer: MEDICARE

## 2018-01-31 VITALS
BODY MASS INDEX: 39.47 KG/M2 | WEIGHT: 291.4 LBS | SYSTOLIC BLOOD PRESSURE: 118 MMHG | TEMPERATURE: 98.5 F | HEART RATE: 80 BPM | RESPIRATION RATE: 20 BRPM | HEIGHT: 72 IN | DIASTOLIC BLOOD PRESSURE: 80 MMHG

## 2018-01-31 DIAGNOSIS — G89.4 CHRONIC PAIN SYNDROME: ICD-10-CM

## 2018-01-31 DIAGNOSIS — R50.9 FEVER, UNSPECIFIED FEVER CAUSE: ICD-10-CM

## 2018-01-31 DIAGNOSIS — M51.369 DDD (DEGENERATIVE DISC DISEASE), LUMBAR: Primary | ICD-10-CM

## 2018-01-31 LAB
FLUAV+FLUBV AG SPEC QL: NEGATIVE
FLUAV+FLUBV AG SPEC QL: NEGATIVE
SPECIMEN SOURCE: NORMAL

## 2018-01-31 PROCEDURE — 99213 OFFICE O/P EST LOW 20 MIN: CPT | Performed by: FAMILY MEDICINE

## 2018-01-31 PROCEDURE — 87804 INFLUENZA ASSAY W/OPTIC: CPT | Performed by: FAMILY MEDICINE

## 2018-01-31 RX ORDER — DIAZEPAM 5 MG
5 TABLET ORAL
Qty: 30 TABLET | Refills: 0 | Status: SHIPPED | OUTPATIENT
Start: 2018-01-31 | End: 2018-02-28

## 2018-01-31 RX ORDER — MORPHINE SULFATE 30 MG/1
30 TABLET, FILM COATED, EXTENDED RELEASE ORAL 3 TIMES DAILY
Qty: 90 TABLET | Refills: 0 | Status: SHIPPED | OUTPATIENT
Start: 2018-01-31 | End: 2018-02-28

## 2018-01-31 RX ORDER — HYDROMORPHONE HYDROCHLORIDE 4 MG/1
8 TABLET ORAL 4 TIMES DAILY PRN
Qty: 180 TABLET | Refills: 0 | Status: SHIPPED | OUTPATIENT
Start: 2018-01-31 | End: 2018-02-28

## 2018-01-31 NOTE — NURSING NOTE
Chief Complaint   Patient presents with     Refill Request       Initial /80 (BP Location: Right arm, Patient Position: Sitting, Cuff Size: Adult Large)  Pulse 80  Temp 98.5  F (36.9  C) (Oral)  Resp 20  Ht 1.829 m (6')  Wt 132.2 kg (291 lb 6.4 oz)  BMI 39.52 kg/m2 Estimated body mass index is 39.52 kg/(m^2) as calculated from the following:    Height as of this encounter: 1.829 m (6').    Weight as of this encounter: 132.2 kg (291 lb 6.4 oz).  Medication Reconciliation: complete     Chapis Javier

## 2018-01-31 NOTE — PROGRESS NOTES
SUBJECTIVE:   Oleg Ford is a 57 year old male who presents to clinic today for the following health issues:      Back Pain Follow Up      Description:   Location of pain:  bilateral  Character of pain: dull ache  Pain radiation: radiates into the right buttocks, radiates into the left buttocks and radiates below the knee   Since last visit, pain is:  unchanged  New numbness or weakness in legs, not attributed to pain:  no     Intensity: Currently 7/10    History:   Pain interferes with job: Not applicable  Therapies tried without relief: everything  Therapies tried with relief: pain meds and surgery     SUBJECTIVE:  Here today in follow-up of degenerative lumbar and SI joint disease and resultant chronic pain.  Couple weeks status post SI joint fusion.  Still too early to tell how much it helped.  This is the second side.  Today he suddenly came down with fevers, chills, aches.  Denies any respiratory symptoms such as stuffy nose, sore throat, cough.  No GI symptoms.  Just feels tired and feverish.  No known exposures.    Review of systems otherwise negative.  Past medical, family, and social history reviewed and updated in chart.    OBJECTIVE:  /80 (BP Location: Right arm, Patient Position: Sitting, Cuff Size: Adult Large)  Pulse 80  Temp 98.5  F (36.9  C) (Oral)  Resp 20  Ht 1.829 m (6')  Wt 132.2 kg (291 lb 6.4 oz)  BMI 39.52 kg/m2  Alert, pleasant, upbeat, and in no apparent discomfort.   skin somewhat clammy but not hot  Ears normal. Throat and pharynx normal. Neck supple. No adenopathy or masses in the neck or supraclavicular regions. Sinuses non tender.  S1 and S2 normal, no murmurs, clicks, gallops or rubs. Regular rate and rhythm. Chest is clear; no wheezes or rales. No edema or JVD.  Past labs reviewed with the patient.   Rapid flu negative    ASSESSMENT / PLAN:  (M51.36) DDD (degenerative disc disease), lumbar  (primary encounter diagnosis)  Comment: Stable on current dosage.   Refilled  Plan: morphine (MS CONTIN) 30 MG 12 hr tablet,         HYDROmorphone (DILAUDID) 4 MG tablet, diazepam         (VALIUM) 5 MG tablet            (G89.4) Chronic pain syndrome  Comment: as above   Plan: morphine (MS CONTIN) 30 MG 12 hr tablet,         HYDROmorphone (DILAUDID) 4 MG tablet, diazepam         (VALIUM) 5 MG tablet            (R50.9) Fever, unspecified fever cause  Comment: Onset of illness likely but at this point does not seem to be influenza.  Patient will go home and rest and we will see how it goes  Plan: Influenza A/B antigen            Follow up 1 month  S. Tenzin Shaw MD    (Chart documentation completed in part with Dragon voice-recognition software.  Even though reviewed some grammatical, spelling, and word errors may remain.)

## 2018-01-31 NOTE — MR AVS SNAPSHOT
After Visit Summary   1/31/2018    Oelg Ford    MRN: [id number]           Patient Information     Date Of Birth          1960        Visit Information        Provider Department      1/31/2018 11:40 AM Cat Shaw MD [facility]        Today's Diagnoses     DDD (degenerative disc disease), lumbar    -  1    Chronic pain syndrome        Fever, unspecified fever cause           Follow-ups after your visit        Follow-up notes from your care team     Return in about 1 month (around 2/28/2018).      Who to contact     If you have questions or need follow up information about today's clinic visit or your schedule please contact [facility] directly at [phone].  Normal or non-critical lab and imaging results will be communicated to you by MyChart, letter or phone within 4 business days after the clinic has received the results. If you do not hear from us within 7 days, please contact the clinic through [employer] or phone. If you have a critical or abnormal lab result, we will notify you by phone as soon as possible.  Submit refill requests through [employer] or call your pharmacy and they will forward the refill request to us. Please allow 3 business days for your refill to be completed.          Additional Information About Your Visit        MyChart Information     [employer] gives you secure access to your electronic health record. If you see a primary care provider, you can also send messages to your care team and make appointments. If you have questions, please call your primary care clinic.  If you do not have a primary care provider, please call [phone] and they will assist you.        Care EveryWhere ID     This is your Care EveryWhere ID. This could be used by other organizations to access your [place] medical records  [id number]        Your Vitals Were     Pulse Temperature Respirations Height BMI (Body Mass Index)       80 98.5  F (36.9   C) (Oral) 20 1.829 m (6') 39.52 kg/m2        Blood Pressure from Last 3 Encounters:   01/31/18 118/80   01/12/18 102/72   01/05/18 120/80    Weight from Last 3 Encounters:   01/31/18 132.2 kg (291 lb 6.4 oz)   01/11/18 132.9 kg (293 lb)   01/05/18 134.4 kg (296 lb 6.4 oz)              We Performed the Following     Influenza A/B antigen          Where to get your medicines      Some of these will need a paper prescription and others can be bought over the counter.  Ask your nurse if you have questions.     Bring a paper prescription for each of these medications     diazepam 5 MG tablet    HYDROmorphone 4 MG tablet    morphine 30 MG 12 hr tablet          Primary Care Provider Office Phone # Fax #    Cat Tenzin Shaw [phone] [address] [facility] [id number]        Equal Access to Services     [facility] : Hadii mayuri ku hadasho Soomaali, waaxda luqadaha, qaybta kaalmada adeegyada, waxay francescain hayderick ortiz . So [facility] [phone].    ATENCIÓN: Si sorin esphector, tiene a gongora disposición servicios gratuitos de asistencia lingüística. Llame al [phone].    We comply with applicable federal civil rights laws and [place] laws. We do not discriminate on the basis of race, color, national origin, age, disability, sex, sexual orientation, or gender identity.            Thank you!     Thank you for choosing [facility]  for your care. Our goal is always to provide you with excellent care. Hearing back from our patients is one way we can continue to improve our services. Please take a few minutes to complete the written survey that you may receive in the mail after your visit with us. Thank you!             Your Updated Medication List - Protect others around you: Learn how to safely use, store and throw away your medicines at www.disposemymeds.org.          This list is accurate as of 1/31/18 12:09 PM.  Always use your most recent med list.                    Brand Name Dispense Instructions for use Diagnosis    ABILIFY 10 MG tablet   Generic drug:  ARIPiprazole      Take 10 mg by mouth daily        buPROPion 150 MG 24 hr tablet    WELLBUTRIN XL     Take 450 mg by mouth every morning        cyclobenzaprine 10 MG tablet    FLEXERIL    90 tablet    TAKE ONE TABLET BY MOUTH 3 TIMES A DAY AS NEEDED FOR MUSCLE SPASMS    DDD (degenerative disc disease), lumbar       diazepam 5 MG tablet    VALIUM    30 tablet    Take 1 tablet (5 mg) by mouth nightly as needed    DDD (degenerative disc disease), lumbar, Chronic pain syndrome       EPINEPHrine 0.3 MG/0.3ML injection 2-pack    EPIPEN/ADRENACLICK/or ANY BX GENERIC EQUIV    1 each    Inject 0.3 mLs (0.3 mg) into the muscle once as needed for anaphylaxis    Bee sting allergy       HYDROmorphone 4 MG tablet    DILAUDID    180 tablet    Take 2 tablets (8 mg) by mouth 4 times daily as needed for moderate to severe pain (every 4-6hour prn)    Chronic pain syndrome, DDD (degenerative disc disease), lumbar       hydrOXYzine 50 MG capsule    VISTARIL     Take 50 mg by mouth 2 times daily as needed        hydrOXYzine 50 MG tablet    ATARAX    120 tablet    Take 1 tablet (50 mg) by mouth every 6 hours as needed for anxiety    Chronic pain syndrome       LAMICTAL 150 MG tablet   Generic drug:  lamoTRIgine      Take 250 mg by mouth daily        morphine 30 MG 12 hr tablet    MS CONTIN    90 tablet    Take 1 tablet (30 mg) by mouth 3 times daily    Chronic pain syndrome, DDD (degenerative disc disease), lumbar       omeprazole 40 MG capsule    priLOSEC    30 capsule    TAKE ONE CAPSULE BY MOUTH EVERY DAY 30 TO 60 MINUTES BEFORE A MEAL    Gastroesophageal reflux disease with esophagitis       ondansetron 4 MG ODT tab    ZOFRAN-ODT    20 tablet    PLACE 1 OR 2 TABLETS UNDER THE TONGUE 3 TIMES A DAY AS NEEDED FOR NAUSEA    Nausea       oxyCODONE-acetaminophen  MG per tablet    PERCOCET    60 tablet    Take 1-2 tablets by mouth every  4 hours as needed for pain (moderate to severe)    SI (sacroiliac) joint dysfunction       rOPINIRole 0.25 MG tablet    REQUIP    60 tablet    Take 1-2 tablets (0.25-0.5 mg) by mouth At Bedtime    RLS (restless legs syndrome)       zolpidem 10 MG tablet    AMBIEN    30 tablet    Take 1 tablet (10 mg) by mouth nightly as needed for sleep    Insomnia, unspecified insomnia

## 2018-02-27 NOTE — PLAN OF CARE
[facility]      OUTPATIENT PHYSICAL THERAPY EVALUATION  PLAN OF TREATMENT FOR OUTPATIENT REHABILITATION  (COMPLETE FOR INITIAL CLAIMS ONLY)  Patient's Last Name, First Name, M.I.  YOB: 1960  Oleg Ford                        Provider's Name  [facility] Medical Record No.  [id number]                               Onset Date:  01/11/18   Start of Care Date:    1/12/18     Type:     _X_PT   ___OT   ___SLP Medical Diagnosis:                        Difficulty walking   PT Diagnosis:  decreased independence with mobility   Visits from SOC:  1   _________________________________________________________________________________  Plan of Treatment/Functional Goals    Planned Interventions:  ,    bed mobility training, gait training, strengthening, transfer training, home program guidelines, progressive activity/exercise,       Goals: See Physical Therapy Goals on Care Plan in [employer] electronic health record.    Therapy Frequency:  (1x eval and treat)  Predicted Duration of Therapy Intervention: 1x eval and treat  _________________________________________________________________________________    I CERTIFY THE NEED FOR THESE SERVICES FURNISHED UNDER        THIS PLAN OF TREATMENT AND WHILE UNDER MY CARE     (Physician co-signature of this document indicates review and certification of the therapy plan).                 ,                   Initial Assessment        See Physical Therapy evaluation dated   in [employer] electronic health record.

## 2018-02-28 ENCOUNTER — TELEPHONE (OUTPATIENT)
Dept: FAMILY MEDICINE | Facility: CLINIC | Age: 58
End: 2018-02-28

## 2018-02-28 ENCOUNTER — OFFICE VISIT (OUTPATIENT)
Dept: FAMILY MEDICINE | Facility: CLINIC | Age: 58
End: 2018-02-28
Payer: COMMERCIAL

## 2018-02-28 VITALS
RESPIRATION RATE: 18 BRPM | BODY MASS INDEX: 39.16 KG/M2 | SYSTOLIC BLOOD PRESSURE: 124 MMHG | HEART RATE: 78 BPM | OXYGEN SATURATION: 97 % | WEIGHT: 289.1 LBS | DIASTOLIC BLOOD PRESSURE: 80 MMHG | HEIGHT: 72 IN | TEMPERATURE: 97.6 F

## 2018-02-28 DIAGNOSIS — G89.4 CHRONIC PAIN SYNDROME: ICD-10-CM

## 2018-02-28 DIAGNOSIS — M51.369 DDD (DEGENERATIVE DISC DISEASE), LUMBAR: ICD-10-CM

## 2018-02-28 PROCEDURE — 99213 OFFICE O/P EST LOW 20 MIN: CPT | Performed by: FAMILY MEDICINE

## 2018-02-28 RX ORDER — DIAZEPAM 5 MG
5 TABLET ORAL
Qty: 30 TABLET | Refills: 0 | Status: SHIPPED | OUTPATIENT
Start: 2018-02-28 | End: 2018-05-02

## 2018-02-28 RX ORDER — MORPHINE SULFATE 30 MG/1
30 TABLET, FILM COATED, EXTENDED RELEASE ORAL 3 TIMES DAILY
Qty: 90 TABLET | Refills: 0 | Status: SHIPPED | OUTPATIENT
Start: 2018-02-28 | End: 2018-03-27

## 2018-02-28 RX ORDER — HYDROMORPHONE HYDROCHLORIDE 4 MG/1
8 TABLET ORAL 4 TIMES DAILY PRN
Qty: 180 TABLET | Refills: 0 | Status: SHIPPED | OUTPATIENT
Start: 2018-02-28 | End: 2018-03-27

## 2018-02-28 ASSESSMENT — ANXIETY QUESTIONNAIRES
3. WORRYING TOO MUCH ABOUT DIFFERENT THINGS: NEARLY EVERY DAY
6. BECOMING EASILY ANNOYED OR IRRITABLE: MORE THAN HALF THE DAYS
2. NOT BEING ABLE TO STOP OR CONTROL WORRYING: NEARLY EVERY DAY
5. BEING SO RESTLESS THAT IT IS HARD TO SIT STILL: SEVERAL DAYS
IF YOU CHECKED OFF ANY PROBLEMS ON THIS QUESTIONNAIRE, HOW DIFFICULT HAVE THESE PROBLEMS MADE IT FOR YOU TO DO YOUR WORK, TAKE CARE OF THINGS AT HOME, OR GET ALONG WITH OTHER PEOPLE: VERY DIFFICULT
1. FEELING NERVOUS, ANXIOUS, OR ON EDGE: NEARLY EVERY DAY
7. FEELING AFRAID AS IF SOMETHING AWFUL MIGHT HAPPEN: NEARLY EVERY DAY
GAD7 TOTAL SCORE: 18

## 2018-02-28 ASSESSMENT — PATIENT HEALTH QUESTIONNAIRE - PHQ9: 5. POOR APPETITE OR OVEREATING: NEARLY EVERY DAY

## 2018-02-28 NOTE — PROGRESS NOTES
SUBJECTIVE:   Oleg Ford is a 57 year old male who presents to clinic today for the following health issues:      Back Pain Follow Up      Description:   Location of pain:  bilateral  Character of pain: sharp  Pain radiation: radiates into the right buttocks and radiates into the left buttocks  Since last visit, pain is:  improved  New numbness or weakness in legs, not attributed to pain:  YES    Intensity: Currently 6/10    History:   Pain interferes with job: Not applicable  Therapies tried without relief: acetaminophen (Tylenol)  Therapies tried with relief: opioids     SUBJECTIVE:  Here today in follow-up of chronic back pain.  Is now status post SI joint fusion bilaterally and he thinks things are helping.  Reliance on the pain medication is going down.  He is feeling better, especially with lying on his sides which used to be quite painful.  No complications from the surgery so far.  Patient reports no side effects from medications, and desires no change in therapy.     Review of systems otherwise negative.  Past medical, family, and social history reviewed and updated in chart.    OBJECTIVE:  /80 (BP Location: Right arm, Patient Position: Sitting, Cuff Size: Adult Large)  Pulse 78  Temp 97.6  F (36.4  C) (Oral)  Resp 18  Ht 1.829 m (6')  Wt 131.1 kg (289 lb 1.6 oz)  SpO2 97%  BMI 39.21 kg/m2  Alert, pleasant, upbeat, and in no apparent discomfort.  S1 and S2 normal, no murmurs, clicks, gallops or rubs. Regular rate and rhythm. Chest is clear; no wheezes or rales. No edema or JVD.  Past labs reviewed with the patient.      ASSESSMENT / PLAN:  (G89.4) Chronic pain syndrome  Comment: stable on his current dosage.  In fact he is decreasing a bit and we will continue to follow along with this  Plan: morphine (MS CONTIN) 30 MG 12 hr tablet,         HYDROmorphone (DILAUDID) 4 MG tablet, diazepam         (VALIUM) 5 MG tablet            (M51.36) DDD (degenerative disc disease), lumbar  Comment: as  above   Plan: morphine (MS CONTIN) 30 MG 12 hr tablet,         HYDROmorphone (DILAUDID) 4 MG tablet, diazepam         (VALIUM) 5 MG tablet            Follow up 1 month  S. Tenzin Shaw MD    (Chart documentation completed in part with Dragon voice-recognition software.  Even though reviewed some grammatical, spelling, and word errors may remain.)

## 2018-02-28 NOTE — MR AVS SNAPSHOT
After Visit Summary   2/28/2018    Oleg Ford    MRN: [id number]           Patient Information     Date Of Birth          1960        Visit Information        Provider Department      2/28/2018 11:40 AM Cat Shaw MD [facility]        Today's Diagnoses     Chronic pain syndrome        DDD (degenerative disc disease), lumbar           Follow-ups after your visit        Follow-up notes from your care team     Return in about 1 month (around 3/28/2018).      Your next 10 appointments already scheduled     Mar 27, 2018 11:40 AM CDT   [facility]    [address] [facility] [address] [phone]           Bring a current list of meds and any records pertaining to this visit. For Physicals, please bring immunization records and any forms needing to be filled out. Please arrive 10 minutes early to complete paperwork.              Who to contact     If you have questions or need follow up information about today's clinic visit or your schedule please contact [facility] directly at [phone].  Normal or non-critical lab and imaging results will be communicated to you by MyChart, letter or phone within 4 business days after the clinic has received the results. If you do not hear from us within 7 days, please contact the clinic through [employer] or phone. If you have a critical or abnormal lab result, we will notify you by phone as soon as possible.  Submit refill requests through [employer] or call your pharmacy and they will forward the refill request to us. Please allow 3 business days for your refill to be completed.          Additional Information About Your Visit        MyChart Information     [employer] gives you secure access to your electronic health record. If you see a primary care provider, you can also send messages to your care team and make  appointments. If you have questions, please call your primary care clinic.  If you do not have a primary care provider, please call [phone] and they will assist you.        Care EveryWhere ID     This is your Care EveryWhere ID. This could be used by other organizations to access your [facility] medical records  [id number]        Your Vitals Were     Pulse Temperature Respirations Height Pulse Oximetry BMI (Body Mass Index)    78 97.6  F (36.4  C) (Oral) 18 1.829 m (6') 97% 39.21 kg/m2       Blood Pressure from Last 3 Encounters:   02/28/18 124/80   01/31/18 118/80   01/12/18 102/72    Weight from Last 3 Encounters:   02/28/18 131.1 kg (289 lb 1.6 oz)   01/31/18 132.2 kg (291 lb 6.4 oz)   01/11/18 132.9 kg (293 lb)              Today, you had the following     No orders found for display         Where to get your medicines      Some of these will need a paper prescription and others can be bought over the counter.  Ask your nurse if you have questions.     Bring a paper prescription for each of these medications     diazepam 5 MG tablet    HYDROmorphone 4 MG tablet    morphine 30 MG 12 hr tablet          Primary Care Provider Office Phone # Fax #    Cat Tenzin Shaw [phone] [address] [facility] [address]        Equal Access to Services     ALANA HANSON : Hadii mayuri sepulveda Soelizabeth, waaxda luqadaha, qaybta kaalkamala gomez. So [facility] [phone].    ATENCIÓN: Si habla español, tiene a gongora disposición servicios gratuitos de asistencia lingüística. Yulia al [phone].    We comply with applicable federal civil rights laws and [place] laws. We do not discriminate on the basis of race, color, national origin, age, disability, sex, sexual orientation, or gender identity.            Thank you!     Thank you for choosing [facility]  for your care. Our goal is always to provide you with excellent care.  Hearing back from our patients is one way we can continue to improve our services. Please take a few minutes to complete the written survey that you may receive in the mail after your visit with us. Thank you!             Your Updated Medication List - Protect others around you: Learn how to safely use, store and throw away your medicines at www.disposemymeds.org.          This list is accurate as of 2/28/18  2:59 PM.  Always use your most recent med list.                   Brand Name Dispense Instructions for use Diagnosis    ABILIFY 10 MG tablet   Generic drug:  ARIPiprazole      Take 10 mg by mouth daily        buPROPion 150 MG 24 hr tablet    WELLBUTRIN XL     Take 450 mg by mouth every morning        cyclobenzaprine 10 MG tablet    FLEXERIL    90 tablet    TAKE ONE TABLET BY MOUTH 3 TIMES A DAY AS NEEDED FOR MUSCLE SPASMS    DDD (degenerative disc disease), lumbar       diazepam 5 MG tablet    VALIUM    30 tablet    Take 1 tablet (5 mg) by mouth nightly as needed    DDD (degenerative disc disease), lumbar, Chronic pain syndrome       EPINEPHrine 0.3 MG/0.3ML injection 2-pack    EPIPEN/ADRENACLICK/or ANY BX GENERIC EQUIV    1 each    Inject 0.3 mLs (0.3 mg) into the muscle once as needed for anaphylaxis    Bee sting allergy       HYDROmorphone 4 MG tablet    DILAUDID    180 tablet    Take 2 tablets (8 mg) by mouth 4 times daily as needed for moderate to severe pain (every 4-6hour prn)    Chronic pain syndrome, DDD (degenerative disc disease), lumbar       hydrOXYzine 50 MG capsule    VISTARIL     Take 50 mg by mouth 2 times daily as needed        hydrOXYzine 50 MG tablet    ATARAX    120 tablet    Take 1 tablet (50 mg) by mouth every 6 hours as needed for anxiety    Chronic pain syndrome       LAMICTAL 150 MG tablet   Generic drug:  lamoTRIgine      Take 250 mg by mouth daily        morphine 30 MG 12 hr tablet    MS CONTIN    90 tablet    Take 1 tablet (30 mg) by mouth 3 times daily    Chronic pain syndrome, DDD  (degenerative disc disease), lumbar       omeprazole 40 MG capsule    priLOSEC    30 capsule    TAKE ONE CAPSULE BY MOUTH EVERY DAY 30 TO 60 MINUTES BEFORE A MEAL    Gastroesophageal reflux disease with esophagitis       ondansetron 4 MG ODT tab    ZOFRAN-ODT    20 tablet    PLACE 1 OR 2 TABLETS UNDER THE TONGUE 3 TIMES A DAY AS NEEDED FOR NAUSEA    Nausea       oxyCODONE-acetaminophen  MG per tablet    PERCOCET    60 tablet    Take 1-2 tablets by mouth every 4 hours as needed for pain (moderate to severe)    SI (sacroiliac) joint dysfunction       rOPINIRole 0.25 MG tablet    REQUIP    60 tablet    Take 1-2 tablets (0.25-0.5 mg) by mouth At Bedtime    RLS (restless legs syndrome)       zolpidem 10 MG tablet    AMBIEN    30 tablet    Take 1 tablet (10 mg) by mouth nightly as needed for sleep    Insomnia, unspecified insomnia

## 2018-02-28 NOTE — TELEPHONE ENCOUNTER
PA for Morphine Sulf ER 30mg    Phone# [phone]    ID# [id number]    PA or alternative    Mell Peguero

## 2018-02-28 NOTE — NURSING NOTE
Chief Complaint   Patient presents with     Refill Request       Initial /80 (BP Location: Right arm, Patient Position: Sitting, Cuff Size: Adult Large)  Pulse 78  Temp 97.6  F (36.4  C) (Oral)  Resp 18  Ht 1.829 m (6')  Wt 131.1 kg (289 lb 1.6 oz)  SpO2 97%  BMI 39.21 kg/m2 Estimated body mass index is 39.21 kg/(m^2) as calculated from the following:    Height as of this encounter: 1.829 m (6').    Weight as of this encounter: 131.1 kg (289 lb 1.6 oz).  Medication Reconciliation: complete     Chapis Javier

## 2018-03-01 ASSESSMENT — PATIENT HEALTH QUESTIONNAIRE - PHQ9: SUM OF ALL RESPONSES TO PHQ QUESTIONS 1-9: 23

## 2018-03-01 ASSESSMENT — ANXIETY QUESTIONNAIRES: GAD7 TOTAL SCORE: 18

## 2018-03-05 NOTE — TELEPHONE ENCOUNTER
PA not needed.  Gave pharmacist number to call for the PPS code.  Pharmacist will call PA team if there are any issues.

## 2018-03-27 ENCOUNTER — OFFICE VISIT (OUTPATIENT)
Dept: FAMILY MEDICINE | Facility: CLINIC | Age: 58
End: 2018-03-27
Payer: COMMERCIAL

## 2018-03-27 ENCOUNTER — TELEPHONE (OUTPATIENT)
Dept: FAMILY MEDICINE | Facility: CLINIC | Age: 58
End: 2018-03-27

## 2018-03-27 VITALS
OXYGEN SATURATION: 98 % | BODY MASS INDEX: 41.78 KG/M2 | TEMPERATURE: 98.2 F | WEIGHT: 308.5 LBS | HEIGHT: 72 IN | SYSTOLIC BLOOD PRESSURE: 126 MMHG | DIASTOLIC BLOOD PRESSURE: 68 MMHG | HEART RATE: 65 BPM | RESPIRATION RATE: 20 BRPM

## 2018-03-27 DIAGNOSIS — G89.4 CHRONIC PAIN SYNDROME: ICD-10-CM

## 2018-03-27 DIAGNOSIS — M51.369 DDD (DEGENERATIVE DISC DISEASE), LUMBAR: Primary | ICD-10-CM

## 2018-03-27 DIAGNOSIS — M53.3 SI (SACROILIAC) JOINT DYSFUNCTION: ICD-10-CM

## 2018-03-27 DIAGNOSIS — G47.00 INSOMNIA, UNSPECIFIED TYPE: ICD-10-CM

## 2018-03-27 PROCEDURE — 99213 OFFICE O/P EST LOW 20 MIN: CPT | Performed by: FAMILY MEDICINE

## 2018-03-27 RX ORDER — HYDROMORPHONE HYDROCHLORIDE 4 MG/1
8 TABLET ORAL 4 TIMES DAILY PRN
Qty: 180 TABLET | Refills: 0 | Status: SHIPPED | OUTPATIENT
Start: 2018-03-27 | End: 2018-05-02

## 2018-03-27 RX ORDER — ZOLPIDEM TARTRATE 10 MG/1
10 TABLET ORAL
Qty: 30 TABLET | Refills: 2 | Status: SHIPPED | OUTPATIENT
Start: 2018-03-27 | End: 2018-12-09

## 2018-03-27 RX ORDER — MORPHINE SULFATE 30 MG/1
30 TABLET, FILM COATED, EXTENDED RELEASE ORAL 3 TIMES DAILY
Qty: 90 TABLET | Refills: 0 | Status: SHIPPED | OUTPATIENT
Start: 2018-03-27 | End: 2018-05-02

## 2018-03-27 ASSESSMENT — PAIN SCALES - GENERAL: PAINLEVEL: SEVERE PAIN (7)

## 2018-03-27 NOTE — TELEPHONE ENCOUNTER
Reason for Call:  Other prescription    Detailed comments: Oleg just left the clinic and is at [facility] pharmacy. They did not get the zolpidem (AMBIEN) 10 MG tablet prescription. Can you please resend it over to them and call and let Oleg know when it gets sent.  Thank you     Phone Number Patient can be reached at: Home number on file [phone] (home)    Best Time: Any    Can we leave a detailed message on this number? YES    Call taken on 3/27/2018 at 12:23 PM by Jerson Calvert

## 2018-03-27 NOTE — PROGRESS NOTES
SUBJECTIVE:   Oleg Ford is a 58 year old male who presents to clinic today for the following health issues:      Back Pain Follow Up      Description:   Location of pain:  center  Character of pain: sharp  Pain radiation: radiates into the right buttocks and radiates into the left buttocks  Since last visit, pain is:  unchanged  New numbness or weakness in legs, not attributed to pain:  no     Intensity: Currently 7/10    History:   Pain interferes with job: Not applicable  Therapies tried without relief: cold and heat  Therapies tried with relief: opioids     SUBJECTIVE:  Here today in follow-up of chronic back pain related to lumbar disc disease and SI joint dysfunction.  Status post bilateral SI joint fusion.  Continues to improve.  Increasing activity more and more this makes him a little bit sore and some trouble sleeping at night and would like to try Ambien again    Review of systems otherwise negative.  Past medical, family, and social history reviewed and updated in chart.    OBJECTIVE:  /68 (BP Location: Right arm, Patient Position: Sitting, Cuff Size: Adult Large)  Pulse 65  Temp 98.2  F (36.8  C) (Oral)  Resp 20  Ht 1.829 m (6')  Wt 139.9 kg (308 lb 8 oz)  SpO2 98%  BMI 41.84 kg/m2  Alert, pleasant, upbeat, and in no apparent discomfort.  S1 and S2 normal, no murmurs, clicks, gallops or rubs. Regular rate and rhythm. Chest is clear; no wheezes or rales. No edema or JVD.  Past labs reviewed with the patient.     ASSESSMENT / PLAN:  (M51.36) DDD (degenerative disc disease), lumbar  (primary encounter diagnosis)  Comment: Doing well on current dosage.  Stable.  Refilled  Plan: HYDROmorphone (DILAUDID) 4 MG tablet, morphine         (MS CONTIN) 30 MG 12 hr tablet            (M53.3) SI (sacroiliac) joint dysfunction  Comment: As above  Plan: HYDROmorphone (DILAUDID) 4 MG tablet, morphine         (MS CONTIN) 30 MG 12 hr tablet            (G89.4) Chronic pain syndrome  Comment: As  above  Plan: HYDROmorphone (DILAUDID) 4 MG tablet, morphine         (MS CONTIN) 30 MG 12 hr tablet            (G47.00) Insomnia, unspecified type  Comment: Discussed mechanism of action of the proposed medication, as well as potential effects, both good and bad.  Patient expressed understanding and agreed with treatment.   Plan: zolpidem (AMBIEN) 10 MG tablet            Follow up 1 month  SANTIAGO Shaw MD    (Chart documentation completed in part with Dragon voice-recognition software.  Even though reviewed some grammatical, spelling, and word errors may remain.)

## 2018-03-27 NOTE — NURSING NOTE
Chief Complaint   Patient presents with     Refill Request       Initial /68 (BP Location: Right arm, Patient Position: Sitting, Cuff Size: Adult Large)  Pulse 65  Temp 98.2  F (36.8  C) (Oral)  Resp 20  Ht 1.829 m (6')  Wt 139.9 kg (308 lb 8 oz)  SpO2 98%  BMI 41.84 kg/m2 Estimated body mass index is 41.84 kg/(m^2) as calculated from the following:    Height as of this encounter: 1.829 m (6').    Weight as of this encounter: 139.9 kg (308 lb 8 oz).  Medication Reconciliation: complete     Chapis Javier

## 2018-03-27 NOTE — MR AVS SNAPSHOT
After Visit Summary   3/27/2018    Oleg Ford    MRN: [id number]           Patient Information     Date Of Birth          1960        Visit Information        Provider Department      3/27/2018 11:40 AM Cat Shaw MD [facility]        Today's Diagnoses     DDD (degenerative disc disease), lumbar    -  1    SI (sacroiliac) joint dysfunction        Chronic pain syndrome        Insomnia, unspecified type           Follow-ups after your visit        Follow-up notes from your care team     Return in about 1 month (around 4/27/2018).      Your next 10 appointments already scheduled     May 02, 2018  9:40 AM CDT   [facility] with Cat Shaw [facility]   [facility]    [address] [place] [address] [phone]           Bring a current list of meds and any records pertaining to this visit. For Physicals, please bring immunization records and any forms needing to be filled out. Please arrive 10 minutes early to complete paperwork.              Who to contact     If you have questions or need follow up information about today's clinic visit or your schedule please contact [facility] directly at [phone].  Normal or non-critical lab and imaging results will be communicated to you by MyChart, letter or phone within 4 business days after the clinic has received the results. If you do not hear from us within 7 days, please contact the clinic through [employer] or phone. If you have a critical or abnormal lab result, we will notify you by phone as soon as possible.  Submit refill requests through [employer] or call your pharmacy and they will forward the refill request to us. Please allow 3 business days for your refill to be completed.          Additional Information About Your Visit        [employer] Information     [employer] gives you secure access to your electronic health record. If you see  a primary care provider, you can also send messages to your care team and make appointments. If you have questions, please call your primary care clinic.  If you do not have a primary care provider, please call [phone] and they will assist you.        Care EveryWhere ID     This is your Care EveryWhere ID. This could be used by other organizations to access your [facility] medical records  [id number]        Your Vitals Were     Pulse Temperature Respirations Height Pulse Oximetry BMI (Body Mass Index)    65 98.2  F (36.8  C) (Oral) 20 1.829 m (6') 98% 41.84 kg/m2       Blood Pressure from Last 3 Encounters:   03/27/18 126/68   02/28/18 124/80   01/31/18 118/80    Weight from Last 3 Encounters:   03/27/18 139.9 kg (308 lb 8 oz)   02/28/18 131.1 kg (289 lb 1.6 oz)   01/31/18 132.2 kg (291 lb 6.4 oz)              Today, you had the following     No orders found for display         Where to get your medicines      Some of these will need a paper prescription and others can be bought over the counter.  Ask your nurse if you have questions.     Bring a paper prescription for each of these medications     HYDROmorphone 4 MG tablet    morphine 30 MG 12 hr tablet    zolpidem 10 MG tablet          Primary Care Provider Office Phone # Fax #    Cat Tenzin Shaw [phone] [address] [facility] [address]        Equal Access to Services     ALANA HANSON AH: Hadii aad ku hadasho Soomaali, waaxda luqadaha, qaybta kaalmada josé miguelegshannan, kamala smith. So [facility] [phone].    ATENCIÓN: Si habla español, tiene a gongora disposición servicios gratuitos de asistencia lingüística. Llame al [phone].    We comply with applicable federal civil rights laws and [place] laws. We do not discriminate on the basis of race, color, national origin, age, disability, sex, sexual orientation, or gender identity.            Thank you!     Thank you for choosing [facility]  for your care. Our goal is always to provide you with excellent care. Hearing back from our patients is one way we can continue to improve our services. Please take a few minutes to complete the written survey that you may receive in the mail after your visit with us. Thank you!             Your Updated Medication List - Protect others around you: Learn how to safely use, store and throw away your medicines at www.disposemymeds.org.          This list is accurate as of 3/27/18 12:10 PM.  Always use your most recent med list.                   Brand Name Dispense Instructions for use Diagnosis    ABILIFY 10 MG tablet   Generic drug:  ARIPiprazole      Take 10 mg by mouth daily        buPROPion 150 MG 24 hr tablet    WELLBUTRIN XL     Take 450 mg by mouth every morning        cyclobenzaprine 10 MG tablet    FLEXERIL    90 tablet    TAKE ONE TABLET BY MOUTH 3 TIMES A DAY AS NEEDED FOR MUSCLE SPASMS    DDD (degenerative disc disease), lumbar       diazepam 5 MG tablet    VALIUM    30 tablet    Take 1 tablet (5 mg) by mouth nightly as needed    DDD (degenerative disc disease), lumbar, Chronic pain syndrome       EPINEPHrine 0.3 MG/0.3ML injection 2-pack    EPIPEN/ADRENACLICK/or ANY BX GENERIC EQUIV    1 each    Inject 0.3 mLs (0.3 mg) into the muscle once as needed for anaphylaxis    Bee sting allergy       HYDROmorphone 4 MG tablet    DILAUDID    180 tablet    Take 2 tablets (8 mg) by mouth 4 times daily as needed for moderate to severe pain (every 4-6hour prn)    Chronic pain syndrome, DDD (degenerative disc disease), lumbar, SI (sacroiliac) joint dysfunction       hydrOXYzine 50 MG capsule    VISTARIL     Take 50 mg by mouth 2 times daily as needed        hydrOXYzine 50 MG tablet    ATARAX    120 tablet    Take 1 tablet (50 mg) by mouth every 6 hours as needed for anxiety    Chronic pain syndrome       LAMICTAL 150 MG tablet   Generic drug:  lamoTRIgine      Take 250 mg by mouth daily        morphine 30 MG  12 hr tablet    MS CONTIN    90 tablet    Take 1 tablet (30 mg) by mouth 3 times daily    Chronic pain syndrome, DDD (degenerative disc disease), lumbar, SI (sacroiliac) joint dysfunction       omeprazole 40 MG capsule    priLOSEC    30 capsule    TAKE ONE CAPSULE BY MOUTH EVERY DAY 30 TO 60 MINUTES BEFORE A MEAL    Gastroesophageal reflux disease with esophagitis       ondansetron 4 MG ODT tab    ZOFRAN-ODT    20 tablet    PLACE 1 OR 2 TABLETS UNDER THE TONGUE 3 TIMES A DAY AS NEEDED FOR NAUSEA    Nausea       oxyCODONE-acetaminophen  MG per tablet    PERCOCET    60 tablet    Take 1-2 tablets by mouth every 4 hours as needed for pain (moderate to severe)    SI (sacroiliac) joint dysfunction       rOPINIRole 0.25 MG tablet    REQUIP    60 tablet    Take 1-2 tablets (0.25-0.5 mg) by mouth At Bedtime    RLS (restless legs syndrome)       zolpidem 10 MG tablet    AMBIEN    30 tablet    Take 1 tablet (10 mg) by mouth nightly as needed for sleep    Insomnia, unspecified type

## 2018-05-02 ENCOUNTER — OFFICE VISIT (OUTPATIENT)
Dept: FAMILY MEDICINE | Facility: CLINIC | Age: 58
End: 2018-05-02
Payer: COMMERCIAL

## 2018-05-02 VITALS
HEART RATE: 66 BPM | BODY MASS INDEX: 39.68 KG/M2 | SYSTOLIC BLOOD PRESSURE: 118 MMHG | TEMPERATURE: 98 F | WEIGHT: 293 LBS | DIASTOLIC BLOOD PRESSURE: 82 MMHG | OXYGEN SATURATION: 97 % | HEIGHT: 72 IN

## 2018-05-02 DIAGNOSIS — M51.369 DDD (DEGENERATIVE DISC DISEASE), LUMBAR: ICD-10-CM

## 2018-05-02 DIAGNOSIS — G89.4 CHRONIC PAIN SYNDROME: Primary | ICD-10-CM

## 2018-05-02 DIAGNOSIS — G25.81 RLS (RESTLESS LEGS SYNDROME): ICD-10-CM

## 2018-05-02 DIAGNOSIS — B00.1 COLD SORE: ICD-10-CM

## 2018-05-02 DIAGNOSIS — M53.3 SI (SACROILIAC) JOINT DYSFUNCTION: ICD-10-CM

## 2018-05-02 PROCEDURE — 99214 OFFICE O/P EST MOD 30 MIN: CPT | Performed by: FAMILY MEDICINE

## 2018-05-02 RX ORDER — HYDROMORPHONE HYDROCHLORIDE 4 MG/1
8 TABLET ORAL 4 TIMES DAILY PRN
Qty: 180 TABLET | Refills: 0 | Status: SHIPPED | OUTPATIENT
Start: 2018-05-02 | End: 2018-06-06

## 2018-05-02 RX ORDER — OXYBUTYNIN CHLORIDE 5 MG/1
5 TABLET ORAL
COMMUNITY
Start: 2018-04-10 | End: 2018-10-05

## 2018-05-02 RX ORDER — ROPINIROLE 0.25 MG/1
0.25-0.5 TABLET, FILM COATED ORAL AT BEDTIME
Qty: 60 TABLET | Refills: 2 | Status: SHIPPED | OUTPATIENT
Start: 2018-05-02 | End: 2019-08-19

## 2018-05-02 RX ORDER — MORPHINE SULFATE 30 MG/1
30 TABLET, FILM COATED, EXTENDED RELEASE ORAL 3 TIMES DAILY
Qty: 90 TABLET | Refills: 0 | Status: SHIPPED | OUTPATIENT
Start: 2018-05-02 | End: 2018-06-06

## 2018-05-02 RX ORDER — DIAZEPAM 5 MG
5 TABLET ORAL
Qty: 30 TABLET | Refills: 0 | Status: SHIPPED | OUTPATIENT
Start: 2018-05-02 | End: 2018-06-06

## 2018-05-02 RX ORDER — ACYCLOVIR 50 MG/G
OINTMENT TOPICAL
Qty: 5 G | Refills: 0 | Status: SHIPPED | OUTPATIENT
Start: 2018-05-02 | End: 2018-10-05

## 2018-05-02 ASSESSMENT — ANXIETY QUESTIONNAIRES
6. BECOMING EASILY ANNOYED OR IRRITABLE: SEVERAL DAYS
7. FEELING AFRAID AS IF SOMETHING AWFUL MIGHT HAPPEN: NEARLY EVERY DAY
IF YOU CHECKED OFF ANY PROBLEMS ON THIS QUESTIONNAIRE, HOW DIFFICULT HAVE THESE PROBLEMS MADE IT FOR YOU TO DO YOUR WORK, TAKE CARE OF THINGS AT HOME, OR GET ALONG WITH OTHER PEOPLE: VERY DIFFICULT
5. BEING SO RESTLESS THAT IT IS HARD TO SIT STILL: NOT AT ALL
2. NOT BEING ABLE TO STOP OR CONTROL WORRYING: NEARLY EVERY DAY
GAD7 TOTAL SCORE: 16
3. WORRYING TOO MUCH ABOUT DIFFERENT THINGS: NEARLY EVERY DAY
1. FEELING NERVOUS, ANXIOUS, OR ON EDGE: NEARLY EVERY DAY

## 2018-05-02 ASSESSMENT — PAIN SCALES - GENERAL: PAINLEVEL: MODERATE PAIN (4)

## 2018-05-02 ASSESSMENT — PATIENT HEALTH QUESTIONNAIRE - PHQ9: 5. POOR APPETITE OR OVEREATING: NEARLY EVERY DAY

## 2018-05-02 NOTE — PROGRESS NOTES
SUBJECTIVE:   Oleg Ford is a 58 year old male who presents to clinic today for the following health issues:      Back Pain Follow Up      Description:   Location of pain:  Left and right lower back  Character of pain: sharp and dull ache  Pain radiation: radiates into the right buttocks and radiates into the left buttocks  Since last visit, pain is:  Unchanged but has worsened because of exercise   New numbness or weakness in legs, not attributed to pain:  YES mostly on right side    Intensity: Currently 4/10    History:   Pain interferes with job: YES  Therapies tried without relief: Percocet  Therapies tried with relief: Dilaudid     SUBJECTIVE:  Here today in follow-up of chronic back issues.  Doing well.  Reports no interval health concerns.    A lot more active but happy he is.  Would like a refill on Requip for restless legs.  Recently started a cold sore a few days ago.  Does not get them that often but was recently on a trip to [place].  No fever or sore throat       Review of systems otherwise negative.  Past medical, family, and social history reviewed and updated in chart.    OBJECTIVE:  /82 (BP Location: Right arm, Patient Position: Chair, Cuff Size: Adult Large)  Pulse 66  Temp 98  F (36.7  C) (Oral)  Ht 1.829 m (6')  Wt 132.9 kg (293 lb)  SpO2 97%  BMI 39.74 kg/m2  Alert, pleasant, upbeat, and in no apparent discomfort.  Small herpetic lesion lower lip  No cervical lymphadenopathy  S1 and S2 normal, no murmurs, clicks, gallops or rubs. Regular rate and rhythm. Chest is clear; no wheezes or rales. No edema or JVD.  Past labs reviewed with the patient.     ASSESSMENT / PLAN:  (G89.4) Chronic pain syndrome  (primary encounter diagnosis)  Comment: Stable on current therapy.  Refilled  Plan: HYDROmorphone (DILAUDID) 4 MG tablet, diazepam         (VALIUM) 5 MG tablet, morphine (MS CONTIN) 30         MG 12 hr tablet            (M51.36) DDD (degenerative disc disease), lumbar  Comment: As  above  Plan: HYDROmorphone (DILAUDID) 4 MG tablet, diazepam         (VALIUM) 5 MG tablet, morphine (MS CONTIN) 30         MG 12 hr tablet            (M53.3) SI (sacroiliac) joint dysfunction  Comment: As above  Plan: HYDROmorphone (DILAUDID) 4 MG tablet, morphine         (MS CONTIN) 30 MG 12 hr tablet            (G25.81) RLS (restless legs syndrome)  Comment:   Plan: rOPINIRole (REQUIP) 0.25 MG tablet            (B00.1) Cold sore  Comment:   Plan: acyclovir (ZOVIRAX) 5 % ointment        Discussed mechanism of action of the proposed medication, as well as potential effects, both good and bad.  Patient expressed understanding and agreed with treatment.     Follow up 1 month   S. Tenzin Shaw MD    (Chart documentation completed in part with Dragon voice-recognition software.  Even though reviewed some grammatical, spelling, and word errors may remain.)

## 2018-05-02 NOTE — MR AVS SNAPSHOT
After Visit Summary   5/2/2018    Oleg Ford    MRN: [id number]           Patient Information     Date Of Birth          1960        Visit Information        Provider Department      5/2/2018 9:40 AM Cat Shaw MD [facility]        Today's Diagnoses     Chronic pain syndrome    -  1    DDD (degenerative disc disease), lumbar        SI (sacroiliac) joint dysfunction        RLS (restless legs syndrome)        Cold sore           Follow-ups after your visit        Follow-up notes from your care team     Return in about 1 month (around 6/2/2018).      Your next 10 appointments already scheduled     Jun 06, 2018 10:20 AM CDT   Office Visit with Cat Shaw MD   [facility] ([facility])    [address] [place] [id number] [phone]           Bring a current list of meds and any records pertaining to this visit. For Physicals, please bring immunization records and any forms needing to be filled out. Please arrive 10 minutes early to complete paperwork.              Who to contact     If you have questions or need follow up information about today's clinic visit or your schedule please contact [facility] directly at [phone].  Normal or non-critical lab and imaging results will be communicated to you by MyChart, letter or phone within 4 business days after the clinic has received the results. If you do not hear from us within 7 days, please contact the clinic through [employer] or phone. If you have a critical or abnormal lab result, we will notify you by phone as soon as possible.  Submit refill requests through [employer] or call your pharmacy and they will forward the refill request to us. Please allow 3 business days for your refill to be completed.          Additional Information About Your Visit        [employer]ish[employer]ila[employer] Information     [employer] gives you secure access to your electronic health  record. If you see a primary care provider, you can also send messages to your care team and make appointments. If you have questions, please call your primary care clinic.  If you do not have a primary care provider, please call [phone] and they will assist you.        Care EveryWhere ID     This is your Care EveryWhere ID. This could be used by other organizations to access your [place] medical records  [id number]        Your Vitals Were     Pulse Temperature Height Pulse Oximetry BMI (Body Mass Index)       66 98  F (36.7  C) (Oral) 1.829 m (6') 97% 39.74 kg/m2        Blood Pressure from Last 3 Encounters:   05/02/18 118/82   03/27/18 126/68   02/28/18 124/80    Weight from Last 3 Encounters:   05/02/18 132.9 kg (293 lb)   03/27/18 139.9 kg (308 lb 8 oz)   02/28/18 131.1 kg (289 lb 1.6 oz)              Today, you had the following     No orders found for display         Today's Medication Changes          These changes are accurate as of 5/2/18 10:51 AM.  If you have any questions, ask your nurse or doctor.               Start taking these medicines.        Dose/Directions    acyclovir 5 % ointment   Commonly known as:  ZOVIRAX   Used for:  Cold sore   Started by:  Cat Shaw MD        Apply topically 5 times daily   Quantity:  5 g   Refills:  0            Where to get your medicines      These medications were sent to [facility] #[id number] [address] [place] [address] [place], [place] AT [address] [place], [facility] [address]     Phone:  [phone]     rOPINIRole 0.25 MG tablet         Some of these will need a paper prescription and others can be bought over the counter.  Ask your nurse if you have questions.     Bring a paper prescription for each of these medications     acyclovir 5 % ointment    diazepam 5 MG tablet    HYDROmorphone 4 MG tablet    morphine 30 MG 12 hr tablet               Information about OPIOIDS     PRESCRIPTION OPIOIDS: WHAT YOU NEED  TO KNOW   You have a prescription for an opioid (narcotic) pain medicine. Opioids can cause addiction. If you have a history of chemical dependency of any type, you are at a higher risk of becoming addicted to opioids. Only take this medicine after all other options have been tried. Take it for as short a time and as few doses as possible.     Do not:    Drive. If you drive while taking these medicines, you could be arrested for driving under the influence (DUI).    Operate heavy machinery    Do any other dangerous activities while taking these medicines.     Drink any alcohol while taking these medicines.      Take with any other medicines that contain acetaminophen. Read all labels carefully. Look for the word  acetaminophen  or  Tylenol.  Ask your pharmacist if you have questions or are unsure.    Store your pills in a secure place, locked if possible. We will not replace any lost or stolen medicine. If you don t finish your medicine, please throw away (dispose) as directed by your pharmacist. The [place] Pollution Control Agency has more information about safe disposal: https://www.pca.[facility].mn.us/living-green/managing-unwanted-medications    All opioids tend to cause constipation. Drink plenty of water and eat foods that have a lot of fiber, such as fruits, vegetables, prune juice, apple juice and high-fiber cereal. Take a laxative (Miralax, milk of magnesia, Colace, Senna) if you don t move your bowels at least every other day.          Primary Care Provider Office Phone # Fax #    Cat Tenzin Shaw [phone] [address] [facility] [address]        Equal Access to Services     [facility] AH: Hadii aad ku hadasho Soomaali, waaxda luqadaha, qaybta kaalmada adeegyada, kamala smith. So [facility] [phone].    ATENCIÓN: Si habla español, tiene a gongora disposición servicios gratuitos de asistencia lingüística. Llame al [phone].    We comply with  applicable federal civil rights laws and [place] laws. We do not discriminate on the basis of race, color, national origin, age, disability, sex, sexual orientation, or gender identity.            Thank you!     Thank you for choosing [facility]  for your care. Our goal is always to provide you with excellent care. Hearing back from our patients is one way we can continue to improve our services. Please take a few minutes to complete the written survey that you may receive in the mail after your visit with us. Thank you!             Your Updated Medication List - Protect others around you: Learn how to safely use, store and throw away your medicines at www.disposemymeds.org.          This list is accurate as of 5/2/18 10:51 AM.  Always use your most recent med list.                   Brand Name Dispense Instructions for use Diagnosis    ABILIFY 10 MG tablet   Generic drug:  ARIPiprazole      Take 10 mg by mouth daily        acyclovir 5 % ointment    ZOVIRAX    5 g    Apply topically 5 times daily    Cold sore       buPROPion 150 MG 24 hr tablet    WELLBUTRIN XL     Take 450 mg by mouth every morning        cyclobenzaprine 10 MG tablet    FLEXERIL    90 tablet    TAKE ONE TABLET BY MOUTH 3 TIMES A DAY AS NEEDED FOR MUSCLE SPASMS    DDD (degenerative disc disease), lumbar       diazepam 5 MG tablet    VALIUM    30 tablet    Take 1 tablet (5 mg) by mouth nightly as needed    DDD (degenerative disc disease), lumbar, Chronic pain syndrome       EPINEPHrine 0.3 MG/0.3ML injection 2-pack    EPIPEN/ADRENACLICK/or ANY BX GENERIC EQUIV    1 each    Inject 0.3 mLs (0.3 mg) into the muscle once as needed for anaphylaxis    Bee sting allergy       HYDROmorphone 4 MG tablet    DILAUDID    180 tablet    Take 2 tablets (8 mg) by mouth 4 times daily as needed for moderate to severe pain (every 4-6hour prn)    Chronic pain syndrome, DDD (degenerative disc disease), lumbar, SI (sacroiliac) joint dysfunction        hydrOXYzine 50 MG capsule    VISTARIL     Take 50 mg by mouth 2 times daily as needed        hydrOXYzine 50 MG tablet    ATARAX    120 tablet    Take 1 tablet (50 mg) by mouth every 6 hours as needed for anxiety    Chronic pain syndrome       LAMICTAL 150 MG tablet   Generic drug:  lamoTRIgine      Take 250 mg by mouth daily        morphine 30 MG 12 hr tablet    MS CONTIN    90 tablet    Take 1 tablet (30 mg) by mouth 3 times daily    Chronic pain syndrome, DDD (degenerative disc disease), lumbar, SI (sacroiliac) joint dysfunction       omeprazole 40 MG capsule    priLOSEC    30 capsule    TAKE ONE CAPSULE BY MOUTH EVERY DAY 30 TO 60 MINUTES BEFORE A MEAL    Gastroesophageal reflux disease with esophagitis       ondansetron 4 MG ODT tab    ZOFRAN-ODT    20 tablet    PLACE 1 OR 2 TABLETS UNDER THE TONGUE 3 TIMES A DAY AS NEEDED FOR NAUSEA    Nausea       oxybutynin 5 MG tablet    DITROPAN     Take 5 mg by mouth        oxyCODONE-acetaminophen  MG per tablet    PERCOCET    60 tablet    Take 1-2 tablets by mouth every 4 hours as needed for pain (moderate to severe)    SI (sacroiliac) joint dysfunction       rOPINIRole 0.25 MG tablet    REQUIP    60 tablet    Take 1-2 tablets (0.25-0.5 mg) by mouth At Bedtime    RLS (restless legs syndrome)       zolpidem 10 MG tablet    AMBIEN    30 tablet    Take 1 tablet (10 mg) by mouth nightly as needed for sleep    Insomnia, unspecified type

## 2018-05-03 ASSESSMENT — ANXIETY QUESTIONNAIRES: GAD7 TOTAL SCORE: 16

## 2018-05-03 ASSESSMENT — PATIENT HEALTH QUESTIONNAIRE - PHQ9: SUM OF ALL RESPONSES TO PHQ QUESTIONS 1-9: 18

## 2018-05-15 DIAGNOSIS — M51.369 DDD (DEGENERATIVE DISC DISEASE), LUMBAR: ICD-10-CM

## 2018-05-15 NOTE — TELEPHONE ENCOUNTER
Requested Prescriptions   Pending Prescriptions Disp Refills     cyclobenzaprine (FLEXERIL) 10 MG tablet [Pharmacy Med Name: CYCLOBENZAPRINE 10 MG TABLE]      Last Written Prescription Date:  11/14/17  Last Fill Quantity: 90 tablet,   # refills: 5  Last Office Visit: Dr. Shaw 05/02/18  Future Office visit:    Next 5 appointments (look out 90 days)     Jun 06, 2018 10:20 AM CDT   Office Visit with Cat Shaw MD   [facility] ([facility])    [address] [place] [id number]   [phone]                   Routing refill request to provider for review/approval because:  Drug not on the [facility], [facility] or  Health refill protocol or controlled substance 90 tablet 0     Sig: TAKE ONE TABLET BY MOUTH 3 TIMES A DAY AS NEEDED FOR MUSCLE SPASMS    There is no refill protocol information for this order

## 2018-05-16 RX ORDER — CYCLOBENZAPRINE HCL 10 MG
TABLET ORAL
Qty: 90 TABLET | Refills: 2 | Status: SHIPPED | OUTPATIENT
Start: 2018-05-16 | End: 2018-08-13

## 2018-05-24 ENCOUNTER — TELEPHONE (OUTPATIENT)
Dept: FAMILY MEDICINE | Facility: CLINIC | Age: 58
End: 2018-05-24

## 2018-05-24 NOTE — TELEPHONE ENCOUNTER
"PA for Acyclovir    Key.[email or web address].com    and enter the Key: [id number]    Enter patient last name and     Complete the form and click \"Send to Plan\"    PA or alternative    Mell Peguero    "

## 2018-05-25 NOTE — TELEPHONE ENCOUNTER
Call patient: PA denied for acyclovir cream. He can trial OTC abreva for his cold sore. If not helping return for further evaluation or do phone/evisit.  LIBBY Wallace, NP-C

## 2018-05-25 NOTE — TELEPHONE ENCOUNTER
PA Initiation    Medication: acyclovir (ZOVIRAX) 5 % ointment  Insurance Company: [employer] - Phone [phone] Fax [phone]  Pharmacy Filling the Rx: [id number] [place] - [address] AT [facility]  Filling Pharmacy Phone: [phone]  Filling Pharmacy Fax:    Start Date: 5/25/2018

## 2018-05-25 NOTE — TELEPHONE ENCOUNTER
PRIOR AUTHORIZATION DENIED    Medication: acyclovir (ZOVIRAX) 5 % ointment - DENIED     Denial Date: 5/25/2018    Denial Rational: PA has been denied because the member must meet the following criteria: has had previous treatment within the past 12 months, contraindication or intolerance to oral Acyclovir and one of the Valacyclovir or Famciclovir.        Appeal Information: Please provide a letter of medical necessity

## 2018-05-25 NOTE — TELEPHONE ENCOUNTER
PA has been denied, please see rational. If you want to appeal, please let the PA team know when a letter of Medical Necessity has been written and placed in the patient's chart. If done with encounter, please close.     Thanks,     Mell Nixon Prior Authorization Team   [phone]     (Routing comment)     Migdalia Rasheed, Medical Assistant

## 2018-06-06 ENCOUNTER — OFFICE VISIT (OUTPATIENT)
Dept: FAMILY MEDICINE | Facility: CLINIC | Age: 58
End: 2018-06-06
Payer: COMMERCIAL

## 2018-06-06 VITALS
TEMPERATURE: 98.4 F | SYSTOLIC BLOOD PRESSURE: 128 MMHG | OXYGEN SATURATION: 100 % | HEIGHT: 72 IN | BODY MASS INDEX: 39.28 KG/M2 | WEIGHT: 290 LBS | HEART RATE: 62 BPM | DIASTOLIC BLOOD PRESSURE: 82 MMHG

## 2018-06-06 DIAGNOSIS — M53.3 SI (SACROILIAC) JOINT DYSFUNCTION: ICD-10-CM

## 2018-06-06 DIAGNOSIS — G89.4 CHRONIC PAIN SYNDROME: ICD-10-CM

## 2018-06-06 DIAGNOSIS — M51.369 DDD (DEGENERATIVE DISC DISEASE), LUMBAR: Primary | ICD-10-CM

## 2018-06-06 PROBLEM — E66.01 MORBID OBESITY (H): Status: ACTIVE | Noted: 2018-06-06

## 2018-06-06 PROCEDURE — 99213 OFFICE O/P EST LOW 20 MIN: CPT | Performed by: FAMILY MEDICINE

## 2018-06-06 RX ORDER — HYDROMORPHONE HYDROCHLORIDE 4 MG/1
8 TABLET ORAL 4 TIMES DAILY PRN
Qty: 180 TABLET | Refills: 0 | Status: SHIPPED | OUTPATIENT
Start: 2018-06-06 | End: 2018-06-06

## 2018-06-06 RX ORDER — MORPHINE SULFATE 30 MG/1
30 TABLET, FILM COATED, EXTENDED RELEASE ORAL 3 TIMES DAILY
Qty: 90 TABLET | Refills: 0 | Status: SHIPPED | OUTPATIENT
Start: 2018-07-05 | End: 2018-08-03

## 2018-06-06 RX ORDER — HYDROMORPHONE HYDROCHLORIDE 4 MG/1
8 TABLET ORAL 4 TIMES DAILY PRN
Qty: 180 TABLET | Refills: 0 | Status: SHIPPED | OUTPATIENT
Start: 2018-07-05 | End: 2018-08-03

## 2018-06-06 RX ORDER — DIAZEPAM 5 MG
5 TABLET ORAL
Qty: 30 TABLET | Refills: 0 | Status: SHIPPED | OUTPATIENT
Start: 2018-06-06 | End: 2018-06-06

## 2018-06-06 RX ORDER — DIAZEPAM 5 MG
5 TABLET ORAL
Qty: 30 TABLET | Refills: 0 | Status: SHIPPED | OUTPATIENT
Start: 2018-07-05 | End: 2018-08-03

## 2018-06-06 RX ORDER — MORPHINE SULFATE 30 MG/1
30 TABLET, FILM COATED, EXTENDED RELEASE ORAL 3 TIMES DAILY
Qty: 90 TABLET | Refills: 0 | Status: SHIPPED | OUTPATIENT
Start: 2018-06-06 | End: 2018-06-06

## 2018-06-06 RX ORDER — TOLTERODINE TARTRATE 1 MG/1
1 TABLET, EXTENDED RELEASE ORAL 2 TIMES DAILY
COMMUNITY
End: 2019-08-30

## 2018-06-06 ASSESSMENT — PAIN SCALES - GENERAL: PAINLEVEL: MODERATE PAIN (4)

## 2018-06-06 NOTE — MR AVS SNAPSHOT
After Visit Summary   6/6/2018    Oleg Ford    MRN: [id number]           Patient Information     Date Of Birth          1960        Visit Information        Provider Department      6/6/2018 10:20 AM Cat Shaw MD [facility]        Today's Diagnoses     DDD (degenerative disc disease), lumbar    -  1    Chronic pain syndrome        SI (sacroiliac) joint dysfunction           Follow-ups after your visit        Follow-up notes from your care team     Return in about 2 months (around 8/6/2018).      Your next 10 appointments already scheduled     Jul 06, 2018 10:40 AM CDT   [facility]   [facility]    [address] [place] [id number] [phone]           Bring a current list of meds and any records pertaining to this visit. For Physicals, please bring immunization records and any forms needing to be filled out. Please arrive 10 minutes early to complete paperwork.            Aug 03, 2018 10:20 AM CDT   [facility] Visit with Cat Shaw MD   [facility]    [address] [place] [id number] [phone]           Bring a current list of meds and any records pertaining to this visit. For Physicals, please bring immunization records and any forms needing to be filled out. Please arrive 10 minutes early to complete paperwork.              Who to contact     If you have questions or need follow up information about today's clinic visit or your schedule please contact [facility] directly at [phone].  Normal or non-critical lab and imaging results will be communicated to you by MyChart, letter or phone within 4 business days after the clinic has received the results. If you do not hear from us within 7 days, please contact the clinic through MyChart or phone. If you have a critical or abnormal  lab result, we will notify you by phone as soon as possible.  Submit refill requests through [employer] or call your pharmacy and they will forward the refill request to us. Please allow 3 business days for your refill to be completed.          Additional Information About Your Visit        [employer] Information     Group[employer] gives you secure access to your electronic health record. If you see a primary care provider, you can also send messages to your care team and make appointments. If you have questions, please call your primary care clinic.  If you do not have a primary care provider, please call [phone] and they will assist you.        Care EveryWhere ID     This is your Care EveryWhere ID. This could be used by other organizations to access your [facility] medical records  [id number]        Your Vitals Were     Pulse Temperature Height Pulse Oximetry BMI (Body Mass Index)       62 98.4  F (36.9  C) (Oral) 1.829 m (6') 100% 39.33 kg/m2        Blood Pressure from Last 3 Encounters:   06/06/18 128/82   05/02/18 118/82   03/27/18 126/68    Weight from Last 3 Encounters:   06/06/18 131.5 kg (290 lb)   05/02/18 132.9 kg (293 lb)   03/27/18 139.9 kg (308 lb 8 oz)              Today, you had the following     No orders found for display         Today's Medication Changes          These changes are accurate as of 6/6/18 10:56 AM.  If you have any questions, ask your nurse or doctor.               Start taking these medicines.        Dose/Directions    diazepam 5 MG tablet   Commonly known as:  VALIUM   Used for:  DDD (degenerative disc disease), lumbar, Chronic pain syndrome   Started by:  Cat Shaw MD        Dose:  5 mg   Start taking on:  7/5/2018   Take 1 tablet (5 mg) by mouth nightly as needed   Quantity:  30 tablet   Refills:  0       HYDROmorphone 4 MG tablet   Commonly known as:  DILAUDID   Used for:  Chronic pain syndrome, DDD (degenerative disc disease), lumbar, SI (sacroiliac) joint dysfunction    Started by:  Cat Shaw MD        Dose:  8 mg   Start taking on:  7/5/2018   Take 2 tablets (8 mg) by mouth 4 times daily as needed for moderate to severe pain (every 4-6hour prn)   Quantity:  180 tablet   Refills:  0       morphine 30 MG 12 hr tablet   Commonly known as:  MS CONTIN   Used for:  Chronic pain syndrome, DDD (degenerative disc disease), lumbar, SI (sacroiliac) joint dysfunction   Started by:  Cat Shaw MD        Dose:  30 mg   Start taking on:  7/5/2018   Take 1 tablet (30 mg) by mouth 3 times daily   Quantity:  90 tablet   Refills:  0            Where to get your medicines      Some of these will need a paper prescription and others can be bought over the counter.  Ask your nurse if you have questions.     Bring a paper prescription for each of these medications     diazepam 5 MG tablet    HYDROmorphone 4 MG tablet    morphine 30 MG 12 hr tablet               Information about OPIOIDS     PRESCRIPTION OPIOIDS: WHAT YOU NEED TO KNOW   You have a prescription for an opioid (narcotic) pain medicine. Opioids can cause addiction. If you have a history of chemical dependency of any type, you are at a higher risk of becoming addicted to opioids. Only take this medicine after all other options have been tried. Take it for as short a time and as few doses as possible.     Do not:    Drive. If you drive while taking these medicines, you could be arrested for driving under the influence (DUI).    Operate heavy machinery    Do any other dangerous activities while taking these medicines.     Drink any alcohol while taking these medicines.      Take with any other medicines that contain acetaminophen. Read all labels carefully. Look for the word  acetaminophen  or  Tylenol.  Ask your pharmacist if you have questions or are unsure.    Store your pills in a secure place, locked if possible. We will not replace any lost or stolen medicine. If you don t finish your medicine, please throw  away (dispose) as directed by your pharmacist. The [place] Pollution [employer] has more information about safe disposal: https://www.pca.[facility].[place].us/living-green/managing-unwanted-medications    All opioids tend to cause constipation. Drink plenty of water and eat foods that have a lot of fiber, such as fruits, vegetables, prune juice, apple juice and high-fiber cereal. Take a laxative (Miralax, milk of magnesia, Colace, Senna) if you don t move your bowels at least every other day.          Primary Care Provider Office Phone # Fax #    Cat Tenzin Shaw [phone] [address] [facility] [address]        Equal Access to Services     ALANA HANSON : Hadii aad ku hadasho Soelizabeth, waaxda luqadaha, qaybta kaalmada adeegyada, kamala ortiz . So [facility] [phone].    ATENCIÓN: Si habla español, tiene a gongora disposición servicios gratuitos de asistencia lingüística. [facility] [phone].    We comply with applicable federal civil rights laws and [place] laws. We do not discriminate on the basis of race, color, national origin, age, disability, sex, sexual orientation, or gender identity.            Thank you!     Thank you for choosing [facility]  for your care. Our goal is always to provide you with excellent care. Hearing back from our patients is one way we can continue to improve our services. Please take a few minutes to complete the written survey that you may receive in the mail after your visit with us. Thank you!             Your Updated Medication List - Protect others around you: Learn how to safely use, store and throw away your medicines at www.disposemymeds.org.          This list is accurate as of 6/6/18 10:56 AM.  Always use your most recent med list.                   Brand Name Dispense Instructions for use Diagnosis    ABILIFY 10 MG tablet   Generic drug:  ARIPiprazole      Take 10 mg by mouth daily         acyclovir 5 % ointment    ZOVIRAX    5 g    Apply topically 5 times daily    Cold sore       buPROPion 150 MG 24 hr tablet    WELLBUTRIN XL     Take 450 mg by mouth every morning        cyclobenzaprine 10 MG tablet    FLEXERIL    90 tablet    TAKE ONE TABLET BY MOUTH 3 TIMES A DAY AS NEEDED FOR MUSCLE SPASMS    DDD (degenerative disc disease), lumbar       diazepam 5 MG tablet   Start taking on:  7/5/2018    VALIUM    30 tablet    Take 1 tablet (5 mg) by mouth nightly as needed    DDD (degenerative disc disease), lumbar, Chronic pain syndrome       EPINEPHrine 0.3 MG/0.3ML injection 2-pack    EPIPEN/ADRENACLICK/or ANY BX GENERIC EQUIV    1 each    Inject 0.3 mLs (0.3 mg) into the muscle once as needed for anaphylaxis    Bee sting allergy       HYDROmorphone 4 MG tablet   Start taking on:  7/5/2018    DILAUDID    180 tablet    Take 2 tablets (8 mg) by mouth 4 times daily as needed for moderate to severe pain (every 4-6hour prn)    Chronic pain syndrome, DDD (degenerative disc disease), lumbar, SI (sacroiliac) joint dysfunction       hydrOXYzine 50 MG capsule    VISTARIL     Take 50 mg by mouth 2 times daily as needed        hydrOXYzine 50 MG tablet    ATARAX    120 tablet    Take 1 tablet (50 mg) by mouth every 6 hours as needed for anxiety    Chronic pain syndrome       LAMICTAL 150 MG tablet   Generic drug:  lamoTRIgine      Take 250 mg by mouth daily        morphine 30 MG 12 hr tablet   Start taking on:  7/5/2018    MS CONTIN    90 tablet    Take 1 tablet (30 mg) by mouth 3 times daily    Chronic pain syndrome, DDD (degenerative disc disease), lumbar, SI (sacroiliac) joint dysfunction       omeprazole 40 MG capsule    priLOSEC    30 capsule    TAKE ONE CAPSULE BY MOUTH EVERY DAY 30 TO 60 MINUTES BEFORE A MEAL    Gastroesophageal reflux disease with esophagitis       ondansetron 4 MG ODT tab    ZOFRAN-ODT    20 tablet    PLACE 1 OR 2 TABLETS UNDER THE TONGUE 3 TIMES A DAY AS NEEDED FOR NAUSEA    Nausea        oxybutynin 5 MG tablet    DITROPAN     Take 5 mg by mouth        oxyCODONE-acetaminophen  MG per tablet    PERCOCET    60 tablet    Take 1-2 tablets by mouth every 4 hours as needed for pain (moderate to severe)    SI (sacroiliac) joint dysfunction       rOPINIRole 0.25 MG tablet    REQUIP    60 tablet    Take 1-2 tablets (0.25-0.5 mg) by mouth At Bedtime    RLS (restless legs syndrome)       tolterodine 1 MG tablet    DETROL     Take 1 mg by mouth 2 times daily        zolpidem 10 MG tablet    AMBIEN    30 tablet    Take 1 tablet (10 mg) by mouth nightly as needed for sleep    Insomnia, unspecified type

## 2018-06-06 NOTE — PROGRESS NOTES
SUBJECTIVE:   Oleg Ford is a 58 year old male who presents to clinic today for the following health issues:      Back Pain Follow Up      Description:   Location of pain:  Low back  Character of pain: stabbing and burning  Pain radiation: radiates into the right buttocks, radiates into the left buttocks and radiates below the knee   Since last visit, pain is:  worsened  New numbness or weakness in legs, not attributed to pain:  YES- but not new    Intensity: Currently 4/10    History:   Pain interferes with job: YES  Therapies tried without relief: None  Therapies tried with relief: opioids and Physical Therapy         Amount of exercise or physical activity: 2-3 days/week for an average of 15-30 minutes    Problems taking medications regularly: No    Medication side effects: Constipation with Opiods    Diet: regular (no restrictions)    SUBJECTIVE:  Here today in routine follow-up of degenerative lumbar disease and SI joint disease.  His SI joint fusions done very well and is happy with the results.  Having some ongoing pain is low of his previous fusions and has an upcoming appointment with his spine specialist.  He thinks they may be doing another myelogram.  But in general things are tolerable with his current medications.  Trying to stay active when he can.    Review of systems otherwise negative.  Past medical, family, and social history reviewed and updated in chart.    OBJECTIVE:  /82 (BP Location: Right arm, Patient Position: Chair, Cuff Size: Adult Large)  Pulse 62  Temp 98.4  F (36.9  C) (Oral)  Ht 1.829 m (6')  Wt 131.5 kg (290 lb)  SpO2 100%  BMI 39.33 kg/m2  Alert, pleasant, upbeat, and in no apparent discomfort.  S1 and S2 normal, no murmurs, clicks, gallops or rubs. Regular rate and rhythm. Chest is clear; no wheezes or rales. No edema or JVD.  Past labs reviewed with the patient.     ASSESSMENT / PLAN:  (M51.36) DDD (degenerative disc disease), lumbar  (primary encounter  diagnosis)  Comment: Doing well and stable on current dosages.  Monitored.  Refilled ×2 months  Plan: HYDROmorphone (DILAUDID) 4 MG tablet, morphine         (MS CONTIN) 30 MG 12 hr tablet, diazepam         (VALIUM) 5 MG tablet, DISCONTINUED: diazepam         (VALIUM) 5 MG tablet, DISCONTINUED:         HYDROmorphone (DILAUDID) 4 MG tablet,         DISCONTINUED: morphine (MS CONTIN) 30 MG 12 hr         tablet            (G89.4) Chronic pain syndrome  Comment: As above  Plan: HYDROmorphone (DILAUDID) 4 MG tablet, morphine         (MS CONTIN) 30 MG 12 hr tablet, diazepam         (VALIUM) 5 MG tablet, DISCONTINUED: diazepam         (VALIUM) 5 MG tablet, DISCONTINUED:         HYDROmorphone (DILAUDID) 4 MG tablet,         DISCONTINUED: morphine (MS CONTIN) 30 MG 12 hr         tablet            (M53.3) SI (sacroiliac) joint dysfunction  Comment: As above  Plan: HYDROmorphone (DILAUDID) 4 MG tablet, morphine         (MS CONTIN) 30 MG 12 hr tablet, DISCONTINUED:         HYDROmorphone (DILAUDID) 4 MG tablet,         DISCONTINUED: morphine (MS CONTIN) 30 MG 12 hr         tablet            Follow up 2 months  SANTIAGO Shaw MD    (Chart documentation completed in part with Dragon voice-recognition software.  Even though reviewed some grammatical, spelling, and word errors may remain.)

## 2018-07-03 DIAGNOSIS — F11.90 CHRONIC, CONTINUOUS USE OF OPIOIDS: ICD-10-CM

## 2018-07-03 DIAGNOSIS — G89.4 CHRONIC PAIN SYNDROME: ICD-10-CM

## 2018-08-03 ENCOUNTER — OFFICE VISIT (OUTPATIENT)
Dept: FAMILY MEDICINE | Facility: CLINIC | Age: 58
End: 2018-08-03
Payer: COMMERCIAL

## 2018-08-03 VITALS
HEART RATE: 77 BPM | SYSTOLIC BLOOD PRESSURE: 112 MMHG | BODY MASS INDEX: 39.01 KG/M2 | OXYGEN SATURATION: 96 % | HEIGHT: 72 IN | TEMPERATURE: 97.9 F | WEIGHT: 288 LBS | DIASTOLIC BLOOD PRESSURE: 82 MMHG | RESPIRATION RATE: 20 BRPM

## 2018-08-03 DIAGNOSIS — M51.369 DDD (DEGENERATIVE DISC DISEASE), LUMBAR: ICD-10-CM

## 2018-08-03 DIAGNOSIS — F33.2 MAJOR DEPRESSIVE DISORDER, RECURRENT, SEVERE WITHOUT PSYCHOTIC FEATURES (H): ICD-10-CM

## 2018-08-03 DIAGNOSIS — G89.4 CHRONIC PAIN SYNDROME: ICD-10-CM

## 2018-08-03 DIAGNOSIS — M53.3 SI (SACROILIAC) JOINT DYSFUNCTION: ICD-10-CM

## 2018-08-03 DIAGNOSIS — G89.4 CHRONIC PAIN SYNDROME: Primary | ICD-10-CM

## 2018-08-03 PROCEDURE — 99214 OFFICE O/P EST MOD 30 MIN: CPT | Performed by: NURSE PRACTITIONER

## 2018-08-03 RX ORDER — DIAZEPAM 5 MG
5 TABLET ORAL
Qty: 30 TABLET | Refills: 0 | Status: SHIPPED | OUTPATIENT
Start: 2018-08-03 | End: 2018-08-03

## 2018-08-03 RX ORDER — HYDROMORPHONE HYDROCHLORIDE 4 MG/1
8 TABLET ORAL 4 TIMES DAILY PRN
Qty: 180 TABLET | Refills: 0 | Status: SHIPPED | OUTPATIENT
Start: 2018-09-08 | End: 2018-10-05

## 2018-08-03 RX ORDER — DIAZEPAM 5 MG
5 TABLET ORAL DAILY PRN
Qty: 30 TABLET | Refills: 0 | Status: SHIPPED | OUTPATIENT
Start: 2018-08-10 | End: 2018-08-03

## 2018-08-03 RX ORDER — ARIPIPRAZOLE 5 MG/1
5 TABLET ORAL DAILY
Qty: 30 TABLET | Refills: 1 | COMMUNITY
Start: 2018-08-03 | End: 2018-10-05

## 2018-08-03 RX ORDER — MORPHINE SULFATE 30 MG/1
30 TABLET, FILM COATED, EXTENDED RELEASE ORAL 3 TIMES DAILY
Qty: 90 TABLET | Refills: 0 | Status: SHIPPED | OUTPATIENT
Start: 2018-08-10 | End: 2018-08-03

## 2018-08-03 RX ORDER — MORPHINE SULFATE 30 MG/1
30 TABLET, FILM COATED, EXTENDED RELEASE ORAL 3 TIMES DAILY
Qty: 90 TABLET | Refills: 0 | Status: SHIPPED | OUTPATIENT
Start: 2018-08-03 | End: 2018-08-03

## 2018-08-03 RX ORDER — DIAZEPAM 5 MG
5 TABLET ORAL DAILY PRN
Qty: 30 TABLET | Refills: 0 | Status: SHIPPED | OUTPATIENT
Start: 2018-09-08 | End: 2018-11-05

## 2018-08-03 RX ORDER — HYDROMORPHONE HYDROCHLORIDE 4 MG/1
8 TABLET ORAL 4 TIMES DAILY PRN
Qty: 180 TABLET | Refills: 0 | Status: SHIPPED | OUTPATIENT
Start: 2018-08-03 | End: 2018-08-03

## 2018-08-03 RX ORDER — HYDROMORPHONE HYDROCHLORIDE 4 MG/1
8 TABLET ORAL 4 TIMES DAILY PRN
Qty: 180 TABLET | Refills: 0 | Status: SHIPPED | OUTPATIENT
Start: 2018-08-10 | End: 2018-08-03

## 2018-08-03 RX ORDER — MORPHINE SULFATE 30 MG/1
30 TABLET, FILM COATED, EXTENDED RELEASE ORAL 3 TIMES DAILY
Qty: 90 TABLET | Refills: 0 | Status: SHIPPED | OUTPATIENT
Start: 2018-09-08 | End: 2018-10-05

## 2018-08-03 ASSESSMENT — PAIN SCALES - GENERAL: PAINLEVEL: MODERATE PAIN (5)

## 2018-08-03 NOTE — MR AVS SNAPSHOT
After Visit Summary   8/3/2018    Oleg Ford    MRN: [id number]           Patient Information     Date Of Birth          1960        Visit Information        Provider Department      8/3/2018 10:40 AM Terri Duvall NP [facility]        Today's Diagnoses     Chronic pain syndrome    -  1    DDD (degenerative disc disease), lumbar        SI (sacroiliac) joint dysfunction        Major depressive disorder, recurrent, severe without psychotic features (H)          Care Instructions    Return  2 months for pain medication refill          Follow-ups after your visit        Who to contact     If you have questions or need follow up information about today's clinic visit or your schedule please contact [facility] directly at [phone].  Normal or non-critical lab and imaging results will be communicated to you by [employer], letter or phone within 4 business days after the clinic has received the results. If you do not hear from us within 7 days, please contact the clinic through [employer] or phone. If you have a critical or abnormal lab result, we will notify you by phone as soon as possible.  Submit refill requests through [employer] or call your pharmacy and they will forward the refill request to us. Please allow 3 business days for your refill to be completed.          Additional Information About Your Visit        MyChart Information     [employer] gives you secure access to your electronic health record. If you see a primary care provider, you can also send messages to your care team and make appointments. If you have questions, please call your primary care clinic.  If you do not have a primary care provider, please call [phone] and they will assist you.        Care EveryWhere ID     This is your Care EveryWhere ID. This could be used by other organizations to access your [facility] medical records  [id number]        Your Vitals Were     Pulse Temperature  Respirations Height Pulse Oximetry BMI (Body Mass Index)    77 97.9  F (36.6  C) (Oral) 20 1.829 m (6') 96% 39.06 kg/m2       Blood Pressure from Last 3 Encounters:   08/03/18 112/82   06/06/18 128/82   05/02/18 118/82    Weight from Last 3 Encounters:   08/03/18 130.6 kg (288 lb)   06/06/18 131.5 kg (290 lb)   05/02/18 132.9 kg (293 lb)              Today, you had the following     No orders found for display         Today's Medication Changes          These changes are accurate as of 8/3/18 11:23 AM.  If you have any questions, ask your nurse or doctor.               Start taking these medicines.        Dose/Directions    HYDROmorphone 4 MG tablet   Commonly known as:  DILAUDID   Used for:  Chronic pain syndrome, DDD (degenerative disc disease), lumbar, SI (sacroiliac) joint dysfunction   Started by:  Cat Shaw MD        Dose:  8 mg   Start taking on:  9/8/2018   Take 2 tablets (8 mg) by mouth 4 times daily as needed for moderate to severe pain (every 4-6hour prn)   Quantity:  180 tablet   Refills:  0       morphine 30 MG 12 hr tablet   Commonly known as:  MS CONTIN   Used for:  Chronic pain syndrome, DDD (degenerative disc disease), lumbar, SI (sacroiliac) joint dysfunction   Started by:  Cat Shaw MD        Dose:  30 mg   Start taking on:  9/8/2018   Take 1 tablet (30 mg) by mouth 3 times daily   Quantity:  90 tablet   Refills:  0         These medicines have changed or have updated prescriptions.        Dose/Directions    diazepam 5 MG tablet   Commonly known as:  VALIUM   This may have changed:    - when to take this  - reasons to take this  - These instructions start on 9/8/2018. If you are unsure what to do until then, ask your doctor or other care provider.   Used for:  Major depressive disorder, recurrent, severe without psychotic features (H)   Changed by:  Cat Shaw MD        Dose:  5 mg   Start taking on:  9/8/2018   Take 1 tablet (5 mg) by mouth daily as needed  for anxiety or sleep   Quantity:  30 tablet   Refills:  0            Where to get your medicines      Some of these will need a paper prescription and others can be bought over the counter.  Ask your nurse if you have questions.     Bring a paper prescription for each of these medications     diazepam 5 MG tablet    HYDROmorphone 4 MG tablet    morphine 30 MG 12 hr tablet                Primary Care Provider Office Phone # Fax #    Cat Tenzin Shaw [phone] [address] [facility] [address]        Equal Access to Services     CHAD HANSON : Hadii aad ku hadasho Soomaali, waaxda luqadaha, qaybta kaalmada adeegyada, waxay idiin hayaan wayne kharash angel . So [facility] [phone].    ATENCIÓN: Si habla español, tiene a gongora disposición servicios gratuitos de asistencia lingüística. [facility] [phone].    We comply with applicable federal civil rights laws and [place] laws. We do not discriminate on the basis of race, color, national origin, age, disability, sex, sexual orientation, or gender identity.            Thank you!     Thank you for choosing [facility]  for your care. Our goal is always to provide you with excellent care. Hearing back from our patients is one way we can continue to improve our services. Please take a few minutes to complete the written survey that you may receive in the mail after your visit with us. Thank you!             Your Updated Medication List - Protect others around you: Learn how to safely use, store and throw away your medicines at www.disposemymeds.org.          This list is accurate as of 8/3/18 11:23 AM.  Always use your most recent med list.                   Brand Name Dispense Instructions for use Diagnosis    acyclovir 5 % ointment    ZOVIRAX    5 g    Apply topically 5 times daily    Cold sore       ARIPiprazole 5 MG tablet    ABILIFY    30 tablet    Take 1 tablet (5 mg) by mouth daily    Major depressive disorder,  recurrent, severe without psychotic features (H)       buPROPion 150 MG 24 hr tablet    WELLBUTRIN XL     Take 450 mg by mouth every morning        cyclobenzaprine 10 MG tablet    FLEXERIL    90 tablet    TAKE ONE TABLET BY MOUTH 3 TIMES A DAY AS NEEDED FOR MUSCLE SPASMS    DDD (degenerative disc disease), lumbar       CYMBALTA PO      Take 20 mg by mouth        diazepam 5 MG tablet   Start taking on:  9/8/2018    VALIUM    30 tablet    Take 1 tablet (5 mg) by mouth daily as needed for anxiety or sleep    Major depressive disorder, recurrent, severe without psychotic features (H)       EPINEPHrine 0.3 MG/0.3ML injection 2-pack    EPIPEN/ADRENACLICK/or ANY BX GENERIC EQUIV    1 each    Inject 0.3 mLs (0.3 mg) into the muscle once as needed for anaphylaxis    Bee sting allergy       HYDROmorphone 4 MG tablet   Start taking on:  9/8/2018    DILAUDID    180 tablet    Take 2 tablets (8 mg) by mouth 4 times daily as needed for moderate to severe pain (every 4-6hour prn)    Chronic pain syndrome, DDD (degenerative disc disease), lumbar, SI (sacroiliac) joint dysfunction       hydrOXYzine 50 MG capsule    VISTARIL     Take 50 mg by mouth 2 times daily as needed        hydrOXYzine 50 MG tablet    ATARAX    120 tablet    Take 1 tablet (50 mg) by mouth every 6 hours as needed for anxiety    Chronic pain syndrome       LAMICTAL 150 MG tablet   Generic drug:  lamoTRIgine      Take 250 mg by mouth daily        morphine 30 MG 12 hr tablet   Start taking on:  9/8/2018    MS CONTIN    90 tablet    Take 1 tablet (30 mg) by mouth 3 times daily    Chronic pain syndrome, DDD (degenerative disc disease), lumbar, SI (sacroiliac) joint dysfunction       naloxone nasal spray    NARCAN    0.2 mL    Spray 1 spray (4 mg) into one nostril alternating nostrils as needed for opioid reversal every 2-3 minutes until assistance arrives    Chronic pain syndrome, Chronic, continuous use of opioids       omeprazole 40 MG capsule    priLOSEC    30  capsule    TAKE ONE CAPSULE BY MOUTH EVERY DAY 30 TO 60 MINUTES BEFORE A MEAL    Gastroesophageal reflux disease with esophagitis       ondansetron 4 MG ODT tab    ZOFRAN-ODT    20 tablet    PLACE 1 OR 2 TABLETS UNDER THE TONGUE 3 TIMES A DAY AS NEEDED FOR NAUSEA    Nausea       oxybutynin 5 MG tablet    DITROPAN     Take 5 mg by mouth        rOPINIRole 0.25 MG tablet    REQUIP    60 tablet    Take 1-2 tablets (0.25-0.5 mg) by mouth At Bedtime    RLS (restless legs syndrome)       tolterodine 1 MG tablet    DETROL     Take 1 mg by mouth 2 times daily        zolpidem 10 MG tablet    AMBIEN    30 tablet    Take 1 tablet (10 mg) by mouth nightly as needed for sleep    Insomnia, unspecified type

## 2018-08-03 NOTE — PROGRESS NOTES
SUBJECTIVE:   Oleg Ford is a 58 year old male who presents to clinic today for the following health issues:    Chronic Pain Follow-Up       Type / Location of Pain: Low Back  Analgesia/pain control:       Recent changes:  worse      Overall control: Inadequate pain control  Activity level/function:      Daily activities:  Unable to perform most daily activities - chores, hobbies, social activities, driving    Work:  not applicable  Adverse effects:  No  Adherance    Taking medication as directed?  Yes    Participating in other treatments: yes  Risk Factors:    Sleep:  Good    Mood/anxiety:  worsened    Recent family or social stressors:  none noted    Other aggravating factors: repetitive activities - everything  PHQ-9 SCORE 9/6/2017 2/28/2018 5/2/2018   Total Score - - -   Total Score MyChart - - -   Total Score 22 23 18     GINI-7 SCORE 9/6/2017 2/28/2018 5/2/2018   Total Score - - -   Total Score 20 18 16     Encounter-Level CSA - 12/01/2015:          Controlled Substance Agreement - Scan on 12/14/2015 11:49 AM : CONTROLLED SUBSTANCE AGREEMENT (below)                Amount of exercise or physical activity: None    Problems taking medications regularly: No    Medication side effects: none    Diet: regular (no restrictions)    Is going to have another surgery on his back but is trying to hold out as long as possible. Has had 7 already. Due for another fusion. Radiation from mid-back down to right leg. Taking morphine 1 pill 30 mg TID, takes dilaudid a few times a day also as needed. Also has spinal injection 3 weeks ago.     Mood is worsened: psychiatrist added cymbalta. Has noted a little improvement, not much. Goes back next month to see them. With depression is sleeping 12-13 hr a day. Hoping that will improve.     Also using valium for sleep. Takes every other night or ever few nights.   Sometimes takes ambien when he really can't sleep. Takes maybe 1-2 every few months.     Problem list and histories  reviewed & adjusted, as indicated.  Additional history: as documented    Patient Active Problem List   Diagnosis     Chronic low back pain     CARDIOVASCULAR SCREENING; LDL GOAL LESS THAN 130     DDD (degenerative disc disease), lumbar     RLS (restless legs syndrome)     Esophageal cyst     Simple hepatic cyst     Simple renal cyst     Chronic pain syndrome     S/P lumbar fusion     Major depressive disorder, recurrent, severe without psychotic features (H)     Gastroesophageal reflux disease with esophagitis     SI (sacroiliac) joint dysfunction     Advance Care Planning     Morbid obesity (H)     Past Surgical History:   Procedure Laterality Date     BACK SURGERY  2/6/2013    lumbar surgery     COLONOSCOPY       FUSION SACRAL ILIAC Right 10/19/2017    Procedure: FUSION SACRAL ILIAC;  Right  side sacroiliac joint fusion with 2 implants ;  Surgeon: Sebastián Szymanski MD;  Location: [facility]     FUSION SACRAL ILIAC Left 1/11/2018    Procedure: FUSION SACRAL ILIAC;   left  side sacroiliac joint fusion with 2 implants.(Zyga)   extraction and use of illac bone graft ;  Surgeon: Sebastián Szymanski MD;  Location:  OR      [facility] ENDOSCOPY W EUS N/A 7/31/2014    Procedure: COMBINED ENDOSCOPIC ULTRASOUND, ESOPHAGOSCOPY, GASTROSCOPY, DUODENOSCOPY (EGD);  Surgeon: Shorty Stoll MD;  Location: [facility]     ORCHIECTOMY INGUINAL      right radical orchiectomy     REMOVE STIMULATOR VAGUS NERVE  12/23/2011    Procedure:REMOVE STIMULATOR VAGUS NERVE; Vagus Nerve Stimulator Removal; Surgeon:ALEXIS BECERRA; Location:[facility]     REMOVE STIMULATOR VAGUS NERVE  11/8/2013    Procedure: REMOVE STIMULATOR VAGUS NERVE;  Removal Of Vagus Nerve Stimulator Lead In Left Neck ;  Surgeon: Alexis Becerra MD;  Location: [facility]     SURGICAL HISTORY OF -       vagal nerve implant; removed     SURGICAL HISTORY OF -       UPPP     VASECTOMY         Social History   Substance Use Topics     Smoking status: Current Every Day Smoker     Packs/day: 0.00      Years: 40.00     Types: Cigarettes     Smokeless tobacco: Never Used      Comment: 1-2 daily     Alcohol use Yes      Comment: 1 drink every 3 months     Family History   Problem Relation Age of Onset     Diabetes Mother      C.A.D. Mother      C.A.D. Maternal Grandmother      Diabetes Maternal Grandmother      Hypertension Maternal Grandmother      Cancer Maternal Grandfather      C.A.D. Maternal Grandfather          Current Outpatient Prescriptions   Medication Sig Dispense Refill     acyclovir (ZOVIRAX) 5 % ointment Apply topically 5 times daily 5 g 0     ARIPiprazole (ABILIFY) 5 MG tablet Take 1 tablet (5 mg) by mouth daily 30 tablet 1     buPROPion (WELLBUTRIN XL) 150 MG 24 hr tablet Take 450 mg by mouth every morning        cyclobenzaprine (FLEXERIL) 10 MG tablet TAKE ONE TABLET BY MOUTH 3 TIMES A DAY AS NEEDED FOR MUSCLE SPASMS 90 tablet 2     DULoxetine HCl (CYMBALTA PO) Take 20 mg by mouth       EPINEPHrine (EPIPEN) 0.3 MG/0.3ML injection Inject 0.3 mLs (0.3 mg) into the muscle once as needed for anaphylaxis 1 each 2     hydrOXYzine (ATARAX) 50 MG tablet Take 1 tablet (50 mg) by mouth every 6 hours as needed for anxiety 120 tablet 3     hydrOXYzine (VISTARIL) 50 MG capsule Take 50 mg by mouth 2 times daily as needed        lamoTRIgine (LAMICTAL) 150 MG tablet Take 250 mg by mouth daily        naloxone (NARCAN) nasal spray Spray 1 spray (4 mg) into one nostril alternating nostrils as needed for opioid reversal every 2-3 minutes until assistance arrives 0.2 mL 0     omeprazole (PRILOSEC) 40 MG capsule TAKE ONE CAPSULE BY MOUTH EVERY DAY 30 TO 60 MINUTES BEFORE A MEAL 30 capsule 10     ondansetron (ZOFRAN-ODT) 4 MG ODT tab PLACE 1 OR 2 TABLETS UNDER THE TONGUE 3 TIMES A DAY AS NEEDED FOR NAUSEA 20 tablet 1     oxybutynin (DITROPAN) 5 MG tablet Take 5 mg by mouth       rOPINIRole (REQUIP) 0.25 MG tablet Take 1-2 tablets (0.25-0.5 mg) by mouth At Bedtime 60 tablet 2     tolterodine (DETROL) 1 MG tablet Take 1 mg  by mouth 2 times daily       zolpidem (AMBIEN) 10 MG tablet Take 1 tablet (10 mg) by mouth nightly as needed for sleep 30 tablet 2     [START ON 9/8/2018] diazepam (VALIUM) 5 MG tablet Take 1 tablet (5 mg) by mouth daily as needed for anxiety or sleep 30 tablet 0     [START ON 9/8/2018] HYDROmorphone (DILAUDID) 4 MG tablet Take 2 tablets (8 mg) by mouth 4 times daily as needed for moderate to severe pain (every 4-6hour prn) 180 tablet 0     [START ON 9/8/2018] morphine (MS CONTIN) 30 MG 12 hr tablet Take 1 tablet (30 mg) by mouth 3 times daily 90 tablet 0     [DISCONTINUED] ARIPiprazole (ABILIFY) 10 MG tablet Take 10 mg by mouth daily       Allergies   Allergen Reactions     Ciprofloxacin Anaphylaxis     Doxycycline Anaphylaxis     Septra [Bactrim] Anaphylaxis     Amoxicillin      itching       Reviewed and updated as needed this visit by clinical staff  Tobacco  Allergies  Meds  Problems  Med Hx  Surg Hx  Fam Hx  Soc Hx        Reviewed and updated as needed this visit by Provider  Allergies  Meds  Problems         ROS:  Constitutional, cardiovascular, pulmonary, MS/Back as above, systems are negative, except as otherwise noted.    OBJECTIVE:     /82 (BP Location: Right arm, Patient Position: Sitting, Cuff Size: Adult Large)  Pulse 77  Temp 97.9  F (36.6  C) (Oral)  Resp 20  Ht 1.829 m (6')  Wt 130.6 kg (288 lb)  SpO2 96%  BMI 39.06 kg/m2  Body mass index is 39.06 kg/(m^2).  GENERAL: healthy, alert and no distress  RESP: lungs clear to auscultation - no rales, rhonchi or wheezes  CV: regular rate and rhythm, normal S1 S2, no S3 or S4, no murmur, click or rub  MS: no gross musculoskeletal defects noted, no edema  BACK: no CVA tenderness, no paralumbar tenderness. Mid-back tenderness with radiation to right leg    Diagnostic Test Results:  none     ASSESSMENT/PLAN:         1. Chronic pain syndrome  NP printed MS Contin, Dilaudid, and valium twice by accident-first time dates for filling were  wrong (8/3/18 and 8/10/18)-placed those 3 scripts in the [facility] bin. The scripts given to patient had fill dates of 8/10/18 based off last fill dates for MS contin and Valium. The Dilaudid script with fill date of 8/3/18 was given to patient (shreded the fill date of 8/10/18 as re-printed it by accident, he would not be early refill by getting the 8/3/18 script).  Dr. Shaw also printed 3 scripts to give to patient with fill dates of Sept 8th for all 3 scripts.    - HYDROmorphone (DILAUDID) 4 MG tablet; Take 2 tablets (8 mg) by mouth 4 times daily as needed for moderate to severe pain (every 4-6hour prn)  Dispense: 180 tablet; Refill: 0  - morphine (MS CONTIN) 30 MG 12 hr tablet; Take 1 tablet (30 mg) by mouth 3 times daily  Dispense: 90 tablet; Refill: 0  - diazepam (VALIUM) 5 MG tablet; Take 1 tablet (5 mg) by mouth nightly as needed  Dispense: 30 tablet; Refill: 0    2. DDD (degenerative disc disease), lumbar  As above  - HYDROmorphone (DILAUDID) 4 MG tablet; Take 2 tablets (8 mg) by mouth 4 times daily as needed for moderate to severe pain (every 4-6hour prn)  Dispense: 180 tablet; Refill: 0  - morphine (MS CONTIN) 30 MG 12 hr tablet; Take 1 tablet (30 mg) by mouth 3 times daily  Dispense: 90 tablet; Refill: 0  - diazepam (VALIUM) 5 MG tablet; Take 1 tablet (5 mg) by mouth nightly as needed  Dispense: 30 tablet; Refill: 0    3. SI (sacroiliac) joint dysfunction  As above  - HYDROmorphone (DILAUDID) 4 MG tablet; Take 2 tablets (8 mg) by mouth 4 times daily as needed for moderate to severe pain (every 4-6hour prn)  Dispense: 180 tablet; Refill: 0  - morphine (MS CONTIN) 30 MG 12 hr tablet; Take 1 tablet (30 mg) by mouth 3 times daily  Dispense: 90 tablet; Refill: 0    4. Major depressive disorder, recurrent, severe without psychotic features (H)  Noted change by psychiatry. Mood stable  - ARIPiprazole (ABILIFY) 5 MG tablet; Take 1 tablet (5 mg) by mouth daily  Dispense: 30 tablet; Refill: 1    FUTURE  APPOINTMENTS:       - Follow-up visit in 2 months. NP spoke to demetrice Rodriguez to give 2 month refills at a time. He printed the 2nd month for patient.     LIBBY Wallace, NP-C  [facility]

## 2018-08-13 ENCOUNTER — HOSPITAL ENCOUNTER (OUTPATIENT)
Facility: CLINIC | Age: 58
End: 2018-08-13
Attending: NEUROLOGICAL SURGERY | Admitting: NEUROLOGICAL SURGERY
Payer: COMMERCIAL

## 2018-08-13 DIAGNOSIS — M51.369 DDD (DEGENERATIVE DISC DISEASE), LUMBAR: ICD-10-CM

## 2018-08-13 RX ORDER — CYCLOBENZAPRINE HCL 10 MG
TABLET ORAL
Qty: 90 TABLET | Refills: 1 | Status: SHIPPED | OUTPATIENT
Start: 2018-08-13 | End: 2019-11-11

## 2018-08-13 NOTE — TELEPHONE ENCOUNTER
Requested Prescriptions   Pending Prescriptions Disp Refills     cyclobenzaprine (FLEXERIL) 10 MG tablet [Pharmacy Med Name: CYCLOBENZAPRINE 10 MG TABLE] 90 tablet 11     Sig: TAKE 1 TABLET BY MOUTH THREE TIMES DAILY AS NEEDED FOR MUSCLE SPASMS    There is no refill protocol information for this order        cyclobenzaprine (FLEXERIL) 10 MG tablet      Last Written Prescription Date:  5/16/18  Last Fill Quantity: 90,   # refills: 2  Last Office Visit: 8/3/18  Future Office visit:    Next 5 appointments (look out 90 days)     Oct 05, 2018 10:40 AM CDT   Pre-Op physical with Cat Shaw MD   [facility]    [address] [place] [id number] [phone]                   Routing refill request to provider for review/approval because:  Drug not on the [facility], [facility] or [facility] refill protocol or controlled substance

## 2018-10-02 ENCOUNTER — TRANSFERRED RECORDS (OUTPATIENT)
Dept: HEALTH INFORMATION MANAGEMENT | Facility: CLINIC | Age: 58
End: 2018-10-02

## 2018-10-05 ENCOUNTER — OFFICE VISIT (OUTPATIENT)
Dept: FAMILY MEDICINE | Facility: CLINIC | Age: 58
End: 2018-10-05
Payer: COMMERCIAL

## 2018-10-05 VITALS
TEMPERATURE: 98.5 F | SYSTOLIC BLOOD PRESSURE: 118 MMHG | OXYGEN SATURATION: 98 % | DIASTOLIC BLOOD PRESSURE: 78 MMHG | HEART RATE: 66 BPM | BODY MASS INDEX: 37.93 KG/M2 | WEIGHT: 280 LBS | HEIGHT: 72 IN

## 2018-10-05 DIAGNOSIS — G89.4 CHRONIC PAIN SYNDROME: Primary | ICD-10-CM

## 2018-10-05 DIAGNOSIS — Z23 NEED FOR PROPHYLACTIC VACCINATION AND INOCULATION AGAINST INFLUENZA: ICD-10-CM

## 2018-10-05 DIAGNOSIS — M51.369 DDD (DEGENERATIVE DISC DISEASE), LUMBAR: ICD-10-CM

## 2018-10-05 DIAGNOSIS — M53.3 SI (SACROILIAC) JOINT DYSFUNCTION: ICD-10-CM

## 2018-10-05 PROCEDURE — 90686 IIV4 VACC NO PRSV 0.5 ML IM: CPT | Performed by: FAMILY MEDICINE

## 2018-10-05 PROCEDURE — 90471 IMMUNIZATION ADMIN: CPT | Performed by: FAMILY MEDICINE

## 2018-10-05 PROCEDURE — 99000 SPECIMEN HANDLING OFFICE-LAB: CPT | Performed by: FAMILY MEDICINE

## 2018-10-05 PROCEDURE — 80307 DRUG TEST PRSMV CHEM ANLYZR: CPT | Mod: 90 | Performed by: FAMILY MEDICINE

## 2018-10-05 PROCEDURE — 99213 OFFICE O/P EST LOW 20 MIN: CPT | Mod: 25 | Performed by: FAMILY MEDICINE

## 2018-10-05 RX ORDER — HYDROMORPHONE HYDROCHLORIDE 4 MG/1
8 TABLET ORAL 4 TIMES DAILY PRN
Qty: 180 TABLET | Refills: 0 | Status: SHIPPED | OUTPATIENT
Start: 2018-10-05 | End: 2018-11-05

## 2018-10-05 RX ORDER — MORPHINE SULFATE 30 MG/1
30 TABLET, FILM COATED, EXTENDED RELEASE ORAL 3 TIMES DAILY
Qty: 90 TABLET | Refills: 0 | Status: SHIPPED | OUTPATIENT
Start: 2018-10-05 | End: 2018-11-05

## 2018-10-05 ASSESSMENT — ANXIETY QUESTIONNAIRES
5. BEING SO RESTLESS THAT IT IS HARD TO SIT STILL: NEARLY EVERY DAY
3. WORRYING TOO MUCH ABOUT DIFFERENT THINGS: NEARLY EVERY DAY
GAD7 TOTAL SCORE: 20
2. NOT BEING ABLE TO STOP OR CONTROL WORRYING: NEARLY EVERY DAY
7. FEELING AFRAID AS IF SOMETHING AWFUL MIGHT HAPPEN: NEARLY EVERY DAY
6. BECOMING EASILY ANNOYED OR IRRITABLE: MORE THAN HALF THE DAYS
1. FEELING NERVOUS, ANXIOUS, OR ON EDGE: NEARLY EVERY DAY
IF YOU CHECKED OFF ANY PROBLEMS ON THIS QUESTIONNAIRE, HOW DIFFICULT HAVE THESE PROBLEMS MADE IT FOR YOU TO DO YOUR WORK, TAKE CARE OF THINGS AT HOME, OR GET ALONG WITH OTHER PEOPLE: EXTREMELY DIFFICULT

## 2018-10-05 ASSESSMENT — PATIENT HEALTH QUESTIONNAIRE - PHQ9: 5. POOR APPETITE OR OVEREATING: NEARLY EVERY DAY

## 2018-10-05 ASSESSMENT — PAIN SCALES - GENERAL: PAINLEVEL: SEVERE PAIN (7)

## 2018-10-05 NOTE — MR AVS SNAPSHOT
After Visit Summary   10/5/2018    Oleg Ford    MRN: [id number]           Patient Information     Date Of Birth          1960        Visit Information        Provider Department      10/5/2018 10:40 AM Cat Shaw MD [facility]        Today's Diagnoses     Chronic pain syndrome    -  1    DDD (degenerative disc disease), lumbar        SI (sacroiliac) joint dysfunction        Need for prophylactic vaccination and inoculation against influenza           Follow-ups after your visit        Follow-up notes from your care team     Return in about 1 month (around 11/5/2018) for Follow up on pain.      Who to contact     If you have questions or need follow up information about today's clinic visit or your schedule please contact [facility] directly at [phone].  Normal or non-critical lab and imaging results will be communicated to you by MyChart, letter or phone within 4 business days after the clinic has received the results. If you do not hear from us within 7 days, please contact the clinic through [employer] or phone. If you have a critical or abnormal lab result, we will notify you by phone as soon as possible.  Submit refill requests through [employer] or call your pharmacy and they will forward the refill request to us. Please allow 3 business days for your refill to be completed.          Additional Information About Your Visit        MyChart Information     [employer] gives you secure access to your electronic health record. If you see a primary care provider, you can also send messages to your care team and make appointments. If you have questions, please call your primary care clinic.  If you do not have a primary care provider, please call [phone] and they will assist you.        Care EveryWhere ID     This is your Care EveryWhere ID. This could be used by other organizations to access your [place] medical records  [id number]        Your  Vitals Were     Pulse Temperature Height Pulse Oximetry BMI (Body Mass Index)       66 98.5  F (36.9  C) (Oral) 1.829 m (6') 98% 37.97 kg/m2        Blood Pressure from Last 3 Encounters:   10/05/18 118/78   08/03/18 112/82   06/06/18 128/82    Weight from Last 3 Encounters:   10/05/18 127 kg (280 lb)   08/03/18 130.6 kg (288 lb)   06/06/18 131.5 kg (290 lb)              We Performed the Following     Comprehen Drug Analysis [facility]     FLU VACCINE, SPLIT VIRUS, IM (QUADRIVALENT) [51696]- >3 YRS     Vaccine Administration, Initial [07386]          Today's Medication Changes          These changes are accurate as of 10/5/18 11:29 AM.  If you have any questions, ask your nurse or doctor.               Stop taking these medicines if you haven't already. Please contact your care team if you have questions.     hydrOXYzine 50 MG capsule   Commonly known as:  VISTARIL   Stopped by:  Cat Shaw MD                Where to get your medicines      Some of these will need a paper prescription and others can be bought over the counter.  Ask your nurse if you have questions.     Bring a paper prescription for each of these medications     HYDROmorphone 4 MG tablet    morphine 30 MG 12 hr tablet               Information about OPIOIDS     PRESCRIPTION OPIOIDS: WHAT YOU NEED TO KNOW   We gave you an opioid (narcotic) pain medicine. It is important to manage your pain, but opioids are not always the best choice. You should first try all the other options your care team gave you. Take this medicine for as short a time (and as few doses) as possible.    Some activities can increase your pain, such as bandage changes or therapy sessions. It may help to take your pain medicine 30 to 60 minutes before these activities. Reduce your stress by getting enough sleep, working on hobbies you enjoy and practicing relaxation or meditation. Talk to your care team about ways to manage your pain beyond prescription opioids.    These  medicines have risks:    DO NOT drive when on new or higher doses of pain medicine. These medicines can affect your alertness and reaction times, and you could be arrested for driving under the influence (DUI). If you need to use opioids long-term, talk to your care team about driving.    DO NOT operate heavy machinery    DO NOT do any other dangerous activities while taking these medicines.    DO NOT drink any alcohol while taking these medicines.     If the opioid prescribed includes acetaminophen, DO NOT take with any other medicines that contain acetaminophen. Read all labels carefully. Look for the word  acetaminophen  or  Tylenol.  Ask your pharmacist if you have questions or are unsure.    You can get addicted to pain medicines, especially if you have a history of addiction (chemical, alcohol or substance dependence). Talk to your care team about ways to reduce this risk.    All opioids tend to cause constipation. Drink plenty of water and eat foods that have a lot of fiber, such as fruits, vegetables, prune juice, apple juice and high-fiber cereal. Take a laxative (Miralax, milk of magnesia, Colace, Senna) if you don t move your bowels at least every other day. Other side effects include upset stomach, sleepiness, dizziness, throwing up, tolerance (needing more of the medicine to have the same effect), physical dependence and slowed breathing.    Store your pills in a secure place, locked if possible. We will not replace any lost or stolen medicine. If you don t finish your medicine, please throw away (dispose) as directed by your pharmacist. The [place] Pollution Control Agency has more information about safe disposal: [email or web address]://[email or web address].[email or web address].us/living-green/[email or web address]-unwanted-medications         Primary Care Provider Office Phone # Fax #    Catvandana Shaw [phone] [address] [facility] [address]        Equal Access to Services     ALANA CASTILLO: Josi dumas  coco Callejas, wabethanieda luescobaradaha, qaybta kaalmada josé miguelcristi, kamala francescain hayaatrinity valdezyovanny tyrongricelda laBethderick sarah. So [facility] [phone].    ATENCIÓN: Si sorin medellin, tiene a gongora disposición servicios gratuitos de asistencia lingüística. Yulia al [phone].    We comply with applicable federal civil rights laws and [place] laws. We do not discriminate on the basis of race, color, national origin, age, disability, sex, sexual orientation, or gender identity.            Thank you!     Thank you for choosing [facility]  for your care. Our goal is always to provide you with excellent care. Hearing back from our patients is one way we can continue to improve our services. Please take a few minutes to complete the written survey that you may receive in the mail after your visit with us. Thank you!             Your Updated Medication List - Protect others around you: Learn how to safely use, store and throw away your medicines at www.disposemymeds.org.          This list is accurate as of 10/5/18 11:29 AM.  Always use your most recent med list.                   Brand Name Dispense Instructions for use Diagnosis    buPROPion 150 MG 24 hr tablet    WELLBUTRIN XL     Take 450 mg by mouth every morning        cyclobenzaprine 10 MG tablet    FLEXERIL    90 tablet    TAKE 1 TABLET BY MOUTH THREE TIMES DAILY AS NEEDED FOR MUSCLE SPASMS    DDD (degenerative disc disease), lumbar       CYMBALTA PO      Take 60 mg by mouth daily        diazepam 5 MG tablet    VALIUM    30 tablet    Take 1 tablet (5 mg) by mouth daily as needed for anxiety or sleep    Major depressive disorder, recurrent, severe without psychotic features (H)       EPINEPHrine 0.3 MG/0.3ML injection 2-pack    EPIPEN/ADRENACLICK/or ANY BX GENERIC EQUIV    1 each    Inject 0.3 mLs (0.3 mg) into the muscle once as needed for anaphylaxis    Bee sting allergy       HYDROmorphone 4 MG tablet    DILAUDID    180 tablet    Take 2 tablets (8 mg) by mouth 4 times  daily as needed for moderate to severe pain (every 4-6hour prn)    Chronic pain syndrome, DDD (degenerative disc disease), lumbar, SI (sacroiliac) joint dysfunction       hydrOXYzine 50 MG tablet    ATARAX    120 tablet    Take 1 tablet (50 mg) by mouth every 6 hours as needed for anxiety    Chronic pain syndrome       LAMICTAL 150 MG tablet   Generic drug:  lamoTRIgine      Take 250 mg by mouth daily        morphine 30 MG 12 hr tablet    MS CONTIN    90 tablet    Take 1 tablet (30 mg) by mouth 3 times daily    Chronic pain syndrome, DDD (degenerative disc disease), lumbar, SI (sacroiliac) joint dysfunction       naloxone nasal spray    NARCAN    0.2 mL    Spray 1 spray (4 mg) into one nostril alternating nostrils as needed for opioid reversal every 2-3 minutes until assistance arrives    Chronic pain syndrome, Chronic, continuous use of opioids       omeprazole 40 MG capsule    priLOSEC    30 capsule    TAKE ONE CAPSULE BY MOUTH EVERY DAY 30 TO 60 MINUTES BEFORE A MEAL    Gastroesophageal reflux disease with esophagitis       ondansetron 4 MG ODT tab    ZOFRAN-ODT    20 tablet    PLACE 1 OR 2 TABLETS UNDER THE TONGUE 3 TIMES A DAY AS NEEDED FOR NAUSEA    Nausea       rOPINIRole 0.25 MG tablet    REQUIP    60 tablet    Take 1-2 tablets (0.25-0.5 mg) by mouth At Bedtime    RLS (restless legs syndrome)       tolterodine 1 MG tablet    DETROL     Take 1 mg by mouth 2 times daily        zolpidem 10 MG tablet    AMBIEN    30 tablet    Take 1 tablet (10 mg) by mouth nightly as needed for sleep    Insomnia, unspecified type

## 2018-10-05 NOTE — PROGRESS NOTES
SUBJECTIVE:   Oleg Ford is a 58 year old male who presents to clinic today for the following health issues:      Depression Followup    Status since last visit: No change    See PHQ-9 for current symptoms.  Other associated symptoms: None    Complicating factors:   Significant life event:  No   Current substance abuse:  None  Anxiety or Panic symptoms:  Yes    PHQ 9/6/2017 2/28/2018 5/2/2018   PHQ-9 Total Score 22 23 18   Q9: Suicide Ideation Nearly every day Nearly every day More than half the days       PHQ-9  English  PHQ-9   Any Language  Suicide Assessment Five-step Evaluation and Treatment (SAFE-T)  Chronic Pain Follow-Up       Type / Location of Pain: Both legs both upper and lower  Analgesia/pain control:       Recent changes:  worse      Overall control: Tolerable with discomfort  Activity level/function:      Daily activities:  Unable to perform most daily activities - chores, hobbies, social activities, driving    Work:  Able to work part time with limitations  Adverse effects:  No  Adherance    Taking medication as directed?  Yes    Participating in other treatments: no   Risk Factors:    Sleep:  Good    Mood/anxiety:  Slightly worsened     Recent family or social stressors:  none noted    Other aggravating factors: prolonged sitting and prolonged standing  PHQ-9 SCORE 9/6/2017 2/28/2018 5/2/2018   Total Score - - -   Total Score MyChart - - -   Total Score 22 23 18     GINI-7 SCORE 9/6/2017 2/28/2018 5/2/2018   Total Score - - -   Total Score 20 18 16     Encounter-Level CSA - 12/01/2015:          Controlled Substance Agreement - Scan on 12/14/2015 11:49 AM : CONTROLLED SUBSTANCE AGREEMENT (below)                Amount of exercise or physical activity: None    Problems taking medications regularly: No    Medication side effects: Nausea and over sleeping     Diet: regular (no restrictions)    SUBJECTIVE:  Here today in follow-up of chronic back pain.  Well-known to me.  Has had extensive surgery  and it sounds like more is planned.  There is a restriction within his lumbar spinal canal and he is meeting with a new surgeon within his network.  Likely to recommend further fusion and removal of his stimulator.  He is trying to keep his medications to a minimum but it is the only thing that is keeping him going currently.  Working with his psychiatrist on some medication adjustments.  We updated his medication list.    Review of systems otherwise negative.  Past medical, family, and social history reviewed and updated in chart.    OBJECTIVE:  /78 (BP Location: Right arm, Patient Position: Chair, Cuff Size: Adult Large)  Pulse 66  Temp 98.5  F (36.9  C) (Oral)  Ht 1.829 m (6')  Wt 127 kg (280 lb)  SpO2 98%  BMI 37.97 kg/m2  Alert, pleasant, upbeat, and in no apparent discomfort.  S1 and S2 normal, no murmurs, clicks, gallops or rubs. Regular rate and rhythm. Chest is clear; no wheezes or rales. No edema or JVD.  Past labs reviewed with the patient.     ASSESSMENT / PLAN:  (G89.4) Chronic pain syndrome  Comment: Higher than the generally accepted MED limit, but I strongly feel that this is the appropriate regimen for the patient at this time.  I have never suspected any misuse or abuse and he is monitored on a routine basis through the [facility] database.  We will ensure that he is up-to-date on a urine drug screen as appropriate.  Problem list and CSA are up-to-date.  Plan: morphine (MS CONTIN) 30 MG 12 hr tablet,         HYDROmorphone (DILAUDID) 4 MG tablet            (M51.36) DDD (degenerative disc disease), lumbar  Comment: As above.  Likely be having another surgery very soon  Plan: morphine (MS CONTIN) 30 MG 12 hr tablet,         HYDROmorphone (DILAUDID) 4 MG tablet            (M53.3) SI (sacroiliac) joint dysfunction  Comment:   Plan: morphine (MS CONTIN) 30 MG 12 hr tablet,         HYDROmorphone (DILAUDID) 4 MG tablet            Follow up 1 month  SANTIAGO Shaw MD    (Chart documentation  completed in part with Dragon voice-recognition software.  Even though reviewed some grammatical, spelling, and word errors may remain.)

## 2018-10-05 NOTE — PROGRESS NOTES
Injectable Influenza Immunization Documentation    1.  Is the person to be vaccinated sick today?   No    2. Does the person to be vaccinated have an allergy to a component   of the vaccine?   No  Egg Allergy Algorithm Link    3. Has the person to be vaccinated ever had a serious reaction   to influenza vaccine in the past?   No    4. Has the person to be vaccinated ever had Guillain-Barré syndrome?   No    Form completed by Meg Swenson MA on 10/5/2018 at 11:15 AM

## 2018-10-06 ASSESSMENT — PATIENT HEALTH QUESTIONNAIRE - PHQ9: SUM OF ALL RESPONSES TO PHQ QUESTIONS 1-9: 26

## 2018-10-06 ASSESSMENT — ANXIETY QUESTIONNAIRES: GAD7 TOTAL SCORE: 20

## 2018-10-11 LAB — COMPREHEN DRUG ANALYSIS UR: NORMAL

## 2018-10-15 NOTE — PROGRESS NOTES
Oleg,  Your urine screen was normal and as expected - showing the medication as prescribed.  As part of [facility]'s new monitoring policy we will periodically recheck this test.  SANTIAGO Shaw M.D.

## 2018-10-16 ENCOUNTER — TRANSFERRED RECORDS (OUTPATIENT)
Dept: HEALTH INFORMATION MANAGEMENT | Facility: CLINIC | Age: 58
End: 2018-10-16

## 2018-10-25 ENCOUNTER — OFFICE VISIT (OUTPATIENT)
Dept: FAMILY MEDICINE | Facility: CLINIC | Age: 58
End: 2018-10-25
Payer: COMMERCIAL

## 2018-10-25 ENCOUNTER — RADIANT APPOINTMENT (OUTPATIENT)
Dept: GENERAL RADIOLOGY | Facility: CLINIC | Age: 58
End: 2018-10-25
Attending: NURSE PRACTITIONER
Payer: COMMERCIAL

## 2018-10-25 ENCOUNTER — TELEPHONE (OUTPATIENT)
Dept: FAMILY MEDICINE | Facility: CLINIC | Age: 58
End: 2018-10-25

## 2018-10-25 VITALS
BODY MASS INDEX: 36.75 KG/M2 | RESPIRATION RATE: 16 BRPM | TEMPERATURE: 97.6 F | OXYGEN SATURATION: 98 % | DIASTOLIC BLOOD PRESSURE: 80 MMHG | SYSTOLIC BLOOD PRESSURE: 136 MMHG | HEART RATE: 93 BPM | WEIGHT: 271 LBS

## 2018-10-25 DIAGNOSIS — R11.2 INTRACTABLE VOMITING WITH NAUSEA, UNSPECIFIED VOMITING TYPE: ICD-10-CM

## 2018-10-25 DIAGNOSIS — R63.4 WEIGHT LOSS: ICD-10-CM

## 2018-10-25 DIAGNOSIS — R13.10 DYSPHAGIA, UNSPECIFIED TYPE: ICD-10-CM

## 2018-10-25 DIAGNOSIS — R63.4 WEIGHT LOSS: Primary | ICD-10-CM

## 2018-10-25 DIAGNOSIS — F17.200 SMOKER: ICD-10-CM

## 2018-10-25 LAB
ALBUMIN SERPL-MCNC: 4 G/DL (ref 3.4–5)
ALBUMIN UR-MCNC: NEGATIVE MG/DL
ALP SERPL-CCNC: 78 U/L (ref 40–150)
ALT SERPL W P-5'-P-CCNC: 30 U/L (ref 0–70)
ANION GAP SERPL CALCULATED.3IONS-SCNC: 9 MMOL/L (ref 3–14)
APPEARANCE UR: CLEAR
AST SERPL W P-5'-P-CCNC: 22 U/L (ref 0–45)
BASOPHILS # BLD AUTO: 0 10E9/L (ref 0–0.2)
BASOPHILS NFR BLD AUTO: 0.4 %
BILIRUB SERPL-MCNC: 0.5 MG/DL (ref 0.2–1.3)
BILIRUB UR QL STRIP: NEGATIVE
BUN SERPL-MCNC: 12 MG/DL (ref 7–30)
CALCIUM SERPL-MCNC: 9.2 MG/DL (ref 8.5–10.1)
CHLORIDE SERPL-SCNC: 104 MMOL/L (ref 94–109)
CO2 SERPL-SCNC: 26 MMOL/L (ref 20–32)
COLOR UR AUTO: YELLOW
CREAT SERPL-MCNC: 1.16 MG/DL (ref 0.66–1.25)
DIFFERENTIAL METHOD BLD: NORMAL
EOSINOPHIL # BLD AUTO: 0.2 10E9/L (ref 0–0.7)
EOSINOPHIL NFR BLD AUTO: 2.6 %
ERYTHROCYTE [DISTWIDTH] IN BLOOD BY AUTOMATED COUNT: 14.1 % (ref 10–15)
GFR SERPL CREATININE-BSD FRML MDRD: 65 ML/MIN/1.7M2
GLUCOSE SERPL-MCNC: 110 MG/DL (ref 70–99)
GLUCOSE UR STRIP-MCNC: NEGATIVE MG/DL
HCT VFR BLD AUTO: 45 % (ref 40–53)
HGB BLD-MCNC: 15.4 G/DL (ref 13.3–17.7)
HGB UR QL STRIP: NEGATIVE
KETONES UR STRIP-MCNC: NEGATIVE MG/DL
LEUKOCYTE ESTERASE UR QL STRIP: NEGATIVE
LYMPHOCYTES # BLD AUTO: 3 10E9/L (ref 0.8–5.3)
LYMPHOCYTES NFR BLD AUTO: 41.4 %
MCH RBC QN AUTO: 28.7 PG (ref 26.5–33)
MCHC RBC AUTO-ENTMCNC: 34.2 G/DL (ref 31.5–36.5)
MCV RBC AUTO: 84 FL (ref 78–100)
MONOCYTES # BLD AUTO: 0.6 10E9/L (ref 0–1.3)
MONOCYTES NFR BLD AUTO: 8.8 %
NEUTROPHILS # BLD AUTO: 3.4 10E9/L (ref 1.6–8.3)
NEUTROPHILS NFR BLD AUTO: 46.8 %
NITRATE UR QL: NEGATIVE
PH UR STRIP: 6 PH (ref 5–7)
PLATELET # BLD AUTO: 298 10E9/L (ref 150–450)
POTASSIUM SERPL-SCNC: 3.8 MMOL/L (ref 3.4–5.3)
PROT SERPL-MCNC: 7.7 G/DL (ref 6.8–8.8)
RBC # BLD AUTO: 5.36 10E12/L (ref 4.4–5.9)
SODIUM SERPL-SCNC: 139 MMOL/L (ref 133–144)
SOURCE: NORMAL
SP GR UR STRIP: >1.03 (ref 1–1.03)
UROBILINOGEN UR STRIP-ACNC: 0.2 EU/DL (ref 0.2–1)
WBC # BLD AUTO: 7.3 10E9/L (ref 4–11)

## 2018-10-25 PROCEDURE — G0472 HEP C SCREEN HIGH RISK/OTHER: HCPCS | Performed by: NURSE PRACTITIONER

## 2018-10-25 PROCEDURE — 85025 COMPLETE CBC W/AUTO DIFF WBC: CPT | Performed by: NURSE PRACTITIONER

## 2018-10-25 PROCEDURE — G0499 HEPB SCREEN HIGH RISK INDIV: HCPCS | Performed by: NURSE PRACTITIONER

## 2018-10-25 PROCEDURE — 81003 URINALYSIS AUTO W/O SCOPE: CPT | Performed by: NURSE PRACTITIONER

## 2018-10-25 PROCEDURE — 36415 COLL VENOUS BLD VENIPUNCTURE: CPT | Performed by: NURSE PRACTITIONER

## 2018-10-25 PROCEDURE — 71046 X-RAY EXAM CHEST 2 VIEWS: CPT | Mod: FY

## 2018-10-25 PROCEDURE — 74019 RADEX ABDOMEN 2 VIEWS: CPT | Mod: FY

## 2018-10-25 PROCEDURE — 87389 HIV-1 AG W/HIV-1&-2 AB AG IA: CPT | Performed by: NURSE PRACTITIONER

## 2018-10-25 PROCEDURE — 80053 COMPREHEN METABOLIC PANEL: CPT | Performed by: NURSE PRACTITIONER

## 2018-10-25 PROCEDURE — 99215 OFFICE O/P EST HI 40 MIN: CPT | Performed by: NURSE PRACTITIONER

## 2018-10-25 RX ORDER — PROMETHAZINE HYDROCHLORIDE 25 MG/1
25 TABLET ORAL EVERY 6 HOURS PRN
Qty: 20 TABLET | Refills: 1 | Status: SHIPPED | OUTPATIENT
Start: 2018-10-25 | End: 2018-12-18

## 2018-10-25 RX ORDER — CLINDAMYCIN HCL 300 MG
CAPSULE ORAL
Refills: 0 | COMMUNITY
Start: 2018-10-15 | End: 2018-10-25

## 2018-10-25 ASSESSMENT — PAIN SCALES - GENERAL: PAINLEVEL: NO PAIN (0)

## 2018-10-25 NOTE — MR AVS SNAPSHOT
After Visit Summary   10/25/2018    Oleg Ford    MRN: [id number]           Patient Information     Date Of Birth          1960        Visit Information        Provider Department      10/25/2018 1:40 PM Bonnie Love APRN CNP [facility]        Today's Diagnoses     Weight loss    -  1    Intractable vomiting with nausea, unspecified vomiting type        Smoker        Dysphagia, unspecified type          Care Instructions    The final reads of your xrays are still pending.  I'd like you to do a CT scan of your abdomen to further investigate the cause of your symptoms.  Please call [phone] to schedule.      If that is unrevealing, I would recommend and upper GI study to look at the lining of your stomach and esophagus.    In the meantime, to help with nausea, please take Phenergan one tablet every 6 hours as needed.  Do not take Zofran while taking.    I'd recommend discussing Remeron with your psychiatrist for the weight loss issues.    Follow-up in one week.    At [facility], we strive to deliver an exceptional experience to you, every time we see you.  If you receive a survey in the mail, please send us back your thoughts. We really do value your feedback.    Your care team:                            Family Medicine Internal Medicine   MD Thomas Rai MD Shantel Branch-Fleming, MD Katya Georgiev PA-C Megan Hill, APRN CNP Nam Ho, MD Pediatrics   MICHELLE Win CNP Paula Brito, MD Amelia Massimini APRN CNP Shaista Malik, MD Bethany Templen, MD Deborah Mielke, MD Kim Thein, APRN CNP      Clinic hours: Monday - Thursday 7 am-7 pm; Fridays 7 am-5 pm.   Urgent care: Monday - Friday 11 am-9 pm; Saturday and Sunday 9 am-5 pm.  Pharmacy : Monday -Thursday 8 am-8 pm; Friday 8 am-6 pm; Saturday and Sunday 9 am-5 pm.     Clinic: [phone]   Pharmacy: [phone]                 Follow-ups after your visit        Follow-up notes from your care team     Return in about 1 week (around 11/1/2018).      Your next 10 appointments already scheduled     Oct 30, 2018  8:30 AM CDT   (Arrive by 8:00 AM)   CT ABDOMEN PELVIS W CONTRAST with BECT1   [facility] [place] ([facility] [place])    [address]  [place] [address]   [phone]           How do I prepare for my exam? (Food and drink instructions) To prepare: Do not eat or drink for 2 hours before your exam. If you need to take medicine, you may take it with small sips of water. (We may ask you to take liquid medicine as well.)  How do I prepare for my exam? (Other instructions) Please arrive 30 minutes early for your CT.  Once in the department you might be asked to drink water 15-20 minutes prior to your exam.  If indicated you may be asked to drink an oral contrast in advance of your CT.  If this is the case, the imaging team will let you know or be in contact with you prior to your appointment  Patients over 70 or patients with diabetes or kidney problems: If you haven t had a blood test (creatinine test) within the last 30 days, the Cardiologist/Radiologist may require you to get this test prior to your exam.  If you have diabetes:  Continue to take your metformin medication on the day of your exam  What should I wear: Please wear loose clothing, such as a sweat suit or jogging clothes. Avoid snaps, zippers and other metal. We may ask you to undress and put on a hospital gown.  How long does the exam take: Most scans take less than 20 minutes.  What should I bring: Please bring any scans or X-rays taken at other hospitals, if similar tests were done. Also bring a list of your medicines, including vitamins, minerals and over-the-counter drugs. It is safest to leave personal items at home.  Do I need a : No  is needed.  What do I need to tell my doctor? Be sure to tell your doctor: * If you have any  allergies. * If there s any chance you are pregnant. * If you are breastfeeding.  What should I do after the exam: No restrictions, You may resume normal activities.  What is this test: A CT (computed tomography) scan is a series of pictures that allows us to look inside your body. The scanner creates images of the body in cross sections, much like slices of bread. This helps us see any problems more clearly. You may receive contrast (X-ray dye) before or during your scan. You will be asked to drink the contrast.  Who should I call with questions: If you have any questions, please call the Imaging Department where you will have your exam. Directions, parking instructions, and other information is available on our website, [email or web address].            Oct 30, 2018  1:00 PM CDT   Office Visit with Cat Shaw MD   [facility] ([place] [id number]   [phone]           Bring a current list of meds and any records pertaining to this visit. For Physicals, please bring immunization records and any forms needing to be filled out. Please arrive 10 minutes early to complete paperwork.            Nov 05, 2018 11:00 AM CST   Office Visit with Cat Shaw MD   [facility] ([facility]    [address] [place], [place] [id number]   [phone]           Bring a current list of meds and any records pertaining to this visit. For Physicals, please bring immunization records and any forms needing to be filled out. Please arrive 10 minutes early to complete paperwork.              Future tests that were ordered for you today     Open Future Orders        Priority Expected Expires Ordered    CT Abdomen Pelvis w Contrast Routine  10/25/2019 10/25/2018            Who to contact     If you have questions or need follow up information about today's clinic visit or your schedule please  contact [facility] directly at [phone].  Normal or non-critical lab and imaging results will be communicated to you by MyChart, letter or phone within 4 business days after the clinic has received the results. If you do not hear from us within 7 days, please contact the clinic through [employer] or phone. If you have a critical or abnormal lab result, we will notify you by phone as soon as possible.  Submit refill requests through [employer] or call your pharmacy and they will forward the refill request to us. Please allow 3 business days for your refill to be completed.          Additional Information About Your Visit        [employer] Information     [employer] gives you secure access to your electronic health record. If you see a primary care provider, you can also send messages to your care team and make appointments. If you have questions, please call your primary care clinic.  If you do not have a primary care provider, please call [phone] and they will assist you.        Care EveryWhere ID     This is your Care EveryWhere ID. This could be used by other organizations to access your [facility] medical records  [id number]        Your Vitals Were     Pulse Temperature Respirations Pulse Oximetry BMI (Body Mass Index)       93 97.6  F (36.4  C) (Oral) 16 98% 36.75 kg/m2        Blood Pressure from Last 3 Encounters:   10/25/18 136/80   10/05/18 118/78   08/03/18 112/82    Weight from Last 3 Encounters:   10/25/18 271 lb (122.9 kg)   10/05/18 280 lb (127 kg)   08/03/18 288 lb (130.6 kg)              We Performed the Following     *UA reflex to Microscopic and Culture ([facility] (except [place] and [place])     CBC with platelets and differential     Comprehensive metabolic panel (BMP + Alb, Alk Phos, ALT, AST, Total. Bili, TP)     Hepatitis B surface antigen     Hepatitis C antibody     HIV Antigen Antibody Combo          Today's Medication Changes          These changes are  accurate as of 10/25/18  3:18 PM.  If you have any questions, ask your nurse or doctor.               Start taking these medicines.        Dose/Directions    promethazine 25 MG tablet   Commonly known as:  PHENERGAN   Used for:  Intractable vomiting with nausea, unspecified vomiting type   Started by:  Bonnie Love APRN CNP        Dose:  25 mg   Take 1 tablet (25 mg) by mouth every 6 hours as needed for nausea   Quantity:  20 tablet   Refills:  1         Stop taking these medicines if you haven't already. Please contact your care team if you have questions.     buPROPion 150 MG 24 hr tablet   Commonly known as:  WELLBUTRIN XL   Stopped by:  Bonnie Love APRN CNP           clindamycin 300 MG capsule   Commonly known as:  CLEOCIN   Stopped by:  Bonnie Love APRN CNP                Where to get your medicines      These medications [place] [facility] [place] [address]  [address], [facility] [address]     Phone:  [phone]     promethazine 25 MG tablet                Primary Care Provider Office Phone # Fax #    Cat Tenzin Shaw [phone] [address] [facility] [address]        Equal Access to Services     CHAD HANSON AH: Hadii aad ku hadasho Soomaali, waaxda luqadaha, qaybta kaalmada adeegyada, waxay idiin hayderick smith. So [facility] [phone].    ATENCIÓN: Si habla español, tiene a gongora disposición servicios gratuitos de asistencia lingüística. ame al [phone].    We comply with applicable federal civil rights laws and [place] laws. We do not discriminate on the basis of race, color, national origin, age, disability, sex, sexual orientation, or gender identity.            Thank you!     Thank you for choosing [facility]  for your care. Our goal is always to provide you with excellent care. Hearing back from our patients is one way we can  continue to improve our services. Please take a few minutes to complete the written survey that you may receive in the mail after your visit with us. Thank you!             Your Updated Medication List - Protect others around you: Learn how to safely use, store and throw away your medicines at www.disposemymeds.org.          This list is accurate as of 10/25/18  3:18 PM.  Always use your most recent med list.                   Brand Name Dispense Instructions for use Diagnosis    cyclobenzaprine 10 MG tablet    FLEXERIL    90 tablet    TAKE 1 TABLET BY MOUTH THREE TIMES DAILY AS NEEDED FOR MUSCLE SPASMS    DDD (degenerative disc disease), lumbar       CYMBALTA PO      Take 60 mg by mouth daily        diazepam 5 MG tablet    VALIUM    30 tablet    Take 1 tablet (5 mg) by mouth daily as needed for anxiety or sleep    Major depressive disorder, recurrent, severe without psychotic features (H)       EPINEPHrine 0.3 MG/0.3ML injection 2-pack    EPIPEN/ADRENACLICK/or ANY BX GENERIC EQUIV    1 each    Inject 0.3 mLs (0.3 mg) into the muscle once as needed for anaphylaxis    Bee sting allergy       HYDROmorphone 4 MG tablet    DILAUDID    180 tablet    Take 2 tablets (8 mg) by mouth 4 times daily as needed for moderate to severe pain (every 4-6hour prn)    Chronic pain syndrome, DDD (degenerative disc disease), lumbar, SI (sacroiliac) joint dysfunction       hydrOXYzine 50 MG tablet    ATARAX    120 tablet    Take 1 tablet (50 mg) by mouth every 6 hours as needed for anxiety    Chronic pain syndrome       LAMICTAL 150 MG tablet   Generic drug:  lamoTRIgine      Take 250 mg by mouth daily        morphine 30 MG 12 hr tablet    MS CONTIN    90 tablet    Take 1 tablet (30 mg) by mouth 3 times daily    Chronic pain syndrome, DDD (degenerative disc disease), lumbar, SI (sacroiliac) joint dysfunction       naloxone nasal spray    NARCAN    0.2 mL    Spray 1 spray (4 mg) into one nostril alternating nostrils as needed for opioid  reversal every 2-3 minutes until assistance arrives    Chronic pain syndrome, Chronic, continuous use of opioids       omeprazole 40 MG capsule    priLOSEC    30 capsule    TAKE ONE CAPSULE BY MOUTH EVERY DAY 30 TO 60 MINUTES BEFORE A MEAL    Gastroesophageal reflux disease with esophagitis       ondansetron 4 MG ODT tab    ZOFRAN-ODT    20 tablet    PLACE 1 OR 2 TABLETS UNDER THE TONGUE 3 TIMES A DAY AS NEEDED FOR NAUSEA    Nausea       promethazine 25 MG tablet    PHENERGAN    20 tablet    Take 1 tablet (25 mg) by mouth every 6 hours as needed for nausea    Intractable vomiting with nausea, unspecified vomiting type       rOPINIRole 0.25 MG tablet    REQUIP    60 tablet    Take 1-2 tablets (0.25-0.5 mg) by mouth At Bedtime    RLS (restless legs syndrome)       tolterodine 1 MG tablet    DETROL     Take 1 mg by mouth 2 times daily        zolpidem 10 MG tablet    AMBIEN    30 tablet    Take 1 tablet (10 mg) by mouth nightly as needed for sleep    Insomnia, unspecified type

## 2018-10-25 NOTE — PATIENT INSTRUCTIONS
The final reads of your xrays are still pending.  I'd like you to do a CT scan of your abdomen to further investigate the cause of your symptoms.  Please call [phone] to schedule.      If that is unrevealing, I would recommend and upper GI study to look at the lining of your stomach and esophagus.    In the meantime, to help with nausea, please take Phenergan one tablet every 6 hours as needed.  Do not take Zofran while taking.    I'd recommend discussing Remeron with your psychiatrist for the weight loss issues.    Follow-up in one week.    At [facility], we strive to deliver an exceptional experience to you, every time we see you.  If you receive a survey in the mail, please send us back your thoughts. We really do value your feedback.    Your care team:                            Family Medicine Internal Medicine   MD Thomas Rai MD Shantel Branch-Fleming, MD Katya Georgiev PA-C Megan Hill, APRN HEATHER Lara MD Pediatrics   Jose Cruz PAHETAL Love, MD Shweta Kahn APRN CNP   MD Pam Delaney MD Deborah Mielke, MD Kim Thein, APRN [facility]      Clinic hours: Monday - Thursday 7 am-7 pm; Fridays 7 am-5 pm.   Urgent care: Monday - Friday 11 am-9 pm; Saturday and Sunday 9 am-5 pm.  Pharmacy : Monday -Thursday 8 am-8 pm; Friday 8 am-6 pm; Saturday and Sunday 9 am-5 pm.     Clinic: [phone]   Pharmacy: [phone]

## 2018-10-25 NOTE — PROGRESS NOTES
SUBJECTIVE:   Oleg Ford is a 58 year old male who presents to clinic today for the following health issues:    Acute Illness   Acute illness concerns: nausea, dry heaves  Onset: x more than 1 month.    Fever: no    Chills/Sweats: YES    Headache (location?): YES    Sinus Pressure:no    Conjunctivitis:  no    Ear Pain: no    Rhinorrhea: no    Congestion: no    Sore Throat: YES     Cough: no    Wheeze: no    Decreased Appetite: YES    Nausea: YES    Vomiting: YES    Diarrhea:  no    Dysuria/Freq.: no    Fatigue/Achiness: YES- whole body.    Sick/Strep Exposure: no     Therapies Tried and outcome: nyquil didn't do anything.    Weight loss, 17 lbs in last 2 months.    Symptoms wax and wane but nausea is very persistent.  He is eating only very little, sips of soup or small bites of food daily.  Drinking about 4 oz of water daily.  Fatigued all the time.  Just finished treatment of Clindamycin for 1 week due to molar infection, this did not seem to change his symptoms.    Started Cymbalta 1.5 months ago.  Symptoms were present prior to that  No diarrhea, bowel movement every couple of days, normal for him given opiates  No travel, no exposures  Trouble swallowing x a few months.  Has history of cystis lesion in esophagus. Biopsied in 2013 and was negative.   +night sweats, drenching for several months.    August 2018 months ago visited mother in [place], was coughing up blood and went to emergency room, xray and CBC were normal. Has not coughed up blood since.  Has smoking history of 40 years or more.  Quit 4 days ago.  Denies shortness of breath or chest pain.  He is extremely dizzy all the time especially with position changes.    Zofran not helpful at all.  Denies rashes.    Problem list and histories reviewed & adjusted, as indicated.  Additional history: as documented    Patient Active Problem List   Diagnosis     Chronic low back pain     CARDIOVASCULAR SCREENING; LDL GOAL LESS THAN 130     DDD (degenerative  disc disease), lumbar     RLS (restless legs syndrome)     Esophageal cyst     Simple hepatic cyst     Simple renal cyst     Chronic pain syndrome     S/P lumbar fusion     Major depressive disorder, recurrent, severe without psychotic features (H)     Gastroesophageal reflux disease with esophagitis     SI (sacroiliac) joint dysfunction     Advance Care Planning     Morbid obesity (H)     Past Surgical History:   Procedure Laterality Date     BACK SURGERY  2/6/2013    lumbar surgery     COLONOSCOPY       FUSION SACRAL ILIAC Right 10/19/2017    Procedure: FUSION SACRAL ILIAC;  Right  side sacroiliac joint fusion with 2 implants ;  Surgeon: Sebastián Szymanski MD;  Location: [facility]     FUSION SACRAL ILIAC Left 1/11/2018    Procedure: FUSION SACRAL ILIAC;   left  side sacroiliac joint fusion with 2 implants.(Zyga)   extraction and use of illac bone graft ;  Surgeon: Sebastián Szymanski MD;  Location: [facility] OR     [facility] UGI ENDOSCOPY W EUS N/A 7/31/2014    Procedure: COMBINED ENDOSCOPIC ULTRASOUND, ESOPHAGOSCOPY, GASTROSCOPY, DUODENOSCOPY (EGD);  Surgeon: Shorty Stoll MD;  Location: [facility]     ORCHIECTOMY INGUINAL      right radical orchiectomy     REMOVE STIMULATOR VAGUS NERVE  12/23/2011    Procedure:REMOVE STIMULATOR VAGUS NERVE; Vagus Nerve Stimulator Removal; Surgeon:ALEXIS BECERRA; Location:[facility]     REMOVE STIMULATOR VAGUS NERVE  11/8/2013    Procedure: REMOVE STIMULATOR VAGUS NERVE;  Removal Of Vagus Nerve Stimulator Lead In Left Neck ;  Surgeon: Alexis Becerra MD;  Location: [facility]     SURGICAL HISTORY OF -       vagal nerve implant; removed     SURGICAL HISTORY OF -       UPPP     VASECTOMY         Social History   Substance Use Topics     Smoking status: Current Every Day Smoker     Packs/day: 0.00     Years: 40.00     Types: Cigarettes     Smokeless tobacco: Never Used      Comment: 1-2 daily     Alcohol use Yes      Comment: 1 drink every 3 months     Family History   Problem Relation Age of Onset     Diabetes  Mother      JOHNAMARGARITA. Mother      JASWANT. Maternal Grandmother      Diabetes Maternal Grandmother      Hypertension Maternal Grandmother      Cancer Maternal Grandfather      JASWANT. Maternal Grandfather          Current Outpatient Prescriptions   Medication Sig Dispense Refill     clindamycin (CLEOCIN) 300 MG capsule TAKE 1 CAPSULE BY MOUTH FOUR TIMES DAILY  0     cyclobenzaprine (FLEXERIL) 10 MG tablet TAKE 1 TABLET BY MOUTH THREE TIMES DAILY AS NEEDED FOR MUSCLE SPASMS 90 tablet 1     diazepam (VALIUM) 5 MG tablet Take 1 tablet (5 mg) by mouth daily as needed for anxiety or sleep 30 tablet 0     DULoxetine HCl (CYMBALTA PO) Take 60 mg by mouth daily        EPINEPHrine (EPIPEN) 0.3 MG/0.3ML injection Inject 0.3 mLs (0.3 mg) into the muscle once as needed for anaphylaxis 1 each 2     HYDROmorphone (DILAUDID) 4 MG tablet Take 2 tablets (8 mg) by mouth 4 times daily as needed for moderate to severe pain (every 4-6hour prn) 180 tablet 0     hydrOXYzine (ATARAX) 50 MG tablet Take 1 tablet (50 mg) by mouth every 6 hours as needed for anxiety 120 tablet 3     lamoTRIgine (LAMICTAL) 150 MG tablet Take 250 mg by mouth daily        morphine (MS CONTIN) 30 MG 12 hr tablet Take 1 tablet (30 mg) by mouth 3 times daily 90 tablet 0     naloxone (NARCAN) nasal spray Spray 1 spray (4 mg) into one nostril alternating nostrils as needed for opioid reversal every 2-3 minutes until assistance arrives 0.2 mL 0     omeprazole (PRILOSEC) 40 MG capsule TAKE ONE CAPSULE BY MOUTH EVERY DAY 30 TO 60 MINUTES BEFORE A MEAL 30 capsule 10     ondansetron (ZOFRAN-ODT) 4 MG ODT tab PLACE 1 OR 2 TABLETS UNDER THE TONGUE 3 TIMES A DAY AS NEEDED FOR NAUSEA 20 tablet 1     rOPINIRole (REQUIP) 0.25 MG tablet Take 1-2 tablets (0.25-0.5 mg) by mouth At Bedtime 60 tablet 2     tolterodine (DETROL) 1 MG tablet Take 1 mg by mouth 2 times daily       zolpidem (AMBIEN) 10 MG tablet Take 1 tablet (10 mg) by mouth nightly as needed for sleep 30 tablet 2      Allergies   Allergen Reactions     Ciprofloxacin Anaphylaxis     Doxycycline Anaphylaxis     Septra [Bactrim] Anaphylaxis     Amoxicillin      itching     Recent Labs   Lab Test  12/01/17   1145  10/20/17   0708  10/06/17   1318   07/08/15   0952   A1C  5.5   --   5.6   --    --    LDL  88   --    --    --   57   HDL  33*   --    --    --   29*   TRIG  184*   --    --    --   188*   CR   --   1.08  1.24   < >   --    GFRESTIMATED   --   71  60*   < >   --    GFRESTBLACK   --   86  73   < >   --    POTASSIUM   --   3.8  4.0   < >   --     < > = values in this interval not displayed.      BP Readings from Last 3 Encounters:   10/25/18 136/80   10/05/18 118/78   08/03/18 112/82    Wt Readings from Last 3 Encounters:   10/25/18 271 lb (122.9 kg)   10/05/18 280 lb (127 kg)   08/03/18 288 lb (130.6 kg)            Reviewed and updated as needed this visit by clinical staff  Tobacco  Allergies  Meds  Med Hx  Surg Hx  Fam Hx  Soc Hx      Reviewed and updated as needed this visit by Provider  Tobacco  Allergies  Meds  Med Hx  Surg Hx  Fam Hx  Soc Hx        ROS:  Constitutional, HEENT, cardiovascular, pulmonary, GI, , musculoskeletal, neuro, skin, endocrine and psych systems are negative, except as otherwise noted.    OBJECTIVE:     /80 (BP Location: Left arm, Patient Position: Chair, Cuff Size: Adult Large)  Pulse 93  Temp 97.6  F (36.4  C) (Oral)  Resp 16  Wt 271 lb (122.9 kg)  SpO2 98%  BMI 36.75 kg/m2  Body mass index is 36.75 kg/(m^2).  GENERAL: fatigued and appears dehydrated, visibly dizzy upon position changes  EYES: Eyes grossly normal to inspection, PERRL and conjunctivae and sclerae normal  HENT: ear canals and TM's normal, nose and mouth without ulcers or lesions; mucous membranes extremely dry.  NECK: no adenopathy, no asymmetry, masses, or scars and thyroid normal to palpation  RESP: lungs clear to auscultation - no rales, rhonchi or wheezes  CV: regular rate and rhythm, normal S1  S2, no S3 or S4, no murmur, click or rub, no peripheral edema and peripheral pulses strong  ABDOMEN: soft, nontender, without hepatosplenomegaly or masses, bowel sounds normal and mildly distended  MS: no gross musculoskeletal defects noted, no edema  SKIN: no suspicious lesions or rashes  NEURO: Normal strength and tone, mentation intact and speech normal  PSYCH: mentation appears normal, affect normal/bright    Diagnostic Test Results:    Results for ELVIN MEDINA (MRN [id number]) as of 10/29/2018 08:47   Ref. Range 10/25/2018 14:39   Sodium Latest Ref Range: 133 - 144 mmol/L 139   Potassium Latest Ref Range: 3.4 - 5.3 mmol/L 3.8   Chloride Latest Ref Range: 94 - 109 mmol/L 104   Carbon Dioxide Latest Ref Range: 20 - 32 mmol/L 26   Urea Nitrogen Latest Ref Range: 7 - 30 mg/dL 12   Creatinine Latest Ref Range: 0.66 - 1.25 mg/dL 1.16   GFR Estimate Latest Ref Range: >60 mL/min/1.7m2 65   GFR Estimate If Black Latest Ref Range: >60 mL/min/1.7m2 79   Calcium Latest Ref Range: 8.5 - 10.1 mg/dL 9.2   Anion Gap Latest Ref Range: 3 - 14 mmol/L 9   Albumin Latest Ref Range: 3.4 - 5.0 g/dL 4.0   Protein Total Latest Ref Range: 6.8 - 8.8 g/dL 7.7   Bilirubin Total Latest Ref Range: 0.2 - 1.3 mg/dL 0.5   Alkaline Phosphatase Latest Ref Range: 40 - 150 U/L 78   ALT Latest Ref Range: 0 - 70 U/L 30   AST Latest Ref Range: 0 - 45 U/L 22   Glucose Latest Ref Range: 70 - 99 mg/dL 110 (H)   WBC Latest Ref Range: 4.0 - 11.0 10e9/L 7.3   Hemoglobin Latest Ref Range: 13.3 - 17.7 g/dL 15.4   Hematocrit Latest Ref Range: 40.0 - 53.0 % 45.0   Platelet Count Latest Ref Range: 150 - 450 10e9/L 298   RBC Count Latest Ref Range: 4.4 - 5.9 10e12/L 5.36   MCV Latest Ref Range: 78 - 100 fl 84   MCH Latest Ref Range: 26.5 - 33.0 pg 28.7   MCHC Latest Ref Range: 31.5 - 36.5 g/dL 34.2   RDW Latest Ref Range: 10.0 - 15.0 % 14.1   Diff Method Unknown Automated Method   % Neutrophils Latest Units: % 46.8   % Lymphocytes Latest Units: % 41.4   %  Monocytes Latest Units: % 8.8   % Eosinophils Latest Units: % 2.6   % Basophils Latest Units: % 0.4   Absolute Neutrophil Latest Ref Range: 1.6 - 8.3 10e9/L 3.4   Absolute Lymphocytes Latest Ref Range: 0.8 - 5.3 10e9/L 3.0   Absolute Monocytes Latest Ref Range: 0.0 - 1.3 10e9/L 0.6   Absolute Eosinophils Latest Ref Range: 0.0 - 0.7 10e9/L 0.2   Absolute Basophils Latest Ref Range: 0.0 - 0.2 10e9/L 0.0     Results for ELVIN MEDINA (MRN [id number]) as of 10/29/2018 08:47   Ref. Range 10/25/2018 14:45   Color Urine Unknown Yellow   Appearance Urine Unknown Clear   Glucose Urine Latest Ref Range: NEG^Negative mg/dL Negative   Bilirubin Urine Latest Ref Range: NEG^Negative  Negative   Ketones Urine Latest Ref Range: NEG^Negative mg/dL Negative   Specific Gravity Urine Latest Ref Range: 1.003 - 1.035  >1.030   pH Urine Latest Ref Range: 5.0 - 7.0 pH 6.0   Protein Albumin Urine Latest Ref Range: NEG^Negative mg/dL Negative   Urobilinogen Urine Latest Ref Range: 0.2 - 1.0 EU/dL 0.2   Nitrite Urine Latest Ref Range: NEG^Negative  Negative   Blood Urine Latest Ref Range: NEG^Negative  Negative   Leukocyte Esterase Urine Latest Ref Range: NEG^Negative  Negative   Source Unknown Midstream Urine     Results for ELVIN MEDINA (MRN [id number]) as of 10/29/2018 08:47   Ref. Range 10/25/2018 14:39   Hep B Surface Agn Latest Ref Range: NR^Nonreactive  Nonreactive   Hepatitis C Antibody Latest Ref Range: NR^Nonreactive  Nonreactive   HIV Antigen Antibody Combo Latest Ref Range: NR^Nonreactive     Nonreactive       CHEST TWO VIEWS 10/25/2018 2:38 PM      HISTORY: Weight loss. Smoker.     COMPARISON: March 16, 2015      FINDINGS: There are no acute infiltrates. The cardiac silhouette is  not enlarged. Pulmonary vasculature is unremarkable.         IMPRESSION: No acute disease.     CHRISSY RODRIGUES MD      ABDOMEN TWO-THREE VIEW  10/25/2018 2:39 PM      HISTORY: Weight loss. Intractable vomiting with nausea, unspecified  vomiting  type.     COMPARISON: None.     FINDINGS: Small amount of stool. No free air. There are no air filled  distended loops of small bowel. The colon is not distended. The lung  bases are unremarkable.         IMPRESSION: Nonobstructed bowel gas pattern.     CHRISSY RODRIGUES MD    CT ABDOMEN AND PELVIS WITH CONTRAST   10/26/2018 1:33 PM      HISTORY: Intractable nausea and vomiting. Weight loss.     TECHNIQUE: 100 mL Isovue-370 IV were administered. After contrast  administration, volumetric helical sections were acquired from the  lung bases to the ischial tuberosities. Coronal images were also  reconstructed. Radiation dose for this scan was reduced using  automated exposure control, adjustment of the mA and/or kV according  to patient size, or iterative reconstruction technique.     COMPARISON: CT of the chest, abdomen, and pelvis performed 7/25/2014.      FINDINGS: Right hepatic cyst measures 1.3 cm, and is unchanged. 1.2 cm  exophytic cyst in the lower pole of the right kidney. A few smaller  probable cysts are also noted in both kidneys. The liver, gallbladder,  spleen, adrenal glands, pancreas, and kidneys are otherwise  unremarkable. No hydronephrosis. Mild atherosclerotic aortoiliac  calcification. No bowel obstruction. Unremarkable appendix. No  enlarged lymph nodes are identified in the abdomen or pelvis. Colonic  diverticulosis, without convincing evidence for diverticulitis.  Postoperative changes of posterior aletha and pedicle screw fusion at  L4-S1. Surgical screws are also noted crossing the bilateral  sacroiliac joints. Degenerative changes are noted in the visualized  thoracolumbar spine.         IMPRESSION: No acute abnormality in the abdomen or pelvis. No cause  for nausea and vomiting is identified.     ELVIN TRIPP MD    ASSESSMENT/PLAN:     1. Weight loss  No etiology identified on labs or imaging to explain symptoms.  Plan is for patient to do an UGI study to evaluate further.  In meantime, will  trial phenergan (reactions with other antinausea regimens and his current medications) so patient can tolerate PO. Advised small amounts of fluids every 20 minutes and bland foods.    - XR Chest 2 Views; Future  - XR Abdomen 2 Views; Future  - CBC with platelets and differential  - Comprehensive metabolic panel (BMP + Alb, Alk Phos, ALT, AST, Total. Bili, TP)  - *UA reflex to Microscopic and Culture ([facility] (except [place] and [place])  - HIV Antigen Antibody Combo  - Hepatitis C antibody  - Hepatitis B surface antigen  - CT Abdomen Pelvis w Contrast; Future    2. Intractable vomiting with nausea, unspecified vomiting type  - XR Abdomen 2 Views; Future  - Comprehensive metabolic panel (BMP + Alb, Alk Phos, ALT, AST, Total. Bili, TP)  - *UA reflex to Microscopic and Culture ([facility] (except [place] and [place])  - promethazine (PHENERGAN) 25 MG tablet; Take 1 tablet (25 mg) by mouth every 6 hours as needed for nausea  Dispense: 20 tablet; Refill: 1  - CT Abdomen Pelvis w Contrast; Future    3. Smoker  - XR Chest 2 Views; Future    4. Dysphagia, unspecified type  As above.       Patient Instructions     The final reads of your xrays are still pending.  I'd like you to do a CT scan of your abdomen to further investigate the cause of your symptoms.  Please call [phone] to schedule.      If that is unrevealing, I would recommend and upper GI study to look at the lining of your stomach and esophagus.    In the meantime, to help with nausea, please take Phenergan one tablet every 6 hours as needed.  Do not take Zofran while taking.    I'd recommend discussing Remeron with your psychiatrist for the weight loss issues.    Follow-up in one week.    At [facility], we strive to deliver an exceptional experience to you, every time we see you.  If you receive a survey in the mail, please send us back your thoughts. We really do value your feedback.    Your  care team:                            Family Medicine Internal Medicine   MD Thomas Rai MD Shantel Branch-Fleming, MD Katya Georgiev PA-C Megan Hill, APRN CNP Nam Ho, MD Pediatrics   MICHELLE Win, MD Shweta Kahn CNP, MD Bethany Templen, MD Deborah Mielke, MD Kim Thein, APRN CNP      Clinic hours: Monday - Thursday 7 am-7 pm; Fridays 7 am-5 pm.   Urgent care: Monday - Friday 11 am-9 pm; Saturday and Sunday 9 am-5 pm.  Pharmacy : Monday -Thursday 8 am-8 pm; Friday 8 am-6 pm; Saturday and Sunday 9 am-5 pm.     Clinic: [phone]   Pharmacy: [phone]            LIBBY Saenz CNP  [facility]

## 2018-10-26 ENCOUNTER — RADIANT APPOINTMENT (OUTPATIENT)
Dept: CT IMAGING | Facility: CLINIC | Age: 58
End: 2018-10-26
Attending: NURSE PRACTITIONER
Payer: COMMERCIAL

## 2018-10-26 DIAGNOSIS — R11.2 INTRACTABLE VOMITING WITH NAUSEA, UNSPECIFIED VOMITING TYPE: ICD-10-CM

## 2018-10-26 DIAGNOSIS — R63.4 WEIGHT LOSS: Primary | ICD-10-CM

## 2018-10-26 DIAGNOSIS — R63.4 WEIGHT LOSS: ICD-10-CM

## 2018-10-26 LAB
HBV SURFACE AG SERPL QL IA: NONREACTIVE
HCV AB SERPL QL IA: NONREACTIVE
HIV 1+2 AB+HIV1 P24 AG SERPL QL IA: NONREACTIVE

## 2018-10-26 PROCEDURE — 74177 CT ABD & PELVIS W/CONTRAST: CPT | Mod: TC

## 2018-10-26 RX ORDER — IOPAMIDOL 755 MG/ML
100 INJECTION, SOLUTION INTRAVASCULAR ONCE
Status: COMPLETED | OUTPATIENT
Start: 2018-10-26 | End: 2018-10-26

## 2018-10-26 RX ADMIN — IOPAMIDOL 100 ML: 755 INJECTION, SOLUTION INTRAVASCULAR at 13:30

## 2018-10-29 ENCOUNTER — HOSPITAL ENCOUNTER (OUTPATIENT)
Facility: AMBULATORY SURGERY CENTER | Age: 58
End: 2018-10-29
Attending: INTERNAL MEDICINE | Admitting: INTERNAL MEDICINE
Payer: COMMERCIAL

## 2018-11-05 ENCOUNTER — OFFICE VISIT (OUTPATIENT)
Dept: FAMILY MEDICINE | Facility: CLINIC | Age: 58
End: 2018-11-05
Payer: COMMERCIAL

## 2018-11-05 VITALS
DIASTOLIC BLOOD PRESSURE: 62 MMHG | SYSTOLIC BLOOD PRESSURE: 112 MMHG | BODY MASS INDEX: 37.7 KG/M2 | TEMPERATURE: 97.8 F | WEIGHT: 278 LBS | OXYGEN SATURATION: 97 % | HEART RATE: 73 BPM

## 2018-11-05 DIAGNOSIS — M51.369 DDD (DEGENERATIVE DISC DISEASE), LUMBAR: ICD-10-CM

## 2018-11-05 DIAGNOSIS — R47.02 DYSPHASIA: ICD-10-CM

## 2018-11-05 DIAGNOSIS — G89.4 CHRONIC PAIN SYNDROME: ICD-10-CM

## 2018-11-05 DIAGNOSIS — F33.2 MAJOR DEPRESSIVE DISORDER, RECURRENT, SEVERE WITHOUT PSYCHOTIC FEATURES (H): ICD-10-CM

## 2018-11-05 DIAGNOSIS — M53.3 SI (SACROILIAC) JOINT DYSFUNCTION: ICD-10-CM

## 2018-11-05 DIAGNOSIS — Z01.818 PREOP GENERAL PHYSICAL EXAM: Primary | ICD-10-CM

## 2018-11-05 PROCEDURE — 99214 OFFICE O/P EST MOD 30 MIN: CPT | Performed by: FAMILY MEDICINE

## 2018-11-05 PROCEDURE — 93000 ELECTROCARDIOGRAM COMPLETE: CPT | Performed by: FAMILY MEDICINE

## 2018-11-05 RX ORDER — MORPHINE SULFATE 30 MG/1
30 TABLET, FILM COATED, EXTENDED RELEASE ORAL 3 TIMES DAILY
Qty: 90 TABLET | Refills: 0 | Status: SHIPPED | OUTPATIENT
Start: 2018-11-05 | End: 2018-12-18

## 2018-11-05 RX ORDER — DIAZEPAM 5 MG
5 TABLET ORAL DAILY PRN
Qty: 30 TABLET | Refills: 0 | Status: SHIPPED | OUTPATIENT
Start: 2018-11-05 | End: 2018-12-07

## 2018-11-05 RX ORDER — HYDROMORPHONE HYDROCHLORIDE 4 MG/1
8 TABLET ORAL 4 TIMES DAILY PRN
Qty: 180 TABLET | Refills: 0 | Status: SHIPPED | OUTPATIENT
Start: 2018-11-05 | End: 2018-12-18

## 2018-11-05 ASSESSMENT — PAIN SCALES - GENERAL: PAINLEVEL: SEVERE PAIN (7)

## 2018-11-05 NOTE — MR AVS SNAPSHOT
After Visit Summary   11/5/2018    Oleg Ford    MRN: [id number]           Patient Information     Date Of Birth          1960        Visit Information        Provider Department      11/5/2018 11:00 AM Cat Shaw MD [facility]        Today's Diagnoses     Preop general physical exam    -  1    Dysphasia        Major depressive disorder, recurrent, severe without psychotic features (H)        Chronic pain syndrome        DDD (degenerative disc disease), lumbar        SI (sacroiliac) joint dysfunction          Care Instructions      Before Your Surgery      Call your surgeon if there is any change in your health. This includes signs of a cold or flu (such as a sore throat, runny nose, cough, rash or fever).    Do not smoke, drink alcohol or take over the counter medicine (unless your surgeon or primary care doctor tells you to) for the 24 hours before and after surgery.    If you take prescribed drugs: Follow your doctor s orders about which medicines to take and which to stop until after surgery.    Eating and drinking prior to surgery: follow the instructions from your surgeon    Take a shower or bath the night before surgery. Use the soap your surgeon gave you to gently clean your skin. If you do not have soap from your surgeon, use your regular soap. Do not shave or scrub the surgery site.  Wear clean pajamas and have clean sheets on your bed.           Follow-ups after your visit        Follow-up notes from your care team     Return in about 3 weeks (around 11/26/2018) for If not improving as expected.      Your next 10 appointments already scheduled     Nov 14, 2018   Procedure with Rosendo Guy MD   [facility] (--)    [address] [place]  [facility] [address]   [phone]            Dec 05, 2018 10:20 AM CST   [facility] Visit with Cat Shaw MD   [facility]    [address]  [facility] [id number] [phone]           Bring a current list of meds and any records pertaining to this visit. For Physicals, please bring immunization records and any forms needing to be filled out. Please arrive 10 minutes early to complete paperwork.            Feb 05, 2019  9:30 AM CST   New Visit with Rosendo Guy MD   [facility]    [address] [place] [address] [phone]              Who to contact     If you have questions or need follow up information about today's clinic visit or your schedule please contact [facility] directly at [phone].  Normal or non-critical lab and imaging results will be communicated to you by MyChart, letter or phone within 4 business days after the clinic has received the results. If you do not hear from us within 7 days, please contact the clinic through MyChart or phone. If you have a critical or abnormal lab result, we will notify you by phone as soon as possible.  Submit refill requests through [employer] or call your pharmacy and they will forward the refill request to us. Please allow 3 business days for your refill to be completed.          Additional Information About Your Visit        [employer] Information     [employer] gives you secure access to your electronic health record. If you see a primary care provider, you can also send messages to your care team and make appointments. If you have questions, please call your primary care clinic.  If you do not have a primary care provider, please call [phone] and they will assist you.        Care EveryWhere ID     This is your Care EveryWhere ID. This could be used by other organizations to access your [facility] medical records  [id number]        Your Vitals Were     Pulse Temperature Pulse Oximetry BMI (Body Mass Index)          73 97.8  F (36.6  C) (Oral) 97% 37.7 kg/m2         Blood Pressure  from Last 3 Encounters:   11/05/18 112/62   10/25/18 136/80   10/05/18 118/78    Weight from Last 3 Encounters:   11/05/18 126.1 kg (278 lb)   10/31/18 124.7 kg (275 lb)   10/25/18 122.9 kg (271 lb)              We Performed the Following     EKG 12-lead complete w/read - Clinics          Where to get your medicines      Some of these will need a paper prescription and others can be bought over the counter.  Ask your nurse if you have questions.     Bring a paper prescription for each of these medications     diazepam 5 MG tablet    HYDROmorphone 4 MG tablet    morphine 30 MG 12 hr tablet         Information about OPIOIDS     PRESCRIPTION OPIOIDS: WHAT YOU NEED TO KNOW   We gave you an opioid (narcotic) pain medicine. It is important to manage your pain, but opioids are not always the best choice. You should first try all the other options your care team gave you. Take this medicine for as short a time (and as few doses) as possible.    Some activities can increase your pain, such as bandage changes or therapy sessions. It may help to take your pain medicine 30 to 60 minutes before these activities. Reduce your stress by getting enough sleep, working on hobbies you enjoy and practicing relaxation or meditation. Talk to your care team about ways to manage your pain beyond prescription opioids.    These medicines have risks:    DO NOT drive when on new or higher doses of pain medicine. These medicines can affect your alertness and reaction times, and you could be arrested for driving under the influence (DUI). If you need to use opioids long-term, talk to your care team about driving.    DO NOT operate heavy machinery    DO NOT do any other dangerous activities while taking these medicines.    DO NOT drink any alcohol while taking these medicines.     If the opioid prescribed includes acetaminophen, DO NOT take with any other medicines that contain acetaminophen. Read all labels carefully. Look for the word   acetaminophen  or  Tylenol.  Ask your pharmacist if you have questions or are unsure.    You can get addicted to pain medicines, especially if you have a history of addiction (chemical, alcohol or substance dependence). Talk to your care team about ways to reduce this risk.    All opioids tend to cause constipation. Drink plenty of water and eat foods that have a lot of fiber, such as fruits, vegetables, prune juice, apple juice and high-fiber cereal. Take a laxative (Miralax, milk of magnesia, Colace, Senna) if you don t move your bowels at least every other day. Other side effects include upset stomach, sleepiness, dizziness, throwing up, tolerance (needing more of the medicine to have the same effect), physical dependence and slowed breathing.    Store your pills in a secure place, locked if possible. We will not replace any lost or stolen medicine. If you don t finish your medicine, please throw away (dispose) as directed by your pharmacist. The [place] [employer]ution [employer] has more information about safe disposal: [email or web address]         Primary Care Provider Office Phone # Fax #    Cat Shaw [phone] [address] [facility] [address]        Equal Access to Services     ALANA HANSON : Hadii mayuri ku hadasho Soomaali, waaxda luqadaha, qaybta kaalmada adeegyada, kamala smith. So [facility] [phone].    ATENCIÓN: Si habla español, tiene a gongora disposición servicios gratuitos de asistencia lingüística. Llame al [phone].    We comply with applicable federal civil rights laws and [place] laws. We do not discriminate on the basis of race, color, national origin, age, disability, sex, sexual orientation, or gender identity.            Thank you!     Thank you for choosing [facility]  for your care. Our goal is always to provide you with excellent care. Hearing back  from our patients is one way we can continue to improve our services. Please take a few minutes to complete the written survey that you may receive in the mail after your visit with us. Thank you!             Your Updated Medication List - Protect others around you: Learn how to safely use, store and throw away your medicines at www.disposemymeds.org.          This list is accurate as of 11/5/18 11:37 AM.  Always use your most recent med list.                   Brand Name Dispense Instructions for use Diagnosis    cyclobenzaprine 10 MG tablet    FLEXERIL    90 tablet    TAKE 1 TABLET BY MOUTH THREE TIMES DAILY AS NEEDED FOR MUSCLE SPASMS    DDD (degenerative disc disease), lumbar       diazepam 5 MG tablet    VALIUM    30 tablet    Take 1 tablet (5 mg) by mouth daily as needed for anxiety or sleep    Major depressive disorder, recurrent, severe without psychotic features (H)       EPINEPHrine 0.3 MG/0.3ML injection 2-pack    EPIPEN/ADRENACLICK/or ANY BX GENERIC EQUIV    1 each    Inject 0.3 mLs (0.3 mg) into the muscle once as needed for anaphylaxis    Bee sting allergy       HYDROmorphone 4 MG tablet    DILAUDID    180 tablet    Take 2 tablets (8 mg) by mouth 4 times daily as needed for moderate to severe pain (every 4-6hour prn)    Chronic pain syndrome, DDD (degenerative disc disease), lumbar, SI (sacroiliac) joint dysfunction       hydrOXYzine 50 MG tablet    ATARAX    120 tablet    Take 1 tablet (50 mg) by mouth every 6 hours as needed for anxiety    Chronic pain syndrome       LAMICTAL 150 MG tablet   Generic drug:  lamoTRIgine      Take 250 mg by mouth daily        LEXAPRO PO      Take 10 mg by mouth daily        morphine 30 MG 12 hr tablet    MS CONTIN    90 tablet    Take 1 tablet (30 mg) by mouth 3 times daily    Chronic pain syndrome, DDD (degenerative disc disease), lumbar, SI (sacroiliac) joint dysfunction       naloxone nasal spray    NARCAN    0.2 mL    Spray 1 spray (4 mg) into one nostril  alternating nostrils as needed for opioid reversal every 2-3 minutes until assistance arrives    Chronic pain syndrome, Chronic, continuous use of opioids       omeprazole 40 MG capsule    priLOSEC    30 capsule    TAKE ONE CAPSULE BY MOUTH EVERY DAY 30 TO 60 MINUTES BEFORE A MEAL    Gastroesophageal reflux disease with esophagitis       ondansetron 4 MG ODT tab    ZOFRAN-ODT    20 tablet    PLACE 1 OR 2 TABLETS UNDER THE TONGUE 3 TIMES A DAY AS NEEDED FOR NAUSEA    Nausea       promethazine 25 MG tablet    PHENERGAN    20 tablet    Take 1 tablet (25 mg) by mouth every 6 hours as needed for nausea    Intractable vomiting with nausea, unspecified vomiting type       rOPINIRole 0.25 MG tablet    REQUIP    60 tablet    Take 1-2 tablets (0.25-0.5 mg) by mouth At Bedtime    RLS (restless legs syndrome)       tolterodine 1 MG tablet    DETROL     Take 1 mg by mouth 2 times daily        zolpidem 10 MG tablet    AMBIEN    30 tablet    Take 1 tablet (10 mg) by mouth nightly as needed for sleep    Insomnia, unspecified type

## 2018-11-05 NOTE — PROGRESS NOTES
[address] [address]  [phone]  Dept: [phone]    PRE-OP EVALUATION:  Today's date: 2018    Oleg Ford (: 1960) presents for pre-operative evaluation assessment as requested by Dr. Guy.  He requires evaluation and anesthesia risk assessment prior to undergoing surgery/procedure for treatment of dysphasia and abdominal pain.    Proposed Surgery/ Procedure: COMBINED ESOPHAGOSCOPY, GASTROSCOPY, DUODENOSCOPY (EGD) WITH CO2 INSUFFLATION  Date of Surgery/ Procedure: 18  Time of Surgery/ Procedure: 12:00PM  Hospital/Surgical Facility:  OR  Fax number for surgical facility:   Primary Physician: Cat Shaw  Type of Anesthesia Anticipated: General    Patient has a Health Care Directive or Living Will:  YES     1. NO - Do you have a history of heart attack, stroke, stent, bypass or surgery on an artery in the head, neck, heart or legs?  2. NO - Do you ever have any pain or discomfort in your chest?  3. NO - Do you have a history of  Heart Failure?  4. YES - ARE YOUR TROUBLED BY SHORTNESS OF BREATH WHEN WALKING ON THE LEVEL, UP A SLIGHT HILL OR AT NIGHT?  Some shortness of breath  5. NO - Do you currently have a cold, bronchitis or other respiratory infection?  6. NO - Do you have a cough, shortness of breath or wheezing?  7. YES - DO YOU SOMETIMES GET PAINS IN THE CALVES OF YOUR LEGS WHEN YOU WALK?  Legs get sore  8. NO - Do you or anyone in your family have previous history of blood clots?  9. NO - Do you or does anyone in your family have a serious bleeding problem such as prolonged bleeding following surgeries or cuts?  10. NO - Have you ever had problems with anemia or been told to take iron pills?  11. NO - Have you had any abnormal blood loss such as black, tarry or bloody stools, or abnormal vaginal bleeding?  12. NO - Have you ever had a blood transfusion?  13. NO - Have you or any of your relatives ever had  problems with anesthesia?  14. NO - Do you have sleep apnea, excessive snoring or daytime drowsiness?  15. NO - Do you have any prosthetic heart valves?  16. NO - Do you have prosthetic joints?  17. NO - Is there any chance that you may be pregnant?      HPI:     HPI related to upcoming procedure:   Progressive abdominal pain, dysphasia, mild weight loss.  Concern for esophageal stricture and possible dysmotility.  Planned scope with potential biopsy and treatment    See problem list for active medical problems.  Problems all longstanding and stable, except as noted/documented.  See ROS for pertinent symptoms related to these conditions.                                                                                                                                                          .  DEPRESSION - Patient has a long history of Depression of moderate severity requiring medication for control with recent symptoms being unchanged..Current symptoms of depression include depressed mood, hopelessness, decreased appetite.                                                                                                                                                                                    .    MEDICAL HISTORY:     Patient Active Problem List    Diagnosis Date Noted     Dysphagia, unspecified type 10/25/2018     Priority: Medium     Weight loss 10/25/2018     Priority: Medium     Intractable vomiting with nausea, unspecified vomiting type 10/25/2018     Priority: Medium     Morbid obesity (H) 06/06/2018     Priority: Medium     Advance Care Planning 01/24/2018     Priority: Medium     SI (sacroiliac) joint dysfunction 10/19/2017     Priority: Medium     Gastroesophageal reflux disease with esophagitis 01/25/2017     Priority: Medium     Major depressive disorder, recurrent, severe without psychotic features (H) 02/22/2016     Priority: Medium     S/P lumbar fusion 01/12/2016     Priority: Medium     Chronic  pain syndrome 12/01/2015     Priority: Medium     Patient is followed by NORY LINARES for ongoing prescription of pain medication.  All refills should be approved by this provider, or covering partner.    Medication(s): MS Contin 30 / dilaudid 4  Maximum quantity per month: 90 / 180 = 186 MED    Plan:  Would like to decrease need for dilaudid, but regimen is actually OK.    Narcan prescribed   Clinic visit frequency required: Q 3 months     Controlled substance agreement on file: Yes       Date(s): 12/1/15    Pain Clinic evaluation in the past: Yes    DIRE Total Score(s):    12/1/2015   Total Score 18       Last [facility] website verification:  done on 12/1/15  7/3/18     [email or web address]        Simple hepatic cyst 03/16/2015     Priority: Medium     Simple renal cyst 03/16/2015     Priority: Medium     Esophageal cyst 09/05/2014     Priority: Medium     RLS (restless legs syndrome) 01/29/2013     Priority: Medium     DDD (degenerative disc disease), lumbar 10/18/2012     Priority: Medium     CARDIOVASCULAR SCREENING; LDL GOAL LESS THAN 130 10/31/2010     Priority: Medium     Chronic low back pain 07/19/2010     Priority: Medium      Past Medical History:   Diagnosis Date     Depression, major      Esophageal mass      History of orchiectomy 1982     History of vasectomy 1991     Low back pain      Other chronic pain     Chronic low back pain.     Restless leg      Sleep apnea     had surgery done to resolve sleep apnea     Past Surgical History:   Procedure Laterality Date     BACK SURGERY  2/6/2013    lumbar surgery     BACK SURGERY      spinal stimulator     COLONOSCOPY       FUSION SACRAL ILIAC Right 10/19/2017    Procedure: FUSION SACRAL ILIAC;  Right  side sacroiliac joint fusion with 2 implants ;  Surgeon: Sebastián Szymanski MD;  Location: [facility] OR     FUSION SACRAL ILIAC Left 1/11/2018    Procedure: FUSION SACRAL ILIAC;   left  side sacroiliac joint fusion with 2 implants.(Zyga)   extraction and  use of illac bone graft ;  Surgeon: Sebastián Szymanski MD;  Location: [facility] OR     [facility] ENDOSCOPY W EUS N/A 7/31/2014    Procedure: COMBINED ENDOSCOPIC ULTRASOUND, ESOPHAGOSCOPY, GASTROSCOPY, DUODENOSCOPY (EGD);  Surgeon: Shorty Stoll MD;  Location: [facility]     ORCHIECTOMY INGUINAL      right radical orchiectomy     REMOVE STIMULATOR VAGUS NERVE  12/23/2011    Procedure:REMOVE STIMULATOR VAGUS NERVE; Vagus Nerve Stimulator Removal; Surgeon:ALEXIS BECERRA; Location:[facility] OR     REMOVE STIMULATOR VAGUS NERVE  11/8/2013    Procedure: REMOVE STIMULATOR VAGUS NERVE;  Removal Of Vagus Nerve Stimulator Lead In Left Neck ;  Surgeon: Alexis Becerra MD;  Location: [facility] OR     SURGICAL HISTORY OF -       vagal nerve implant; removed     SURGICAL HISTORY OF -       UPPP     VASECTOMY       Current Outpatient Prescriptions   Medication Sig Dispense Refill     cyclobenzaprine (FLEXERIL) 10 MG tablet TAKE 1 TABLET BY MOUTH THREE TIMES DAILY AS NEEDED FOR MUSCLE SPASMS 90 tablet 1     diazepam (VALIUM) 5 MG tablet Take 1 tablet (5 mg) by mouth daily as needed for anxiety or sleep 30 tablet 0     EPINEPHrine (EPIPEN) 0.3 MG/0.3ML injection Inject 0.3 mLs (0.3 mg) into the muscle once as needed for anaphylaxis 1 each 2     Escitalopram Oxalate (LEXAPRO PO) Take 10 mg by mouth daily       HYDROmorphone (DILAUDID) 4 MG tablet Take 2 tablets (8 mg) by mouth 4 times daily as needed for moderate to severe pain (every 4-6hour prn) 180 tablet 0     hydrOXYzine (ATARAX) 50 MG tablet Take 1 tablet (50 mg) by mouth every 6 hours as needed for anxiety 120 tablet 3     lamoTRIgine (LAMICTAL) 150 MG tablet Take 250 mg by mouth daily        morphine (MS CONTIN) 30 MG 12 hr tablet Take 1 tablet (30 mg) by mouth 3 times daily 90 tablet 0     naloxone (NARCAN) nasal spray Spray 1 spray (4 mg) into one nostril alternating nostrils as needed for opioid reversal every 2-3 minutes until assistance arrives 0.2 mL 0     omeprazole (PRILOSEC) 40 MG capsule  TAKE ONE CAPSULE BY MOUTH EVERY DAY 30 TO 60 MINUTES BEFORE A MEAL 30 capsule 10     ondansetron (ZOFRAN-ODT) 4 MG ODT tab PLACE 1 OR 2 TABLETS UNDER THE TONGUE 3 TIMES A DAY AS NEEDED FOR NAUSEA 20 tablet 1     promethazine (PHENERGAN) 25 MG tablet Take 1 tablet (25 mg) by mouth every 6 hours as needed for nausea 20 tablet 1     rOPINIRole (REQUIP) 0.25 MG tablet Take 1-2 tablets (0.25-0.5 mg) by mouth At Bedtime 60 tablet 2     tolterodine (DETROL) 1 MG tablet Take 1 mg by mouth 2 times daily       zolpidem (AMBIEN) 10 MG tablet Take 1 tablet (10 mg) by mouth nightly as needed for sleep 30 tablet 2     OTC products: None, except as noted above    Allergies   Allergen Reactions     Ciprofloxacin Anaphylaxis     Doxycycline Anaphylaxis     Septra [Bactrim] Anaphylaxis     Amoxicillin      itching     Nuts      Brazil nuts      Latex Allergy: NO    Social History   Substance Use Topics     Smoking status: Former Smoker     Packs/day: 0.00     Years: 40.00     Types: Cigarettes     Smokeless tobacco: Never Used      Comment: Last cigarette was October 17, 2018     Alcohol use Yes      Comment: 4 drinks a year     History   Drug Use     Yes     Special: Marijuana     Comment: morphine, dilaudid       REVIEW OF SYSTEMS:   CONSTITUTIONAL: NEGATIVE for fever, chills, change in weight  INTEGUMENTARY/SKIN: NEGATIVE for worrisome rashes, moles or lesions  EYES: NEGATIVE for vision changes or irritation  ENT/MOUTH: NEGATIVE for ear, mouth and throat problems  RESP: NEGATIVE for significant cough or SOB  BREAST: NEGATIVE for masses, tenderness or discharge  CV: NEGATIVE for chest pain, palpitations or peripheral edema  GI: As above  : NEGATIVE for frequency, dysuria, or hematuria  MUSCULOSKELETAL: Chronic back pain, well-known  NEURO: NEGATIVE for weakness, dizziness or paresthesias  ENDOCRINE: NEGATIVE for temperature intolerance, skin/hair changes  HEME: NEGATIVE for bleeding problems  PSYCHIATRIC: NEGATIVE for changes in  mood or affect    EXAM:   /62 (BP Location: Right arm, Patient Position: Chair, Cuff Size: Adult Large)  Pulse 73  Temp 97.8  F (36.6  C) (Oral)  Wt 126.1 kg (278 lb)  SpO2 97%  BMI 37.7 kg/m2    GENERAL APPEARANCE: healthy, alert and no distress     EYES: EOMI,  PERRL     HENT: ear canals and TM's normal and nose and mouth without ulcers or lesions     NECK: no adenopathy, no asymmetry, masses, or scars and thyroid normal to palpation     RESP: lungs clear to auscultation - no rales, rhonchi or wheezes     CV: regular rates and rhythm, normal S1 S2, no S3 or S4 and no murmur, click or rub     ABDOMEN:  soft, nontender, no HSM or masses and bowel sounds normal     MS: extremities normal- no gross deformities noted, no evidence of inflammation in joints, FROM in all extremities.     SKIN: no suspicious lesions or rashes     NEURO: Normal strength and tone, sensory exam grossly normal, mentation intact and speech normal     PSYCH: mentation appears normal. and affect normal/bright     LYMPHATICS: No cervical adenopathy    DIAGNOSTICS:   EKG: appears normal, NSR, normal axis, normal intervals, no acute ST/T changes c/w ischemia, no LVH by voltage criteria, unchanged from previous tracings  No lab work needed    Recent Labs   Lab Test  10/25/18   1439  01/11/18   1133  12/01/17   1145  10/20/17   0708  10/06/17   1318   07/25/14   1534   HGB  15.4  14.7   --   12.8*  15.4   < >  14.9   PLT  298   --    --   211  271   < >  208   INR   --    --    --    --   1.06   --   1.00   NA  139   --    --   133  139   < >   --    POTASSIUM  3.8   --    --   3.8  4.0   < >   --    CR  1.16   --    --   1.08  1.24   < >   --    A1C   --    --   5.5   --   5.6   --    --     < > = values in this interval not displayed.        IMPRESSION:   Reason for surgery/procedure: Dysphagia, abdominal pain  Diagnosis/reason for consult: Preoperative clearance    The proposed surgical procedure is considered INTERMEDIATE  risk.    REVISED CARDIAC RISK INDEX  The patient has the following serious cardiovascular risks for perioperative complications such as (MI, PE, VFib and 3  AV Block):  No serious cardiac risks  INTERPRETATION: 0 risks: Class I (very low risk - 0.4% complication rate)    The patient has the following additional risks for perioperative complications:  No identified additional risks  High tolerance to opioid analgesics due to chronic usage      ICD-10-CM    1. Preop general physical exam Z01.818 EKG 12-lead complete w/read - Clinics   2. Dysphasia R47.02        RECOMMENDATIONS:         --Patient is to take all scheduled medications on the day of surgery.    APPROVAL GIVEN to proceed with proposed procedure, without further diagnostic evaluation       Signed Electronically by: Cat Shaw MD    Copy of this evaluation report is provided to requesting physician.    [facility] Preop Guidelines    Revised Cardiac Risk Index

## 2018-11-13 ENCOUNTER — ANESTHESIA EVENT (OUTPATIENT)
Dept: SURGERY | Facility: AMBULATORY SURGERY CENTER | Age: 58
End: 2018-11-13

## 2018-11-13 RX ORDER — SODIUM CHLORIDE, SODIUM LACTATE, POTASSIUM CHLORIDE, CALCIUM CHLORIDE 600; 310; 30; 20 MG/100ML; MG/100ML; MG/100ML; MG/100ML
INJECTION, SOLUTION INTRAVENOUS CONTINUOUS
Status: CANCELLED | OUTPATIENT
Start: 2018-11-13

## 2018-11-13 RX ORDER — FENTANYL CITRATE 50 UG/ML
25-50 INJECTION, SOLUTION INTRAMUSCULAR; INTRAVENOUS
Status: CANCELLED | OUTPATIENT
Start: 2018-11-13

## 2018-11-13 RX ORDER — ONDANSETRON 4 MG/1
4 TABLET, ORALLY DISINTEGRATING ORAL EVERY 30 MIN PRN
Status: CANCELLED | OUTPATIENT
Start: 2018-11-13

## 2018-11-13 RX ORDER — DEXAMETHASONE SODIUM PHOSPHATE 4 MG/ML
4 INJECTION, SOLUTION INTRA-ARTICULAR; INTRALESIONAL; INTRAMUSCULAR; INTRAVENOUS; SOFT TISSUE EVERY 10 MIN PRN
Status: CANCELLED | OUTPATIENT
Start: 2018-11-13

## 2018-11-13 RX ORDER — MEPERIDINE HYDROCHLORIDE 25 MG/ML
12.5 INJECTION INTRAMUSCULAR; INTRAVENOUS; SUBCUTANEOUS
Status: CANCELLED | OUTPATIENT
Start: 2018-11-13

## 2018-11-13 RX ORDER — ONDANSETRON 2 MG/ML
4 INJECTION INTRAMUSCULAR; INTRAVENOUS EVERY 30 MIN PRN
Status: CANCELLED | OUTPATIENT
Start: 2018-11-13

## 2018-11-13 RX ORDER — ALBUTEROL SULFATE 0.83 MG/ML
2.5 SOLUTION RESPIRATORY (INHALATION) EVERY 4 HOURS PRN
Status: CANCELLED | OUTPATIENT
Start: 2018-11-13

## 2018-11-13 RX ORDER — NALOXONE HYDROCHLORIDE 0.4 MG/ML
.1-.4 INJECTION, SOLUTION INTRAMUSCULAR; INTRAVENOUS; SUBCUTANEOUS
Status: CANCELLED | OUTPATIENT
Start: 2018-11-13 | End: 2018-11-14

## 2018-11-13 RX ORDER — OXYCODONE HYDROCHLORIDE 5 MG/1
5-10 TABLET ORAL EVERY 4 HOURS PRN
Status: CANCELLED | OUTPATIENT
Start: 2018-11-13

## 2018-11-13 RX ORDER — KETOROLAC TROMETHAMINE 30 MG/ML
30 INJECTION, SOLUTION INTRAMUSCULAR; INTRAVENOUS EVERY 6 HOURS PRN
Status: CANCELLED | OUTPATIENT
Start: 2018-11-13 | End: 2018-11-18

## 2018-11-13 RX ORDER — HYDROMORPHONE HYDROCHLORIDE 1 MG/ML
.3-.5 INJECTION, SOLUTION INTRAMUSCULAR; INTRAVENOUS; SUBCUTANEOUS EVERY 10 MIN PRN
Status: CANCELLED | OUTPATIENT
Start: 2018-11-13

## 2018-11-14 ENCOUNTER — SURGERY (OUTPATIENT)
Age: 58
End: 2018-11-14
Payer: COMMERCIAL

## 2018-11-14 ENCOUNTER — ANESTHESIA (OUTPATIENT)
Dept: SURGERY | Facility: AMBULATORY SURGERY CENTER | Age: 58
End: 2018-11-14
Payer: COMMERCIAL

## 2018-11-14 ENCOUNTER — HOSPITAL ENCOUNTER (OUTPATIENT)
Facility: AMBULATORY SURGERY CENTER | Age: 58
Discharge: HOME | End: 2018-11-14
Attending: INTERNAL MEDICINE | Admitting: INTERNAL MEDICINE
Payer: COMMERCIAL

## 2018-11-14 VITALS
OXYGEN SATURATION: 93 % | BODY MASS INDEX: 37.25 KG/M2 | HEIGHT: 72 IN | SYSTOLIC BLOOD PRESSURE: 124 MMHG | TEMPERATURE: 97.5 F | RESPIRATION RATE: 18 BRPM | HEART RATE: 67 BPM | WEIGHT: 275 LBS | DIASTOLIC BLOOD PRESSURE: 74 MMHG

## 2018-11-14 DIAGNOSIS — K31.84 GASTROPARESIS: ICD-10-CM

## 2018-11-14 DIAGNOSIS — R63.4 WEIGHT LOSS: Primary | ICD-10-CM

## 2018-11-14 DIAGNOSIS — R11.0 NAUSEA: ICD-10-CM

## 2018-11-14 LAB — UPPER GI ENDOSCOPY: NORMAL

## 2018-11-14 PROCEDURE — 43239 EGD BIOPSY SINGLE/MULTIPLE: CPT | Performed by: INTERNAL MEDICINE

## 2018-11-14 PROCEDURE — G8918 PT W/O PREOP ORDER IV AB PRO: HCPCS

## 2018-11-14 PROCEDURE — 88305 TISSUE EXAM BY PATHOLOGIST: CPT | Performed by: INTERNAL MEDICINE

## 2018-11-14 PROCEDURE — 43239 EGD BIOPSY SINGLE/MULTIPLE: CPT

## 2018-11-14 PROCEDURE — G8907 PT DOC NO EVENTS ON DISCHARG: HCPCS

## 2018-11-14 RX ORDER — ACETAMINOPHEN 325 MG/1
975 TABLET ORAL ONCE
Status: DISCONTINUED | OUTPATIENT
Start: 2018-11-14 | End: 2018-11-15 | Stop reason: HOSPADM

## 2018-11-14 RX ORDER — ONDANSETRON 2 MG/ML
4 INJECTION INTRAMUSCULAR; INTRAVENOUS
Status: DISCONTINUED | OUTPATIENT
Start: 2018-11-14 | End: 2018-11-15 | Stop reason: HOSPADM

## 2018-11-14 RX ORDER — LIDOCAINE HYDROCHLORIDE 20 MG/ML
INJECTION, SOLUTION INFILTRATION; PERINEURAL PRN
Status: DISCONTINUED | OUTPATIENT
Start: 2018-11-14 | End: 2018-11-14

## 2018-11-14 RX ORDER — LIDOCAINE 40 MG/G
CREAM TOPICAL
Status: DISCONTINUED | OUTPATIENT
Start: 2018-11-14 | End: 2018-11-15 | Stop reason: HOSPADM

## 2018-11-14 RX ORDER — PROPOFOL 10 MG/ML
INJECTION, EMULSION INTRAVENOUS PRN
Status: DISCONTINUED | OUTPATIENT
Start: 2018-11-14 | End: 2018-11-14

## 2018-11-14 RX ORDER — SODIUM CHLORIDE, SODIUM LACTATE, POTASSIUM CHLORIDE, CALCIUM CHLORIDE 600; 310; 30; 20 MG/100ML; MG/100ML; MG/100ML; MG/100ML
INJECTION, SOLUTION INTRAVENOUS CONTINUOUS
Status: DISCONTINUED | OUTPATIENT
Start: 2018-11-14 | End: 2018-11-15 | Stop reason: HOSPADM

## 2018-11-14 RX ORDER — GABAPENTIN 300 MG/1
300 CAPSULE ORAL ONCE
Status: DISCONTINUED | OUTPATIENT
Start: 2018-11-14 | End: 2018-11-15 | Stop reason: HOSPADM

## 2018-11-14 RX ADMIN — LIDOCAINE HYDROCHLORIDE 40 MG: 20 INJECTION, SOLUTION INFILTRATION; PERINEURAL at 08:55

## 2018-11-14 RX ADMIN — LIDOCAINE HYDROCHLORIDE 40 MG: 20 INJECTION, SOLUTION INFILTRATION; PERINEURAL at 08:45

## 2018-11-14 RX ADMIN — LIDOCAINE HYDROCHLORIDE 40 MG: 20 INJECTION, SOLUTION INFILTRATION; PERINEURAL at 08:47

## 2018-11-14 RX ADMIN — PROPOFOL 30 MG: 10 INJECTION, EMULSION INTRAVENOUS at 08:47

## 2018-11-14 RX ADMIN — PROPOFOL 50 MG: 10 INJECTION, EMULSION INTRAVENOUS at 08:45

## 2018-11-14 NOTE — ANESTHESIA PREPROCEDURE EVALUATION
Anesthesia Pre-Procedure Evaluation    Patient: Oleg Ford   MRN:     [id number] Gender:   male   Age:    58 year old :      1960        Preoperative Diagnosis: EGD, Koopman, Intractable vomiting with nausea, unspecified vomiting type  Weight loss, BMI 38.05   Procedure(s):  COMBINED ESOPHAGOSCOPY, GASTROSCOPY, DUODENOSCOPY (EGD) WITH CO2 INSUFFLATION     Past Medical History:   Diagnosis Date     Depression, major      Esophageal mass      History of orchiectomy      History of vasectomy      Low back pain      Other chronic pain     Chronic low back pain.     Restless leg      Sleep apnea     had surgery done to resolve sleep apnea      Past Surgical History:   Procedure Laterality Date     BACK SURGERY  2013    lumbar surgery     BACK SURGERY      spinal stimulator     COLONOSCOPY       FUSION SACRAL ILIAC Right 10/19/2017    Procedure: FUSION SACRAL ILIAC;  Right  side sacroiliac joint fusion with 2 implants ;  Surgeon: Sebastián Szymanski MD;  Location: [facility]     FUSION SACRAL ILIAC Left 2018    Procedure: FUSION SACRAL ILIAC;   left  side sacroiliac joint fusion with 2 implants.(Zyga)   extraction and use of illac bone graft ;  Surgeon: Sebastáin Szymanski MD;  Location:  OR      [facility] ENDOSCOPY W EUS N/A 2014    Procedure: COMBINED ENDOSCOPIC ULTRASOUND, ESOPHAGOSCOPY, GASTROSCOPY, DUODENOSCOPY (EGD);  Surgeon: Shorty Stoll MD;  [facility]     ORCHIECTOMY INGUINAL      right radical orchiectomy     REMOVE STIMULATOR VAGUS NERVE  2011    Procedure:REMOVE STIMULATOR VAGUS NERVE; Vagus Nerve Stimulator Removal; Surgeon:MARTÍN BECERRA; Location:[facility]     REMOVE STIMULATOR VAGUS NERVE  2013    Procedure: REMOVE STIMULATOR VAGUS NERVE;  Removal Of Vagus Nerve Stimulator Lead In Left Neck ;  Surgeon: Martín Becerra MD;  Location: [facility]     SURGICAL HISTORY OF -       vagal nerve implant; removed     SURGICAL HISTORY OF -       UPPP     VASECTOMY             Anesthesia Evaluation     .             ROS/MED HX    ENT/Pulmonary:     (+)sleep apnea (s/p UPPP), doesn't use CPAP , . .    Neurologic:       Cardiovascular:         METS/Exercise Tolerance:     Hematologic:         Musculoskeletal:   (+) , , other musculoskeletal- LBP      GI/Hepatic:         Renal/Genitourinary:         Endo:     (+) Obesity, .      Psychiatric:     (+) psychiatric history anxiety and depression      Infectious Disease:         Malignancy:         Other:    (+) H/O Chronic Pain,H/O chronic opiod use ,                        PHYSICAL EXAM:   Mental Status/Neuro: A/A/O   Airway: Facies: Feasible  Mallampati: II  Mouth/Opening: Full  TM distance: > 6 cm  Neck ROM: Full   Respiratory: Auscultation: CTAB     Resp. Rate: Normal     Resp. Effort: Normal      CV: Rhythm: Regular  Rate: Age appropriate  Heart: Normal Sounds   Comments:      Dental: Normal                  Lab Results   Component Value Date    WBC 7.3 10/25/2018    HGB 15.4 10/25/2018    HCT 45.0 10/25/2018     10/25/2018     10/25/2018    POTASSIUM 3.8 10/25/2018    CHLORIDE 104 10/25/2018    CO2 26 10/25/2018    BUN 12 10/25/2018    CR 1.16 10/25/2018     (H) 10/25/2018    ANDER 9.2 10/25/2018    ALBUMIN 4.0 10/25/2018    PROTTOTAL 7.7 10/25/2018    ALT 30 10/25/2018    AST 22 10/25/2018    ALKPHOS 78 10/25/2018    BILITOTAL 0.5 10/25/2018    LIPASE 113 03/26/2010    PTT 38 (H) 10/06/2017    INR 1.06 10/06/2017       Preop Vitals  BP Readings from Last 3 Encounters:   11/14/18 128/79   11/05/18 112/62   10/25/18 136/80    Pulse Readings from Last 3 Encounters:   11/14/18 67   11/05/18 73   10/25/18 93      Resp Readings from Last 3 Encounters:   11/14/18 18   10/25/18 16   08/03/18 20    SpO2 Readings from Last 3 Encounters:   11/14/18 97%   11/05/18 97%   10/25/18 98%      Temp Readings from Last 1 Encounters:   11/14/18 97  F (36.1  C) (Temporal)    Ht Readings from Last 1 Encounters:   10/31/18 1.829 m (6')       Wt Readings from Last 1 Encounters:   10/31/18 124.7 kg (275 lb)    Estimated body mass index is 37.3 kg/(m^2) as calculated from the following:    Height as of this encounter: 1.829 m (6').    Weight as of this encounter: 124.7 kg (275 lb).     LDA:  Peripheral IV 11/14/18 Left Hand (Active)   Site Assessment WDL 11/14/2018  7:44 AM   Line Status Infusing 11/14/2018  7:44 AM   Number of days:0       ETT (adult) 8 (Active)   Number of days:391            Assessment:   ASA SCORE: 3    NPO Status: > 2 hours since completed Clear Liquids; > 6 hours since completed Solid Foods   Documentation: H&P complete   Proceeding: Proceed without further delay  Tobacco Use:  NO Active use of Tobacco/UNKNOWN Tobacco use status     Plan:   Anes. Type:  MAC   Pre-Induction: Midazolam IV   Induction:  IV (Standard)   Airway: Native Airway   Access/Monitoring: PIV   Maintenance: Propofol; IV   Emergence: Procedure Site   Logistics: Same Day Surgery     Postop Pain/Sedation Strategy:  Standard-Options: Opioids PRN     PONV Management:  Adult Risk Factors:, Non-Smoker, Postop Opioids  Prevention: Propofol Infusion; Ondansetron     CONSENT: Direct conversation   Plan and risks discussed with: Patient                            Jey Nelson MD

## 2018-11-14 NOTE — ANESTHESIA POSTPROCEDURE EVALUATION
Anesthesia POST Procedure Evaluation    Patient: Oleg Ford   MRN:     [id number] Gender:   male   Age:    58 year old :      1960        Preoperative Diagnosis: EGD, Koopman, Intractable vomiting with nausea, unspecified vomiting type  Weight loss, BMI 38.05   Procedure(s):  COMBINED ESOPHAGOSCOPY, GASTROSCOPY, DUODENOSCOPY (EGD) WITH CO2 INSUFFLATION  COMBINED ESOPHAGOSCOPY, GASTROSCOPY, DUODENOSCOPY (EGD), BIOPSY SINGLE OR MULTIPLE   Postop Comments: No value filed.       Anesthesia Type:  MAC    Reportable Event: NO     PAIN: Uncomplicated   Sign Out status: Comfortable, Well controlled pain     PONV: No PONV   Sign Out status:  No Nausea or Vomiting     Neuro/Psych: Uneventful perioperative course   Sign Out Status: Preoperative baseline; Age appropriate mentation     Airway/Resp.: Uneventful perioperative course   Sign Out Status: Non labored breathing, age appropriate RR; Resp. Status within EXPECTED Parameters     CV: Uneventful perioperative course   Sign Out status: Appropriate BP and perfusion indices; Appropriate HR/Rhythm     Disposition:   Sign Out in:  Phase II  Disposition:  Home  Recovery Course: Uneventful  Follow-Up: Not required           Last Anesthesia Record Vitals:  CRNA VITALS  2018 0829 - 2018 0929      2018             Pulse: 66    SpO2: 98 %          Last PACU/Preop Vitals:  Vitals:    18 0905 18 0920 18 0935   BP: 101/70  124/74   Pulse:      Resp:    Temp:   97.5  F (36.4  C)   SpO2: 94% 94% 93%         Electronically Signed By: Jey Nelson MD, 2018, 10:17 AM

## 2018-11-18 LAB — COPATH REPORT: NORMAL

## 2018-12-07 ENCOUNTER — TELEPHONE (OUTPATIENT)
Dept: GENERAL RADIOLOGY | Facility: CLINIC | Age: 58
End: 2018-12-07

## 2018-12-07 DIAGNOSIS — G47.00 INSOMNIA, UNSPECIFIED TYPE: ICD-10-CM

## 2018-12-07 DIAGNOSIS — K21.00 GASTROESOPHAGEAL REFLUX DISEASE WITH ESOPHAGITIS: ICD-10-CM

## 2018-12-07 DIAGNOSIS — F33.2 MAJOR DEPRESSIVE DISORDER, RECURRENT, SEVERE WITHOUT PSYCHOTIC FEATURES (H): ICD-10-CM

## 2018-12-07 NOTE — TELEPHONE ENCOUNTER
"Requested Prescriptions   Pending Prescriptions Disp Refills     omeprazole (PRILOSEC) 40 MG DR capsule 30 capsule 10    PPI Protocol Passed    12/7/2018  8:51 AM       Passed - Not on Clopidogrel (unless Pantoprazole ordered)       Passed - No diagnosis of osteoporosis on record       Passed - Recent (12 mo) or future (30 days) visit within the authorizing provider's specialty    Patient had office visit in the last 12 months or has a visit in the next 30 days with authorizing provider or within the authorizing provider's specialty.  See \"Patient Info\" tab in inbasket, or \"Choose Columns\" in Meds & Orders section of the refill encounter.      Last Written Prescription Date:  1/17/18  Last Fill Quantity: 30,  # refills: 10   Last office visit: 10/26/2018 with prescribing provider:     Future Office Visit:   Next 5 appointments (look out 90 days)     Dec 18, 2018 10:40 AM CST   [facility]    [address] [place] [id number] [phone]                            Passed - Patient is age 18 or older        diazepam (VALIUM) 5 MG tablet 30 tablet 0     Sig: Take 1 tablet (5 mg) by mouth daily as needed for anxiety or sleep    There is no refill protocol information for this order          "

## 2018-12-07 NOTE — TELEPHONE ENCOUNTER
zolpidem (AMBIEN) 10 MG tablet      Last Written Prescription Date:  3/27/18  Last Fill Quantity: 30,   # refills: 2  Last Office Visit: 11/5/18  Future Office visit:    Next 5 appointments (look out 90 days)     Dec 18, 2018 10:40 AM CST   Office Visit with Cat Shaw MD   [facility] ([facility])    [address] [place] [id number] [phone]                   Routing refill request to provider for review/approval because:  Drug not on the [facility], [facility] or [facility] refill protocol or controlled substance

## 2018-12-07 NOTE — TELEPHONE ENCOUNTER
diazepam (VALIUM) 5 MG tablet      Last Written Prescription Date:  11/5/18  Last Fill Quantity: 30,   # refills: 0  Last Office Visit: 11/5/18  Future Office visit:    Next 5 appointments (look out 90 days)     Dec 18, 2018 10:40 AM CST   Office Visit with Cat Shaw MD   [facility] ([address] [address]   [phone]                   Routing refill request to provider for review/approval because:  Drug not on the [facility], [facility] or [facility] refill protocol or controlled substance

## 2018-12-09 RX ORDER — ZOLPIDEM TARTRATE 10 MG/1
10 TABLET ORAL
Qty: 30 TABLET | Refills: 2 | Status: SHIPPED | OUTPATIENT
Start: 2018-12-09 | End: 2019-01-09

## 2018-12-10 DIAGNOSIS — K21.00 GASTROESOPHAGEAL REFLUX DISEASE WITH ESOPHAGITIS: ICD-10-CM

## 2018-12-11 RX ORDER — DIAZEPAM 5 MG
5 TABLET ORAL DAILY PRN
Qty: 30 TABLET | Refills: 0 | Status: SHIPPED | OUTPATIENT
Start: 2018-12-11 | End: 2018-12-18

## 2018-12-11 RX ORDER — OMEPRAZOLE 40 MG/1
CAPSULE, DELAYED RELEASE ORAL
Qty: 30 CAPSULE | Refills: 10 | Status: SHIPPED | OUTPATIENT
Start: 2018-12-11 | End: 2018-12-13

## 2018-12-11 NOTE — TELEPHONE ENCOUNTER
"Requested Prescriptions   Pending Prescriptions Disp Refills     omeprazole (PRILOSEC) 40 MG DR capsule [Pharmacy Med Name: OMEPRAZOLE DR 40 MG CAPSULE]  May be duplicate request. Pending from 12/7/18  Last Written Prescription Date:  1/17/18  Last Fill Quantity: 30 capsule,  # refills: 10   Last office visit: 11/5/2018 with prescribing provider:  Dr. Shaw   Future Office Visit:   Next 5 appointments (look out 90 days)    Dec 18, 2018 10:40 AM CST  Office Visit with Cat Shaw MD  [facility] [address] [place] [id number] [phone]          30 capsule 0     Sig: TAKE 1 CAPSULE BY MOUTH ONCE DAILY 30-60 MINUTES BEFORE A MEAL    PPI Protocol Passed - 12/10/2018  8:29 AM       Passed - Not on Clopidogrel (unless Pantoprazole ordered)       Passed - No diagnosis of osteoporosis on record       Passed - Recent (12 mo) or future (30 days) visit within the authorizing provider's specialty    Patient had office visit in the last 12 months or has a visit in the next 30 days with authorizing provider or within the authorizing provider's specialty.  See \"Patient Info\" tab in inbasket, or \"Choose Columns\" in Meds & Orders section of the refill encounter.             Passed - Patient is age 18 or older          "

## 2018-12-11 NOTE — TELEPHONE ENCOUNTER
For diazepam:  Routing refill request to provider for review/approval because:  Drug not on the [facility] refill protocol     For omeprazole:  Prescription approved per [facility] Refill Protocol.        Jeanna Moreau RN, BSN

## 2018-12-13 RX ORDER — OMEPRAZOLE 40 MG/1
CAPSULE, DELAYED RELEASE ORAL
Qty: 30 CAPSULE | Refills: 9 | Status: SHIPPED | OUTPATIENT
Start: 2018-12-13 | End: 2020-01-03

## 2018-12-13 RX ORDER — OMEPRAZOLE 40 MG/1
CAPSULE, DELAYED RELEASE ORAL
Qty: 30 CAPSULE | Refills: 9 | OUTPATIENT
Start: 2018-12-13

## 2018-12-13 NOTE — TELEPHONE ENCOUNTER
Please call patient. The pharmacy requesting his prescription is listed as [facility] - [address] [facility] #[phone].   Is that the correct pharmacy patient would like us to send it to? (it is not listed as [facility], but [facility]) if that is the correct pharmacy, we can send it there. Let RN team know.     Shu Jaimes RN

## 2018-12-13 NOTE — TELEPHONE ENCOUNTER
This writer attempted to contact 1 on 12/13/18      Reason for call Rx and left detailed message.      If patient calls back:   Patient contacted by [facility] Team (MA/TC). Inform patient that someone from the team will contact them, document that pt called and route to care team.         [id number], MEDICAL ASSISTANT

## 2018-12-13 NOTE — TELEPHONE ENCOUNTER
Prescription given on 12/11/18 for #30 with 10 refills and sent to local [facility] pharmacy in [place]. Is patient not using this pharmacy? Request is from mail order.   Please clarify, thank you.    Tina Cabrera RN  [facility] Triage

## 2018-12-13 NOTE — TELEPHONE ENCOUNTER
Reason for Call:  Other prescription    Detailed comments: Pt returning phone call and would like a phone call back regarding his Rx request for Pt has now changed his Pharmacy from [facility] to [facility] and would like an update regarding the Rx request.    Phone Number Patient can be reached at: Home number on file [phone] (home)    Best Time: anytime    Can we leave a detailed message on this number? YES    Call taken on 12/13/2018 at 2:57 PM by Juan Cardona

## 2018-12-18 ENCOUNTER — OFFICE VISIT (OUTPATIENT)
Dept: FAMILY MEDICINE | Facility: CLINIC | Age: 58
End: 2018-12-18
Payer: COMMERCIAL

## 2018-12-18 VITALS
HEIGHT: 72 IN | OXYGEN SATURATION: 100 % | SYSTOLIC BLOOD PRESSURE: 126 MMHG | WEIGHT: 275 LBS | DIASTOLIC BLOOD PRESSURE: 84 MMHG | BODY MASS INDEX: 37.25 KG/M2 | TEMPERATURE: 98.6 F | HEART RATE: 69 BPM

## 2018-12-18 DIAGNOSIS — Z01.818 PREOP GENERAL PHYSICAL EXAM: Primary | ICD-10-CM

## 2018-12-18 DIAGNOSIS — G89.4 CHRONIC PAIN SYNDROME: ICD-10-CM

## 2018-12-18 DIAGNOSIS — R11.2 INTRACTABLE VOMITING WITH NAUSEA, UNSPECIFIED VOMITING TYPE: ICD-10-CM

## 2018-12-18 DIAGNOSIS — K29.70 GASTRITIS WITHOUT BLEEDING, UNSPECIFIED CHRONICITY, UNSPECIFIED GASTRITIS TYPE: ICD-10-CM

## 2018-12-18 DIAGNOSIS — N48.89 NODULE OF SHAFT OF PENIS: ICD-10-CM

## 2018-12-18 DIAGNOSIS — F33.2 MAJOR DEPRESSIVE DISORDER, RECURRENT, SEVERE WITHOUT PSYCHOTIC FEATURES (H): ICD-10-CM

## 2018-12-18 DIAGNOSIS — M51.369 DDD (DEGENERATIVE DISC DISEASE), LUMBAR: ICD-10-CM

## 2018-12-18 DIAGNOSIS — M53.3 SI (SACROILIAC) JOINT DYSFUNCTION: ICD-10-CM

## 2018-12-18 LAB
ERYTHROCYTE [DISTWIDTH] IN BLOOD BY AUTOMATED COUNT: 14.4 % (ref 10–15)
HCT VFR BLD AUTO: 44.5 % (ref 40–53)
HGB BLD-MCNC: 15.2 G/DL (ref 13.3–17.7)
INR PPP: 0.97 (ref 0.86–1.14)
MCH RBC QN AUTO: 28.7 PG (ref 26.5–33)
MCHC RBC AUTO-ENTMCNC: 34.2 G/DL (ref 31.5–36.5)
MCV RBC AUTO: 84 FL (ref 78–100)
PLATELET # BLD AUTO: 273 10E9/L (ref 150–450)
RBC # BLD AUTO: 5.29 10E12/L (ref 4.4–5.9)
WBC # BLD AUTO: 7.4 10E9/L (ref 4–11)

## 2018-12-18 PROCEDURE — 85027 COMPLETE CBC AUTOMATED: CPT | Performed by: FAMILY MEDICINE

## 2018-12-18 PROCEDURE — 99214 OFFICE O/P EST MOD 30 MIN: CPT | Performed by: FAMILY MEDICINE

## 2018-12-18 PROCEDURE — 85610 PROTHROMBIN TIME: CPT | Performed by: FAMILY MEDICINE

## 2018-12-18 PROCEDURE — 36415 COLL VENOUS BLD VENIPUNCTURE: CPT | Performed by: FAMILY MEDICINE

## 2018-12-18 RX ORDER — HYDROMORPHONE HYDROCHLORIDE 4 MG/1
8 TABLET ORAL 4 TIMES DAILY PRN
Qty: 180 TABLET | Refills: 0 | Status: SHIPPED | OUTPATIENT
Start: 2018-12-18 | End: 2018-12-18

## 2018-12-18 RX ORDER — DIAZEPAM 5 MG
5 TABLET ORAL DAILY PRN
Qty: 30 TABLET | Refills: 0 | Status: SHIPPED | OUTPATIENT
Start: 2019-01-09 | End: 2019-02-18

## 2018-12-18 RX ORDER — MORPHINE SULFATE 30 MG/1
30 TABLET, FILM COATED, EXTENDED RELEASE ORAL 3 TIMES DAILY
Qty: 90 TABLET | Refills: 0 | Status: SHIPPED | OUTPATIENT
Start: 2019-01-15 | End: 2019-02-18

## 2018-12-18 RX ORDER — MORPHINE SULFATE 30 MG/1
30 TABLET, FILM COATED, EXTENDED RELEASE ORAL 3 TIMES DAILY
Qty: 90 TABLET | Refills: 0 | Status: SHIPPED | OUTPATIENT
Start: 2018-12-18 | End: 2018-12-18

## 2018-12-18 RX ORDER — PROMETHAZINE HYDROCHLORIDE 25 MG/1
25 TABLET ORAL EVERY 6 HOURS PRN
Qty: 20 TABLET | Refills: 5 | Status: SHIPPED | OUTPATIENT
Start: 2018-12-18 | End: 2019-09-16

## 2018-12-18 RX ORDER — HYDROMORPHONE HYDROCHLORIDE 4 MG/1
8 TABLET ORAL 4 TIMES DAILY PRN
Qty: 180 TABLET | Refills: 0 | Status: SHIPPED | OUTPATIENT
Start: 2019-01-15 | End: 2019-02-18

## 2018-12-18 ASSESSMENT — MIFFLIN-ST. JEOR: SCORE: 2115.39

## 2018-12-18 ASSESSMENT — PAIN SCALES - GENERAL: PAINLEVEL: MILD PAIN (3)

## 2018-12-18 NOTE — PROGRESS NOTES
[address] [address]  [phone]  Dept: [phone]    PRE-OP EVALUATION:  Today's date: 2018    Oleg Ford (: 1960) presents for pre-operative evaluation assessment as requested by Dr. Joseph.  He requires evaluation and anesthesia risk assessment prior to undergoing surgery/procedure for treatment of chronic back pain.    Proposed Surgery/ Procedure: Decompression/revision laminectomy of levels T7-T9, removal of spinal cord stimulator, spinal fusion T7-T8  Date of Surgery/ Procedure: 7:30AM  Time of Surgery/ Procedure: 1/10/19        Hospital/Surgical Facility: [facility]  Fax number for surgical facility: [phone]  Primary Physician: Cat Shaw  Type of Anesthesia Anticipated: General    Patient has a Health Care Directive or Living Will:  YES     1. NO - Do you have a history of heart attack, stroke, stent, bypass or surgery on an artery in the head, neck, heart or legs?  2. NO - Do you ever have any pain or discomfort in your chest?  3. NO - Do you have a history of  Heart Failure?  4. YES - ARE YOUR TROUBLED BY SHORTNESS OF BREATH WHEN WALKING ON THE LEVEL, UP A SLIGHT HILL OR AT NIGHT?   5. NO - Do you currently have a cold, bronchitis or other respiratory infection?  6. NO - Do you have a cough, shortness of breath or wheezing?  7. YES - DO YOU SOMETIMES GET PAINS IN THE CALVES OF YOUR LEGS WHEN YOU WALK?   8. NO - Do you or anyone in your family have previous history of blood clots?  9. NO - Do you or does anyone in your family have a serious bleeding problem such as prolonged bleeding following surgeries or cuts?  10. NO - Have you ever had problems with anemia or been told to take iron pills?  11. NO - Have you had any abnormal blood loss such as black, tarry or bloody stools, or abnormal vaginal bleeding?  12. NO - Have you ever had a blood transfusion?  13. YES - HAVE YOU OR ANY OF YOUR  RELATIVES EVER HAD PROBLEMS WITH ANESTHESIA?   14. NO - Do you have sleep apnea, excessive snoring or daytime drowsiness?  15. NO - Do you have any prosthetic heart valves?  16. NO - Do you have prosthetic joints?  17. NO - Is there any chance that you may be pregnant?      HPI:     HPI related to upcoming procedure:   Long-standing progressive degenerative spinal disease.  Current plan to perform further procedures on lower thoracic spine and remove spinal cord stimulator.  May need to have further lumbar fusion done in the future    See problem list for active medical problems.  Problems all longstanding and stable, except as noted/documented.  See ROS for pertinent symptoms related to these conditions.                                                                                                                                                          .  DEPRESSION - Patient has a long history of Depression of moderate severity requiring medication for control with recent symptoms being unchanged..Current symptoms of depression include depressed mood, hopelessness.                                                                                                                                                                                    .  CHRONIC BACK PAIN  - as above     MEDICAL HISTORY:     Patient Active Problem List    Diagnosis Date Noted     Dysphagia, unspecified type 10/25/2018     Priority: Medium     Weight loss 10/25/2018     Priority: Medium     Intractable vomiting with nausea, unspecified vomiting type 10/25/2018     Priority: Medium     Morbid obesity (H) 06/06/2018     Priority: Medium     Advance Care Planning 01/24/2018     Priority: Medium     SI (sacroiliac) joint dysfunction 10/19/2017     Priority: Medium     Gastroesophageal reflux disease with esophagitis 01/25/2017     Priority: Medium     Major depressive disorder, recurrent, severe without psychotic features (H) 02/22/2016      Priority: Medium     S/P lumbar fusion 01/12/2016     Priority: Medium     Chronic pain syndrome 12/01/2015     Priority: Medium     Patient is followed by NORY LINARES for ongoing prescription of pain medication.  All refills should be approved by this provider, or covering partner.    Medication(s): MS Contin 30 / dilaudid 4  Maximum quantity per month: 90 / 180 = 186 MED    Plan:  Would like to decrease need for dilaudid, but regimen is actually OK.    Narcan prescribed   Clinic visit frequency required: Q 3 months     Controlled substance agreement on file: Yes       Date(s): 12/1/15    Pain Clinic evaluation in the past: Yes    DIRE Total Score(s):    12/1/2015   Total Score 18       Last [facility] website verification:  done on 12/1/15  7/3/18     [email or web address]        Simple hepatic cyst 03/16/2015     Priority: Medium     Simple renal cyst 03/16/2015     Priority: Medium     Esophageal cyst 09/05/2014     Priority: Medium     RLS (restless legs syndrome) 01/29/2013     Priority: Medium     DDD (degenerative disc disease), lumbar 10/18/2012     Priority: Medium     CARDIOVASCULAR SCREENING; LDL GOAL LESS THAN 130 10/31/2010     Priority: Medium     Chronic low back pain 07/19/2010     Priority: Medium      Past Medical History:   Diagnosis Date     Depression, major      Esophageal mass      History of orchiectomy 1982     History of vasectomy 1991     Low back pain      Other chronic pain     Chronic low back pain.     Restless leg      Sleep apnea     had surgery done to resolve sleep apnea     Past Surgical History:   Procedure Laterality Date     BACK SURGERY  2/6/2013    lumbar surgery     BACK SURGERY      spinal stimulator     COLONOSCOPY       COMBINED ESOPHAGOSCOPY, GASTROSCOPY, DUODENOSCOPY (EGD) WITH CO2 INSUFFLATION N/A 11/14/2018    Procedure: COMBINED ESOPHAGOSCOPY, GASTROSCOPY, DUODENOSCOPY (EGD) WITH CO2 INSUFFLATION;  Surgeon: Rosendo Guy MD;  Location:   OR     ESOPHAGOSCOPY, GASTROSCOPY, DUODENOSCOPY (EGD), COMBINED N/A 11/14/2018    Procedure: COMBINED ESOPHAGOSCOPY, GASTROSCOPY, DUODENOSCOPY (EGD), BIOPSY SINGLE OR MULTIPLE;  Surgeon: Rosendo Guy MD;  Location: [facility] OR     FUSION SACRAL ILIAC Right 10/19/2017    Procedure: FUSION SACRAL ILIAC;  Right  side sacroiliac joint fusion with 2 implants ;  Surgeon: Sebastián Szymanski MD;  Location: [facility] OR     FUSION SACRAL ILIAC Left 1/11/2018    Procedure: FUSION SACRAL ILIAC;   left  side sacroiliac joint fusion with 2 implants.(Zyga)   extraction and use of illac bone graft ;  Surgeon: Sebastián Szymanski MD;  Location: [facility] OR     HC UGI ENDOSCOPY W EUS N/A 7/31/2014    Procedure: COMBINED ENDOSCOPIC ULTRASOUND, ESOPHAGOSCOPY, GASTROSCOPY, DUODENOSCOPY (EGD);  Surgeon: Shorty Stoll MD;  [facility]     ORCHIECTOMY INGUINAL      right radical orchiectomy     REMOVE STIMULATOR VAGUS NERVE  12/23/2011    Procedure:REMOVE STIMULATOR VAGUS NERVE; Vagus Nerve Stimulator Removal; Surgeon:MARTÍN BECERRA; Location:[facility]     REMOVE STIMULATOR VAGUS NERVE  11/8/2013    Procedure: REMOVE STIMULATOR VAGUS NERVE;  Removal Of Vagus Nerve Stimulator Lead In Left Neck ;  Surgeon: Martín Becerra MD;  [facility]: [facility] OR     SURGICAL HISTORY OF -       vagal nerve implant; removed     SURGICAL HISTORY OF -       UPPP     VASECTOMY       Current Outpatient Medications   Medication Sig Dispense Refill     cyclobenzaprine (FLEXERIL) 10 MG tablet TAKE 1 TABLET BY MOUTH THREE TIMES DAILY AS NEEDED FOR MUSCLE SPASMS 90 tablet 1     diazepam (VALIUM) 5 MG tablet Take 1 tablet (5 mg) by mouth daily as needed for anxiety or sleep 30 tablet 0     EPINEPHrine (EPIPEN) 0.3 MG/0.3ML injection Inject 0.3 mLs (0.3 mg) into the muscle once as needed for anaphylaxis 1 each 2     Escitalopram Oxalate (LEXAPRO PO) Take 10 mg by mouth daily       HYDROmorphone (DILAUDID) 4 MG tablet Take 2 tablets (8 mg) by mouth 4 times daily as needed for  moderate to severe pain (every 4-6hour prn) 180 tablet 0     hydrOXYzine (ATARAX) 50 MG tablet Take 1 tablet (50 mg) by mouth every 6 hours as needed for anxiety 120 tablet 3     lamoTRIgine (LAMICTAL) 150 MG tablet Take 250 mg by mouth daily        morphine (MS CONTIN) 30 MG 12 hr tablet Take 1 tablet (30 mg) by mouth 3 times daily 90 tablet 0     naloxone (NARCAN) nasal spray Spray 1 spray (4 mg) into one nostril alternating nostrils as needed for opioid reversal every 2-3 minutes until assistance arrives 0.2 mL 0     omeprazole (PRILOSEC) 40 MG DR capsule TAKE ONE CAPSULE BY MOUTH EVERY DAY 30 TO 60 MINUTES BEFORE A MEAL 30 capsule 9     ondansetron (ZOFRAN-ODT) 4 MG ODT tab PLACE 1 OR 2 TABLETS UNDER THE TONGUE 3 TIMES A DAY AS NEEDED FOR NAUSEA 20 tablet 1     promethazine (PHENERGAN) 25 MG tablet Take 1 tablet (25 mg) by mouth every 6 hours as needed for nausea 20 tablet 1     rOPINIRole (REQUIP) 0.25 MG tablet Take 1-2 tablets (0.25-0.5 mg) by mouth At Bedtime 60 tablet 2     tolterodine (DETROL) 1 MG tablet Take 1 mg by mouth 2 times daily       zolpidem (AMBIEN) 10 MG tablet Take 1 tablet (10 mg) by mouth nightly as needed for sleep 30 tablet 2     OTC products: None, except as noted above    Allergies   Allergen Reactions     Ciprofloxacin Anaphylaxis     Doxycycline Anaphylaxis     Septra [Bactrim] Anaphylaxis     Amoxicillin      itching     Nuts      Brazil nuts      Latex Allergy: NO    Social History     Tobacco Use     Smoking status: Former Smoker     Packs/day: 0.00     Years: 40.00     Pack years: 0.00     Types: Cigarettes     Smokeless tobacco: Never Used     Tobacco comment: Last cigarette was October 17, 2018   Substance Use Topics     Alcohol use: Yes     Comment: 4 drinks a year     History   Drug Use No     Comment: morphine, dilaudid       REVIEW OF SYSTEMS:   CONSTITUTIONAL: NEGATIVE for fever, chills, change in weight  INTEGUMENTARY/SKIN: NEGATIVE for worrisome rashes, moles or  lesions  EYES: NEGATIVE for vision changes or irritation  ENT/MOUTH: NEGATIVE for ear, mouth and throat problems  RESP: NEGATIVE for significant cough or SOB  BREAST: NEGATIVE for masses, tenderness or discharge  CV: NEGATIVE for chest pain, palpitations or peripheral edema  GI: NEGATIVE for nausea, abdominal pain, heartburn, or change in bowel habits  : NEGATIVE for frequency, dysuria, or hematuria  MUSCULOSKELETAL: as above   NEURO: NEGATIVE for weakness, dizziness or paresthesias  ENDOCRINE: NEGATIVE for temperature intolerance, skin/hair changes  HEME: NEGATIVE for bleeding problems  PSYCHIATRIC: NEGATIVE for changes in mood or affect    EXAM:   /84 (BP Location: Right arm, Patient Position: Chair, Cuff Size: Adult Large)   Pulse 69   Temp 98.6  F (37  C) (Oral)   Ht 1.829 m (6')   Wt 124.7 kg (275 lb)   SpO2 100%   BMI 37.30 kg/m      GENERAL APPEARANCE: healthy, alert and no distress     EYES: EOMI,  PERRL     HENT: ear canals and TM's normal and nose and mouth without ulcers or lesions     NECK: no adenopathy, no asymmetry, masses, or scars and thyroid normal to palpation     RESP: lungs clear to auscultation - no rales, rhonchi or wheezes     CV: regular rates and rhythm, normal S1 S2, no S3 or S4 and no murmur, click or rub     ABDOMEN:  soft, nontender, no HSM or masses and bowel sounds normal     MS: extremities normal- no gross deformities noted, no evidence of inflammation in joints, FROM in all extremities.     SKIN: no suspicious lesions or rashes     NEURO: Normal strength and tone, sensory exam grossly normal, mentation intact and speech normal     PSYCH: mentation appears normal. and affect normal/bright     LYMPHATICS: No cervical adenopathy    DIAGNOSTICS:   Recent EKG attached    Recent Labs   Lab Test 10/25/18  1439 01/11/18  1133 12/01/17  1145 10/20/17  0708 10/06/17  1318  07/25/14  1534   HGB 15.4 14.7  --  12.8* 15.4   < > 14.9     --   --  211 271   < > 208   INR  --    --   --   --  1.06  --  1.00     --   --  133 139   < >  --    POTASSIUM 3.8  --   --  3.8 4.0   < >  --    CR 1.16  --   --  1.08 1.24   < >  --    A1C  --   --  5.5  --  5.6  --   --     < > = values in this interval not displayed.    CBC, INR pending     IMPRESSION:   Reason for surgery/procedure: Chronic degenerative spinal disease  Diagnosis/reason for consult: Preoperative clearance    The proposed surgical procedure is considered INTERMEDIATE risk.    REVISED CARDIAC RISK INDEX  The patient has the following serious cardiovascular risks for perioperative complications such as (MI, PE, VFib and 3  AV Block):  No serious cardiac risks  INTERPRETATION: 0 risks: Class I (very low risk - 0.4% complication rate)    The patient has the following additional risks for perioperative complications:  No identified additional risks  High tolerance to opioid analgesics due to chronic usage      ICD-10-CM    1. Preop general physical exam Z01.818    2. DDD (degenerative disc disease), lumbar M51.36        RECOMMENDATIONS:         --Patient is to take all scheduled medications on the day of surgery.    APPROVAL GIVEN to proceed with proposed procedure, without further diagnostic evaluation       Signed Electronically by: Cat Shaw MD    Copy of this evaluation report is provided to requesting physician.    [facility] Preop Guidelines    Revised Cardiac Risk Index

## 2019-01-07 ENCOUNTER — OFFICE VISIT (OUTPATIENT)
Dept: UROLOGY | Facility: CLINIC | Age: 59
End: 2019-01-07
Attending: FAMILY MEDICINE
Payer: COMMERCIAL

## 2019-01-07 VITALS
HEIGHT: 72 IN | BODY MASS INDEX: 37.06 KG/M2 | SYSTOLIC BLOOD PRESSURE: 129 MMHG | HEART RATE: 82 BPM | TEMPERATURE: 97.2 F | DIASTOLIC BLOOD PRESSURE: 90 MMHG | RESPIRATION RATE: 16 BRPM | WEIGHT: 273.6 LBS | OXYGEN SATURATION: 96 %

## 2019-01-07 DIAGNOSIS — N48.6 PEYRONIE'S DISEASE: Primary | ICD-10-CM

## 2019-01-07 PROCEDURE — 99203 OFFICE O/P NEW LOW 30 MIN: CPT | Performed by: UROLOGY

## 2019-01-07 RX ORDER — BUPROPION HYDROCHLORIDE 150 MG/1
150 TABLET, EXTENDED RELEASE ORAL
COMMUNITY
End: 2019-07-12

## 2019-01-07 ASSESSMENT — PAIN SCALES - GENERAL: PAINLEVEL: MILD PAIN (3)

## 2019-01-07 ASSESSMENT — MIFFLIN-ST. JEOR: SCORE: 2109.04

## 2019-01-07 NOTE — PROGRESS NOTES
I am seeing Oleg Ford in consultation from Cat Shaw  for evaluation of penile lump/ pain.    HPI:  Oleg Ford is a 58 year old male noting some lumps on right side of penis.  This was idiopathic.  No trauma.  Is tender to palpation.  He's not noticing curvature to the erection.  No history of Dupuytren's contractures.    REVIEW OF SYSTEMS:  General: negative  Skin: negative  Eyes: negative  Ears/Nose/Throat: negative  Respiratory: negative  Cardiovascular: negative  Gastrointestinal: negative  Genitourinary: see HPI  Musculoskeletal: negative  Neurologic: negative  Psychiatric: negative  Hematologic/Lymphatic/Immunologic: negative  Endocrine: negative    PAST MEDICAL HX:  Past Medical History:   Diagnosis Date     Depression, major      Esophageal mass      History of orchiectomy 1982     History of vasectomy 1991     Low back pain      Other chronic pain     Chronic low back pain.     Restless leg      Sleep apnea     had surgery done to resolve sleep apnea       PAST SURG HX:  Past Surgical History:   Procedure Laterality Date     BACK SURGERY  2/6/2013    lumbar surgery     BACK SURGERY      spinal stimulator     COLONOSCOPY       COMBINED ESOPHAGOSCOPY, GASTROSCOPY, DUODENOSCOPY (EGD) WITH CO2 INSUFFLATION N/A 11/14/2018    Procedure: COMBINED ESOPHAGOSCOPY, GASTROSCOPY, DUODENOSCOPY (EGD) WITH CO2 INSUFFLATION;  Surgeon: Rosendo Guy MD;  [facility]     ESOPHAGOSCOPY, GASTROSCOPY, DUODENOSCOPY (EGD), COMBINED N/A 11/14/2018    Procedure: COMBINED ESOPHAGOSCOPY, GASTROSCOPY, DUODENOSCOPY (EGD), BIOPSY SINGLE OR MULTIPLE;  Surgeon: Rosendo Guy MD;  [facility]: [facility]     FUSION SACRAL ILIAC Right 10/19/2017    Procedure: FUSION SACRAL ILIAC;  Right  side sacroiliac joint fusion with 2 implants ;  Surgeon: Sebastián Szymanski MD;  [facility]: [facility]     FUSION SACRAL ILIAC Left 1/11/2018    Procedure: FUSION SACRAL ILIAC;   left  side sacroiliac joint fusion with 2  implants.(Zyga)   extraction and use of illac bone graft ;  Surgeon: Sebastián Szymanski MD;  Location: [facility] OR     HC UGI ENDOSCOPY W EUS N/A 7/31/2014    Procedure: COMBINED ENDOSCOPIC ULTRASOUND, ESOPHAGOSCOPY, GASTROSCOPY, DUODENOSCOPY (EGD);  Surgeon: Shorty Stoll MD;  [facility]     ORCHIECTOMY INGUINAL      right radical orchiectomy     REMOVE STIMULATOR VAGUS NERVE  12/23/2011    Procedure:REMOVE STIMULATOR VAGUS NERVE; Vagus Nerve Stimulator Removal; Surgeon:ALEXIS BECERRA; [facility]     REMOVE STIMULATOR VAGUS NERVE  11/8/2013    Procedure: REMOVE STIMULATOR VAGUS NERVE;  Removal Of Vagus Nerve Stimulator Lead In Left Neck ;  Surgeon: Alexis Becerra MD;  [facility]     SURGICAL HISTORY OF -       vagal nerve implant; removed     SURGICAL HISTORY OF -       UPPP     VASECTOMY          FAMILY HX:  Family History   Problem Relation Age of Onset     Diabetes Mother      C.A.D. Mother      C.A.D. Maternal Grandmother      Diabetes Maternal Grandmother      Hypertension Maternal Grandmother      Cancer Maternal Grandfather      C.A.D. Maternal Grandfather        SOCIAL HX:  Social History     Tobacco Use     Smoking status: Former Smoker     Packs/day: 0.00     Years: 40.00     Pack years: 0.00     Types: Cigarettes     Smokeless tobacco: Never Used     Tobacco comment: Last cigarette was October 17, 2018   Substance Use Topics     Alcohol use: Yes     Comment: 4 drinks a year     Drug use: No     Comment: morphine, dilaudid       MEDICATIONS:  Current Outpatient Medications   Medication Sig     buPROPion (WELLBUTRIN SR) 150 MG 12 hr tablet Take 150 mg by mouth     cyclobenzaprine (FLEXERIL) 10 MG tablet TAKE 1 TABLET BY MOUTH THREE TIMES DAILY AS NEEDED FOR MUSCLE SPASMS     [START ON 1/9/2019] diazepam (VALIUM) 5 MG tablet Take 1 tablet (5 mg) by mouth daily as needed for anxiety or sleep     EPINEPHrine (EPIPEN) 0.3 MG/0.3ML injection Inject 0.3 mLs (0.3 mg) into the muscle once as needed  for anaphylaxis     Escitalopram Oxalate (LEXAPRO PO) Take 10 mg by mouth daily     [START ON 1/15/2019] HYDROmorphone (DILAUDID) 4 MG tablet Take 2 tablets (8 mg) by mouth 4 times daily as needed for moderate to severe pain (every 4-6hour prn)     hydrOXYzine (ATARAX) 50 MG tablet Take 1 tablet (50 mg) by mouth every 6 hours as needed for anxiety     lamoTRIgine (LAMICTAL) 150 MG tablet Take 250 mg by mouth daily      [START ON 1/15/2019] morphine (MS CONTIN) 30 MG CR tablet Take 1 tablet (30 mg) by mouth 3 times daily     naloxone (NARCAN) nasal spray Spray 1 spray (4 mg) into one nostril alternating nostrils as needed for opioid reversal every 2-3 minutes until assistance arrives     omeprazole (PRILOSEC) 40 MG DR capsule TAKE ONE CAPSULE BY MOUTH EVERY DAY 30 TO 60 MINUTES BEFORE A MEAL     ondansetron (ZOFRAN-ODT) 4 MG ODT tab PLACE 1 OR 2 TABLETS UNDER THE TONGUE 3 TIMES A DAY AS NEEDED FOR NAUSEA     promethazine (PHENERGAN) 25 MG tablet Take 1 tablet (25 mg) by mouth every 6 hours as needed for nausea     rOPINIRole (REQUIP) 0.25 MG tablet Take 1-2 tablets (0.25-0.5 mg) by mouth At Bedtime     tolterodine (DETROL) 1 MG tablet Take 1 mg by mouth 2 times daily     zolpidem (AMBIEN) 10 MG tablet Take 1 tablet (10 mg) by mouth nightly as needed for sleep     No current facility-administered medications for this visit.      Facility-Administered Medications Ordered in Other Visits   Medication     neostigmine (PROSTIGMINE) injection       ALLERGIES:  Ciprofloxacin; Doxycycline; Septra [bactrim]; Amoxicillin; and Nuts      GENERAL PHYSICAL EXAM:     /90 (BP Location: Left arm, Patient Position: Sitting, Cuff Size: Adult Large)   Pulse 82   Temp 97.2  F (36.2  C)   Resp 16   Ht 1.829 m (6')   Wt 124.1 kg (273 lb 9.6 oz)   SpO2 96%   BMI 37.11 kg/m     Constitutional: No acute distress. Well nourished.   PSYCH: normal mood and affect.  NEURO: normal gait, no focal deficits.   EYES: anicteric, EOMI,  PERR.  CARDIOPULMONARY: breathing non-labored, pulse regular rate/rhythm, no peripheral edema.  GI: Abdomen soft, overweight   MUSCULOSKELETAL: normal limb proportions, no muscle wasting, no contractures.  SKIN: Normal virilized hair distribution, no lesions, warts or rashes over genitalia, abdomen extremities or face.  HEME/LYMPH: no ecchymosis, no lymphedema.     EXAM:  Phallus circumcised, meatus adequate, dense/ tender plaques palpated at the right lateral shaft of the penis.  Left testis descended   Right testis descended  Cord structures not remarkable.     Prostate exam: deferred     Imaging/labs:  Lab Results   Component Value Date    CR 1.16 10/25/2018    CR 1.08 10/20/2017    CR 1.24 10/06/2017     Lab Results   Component Value Date    PSA 1.50 07/08/2015       ASSESSMENT:     Peyronie's disease     PLAN:    Advised conservative management: rest, NSAIDS.    Discussed option of pentoxifylline, he declines now.  Most likely wouldn't help.    Discussed natura history of Peyronie's disease, and possible sequelae.    PRN follow-up.    Copied cc to Consulting provider Colin         Thank-you for the kind consultation.  Fran Sequeira MD     Urological Surgeon

## 2019-01-07 NOTE — NURSING NOTE
Oleg Ford's goals for this visit include:   Chief Complaint   Patient presents with     Consult     Nodule of shaft of penis, referral attached, ltr sent12.18.18       He requests these members of his care team be copied on today's visit information: Yes    PCP: Cat Shaw    Referring Provider:  Cat Shaw MD  [address] [place] [address]    /90 (BP Location: Left arm, Patient Position: Sitting, Cuff Size: Adult Large)   Pulse 82   Temp 97.2  F (36.2  C)   Resp 16   Ht 1.829 m (6')   Wt 124.1 kg (273 lb 9.6 oz)   SpO2 96%   BMI 37.11 kg/m      Do you need any medication refills at today's visit? No      Khurram Shelton CMA (AAMA)

## 2019-01-09 DIAGNOSIS — G47.00 INSOMNIA, UNSPECIFIED TYPE: ICD-10-CM

## 2019-01-09 RX ORDER — ZOLPIDEM TARTRATE 10 MG/1
10 TABLET ORAL
Qty: 30 TABLET | Refills: 5 | Status: SHIPPED | OUTPATIENT
Start: 2019-01-09 | End: 2019-07-12 | Stop reason: DRUGHIGH

## 2019-01-09 NOTE — TELEPHONE ENCOUNTER
Requested Prescriptions   Pending Prescriptions Disp Refills     zolpidem (AMBIEN) 10 MG tablet 30 tablet 2     Sig: Take 1 tablet (10 mg) by mouth nightly as needed for sleep    There is no refill protocol information for this order          zolpidem (AMBIEN) 10 MG tablet      Last Written Prescription Date:  12/9/18  Last Fill Quantity: 30,   # refills: 2  Last Office Visit: 12/18/18  Future Office visit:    Next 5 appointments (look out 90 days)    Feb 18, 2019 10:40 AM CST  [facility] [address] [place] [id number] [phone]           Routing refill request to provider for review/approval because:  Drug not on the [facility], [facility] or [facility] refill protocol or controlled substance

## 2019-01-16 ENCOUNTER — MEDICAL CORRESPONDENCE (OUTPATIENT)
Dept: HEALTH INFORMATION MANAGEMENT | Facility: CLINIC | Age: 59
End: 2019-01-16

## 2019-01-16 ENCOUNTER — TELEPHONE (OUTPATIENT)
Dept: FAMILY MEDICINE | Facility: CLINIC | Age: 59
End: 2019-01-16

## 2019-01-16 NOTE — TELEPHONE ENCOUNTER
Reason for Call:  Other     Detailed comments: patient had thoracic fusion on 1/10/2019. Need orders for PHYSICAL THERAPY and add nursing for wound care.    Phone Number Patient can be reached at: Other phone number:    [facility] (HOME CARE) [phone]         Best Time: any    Can we leave a detailed message on this number? YES    Call taken on 1/16/2019 at 3:50 PM by Monica Resendiz

## 2019-01-16 NOTE — TELEPHONE ENCOUNTER
Spoke with Etienne physical therapist     Verbal orders given to approve the requested services below per the 'Home Care, Assisted Living, or Nursing Home Evaluations and Treatments [facility] Policy'.       PT [id number] ,   [id number]    SN to eval and treat to assess for wound care needs      Sigrid Rhodes RN

## 2019-01-21 ENCOUNTER — TELEPHONE (OUTPATIENT)
Dept: FAMILY MEDICINE | Facility: CLINIC | Age: 59
End: 2019-01-21

## 2019-01-21 NOTE — TELEPHONE ENCOUNTER
[facility] verbal order form placed on Dr. Shaw's desk for completion      Mery JIMÉNEZ (R))

## 2019-01-28 ENCOUNTER — TELEPHONE (OUTPATIENT)
Dept: FAMILY MEDICINE | Facility: CLINIC | Age: 59
End: 2019-01-28

## 2019-01-28 ENCOUNTER — TRANSFERRED RECORDS (OUTPATIENT)
Dept: HEALTH INFORMATION MANAGEMENT | Facility: CLINIC | Age: 59
End: 2019-01-28

## 2019-02-08 ENCOUNTER — TELEPHONE (OUTPATIENT)
Dept: FAMILY MEDICINE | Facility: CLINIC | Age: 59
End: 2019-02-08

## 2019-02-12 ENCOUNTER — TELEPHONE (OUTPATIENT)
Dept: FAMILY MEDICINE | Facility: CLINIC | Age: 59
End: 2019-02-12

## 2019-02-12 NOTE — TELEPHONE ENCOUNTER
[facility] Order form placed on Dr. Shaw 's desk for completion.      Mery JIMÉNEZ (R))

## 2019-02-14 ENCOUNTER — TELEPHONE (OUTPATIENT)
Dept: FAMILY MEDICINE | Facility: CLINIC | Age: 59
End: 2019-02-14

## 2019-02-14 NOTE — TELEPHONE ENCOUNTER
Forms from [facility]- Orders placed on Dr. Shaw deszaire for completion.    Mell Peguero

## 2019-02-14 NOTE — TELEPHONE ENCOUNTER
[facility] forms placed on Dr. Herman deszaire for completion.    Sally ALFARO, Patient Care

## 2019-02-14 NOTE — TELEPHONE ENCOUNTER
Sorry, wrong dot phrase. Form completed and appears to have been faxed per below. Will close encounter.

## 2019-02-15 ENCOUNTER — TRANSFERRED RECORDS (OUTPATIENT)
Dept: HEALTH INFORMATION MANAGEMENT | Facility: CLINIC | Age: 59
End: 2019-02-15

## 2019-02-15 LAB
ALT SERPL-CCNC: 19 U/L (ref 0–55)
AST SERPL-CCNC: 16 U/L (ref 5–34)
CREAT SERPL-MCNC: 0.96 MG/DL (ref 0.7–1.3)
GFR SERPL CREATININE-BSD FRML MDRD: >60 ML/MIN/1.73M2
GLUCOSE SERPL-MCNC: 102 MG/DL (ref 75–100)
INR PPP: 0.98 (ref 0.8–1.2)
POTASSIUM SERPL-SCNC: 3.8 MMOL/L (ref 3.6–5)

## 2019-02-18 ENCOUNTER — OFFICE VISIT (OUTPATIENT)
Dept: FAMILY MEDICINE | Facility: CLINIC | Age: 59
End: 2019-02-18
Payer: COMMERCIAL

## 2019-02-18 VITALS
OXYGEN SATURATION: 97 % | WEIGHT: 273 LBS | TEMPERATURE: 98 F | DIASTOLIC BLOOD PRESSURE: 88 MMHG | BODY MASS INDEX: 36.98 KG/M2 | HEIGHT: 72 IN | HEART RATE: 93 BPM | SYSTOLIC BLOOD PRESSURE: 128 MMHG

## 2019-02-18 DIAGNOSIS — R06.02 SOB (SHORTNESS OF BREATH): ICD-10-CM

## 2019-02-18 DIAGNOSIS — I71.20 THORACIC AORTIC ANEURYSM WITHOUT RUPTURE (H): Primary | ICD-10-CM

## 2019-02-18 DIAGNOSIS — M51.369 DDD (DEGENERATIVE DISC DISEASE), LUMBAR: ICD-10-CM

## 2019-02-18 DIAGNOSIS — M53.3 SI (SACROILIAC) JOINT DYSFUNCTION: ICD-10-CM

## 2019-02-18 DIAGNOSIS — G89.4 CHRONIC PAIN SYNDROME: ICD-10-CM

## 2019-02-18 DIAGNOSIS — F33.2 MAJOR DEPRESSIVE DISORDER, RECURRENT, SEVERE WITHOUT PSYCHOTIC FEATURES (H): ICD-10-CM

## 2019-02-18 PROCEDURE — 99214 OFFICE O/P EST MOD 30 MIN: CPT | Performed by: FAMILY MEDICINE

## 2019-02-18 RX ORDER — MORPHINE SULFATE 30 MG/1
30 TABLET, FILM COATED, EXTENDED RELEASE ORAL 3 TIMES DAILY
Qty: 90 TABLET | Refills: 0 | Status: SHIPPED | OUTPATIENT
Start: 2019-02-18 | End: 2019-03-12

## 2019-02-18 RX ORDER — DIAZEPAM 5 MG
5 TABLET ORAL DAILY PRN
Qty: 30 TABLET | Refills: 0 | Status: SHIPPED | OUTPATIENT
Start: 2019-02-18 | End: 2019-03-12

## 2019-02-18 RX ORDER — HYDROMORPHONE HYDROCHLORIDE 4 MG/1
8 TABLET ORAL 4 TIMES DAILY PRN
Qty: 180 TABLET | Refills: 0 | Status: SHIPPED | OUTPATIENT
Start: 2019-02-18 | End: 2019-03-12

## 2019-02-18 RX ORDER — ALBUTEROL SULFATE 90 UG/1
2 AEROSOL, METERED RESPIRATORY (INHALATION) EVERY 4 HOURS PRN
Qty: 1 INHALER | Refills: 0 | Status: SHIPPED | OUTPATIENT
Start: 2019-02-18 | End: 2019-11-19

## 2019-02-18 ASSESSMENT — PAIN SCALES - GENERAL: PAINLEVEL: MODERATE PAIN (4)

## 2019-02-18 ASSESSMENT — MIFFLIN-ST. JEOR: SCORE: 2106.32

## 2019-02-18 NOTE — PROGRESS NOTES
SUBJECTIVE:   Oleg Ford is a 58 year old male who presents to clinic today for the following health issues:      Patient would also like to discuss Aneurism.     Surgical Follow-up Visit:    Hospital/Nursing Home/IP Rehab Facility: [facility]  Date of Surgery: 1/10/19  Type of Surgery: BILATERAL T7-T9 DECOMPRESSION- REVISION LAMINECTOMY, REMOVAL OF SPINAL CORD STIMULATOR DEVICE AND BATTERY, BILATERAL T7-T8 POSTERIOR SPINAL FUSION WITH INSTRUMENTATION            Problems taking medications regularly:  None       Medication changes since discharge: None       Problems adhering to non-medication therapy:  None    Summary of hospitalization:  CareEverywhere information obtained and reviewed  Diagnostic Tests/Treatments reviewed.  Follow up needed: none  Other Healthcare Providers Involved in Patient s Care:         Specialty care  Update since discharge: improved.     Post Discharge Medication Reconciliation: discharge medications reconciled, continue medications without change.  Plan of care communicated with patient     Coding guidelines for this visit:  Type of Medical   Decision Making Face-to-Face Visit       within 7 Days of discharge Face-to-Face Visit        within 14 days of discharge   Moderate Complexity [id number]   High Complexity 57993 [id number]          SUBJECTIVE:  Here today originally scheduled in follow-up of recent back surgery.  Those records are reviewed with patient and through care everywhere.  Following with his specialist.  Has developed an issue of some shortness of breath associated with some chest pain.  Sometimes exertional and sometimes not.  Recently had pulmonary function testing done and those results are not available yet.  But as part of his workup he had a chest CT which revealed a 4.3 cm thoracic aortic aneurysm/dilation.    Review of systems otherwise negative.  Past medical, family, and social history reviewed and updated in chart.    OBJECTIVE:  /88 (BP  Location: Right arm, Patient Position: Chair, Cuff Size: Adult Large)   Pulse 93   Temp 98  F (36.7  C) (Oral)   Ht 1.829 m (6')   Wt 123.8 kg (273 lb)   SpO2 97%   BMI 37.03 kg/m    Alert, pleasant, upbeat, and in no apparent discomfort.  S1 and S2 normal, no murmurs, clicks, gallops or rubs. Regular rate and rhythm. Chest is clear; no wheezes or rales. No edema or JVD.  Past labs reviewed with the patient.   Reviewed imaging results    ASSESSMENT / PLAN:  (I71.2) Thoracic aortic aneurysm without rupture (H)  (primary encounter diagnosis)  Comment: Updated problem list with recommendations.  As long as this remains stable this is likely to be low risk for rupture.  We would be most concerned about continued changes.  We will plan to recheck in 6 months and ultrasound should be adequate for this  Plan:     (R06.02) SOB (shortness of breath)  Comment: Trial of albuterol to see if this helps with his symptomatology while we are waiting pulmonary function tests  Plan: albuterol (PROAIR HFA/PROVENTIL HFA/VENTOLIN         HFA) 108 (90 Base) MCG/ACT inhaler            (M51.36) DDD (degenerative disc disease), lumbar  Comment:   Plan: morphine (MS CONTIN) 30 MG CR tablet,         HYDROmorphone (DILAUDID) 4 MG tablet            (G89.4) Chronic pain syndrome  Comment:   Plan: morphine (MS CONTIN) 30 MG CR tablet,         HYDROmorphone (DILAUDID) 4 MG tablet            (M53.3) SI (sacroiliac) joint dysfunction  Comment:   Plan: morphine (MS CONTIN) 30 MG CR tablet,         HYDROmorphone (DILAUDID) 4 MG tablet            (F33.2) Major depressive disorder, recurrent, severe without psychotic features (H)  Comment:   Plan: diazepam (VALIUM) 5 MG tablet            Follow up 1 month  SANTIAGO Shaw MD    (Chart documentation completed in part with Dragon voice-recognition software.  Even though reviewed some grammatical, spelling, and word errors may remain.)

## 2019-02-19 ENCOUNTER — TELEPHONE (OUTPATIENT)
Dept: FAMILY MEDICINE | Facility: CLINIC | Age: 59
End: 2019-02-19

## 2019-02-19 ENCOUNTER — TRANSFERRED RECORDS (OUTPATIENT)
Dept: HEALTH INFORMATION MANAGEMENT | Facility: CLINIC | Age: 59
End: 2019-02-19

## 2019-02-19 NOTE — TELEPHONE ENCOUNTER
Reason for Call:  Other call back    Detailed comments: Oleg has some questions regarding his CT scan that was done on 02/15. He was given the results but has some questions. Please to discuss with him the results.  Thank you     Phone Number Patient can be reached at: Home number on file [phone] (home)    Best Time: Any    Can we leave a detailed message on this number? YES    Call taken on 2/19/2019 at 4:53 PM by Jerson Calvert

## 2019-02-20 NOTE — TELEPHONE ENCOUNTER
This writer attempted to contact Oleg on 02/20/19      Reason for call find out patient questions and left detailed message.      If patient calls back:   Registered Nurse called. Follow Triage Call workflow        Heidy Cerna RN

## 2019-02-20 NOTE — TELEPHONE ENCOUNTER
Spoke with patient and he reports that he was at the emergency room last week and they did an xray and found swollen lymph nodes and want him to do a CT of his abdomen and pelvis. He states that they did not order the CT for him and told him that he needs to call PCP and have PCP order the CT for abdomen and pelvis. Patient lives in [place] and wants to do the CT in that area. This writer told patient would send message to provider about this. Asked him why the person recommending the CT did not order it and he said that PCP needed to order it. Told patient we would call him back after PCP reviews.      Sigrid Rhodes RN

## 2019-02-20 NOTE — TELEPHONE ENCOUNTER
With these recent x-rays?  Because I looked through all of the records we have from [facility] and I do not see any mention of enlarged lymph nodes.  He did just have a CT scan of abdomen and pelvis in April and it was totally normal.

## 2019-02-20 NOTE — TELEPHONE ENCOUNTER
returned call    Best number to reach caller:   Oleg Ford (Self) [phone] (H)       Is it ok to leave a detailed message: YES

## 2019-02-20 NOTE — TELEPHONE ENCOUNTER
Spoke with patient and reviewed PCPs message with him. Told him to call the emergency room staff where the provider is located that told him to order CT scan. Told him if he does not get anywhere with that to follow up in the clinic where that emergency room is located.   Sigrid Rhodes RN

## 2019-02-21 ENCOUNTER — TELEPHONE (OUTPATIENT)
Dept: FAMILY MEDICINE | Facility: CLINIC | Age: 59
End: 2019-02-21

## 2019-02-21 NOTE — TELEPHONE ENCOUNTER
[facility] verbal order form placed on Dr. Shaw 's desk for completion.      Mery JIMÉNEZ (R))

## 2019-02-22 ENCOUNTER — TELEPHONE (OUTPATIENT)
Dept: FAMILY MEDICINE | Facility: CLINIC | Age: 59
End: 2019-02-22

## 2019-02-22 NOTE — TELEPHONE ENCOUNTER
[facility] verbal order form placed on Dr. Shaw 's desk for completion.      Mery JIMÉNEZ (R))

## 2019-02-25 ENCOUNTER — TELEPHONE (OUTPATIENT)
Dept: FAMILY MEDICINE | Facility: CLINIC | Age: 59
End: 2019-02-25

## 2019-02-25 NOTE — TELEPHONE ENCOUNTER
[facility] discharge order form placed on Dr. Shaw 's desk for completion.      Mery JIMÉNEZ (R))

## 2019-02-28 ENCOUNTER — TELEPHONE (OUTPATIENT)
Dept: FAMILY MEDICINE | Facility: CLINIC | Age: 59
End: 2019-02-28

## 2019-02-28 NOTE — TELEPHONE ENCOUNTER
Forms from [facility]- new visit sets placed on Dr. Colin nascimento for completion    Mell Peguero

## 2019-03-07 ENCOUNTER — OFFICE VISIT (OUTPATIENT)
Dept: GASTROENTEROLOGY | Facility: CLINIC | Age: 59
End: 2019-03-07
Payer: COMMERCIAL

## 2019-03-07 VITALS
WEIGHT: 280.8 LBS | BODY MASS INDEX: 38.03 KG/M2 | DIASTOLIC BLOOD PRESSURE: 77 MMHG | HEART RATE: 69 BPM | OXYGEN SATURATION: 97 % | SYSTOLIC BLOOD PRESSURE: 109 MMHG | HEIGHT: 72 IN

## 2019-03-07 DIAGNOSIS — R11.2 INTRACTABLE VOMITING WITH NAUSEA, UNSPECIFIED VOMITING TYPE: Primary | ICD-10-CM

## 2019-03-07 PROCEDURE — 99214 OFFICE O/P EST MOD 30 MIN: CPT | Performed by: INTERNAL MEDICINE

## 2019-03-07 ASSESSMENT — MIFFLIN-ST. JEOR: SCORE: 2145.67

## 2019-03-07 ASSESSMENT — PAIN SCALES - GENERAL: PAINLEVEL: MODERATE PAIN (4)

## 2019-03-07 NOTE — NURSING NOTE
"Oleg Ford's goals for this visit include:   Chief Complaint   Patient presents with     Consult     Vomiting and nausea; started October 2018.       He requests these members of his care team be copied on today's visit information: PCP    PCP: Cat Shaw    Referring Provider:  Referred Self, MD  No address on file    /77 (BP Location: Left arm, Patient Position: Chair, Cuff Size: Adult Large)   Pulse 69   Ht 1.835 m (6' 0.25\")   Wt 127.4 kg (280 lb 12.8 oz)   SpO2 97%   BMI 37.82 kg/m      Do you need any medication refills at today's visit? None      Sonia Greene CMA        "

## 2019-03-07 NOTE — PATIENT INSTRUCTIONS
Dr. Rodrigez is a lung doctor here at [place]    Avoid eating late at night    Eat small meals 5-6 times daily, not larger meals    Try to limit the amount of pain medication     Take miralax 1 scoop every day    Discuss with Dr. Shaw about the chest pain and difficulty breathing to see if you need a cardiology/heart evaluation.    We should schedule another upper endoscopy after the lung and heart evaluations are done.

## 2019-03-07 NOTE — PROGRESS NOTES
GASTROENTEROLOGY FOLLOW UP CLINIC VISIT    CC/REFERRING MD:    Cat Shaw  Referred Self    REASON FOR CONSULTATION:   Referred Self for   Chief Complaint   Patient presents with     Consult     Vomiting and nausea; started October 2018.       HISTORY OF PRESENT ILLNESS:    Oleg Ford is 58 year old male who presents for follow up of nausea and vomiting.  He notes that he has had issues with nausea and vomiting over the past few months.  He reports every 2-3 days having nausea with retching which is typically occurring first thing in the morning.  He has tried multiple medications which do not help including Zofran and promethazine.  He reports that he does not specifically vomit up food.  He notes that he has lost 20 pounds since the onset of symptoms.  He reports that he takes omeprazole 40 mg daily.  He did have an EGD November 2018 which noted a 2 cm hiatal hernia and a large amount of food in the gastric body concerning for gastroparesis.  He notes that he does take pain medication daily including long-acting MS Contin as well as short acting Dilaudid due to spinal stenosis issues requiring multiple surgeries.  He notes he is not able to completely come off medication at this point given his spine issues.  He reports that he has having a bowel movement daily or every other day.  There is no family history of colon cancer.  On review of systems, he does note that he is having daily shortness of breath.  He also reports having chest pain, substernal, with shortness of breath whenever he does any physical activity and then the pain and shortness of breath resolve when he sits down.    PERTINENT PAST MEDICAL HISTORY:    Past Medical History:   Diagnosis Date     Depression, major      Esophageal mass      History of orchiectomy 1982     History of vasectomy 1991     Low back pain      Other chronic pain     Chronic low back pain.     Restless leg      Sleep apnea     had surgery done to resolve  sleep apnea       PREVIOUS SURGERIES:   Past Surgical History:   Procedure Laterality Date     BACK SURGERY  2/6/2013    lumbar surgery     BACK SURGERY      spinal stimulator     COLONOSCOPY       COMBINED ESOPHAGOSCOPY, GASTROSCOPY, DUODENOSCOPY (EGD) WITH CO2 INSUFFLATION N/A 11/14/2018    Procedure: COMBINED ESOPHAGOSCOPY, GASTROSCOPY, DUODENOSCOPY (EGD) WITH CO2 INSUFFLATION;  Surgeon: Rosendo Guy MD;  Location: [facility] OR     ESOPHAGOSCOPY, GASTROSCOPY, DUODENOSCOPY (EGD), COMBINED N/A 11/14/2018    Procedure: COMBINED ESOPHAGOSCOPY, GASTROSCOPY, DUODENOSCOPY (EGD), BIOPSY SINGLE OR MULTIPLE;  Surgeon: Rosendo Guy MD;  Location: [facility] OR     FUSION SACRAL ILIAC Right 10/19/2017    Procedure: FUSION SACRAL ILIAC;  Right  side sacroiliac joint fusion with 2 implants ;  Surgeon: Sebastián Szymanski MD;  Location: [facility] OR     FUSION SACRAL ILIAC Left 1/11/2018    Procedure: FUSION SACRAL ILIAC;   left  side sacroiliac joint fusion with 2 implants.(Zyga)   extraction and use of illac bone graft ;  Surgeon: Sebastián Szymanski MD;  Location: [facility] OR     [facility] UGI ENDOSCOPY W EUS N/A 7/31/2014    Procedure: COMBINED ENDOSCOPIC ULTRASOUND, ESOPHAGOSCOPY, GASTROSCOPY, DUODENOSCOPY (EGD);  Surgeon: Shorty Stoll MD;  [facility]     ORCHIECTOMY INGUINAL      right radical orchiectomy     REMOVE STIMULATOR VAGUS NERVE  12/23/2011    Procedure:REMOVE STIMULATOR VAGUS NERVE; Vagus Nerve Stimulator Removal; Surgeon:MARTÍN BECERRA; Location:[facility] OR     REMOVE STIMULATOR VAGUS NERVE  11/8/2013    Procedure: REMOVE STIMULATOR VAGUS NERVE;  Removal Of Vagus Nerve Stimulator Lead In Left Neck ;  Surgeon: Martín Becerra MD;  Location: [facility] OR     SURGICAL HISTORY OF -       vagal nerve implant; removed     SURGICAL HISTORY OF -       UPPP     VASECTOMY         ALLERGIES:     Allergies   Allergen Reactions     Ciprofloxacin Anaphylaxis     Doxycycline Anaphylaxis     Septra [Bactrim] Anaphylaxis     Amoxicillin       itching     Nuts      [place] nuts       PERTINENT MEDICATIONS:    Current Outpatient Medications:      albuterol (PROAIR HFA/PROVENTIL HFA/VENTOLIN HFA) 108 (90 Base) MCG/ACT inhaler, Inhale 2 puffs into the lungs every 4 hours as needed for shortness of breath / dyspnea, Disp: 1 Inhaler, Rfl: 0     buPROPion (WELLBUTRIN SR) 150 MG 12 hr tablet, Take 150 mg by mouth, Disp: , Rfl:      cyclobenzaprine (FLEXERIL) 10 MG tablet, TAKE 1 TABLET BY MOUTH THREE TIMES DAILY AS NEEDED FOR MUSCLE SPASMS, Disp: 90 tablet, Rfl: 1     diazepam (VALIUM) 5 MG tablet, Take 1 tablet (5 mg) by mouth daily as needed for anxiety or sleep, Disp: 30 tablet, Rfl: 0     EPINEPHrine (EPIPEN) 0.3 MG/0.3ML injection, Inject 0.3 mLs (0.3 mg) into the muscle once as needed for anaphylaxis, Disp: 1 each, Rfl: 2     Escitalopram Oxalate (LEXAPRO PO), Take 10 mg by mouth daily, Disp: , Rfl:      HYDROmorphone (DILAUDID) 4 MG tablet, Take 2 tablets (8 mg) by mouth 4 times daily as needed for moderate to severe pain (every 4-6hour prn), Disp: 180 tablet, Rfl: 0     hydrOXYzine (ATARAX) 50 MG tablet, Take 1 tablet (50 mg) by mouth every 6 hours as needed for anxiety, Disp: 120 tablet, Rfl: 3     lamoTRIgine (LAMICTAL) 150 MG tablet, Take 250 mg by mouth daily , Disp: , Rfl:      morphine (MS CONTIN) 30 MG CR tablet, Take 1 tablet (30 mg) by mouth 3 times daily, Disp: 90 tablet, Rfl: 0     naloxone (NARCAN) nasal spray, Spray 1 spray (4 mg) into one nostril alternating nostrils as needed for opioid reversal every 2-3 minutes until assistance arrives, Disp: 0.2 mL, Rfl: 0     omeprazole (PRILOSEC) 40 MG DR capsule, TAKE ONE CAPSULE BY MOUTH EVERY DAY 30 TO 60 MINUTES BEFORE A MEAL, Disp: 30 capsule, Rfl: 9     ondansetron (ZOFRAN-ODT) 4 MG ODT tab, PLACE 1 OR 2 TABLETS UNDER THE TONGUE 3 TIMES A DAY AS NEEDED FOR NAUSEA, Disp: 20 tablet, Rfl: 1     promethazine (PHENERGAN) 25 MG tablet, Take 1 tablet (25 mg) by mouth every 6 hours as needed for nausea,  "Disp: 20 tablet, Rfl: 5     rOPINIRole (REQUIP) 0.25 MG tablet, Take 1-2 tablets (0.25-0.5 mg) by mouth At Bedtime, Disp: 60 tablet, Rfl: 2     tolterodine (DETROL) 1 MG tablet, Take 1 mg by mouth 2 times daily, Disp: , Rfl:      zolpidem (AMBIEN) 10 MG tablet, Take 1 tablet (10 mg) by mouth nightly as needed for sleep, Disp: 30 tablet, Rfl: 5  No current facility-administered medications for this visit.     Facility-Administered Medications Ordered in Other Visits:      neostigmine (PROSTIGMINE) injection, , Intravenous, PRN, Zappa, Anastacio V, APRN CRNA, 3 mg at 01/11/18 1504    FAMILY HISTORY:   Family History   Problem Relation Age of Onset     Diabetes Mother      C.A.D. Mother      C.A.D. Maternal Grandmother      Diabetes Maternal Grandmother      Hypertension Maternal Grandmother      Cancer Maternal Grandfather      C.A.D. Maternal Grandfather         ROS:    No fevers or chills  No weight loss  No blurry vision, double vision or change in vision  No sore throat  No lymphadenopathy  No headache, paraesthesias, or weakness in a limb  No shortness of breath or wheezing  No chest pain or pressure  No arthralgias or myalgias  No rashes or skin changes  No odynophagia or dysphagia  No BRBPR, hematochezia, melena  No dysuria, frequency or urgency  No hot/cold intolerance or polyria  No anxiety or depression  PHYSICAL EXAMINATION:  Constitutional: aaox3, cooperative, pleasant, not dyspneic/diaphoretic, no acute distress  Vitals reviewed: /77 (BP Location: Left arm, Patient Position: Chair, Cuff Size: Adult Large)   Pulse 69   Ht 1.835 m (6' 0.25\")   Wt 127.4 kg (280 lb 12.8 oz)   SpO2 97%   BMI 37.82 kg/m    Wt:   Wt Readings from Last 2 Encounters:   03/07/19 127.4 kg (280 lb 12.8 oz)   02/18/19 123.8 kg (273 lb)      Eyes: Sclera anicteric/injected  Ears/nose/mouth/throat: Normal oropharynx without ulcers or exudate, mucus membranes moist, hearing intact  Neck: supple, thyroid normal size  CV: No " edema  Respiratory: Unlabored breathing  Lymph: No submandibular, supraclavicular or inguinal lymphadenopathy  Abd: obese, Nondistended, no masses, +bs, no hepatosplenomegaly, nontender, no peritoneal signs  Skin: warm, perfused, no jaundice  Psych: Normal affect  MSK: Normal gait      PERTINENT STUDIES:   Most recent CBC:  Recent Labs   Lab Test 12/18/18  1105 10/25/18  1439   WBC 7.4 7.3   HGB 15.2 15.4   HCT 44.5 45.0    298     Most recent hepatic panel:  Recent Labs   Lab Test 02/15/19 10/25/18  1439   ALT 19 30   AST 16 22     Most recent creatinine:  Recent Labs   Lab Test 02/15/19 10/25/18  1439   CR 0.96 1.16       RADIOLOGY:      Examination:  CT CHEST/ABDOMEN/PELVIS W CONTRAST, 7/25/2014 4:55 PM      Comparison: Outside CT PE protocol, 6/13/2014     History: Esophageal mass incidentally noted on outside CT, history of  blunt chest trauma per the EMR     Technique: Volumetric helical acquisition of CT images from the  thoracic inlet to the symphysis pubis after the uncomplicated  administration of 135 mL of intravenous Isovue-370. Coronal and  sagittal images and axial MIP images were reconstructed from the  source data.     Findings:  Chest:  The heart size is within normal limits. No pericardial pleural  effusion. No mediastinal, axillary, or hilar lymphadenopathy.      The soft tissue mass in the left wall of the esophagus or along the  left side wall of the esophagus measuring 1.6 x 3.9 cm, unchanged from  previous.      The great vessels of the chest are unremarkable, no central pulmonary  emboli. The thyroid is unremarkable. There is minimal dependent  atelectasis bilaterally. Multiple soft tissue nodules bilaterally are  unchanged, for example a 4 mm right upper lobe nodule (series 4, image  68), a 3 mm right lower lobe nodule (series 4, image 77), and a 6 mm  left lower lobe nodule (series 4, image 70). No new nodules  identified. The lungs are otherwise clear.     Abdomen/pelvis:  A  well-circumscribed 1.5 x 1.6 cm fluid attenuation nodule in hepatic  segment 8 appears unchanged given the differences in technique. The  liver is otherwise unremarkable. The gallbladder, spleen, pancreas,  and adrenal glands are unremarkable. Multiple too small to character  is hypodensities in the kidneys bilaterally most consistent with  simple cysts, the kidneys are otherwise unremarkable. The stomach and  small bowel are within normal limits, no dilatation or bowel wall  thickening. There is mild sigmoid diverticulosis, no wall thickening  or pericolic stranding. The colon is otherwise unremarkable. There are  trace atherosclerotic calcific patient in the abdominal aorta and is  branches without evidence of aneurysm. There is no abdominal or pelvic  lymphadenopathy     Bones:  There is an area of sclerosis in the body of the sternum best seen on  sagittal image 66. This is associated with cortical irregularity and  periosteal reaction. No other suspicious bony lesions are identified.     IMPRESSION  Impression:   1. Unchanged 0.6 x 3.9 cm soft tissue mass in the midesophagus. The  differential includes both benign and malignant neoplasms or  duplication cyst, recommend biopsy.  2. Multiple indeterminate pulmonary nodules bilaterally, the largest  measuring 6 mm on the left. If the esophageal mass proves to be  benign, recommend followup per Fleischer Society criteria.  3. 1.6 cm simple hepatic cyst.  4. Healing sternal fracture.     I have personally reviewed the examination and initial interpretation  and I agree with the findings.     ALFONSO YEAGER MD    CT ANGIO CHEST ABDOMEN PELVIS    HISTORY: ? Aortic aneurysm enlarged lymph node 1.9 cm per CT chest done at [facility] over weekend.    COMPARISON: CT [facility] 02/15/2019.    FINDINGS: Again noted is prominence of the ascending aorta. Probable tricuspid valve. The ascending aorta measures approximately 4.2 cm on the coronal and 4.1 cm  on the sagittal projections. The diameter may be slightly accentuated by cardiac motion.    Again noted is a nonenhancing relatively bland appearing triangular-shaped soft tissue density within the region of the posterior mediastinum, centered on image 70 series 4. The MOLLY resides adjacent to the left lateral wall of the esophagus, the posterior aspect of the left mainstem bronchus, and the right ventral aspect of the descending thoracic aorta with maximum diameter approximately 3.0 x 1.9 cm.    The lung fields are clear. No evidence of a pleural effusion. Nonfocal upper abdomen.    IMPRESSION:   1.  Ascending aortic prominence with borderline aneurysm. The current study was obtained in a non-gated fashion and the diameter may be accentuated by cardiac motion by a few millimeters.  2.  Indeterminate soft tissue density in the posterior mediastinum centered just below the level of the maris but not related to the subcarinal tissues. This may represent a congenital mediastinal cyst versus lymphadenopathy. Lymphadenopathy is considered much less likely given the fact that there is no evidence of axillary hilar or mediastinal adenopathy elsewhere in the chest.  3.  There are postsurgical changes of the caudal thoracic and lumbar spine. The descending thoracic aorta, abdominal aorta exhibit normal morphology with some scattered atherosclerotic changes. The renal arteries are widely patent. Patent SAVANNAH. Normal external iliac and hypogastric arteries, and common femoral arteries.  4.  A follow-up CT chest could be obtained in 3 months for continued assessment.            ASSESSMENT/PLAN:    Oleg Ford is a 58 year old male who presents for follow up of nausea, with vomiting.  He has frequent nausea and vomiting and is noted to have a large amount of retained food on his last EGD.  He is on narcotic medications daily due to spine issues.  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  I have advised him to avoid eating late at night (which she is currently doing frequently).  He should also focus on having small, frequent meals 5-6 times daily instead of large meals.  I also advised him to limit the amount of narcotic pain medication he is needing and ideally he would be off of it though I recognize this is likely difficult given his spine issues.  I do not think that a gastric emptying scan is going to be helpful while he is on narcotics.  I would not give promotility agents at this time.  He should continue his PPI therapy.  I would like to schedule another upper endoscopy in order to evaluate the remainder of the stomach which was not visualized.  However, I have some concerns given his admission of substernal chest pain with shortness of breath with physical activity and then which resolves when he rests, raising the question that he could have angina.  I have advised him that he should speak with his primary physician and or consider a cardiology evaluation prior to scheduling his follow-up endoscopy.  He reports that he has also been advised by his primary physician to see a lung doctor due to shortness of breath.  I give him the name of Dr. Rodrigez who sees patients at [place].  His follow-up endoscopy should be scheduled with [facility] in approximately 3 months after these evaluations are completed.  I have also advised him to start MiraLAX 1 scoop daily to ensure that constipation is not contributing to his nausea and vomiting episodes.    Additionally, we note that he has been noted to have a subcarinal process seen on CT imaging.  This currently measures 3.0 cm x 1.9 cm.  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  At that point, it measured 3.9 cm x 0.6 cm.  EUS at that time was consistent with a duplication cyst.  Unless radiology feels that this lesion is changing, I do not think that another endoscopic  ultrasound is warranted at this point.  It is reassuring that it is been fairly stable over the past several years.      RTC 6 months    Thank you for this consultation.  It was a pleasure to participate in the care of this patient; please contact us with any further questions.     This note was created with voice recognition software, and while reviewed for accuracy, typos may remain.     Rosendo Guy MD  Adjunct  of Medicine  Division of Gastroenterology, Hepatology and Nutrition  [facility]  [phone]

## 2019-03-12 ENCOUNTER — TELEPHONE (OUTPATIENT)
Dept: PULMONOLOGY | Facility: CLINIC | Age: 59
End: 2019-03-12

## 2019-03-12 ENCOUNTER — OFFICE VISIT (OUTPATIENT)
Dept: FAMILY MEDICINE | Facility: CLINIC | Age: 59
End: 2019-03-12
Payer: COMMERCIAL

## 2019-03-12 VITALS
SYSTOLIC BLOOD PRESSURE: 136 MMHG | HEART RATE: 84 BPM | HEIGHT: 72 IN | BODY MASS INDEX: 37.11 KG/M2 | OXYGEN SATURATION: 99 % | DIASTOLIC BLOOD PRESSURE: 64 MMHG | WEIGHT: 274 LBS

## 2019-03-12 DIAGNOSIS — M53.3 SI (SACROILIAC) JOINT DYSFUNCTION: ICD-10-CM

## 2019-03-12 DIAGNOSIS — M51.369 DDD (DEGENERATIVE DISC DISEASE), LUMBAR: ICD-10-CM

## 2019-03-12 DIAGNOSIS — F33.2 MAJOR DEPRESSIVE DISORDER, RECURRENT, SEVERE WITHOUT PSYCHOTIC FEATURES (H): ICD-10-CM

## 2019-03-12 DIAGNOSIS — R07.89 ATYPICAL CHEST PAIN: ICD-10-CM

## 2019-03-12 DIAGNOSIS — R06.09 DOE (DYSPNEA ON EXERTION): Primary | ICD-10-CM

## 2019-03-12 DIAGNOSIS — G89.4 CHRONIC PAIN SYNDROME: ICD-10-CM

## 2019-03-12 PROCEDURE — 99214 OFFICE O/P EST MOD 30 MIN: CPT | Performed by: FAMILY MEDICINE

## 2019-03-12 RX ORDER — BUDESONIDE AND FORMOTEROL FUMARATE DIHYDRATE 160; 4.5 UG/1; UG/1
2 AEROSOL RESPIRATORY (INHALATION) 2 TIMES DAILY
COMMUNITY
End: 2019-07-12

## 2019-03-12 RX ORDER — MORPHINE SULFATE 30 MG/1
30 TABLET, FILM COATED, EXTENDED RELEASE ORAL 3 TIMES DAILY
Qty: 90 TABLET | Refills: 0 | Status: SHIPPED | OUTPATIENT
Start: 2019-03-18 | End: 2019-04-15

## 2019-03-12 RX ORDER — DIAZEPAM 5 MG
5 TABLET ORAL DAILY PRN
Qty: 30 TABLET | Refills: 0 | Status: SHIPPED | OUTPATIENT
Start: 2019-03-18 | End: 2019-04-15

## 2019-03-12 RX ORDER — TIOTROPIUM BROMIDE AND OLODATEROL 3.124; 2.736 UG/1; UG/1
SPRAY, METERED RESPIRATORY (INHALATION)
Refills: 11 | COMMUNITY
Start: 2019-03-06 | End: 2019-05-15

## 2019-03-12 RX ORDER — HYDROMORPHONE HYDROCHLORIDE 4 MG/1
8 TABLET ORAL 4 TIMES DAILY PRN
Qty: 180 TABLET | Refills: 0 | Status: SHIPPED | OUTPATIENT
Start: 2019-03-18 | End: 2019-04-15

## 2019-03-12 ASSESSMENT — MIFFLIN-ST. JEOR: SCORE: 2114.83

## 2019-03-12 NOTE — PROGRESS NOTES
"  SUBJECTIVE:   Oleg Ford is a 58 year old male who presents to clinic today for the following health issues:    Patient presents for Pulmonologist referral, patient having SOB.     SUBJECTIVE:  Here today for worsening dyspnea on exertion.  Patient well-known to me and previously had mentioned some intermittent shortness of breath but this is significantly worse without any specific event.  No recent illness symptoms.  Has been seeing another doctor closer to home and they had tried him on a variety of inhalers including albuterol, steroid inhalers, and a Spiriva equivalent.  None of these have made any difference.  Patient says even getting dressed or basic walking is making him short of breath and at times he has some broad chest pain.  No nausea or diaphoresis but sometimes he feels quite dizzy.  In looking at his vital signs today things are quite stable and that pattern has remained the same.  He saw Dr. Guy recently for some GI workup and he communicated the symptoms to me as well.  Patient does have a history of smoking with cessation somewhat recent, but no previous diagnosis of COPD.  Because of ongoing chronic back issues he understandably has become somewhat deconditioned over time, but this represents a dramatic change.  Today he is feeling dizzy and short of breath related to the walking from the car into here.  Denies any pain going on in his chest or nausea at this time.  In January he had a normal nuclear medicine stress test done through [facility]    Review of systems otherwise negative.  Past medical, family, and social history reviewed and updated in chart.    OBJECTIVE:  /64 (BP Location: Right arm, Patient Position: Chair, Cuff Size: Adult Large)   Pulse 84   Ht 1.835 m (6' 0.25\")   Wt 124.3 kg (274 lb)   SpO2 99%   BMI 36.90 kg/m    Alert and pleasant but appears somewhat uncomfortable.  Not toxic.  Not struggling to breathe and no audible wheezing  Heart regular rate and " rhythm without murmur.  No JVD or peripheral edema  Lungs are clear to auscultation bilaterally with normal breath sounds and no wheezing noted  Past labs reviewed with the patient.     ASSESSMENT / PLAN:  (R06.09) VILLALOBOS (dyspnea on exertion)  (primary encounter diagnosis)  Comment: Not sure what to make of this.  Subjectively it is significantly worse but objectively I do not have any particular findings.  We discussed the possibility of spirometry today but patient is not feeling up to it.  I would like to get him into see pulmonology and will place that referral.  I truly do not think this is a new coronary issue given the fact that he just had a normal nuclear medicine test done 2 months ago.   Plan: PULMONARY MEDICINE REFERRAL            (R07.89) Atypical chest pain  Comment: as above   Plan: artery            Follow up based upon results  SANTIAGO Shaw MD    (Chart documentation completed in part with Dragon voice-recognition software.  Even though reviewed some grammatical, spelling, and word errors may remain.)

## 2019-03-12 NOTE — TELEPHONE ENCOUNTER
Referred by Kiana Jones NP with [facility] for COPD with conflicting CT results for sucarinal cyst vs swollen lymph nodes; increased SOB despite multiple different treatments. Patient is new to Pulmonology -  [facility]

## 2019-03-13 ENCOUNTER — TELEPHONE (OUTPATIENT)
Dept: FAMILY MEDICINE | Facility: CLINIC | Age: 59
End: 2019-03-13

## 2019-03-13 DIAGNOSIS — R06.09 DOE (DYSPNEA ON EXERTION): Primary | ICD-10-CM

## 2019-03-13 NOTE — TELEPHONE ENCOUNTER
My first choice was to use the pulmonology team at [place].  But if we cannot get him in in a timely manner my plan was to send him to  [place] -so let me know if it is an issue of us calling to get him scheduled quicker or a new location

## 2019-03-13 NOTE — TELEPHONE ENCOUNTER
Pt was not able to get scheduled  With pulmonology.  States he was advised to tell provider if he couldn't get in for other options.  Please call pt to advise.       Thank you,   Jon LEROY   Central Scheduling  [phone]

## 2019-03-13 NOTE — TELEPHONE ENCOUNTER
Patient said  Pulmonlogy does not have any openings until May and [facility] could not get him in either. He would like to go anywhere that he can get in with soon. Provider, new location please.     [id number], MEDICAL ASSISTANT

## 2019-04-13 ENCOUNTER — TRANSFERRED RECORDS (OUTPATIENT)
Dept: HEALTH INFORMATION MANAGEMENT | Facility: CLINIC | Age: 59
End: 2019-04-13

## 2019-04-15 ENCOUNTER — OFFICE VISIT (OUTPATIENT)
Dept: FAMILY MEDICINE | Facility: CLINIC | Age: 59
End: 2019-04-15
Payer: COMMERCIAL

## 2019-04-15 VITALS
WEIGHT: 265 LBS | OXYGEN SATURATION: 98 % | DIASTOLIC BLOOD PRESSURE: 80 MMHG | HEART RATE: 75 BPM | HEIGHT: 72 IN | TEMPERATURE: 97.8 F | SYSTOLIC BLOOD PRESSURE: 124 MMHG | BODY MASS INDEX: 35.89 KG/M2

## 2019-04-15 DIAGNOSIS — F33.2 MAJOR DEPRESSIVE DISORDER, RECURRENT, SEVERE WITHOUT PSYCHOTIC FEATURES (H): ICD-10-CM

## 2019-04-15 DIAGNOSIS — M51.369 DDD (DEGENERATIVE DISC DISEASE), LUMBAR: ICD-10-CM

## 2019-04-15 DIAGNOSIS — M53.3 SI (SACROILIAC) JOINT DYSFUNCTION: ICD-10-CM

## 2019-04-15 DIAGNOSIS — G89.4 CHRONIC PAIN SYNDROME: ICD-10-CM

## 2019-04-15 PROCEDURE — 99214 OFFICE O/P EST MOD 30 MIN: CPT | Performed by: FAMILY MEDICINE

## 2019-04-15 RX ORDER — MORPHINE SULFATE 30 MG/1
30 TABLET, FILM COATED, EXTENDED RELEASE ORAL 3 TIMES DAILY
Qty: 90 TABLET | Refills: 0 | Status: SHIPPED | OUTPATIENT
Start: 2019-04-15 | End: 2019-05-15

## 2019-04-15 RX ORDER — OXYCODONE HYDROCHLORIDE 15 MG/1
15 TABLET ORAL 3 TIMES DAILY PRN
Qty: 15 TABLET | Refills: 0 | Status: SHIPPED | OUTPATIENT
Start: 2019-04-15 | End: 2019-04-24

## 2019-04-15 RX ORDER — DIAZEPAM 5 MG
5 TABLET ORAL DAILY PRN
Qty: 30 TABLET | Refills: 0 | Status: SHIPPED | OUTPATIENT
Start: 2019-04-15 | End: 2019-05-15

## 2019-04-15 RX ORDER — HYDROMORPHONE HYDROCHLORIDE 4 MG/1
8 TABLET ORAL 4 TIMES DAILY PRN
Qty: 180 TABLET | Refills: 0 | Status: SHIPPED | OUTPATIENT
Start: 2019-04-15 | End: 2019-05-15

## 2019-04-15 ASSESSMENT — MIFFLIN-ST. JEOR: SCORE: 2069

## 2019-04-15 ASSESSMENT — PATIENT HEALTH QUESTIONNAIRE - PHQ9
SUM OF ALL RESPONSES TO PHQ QUESTIONS 1-9: 26
5. POOR APPETITE OR OVEREATING: NEARLY EVERY DAY

## 2019-04-15 ASSESSMENT — ANXIETY QUESTIONNAIRES
2. NOT BEING ABLE TO STOP OR CONTROL WORRYING: NEARLY EVERY DAY
6. BECOMING EASILY ANNOYED OR IRRITABLE: NEARLY EVERY DAY
1. FEELING NERVOUS, ANXIOUS, OR ON EDGE: NEARLY EVERY DAY
IF YOU CHECKED OFF ANY PROBLEMS ON THIS QUESTIONNAIRE, HOW DIFFICULT HAVE THESE PROBLEMS MADE IT FOR YOU TO DO YOUR WORK, TAKE CARE OF THINGS AT HOME, OR GET ALONG WITH OTHER PEOPLE: EXTREMELY DIFFICULT
5. BEING SO RESTLESS THAT IT IS HARD TO SIT STILL: MORE THAN HALF THE DAYS
GAD7 TOTAL SCORE: 20
7. FEELING AFRAID AS IF SOMETHING AWFUL MIGHT HAPPEN: NEARLY EVERY DAY
3. WORRYING TOO MUCH ABOUT DIFFERENT THINGS: NEARLY EVERY DAY

## 2019-04-15 ASSESSMENT — PAIN SCALES - GENERAL: PAINLEVEL: SEVERE PAIN (7)

## 2019-04-15 NOTE — PROGRESS NOTES
SUBJECTIVE:   Oleg Ford is a 59 year old male who presents to clinic today for the following   health issues:      Chronic Pain Follow-Up     Patient would like to discuss Pain Management.  Type / Location of Pain: Both legs both upper and lower  Analgesia/pain control:       Recent changes:  worse      Overall control: Inadequate pain control  Activity level/function:      Daily activities:  Unable to perform most daily activities - chores, hobbies, social activities, driving    Work:  Unable to work  Adverse effects:  No  Adherance    Taking medication as directed?  Yes    Participating in other treatments: no - not currently   Risk Factors:    Sleep:  Fair    Mood/anxiety:  worsened    Recent family or social stressors:  none noted    Other aggravating factors: prolonged sitting and prolonged standing  PHQ-9 SCORE 2/28/2018 5/2/2018 10/5/2018   PHQ-9 Total Score - - -   PHQ-9 Total Score MyChart - - -   PHQ-9 Total Score 23 18 26     GINI-7 SCORE 2/28/2018 5/2/2018 10/5/2018   Total Score - - -   Total Score 18 16 20     Encounter-Level CSA - 12/01/2015:    Controlled Substance Agreement - Scan on 12/14/2015 11:49 AM: CONTROLLED SUBSTANCE AGREEMENT (below)       Patient-Level CSA:    There are no patient-level csa.         Amount of exercise or physical activity: None    Problems taking medications regularly: No    Medication side effects: none    Diet: regular (no restrictions)    SUBJECTIVE:  Here today in follow-up of depression and chronic back pain.  Well-known to me and things have generally been stable, but had a fall a couple of weeks ago while out in [place] and his back is been severely painful since.  Reviewed recent ER visit and imaging has an appointment with his back surgeon tomorrow.  Primarily is localized to the low back with some down the leg but he does not feel any change in neurologic function.  But very painful.    Review of systems otherwise negative.  Past medical, family, and  "social history reviewed and updated in chart.    OBJECTIVE:  /80 (BP Location: Right arm, Patient Position: Chair, Cuff Size: Adult Large)   Pulse 75   Temp 97.8  F (36.6  C) (Oral)   Ht 1.835 m (6' 0.25\")   Wt 120.2 kg (265 lb)   SpO2 98%   BMI 35.69 kg/m    Alert, pleasant, upbeat, and in no apparent discomfort.  S1 and S2 normal, no murmurs, clicks, gallops or rubs. Regular rate and rhythm. Chest is clear; no wheezes or rales. No edema or JVD.  Back - ++ tenderness to palpation posterior superior iliac crest region on the right  Past labs reviewed with the patient.     ASSESSMENT / PLAN:  (F33.2) Major depressive disorder, recurrent, severe without psychotic features (H)  Comment: Stable on current therapy.  Continue same  Plan: diazepam (VALIUM) 5 MG tablet            (G89.4) Chronic pain syndrome  Comment: Continue same baseline therapy and will be following up with his surgeon.  Plan: HYDROmorphone (DILAUDID) 4 MG tablet, morphine         (MS CONTIN) 30 MG CR tablet            (M51.36) DDD (degenerative disc disease), lumbar  Comment: For acute flareup of pain can use a small amount of Motrin.  We may want to consider a cortisone injection into the trigger point on his back if needed.  This will be after he sees his surgeon  Plan: HYDROmorphone (DILAUDID) 4 MG tablet, morphine         (MS CONTIN) 30 MG CR tablet, oxyCODONE IR         (ROXICODONE) 15 MG tablet            (M53.3) SI (sacroiliac) joint dysfunction  Comment:   Plan: HYDROmorphone (DILAUDID) 4 MG tablet, morphine         (MS CONTIN) 30 MG CR tablet            Follow up 1 month or sooner if needed  SANTIAGO Shaw MD    (Chart documentation completed in part with Dragon voice-recognition software.  Even though reviewed some grammatical, spelling, and word errors may remain.)     "

## 2019-04-16 ASSESSMENT — ANXIETY QUESTIONNAIRES: GAD7 TOTAL SCORE: 20

## 2019-04-24 ENCOUNTER — OFFICE VISIT (OUTPATIENT)
Dept: FAMILY MEDICINE | Facility: CLINIC | Age: 59
End: 2019-04-24
Payer: COMMERCIAL

## 2019-04-24 VITALS
HEART RATE: 62 BPM | TEMPERATURE: 97.4 F | BODY MASS INDEX: 36.57 KG/M2 | HEIGHT: 72 IN | OXYGEN SATURATION: 98 % | SYSTOLIC BLOOD PRESSURE: 128 MMHG | DIASTOLIC BLOOD PRESSURE: 84 MMHG | WEIGHT: 270 LBS

## 2019-04-24 DIAGNOSIS — M51.369 DDD (DEGENERATIVE DISC DISEASE), LUMBAR: ICD-10-CM

## 2019-04-24 DIAGNOSIS — M54.50 ACUTE BILATERAL LOW BACK PAIN WITHOUT SCIATICA: Primary | ICD-10-CM

## 2019-04-24 DIAGNOSIS — G89.4 CHRONIC PAIN SYNDROME: ICD-10-CM

## 2019-04-24 PROCEDURE — 20552 NJX 1/MLT TRIGGER POINT 1/2: CPT | Performed by: FAMILY MEDICINE

## 2019-04-24 PROCEDURE — 99213 OFFICE O/P EST LOW 20 MIN: CPT | Mod: 25 | Performed by: FAMILY MEDICINE

## 2019-04-24 RX ORDER — METHYLPREDNISOLONE 4 MG
TABLET, DOSE PACK ORAL
Qty: 21 TABLET | Refills: 0 | Status: SHIPPED | OUTPATIENT
Start: 2019-04-24 | End: 2019-05-15

## 2019-04-24 RX ORDER — OXYCODONE HYDROCHLORIDE 15 MG/1
15 TABLET ORAL 3 TIMES DAILY PRN
Qty: 15 TABLET | Refills: 0 | Status: SHIPPED | OUTPATIENT
Start: 2019-04-24 | End: 2019-05-17

## 2019-04-24 RX ORDER — TRIAMCINOLONE ACETONIDE 40 MG/ML
40 INJECTION, SUSPENSION INTRA-ARTICULAR; INTRAMUSCULAR ONCE
Status: DISCONTINUED | OUTPATIENT
Start: 2019-04-24 | End: 2021-08-06

## 2019-04-24 ASSESSMENT — MIFFLIN-ST. JEOR: SCORE: 2091.68

## 2019-04-24 NOTE — LETTER
[facility]  04/24/19    Patient: Oleg Ford  YOB: 1960  Medical Record Number: [id number]                                                                  Opioid / Opioid Plus Controlled Substance Agreement    I understand that my care provider has prescribed an opioid (narcotic) controlled substance to help manage my condition(s). I am taking this medicine to help me function or work. I know this is strong medicine, and that it can cause serious side effects. Opioid medicine can be sedating, addicting and may cause a dependency on the drug. They can affect my ability to drive or think, and cause depression. They need to be taken exactly as prescribed. Combining opioids with certain medicines or chemicals (such as cocaine, sedatives and tranquilizers, sleeping pills, meth) can be dangerous or even fatal. Also, if I stop opioids suddenly, I may have severe withdrawal symptoms. Last, I understand that opioids do not work for all types of pain nor for all patients. If not helpful, I may be asked to stop them.        The risks, benefits, and side effects of these medicine(s) were explained to me. I agree that:    1. I will take part in other treatments as advised by my care team. This may be psychiatry or counseling, physical therapy, behavioral therapy, group treatment or a referral to a pain clinic. I will reduce or stop my medicine when my care team tells me to do so.  2. I will take my medicines as prescribed. I will not change the dose or schedule unless my care team tells me to. There will be no refills if I  run out early.   I may be contactedwithout warning and asked to complete a urine drug test or pill count at any time.   3. I will keep all my appointments, and understand this is part of the monitoring of opioids. My care team may require an office visit for EVERY opioid/controlled substance refill. If I miss appointments or don t follow instructions, my care team may stop  my medicine.  4. I will not ask other providers to prescribe controlled substances, and I will not accept controlled substances from other people. If I need another prescribed controlled substance for a new reason, I will tell my care team within 1 business day.  5. I will use one pharmacy to fill all of my controlled substance prescriptions, and it is up to me to make sure that I do not run out of my medicines on weekends or holidays. If my care team is willing to refill my opioid prescription without a visit, I must request refills only during office hours, refills may take up to 3 days to process, and it may take up to 5 to 7 days for my medicine to be mailed and ready at my pharmacy. Prescriptions will not be mailed anywhere except my pharmacy.        [id number]  Rev 12/18         Registration to scan to EHR                             Page 1 of 2               Controlled Substance Agreement Opioid        [facility]  04/24/19  Patient: Oleg Ford  YOB: 1960  Medical Record Number: [id number]                                                                  6. I am responsible for my prescriptions. If the medicine/prescription is lost or stolen, it will not be replaced. I also agree not to share controlled substance medicines with anyone.  7. I agree to not use ANY illegal or recreational drugs. This includes marijuana, cocaine, bath salts or other drugs. I agree not to use alcohol unless my care team says I may.          I agree to give urine samples whenever asked. If I don t give a urine sample, the care team may stop my medicine.    8. If I enroll in the [place] Medical Marijuana program, I will tell my care team. I will also sign an agreement to share my medical records with my care team.   9. I will bring in my list of medicines (or my medicine bottles) each time I come to the clinic.   10. I will tell my care team right away if I become pregnant or have a new medical problem  treated outside of my regular clinic.  11. I understand that this medicine can affect my thinking and judgment. It may be unsafe for me to drive, use machinery and do dangerous tasks. I will not do any of these things until I know how the medicine affects me. If my dose changes, I will wait to see how it affects me. I will contact my care team if I have concerns about medicine side effects.    I understand that if I do not follow any of the conditions above, my prescriptions or treatment may be stopped.      I agree that my provider, clinic care team, and pharmacy may work with any city, state or federal law enforcement agency that investigates the misuse, sale, or other diversion of my controlled medicine. I will allow my provider to discuss my care with or share a copy of this agreement with any other treating provider, pharmacy or emergency room where I receive care. I agree to give up (waive) any right of privacy or confidentiality with respect to these consents.     I have read this agreement and have asked questions about anything I did not understand.      ________________________________________________________________________  Patient signature - Date/Time -  Oleg Ford                                      ________________________________________________________________________  Witness signature                                                            ________________________________________________________________________  Provider signature - Cat Shaw MD      [id number]  Rev 12/18         Registration to scan to EHR                         Page 2 of 2                   Controlled Substance Agreement Opioid           Page 1 of 2  Opioid Pain Medicines (also known as Narcotics)  What You Need to Know    What are opioids?   Opioids are pain medicines that must be prescribed by a doctor.  They are also known as narcotics.    Examples are:     morphine (MS Contin, Irina)    oxycodone  (Oxycontin)    oxycodone and acetaminophen (Percocet)    hydrocodone and acetaminophen (Vicodin, Norco)     fentanyl patch (Duragesic)     hydromorphone (Dilaudid)     methadone     What do opioids do well?   Opioids are best for short-term pain after a surgery or injury. They also work well for cancer pain. Unlike other pain medicines, they do not cause liver or kidney failure or ulcers. They may help some people with long-lasting (chronic) pain.     What do opioids NOT do well?   Opioids never get rid of pain entirely, and they do not work well for most patients with chronic pain. Opioids do not reduce swelling, one of the causes of pain. They also don t work well for nerve pain.                           For informational purposes only.  Not to replace the advice of your care provider.  Copyright [facility]. All right reserved. [employer] 691363-[id number] 02/18.      Page 2 of 2    Risks and side effects   Talk to your doctor before you start or decide to keep taking one of these medicines. Side effects include:    Lowering your breathing rate enough to cause death    Overdose, including death, especially if taking higher than prescribed doses    Long-term opioid use    Worse depression symptoms; less pleasure in things you usually enjoy    Feeling tired or sluggish    Slower thoughts or cloudy thinking    Being more sensitive to pain over time; pain is harder to control    Trouble sleeping or restless sleep    Changes in hormone levels (for example, less testosterone)    Changes in sex drive or ability to have sex    Constipation    Unsafe driving    Itching and sweating    Feeling dizzy    Nausea, vomiting and dry mouth    What else should I know about opioids?  When someone takes opioids for too long or too often, they become dependent. This means that if you stop or reduce the medicine too quickly, you will have withdrawal symptoms.    Dependence is not the same as addiction. Addiction is when  people keep using a substance that harms their body, their mind or their relations with others. If you have a history of drug or alcohol abuse, taking opioids can cause a relapse.    Over time, opioids don t work as well. Most people will need higher and higher doses. The higher the dose, the more serious the side effects. We don t know the long-term effects of opioids.      Prescribed opioids aren't the best way to manage chronic pain    Other ways to manage pain include:      Ibuprofen or acetaminophen.  You should always try this first.      Treat health problems that may be causing pain.      acupuncture or massage, deep breathing, meditation, visual imagery, aromatherapy.      Use heat or ice at the pain site      Physical therapy and exercise      Stop smoking      See a counselor or therapist                                                  People who have used opioids for a long time may have a lower quality of life, worse depression, higher levels of pain and more visits to doctors.    Never share your opioids with others. Be sure to store opioids in a secure place, locked if possible.Young children can easily swallow them and overdose.     You can overdose on opioids.  Signs of overdose include decrease or loss of consciousness, slowed breathing, trouble waking and blue lips.  If someone is worried about overdose, they should call 911.    If you are at risk for overdose, you may get naloxone (Narcan, a medicine that reverses the effects of opioids.  If you overdose, a friend or family member can give you Narcan while waiting for the ambulance.  They need to know the signs of overdose and how to give Narcan.    While you're taking opioids:    Don't use alcohol or street drugs. Taking them together can cause death.    Don't take any of these medicines unless your doctor says its okay.  Taking these with opioids can cause death.    Benzodiazepines (such as lorazepam         or diazepam)    Muscle relaxers  (such as cyclobenzaprine)    sleeping pills    other opioids    Safe disposal of opioids  Find your area drug take-back program, your pharmacy mail-back program, buy a special disposal bag (such as Deterra) from your pharmacy or flush them down the toilet.  Use the guidelines at:  www.fda.gov/drugs/resourcesforyou

## 2019-04-24 NOTE — PROGRESS NOTES
"  SUBJECTIVE:   Oleg Ford is a 59 year old male who presents to clinic today for the following   health issues:      Back Pain Follow Up      Description:   Location of pain:  bilateral  Character of pain: stabbing  Pain radiation: Does not radiate and radiates into abdomin   Since last visit, pain is:  Worsened due to flare up x 1 week   New numbness or weakness in legs, not attributed to pain:  no     Intensity: Currently 7/10    History:   Pain interferes with job: YES  Therapies tried without relief: None   Therapies tried with relief: opioids     SUBJECTIVE:  Here today in follow-up of acute flareup of low back pain.  I saw him last week and he has been suffering since a fall recently.  Had an MRI done with his surgeon does not feel there is anything new or surgical.  Things seemed in place and previous fusions stable.  Patient still having significant pain in the right and left in his low back with some radiation to the right groin.  No bowel or bladder symptoms per se, just pain.    Review of systems otherwise negative.  Past medical, family, and social history reviewed and updated in chart.    OBJECTIVE:  /84 (BP Location: Right arm, Patient Position: Chair, Cuff Size: Adult Large)   Pulse 62   Temp 97.4  F (36.3  C) (Oral)   Ht 1.835 m (6' 0.25\")   Wt 122.5 kg (270 lb)   SpO2 98%   BMI 36.37 kg/m    Alert and pleasant but clearly uncomfortable  S1 and S2 normal, no murmurs, clicks, gallops or rubs. Regular rate and rhythm. Chest is clear; no wheezes or rales. No edema or JVD.  Has poor motion through the low back as he has an numerous back scars  3 separate very tender trigger points identified, 2 on the right and one on the left  Past labs reviewed with the patient.     x3 --- The risks and benefits, as well as expected effect, of trigger point injection was discussed with patient and he agrees to proceed.  After identifying the area of maximal point tenderness the area was prepped clean " with alcohol.  The trigger point was injected with 3 cc of a 9:1 mixture of lidocaine 1% and kenalog 40.  Pain experienced initially, followed by pain relief.  Bandaid.  No complications.    ASSESSMENT / PLAN:  (M54.5) Acute bilateral low back pain without sciatica  (primary encounter diagnosis)  Comment: Status post trigger point injection.  Might need to try a course of oral steroids if not settling down the next few days  Plan: methylPREDNISolone (MEDROL DOSEPAK) 4 MG tablet        therapy pack, INJECTION SNGL/MULT TRIGGER         POINT, >3 MUSCLES            (M51.36) DDD (degenerative disc disease), lumbar  Comment: Short-term addition to his baseline  Plan: oxyCODONE IR (ROXICODONE) 15 MG tablet            (G89.4) Chronic pain syndrome  Comment: Stable and monitored.  New controlled substance agreement signed today.  Will be due for annual urine drug screening by the summer  Plan:     Follow up 1 week if not improving  SANTIAGO Shaw MD    (Chart documentation completed in part with Dragon voice-recognition software.  Even though reviewed some grammatical, spelling, and word errors may remain.)

## 2019-04-29 ENCOUNTER — OFFICE VISIT (OUTPATIENT)
Dept: UROLOGY | Facility: CLINIC | Age: 59
End: 2019-04-29
Payer: COMMERCIAL

## 2019-04-29 ENCOUNTER — TELEPHONE (OUTPATIENT)
Dept: UROLOGY | Facility: CLINIC | Age: 59
End: 2019-04-29

## 2019-04-29 VITALS
OXYGEN SATURATION: 94 % | DIASTOLIC BLOOD PRESSURE: 88 MMHG | RESPIRATION RATE: 18 BRPM | HEART RATE: 68 BPM | SYSTOLIC BLOOD PRESSURE: 124 MMHG

## 2019-04-29 DIAGNOSIS — N45.1 EPIDIDYMITIS: Primary | ICD-10-CM

## 2019-04-29 DIAGNOSIS — R39.15 URINARY URGENCY: ICD-10-CM

## 2019-04-29 DIAGNOSIS — N50.819 TESTIS PAIN: ICD-10-CM

## 2019-04-29 PROCEDURE — 99214 OFFICE O/P EST MOD 30 MIN: CPT | Performed by: UROLOGY

## 2019-04-29 RX ORDER — OXYBUTYNIN CHLORIDE 5 MG/1
5-10 TABLET ORAL 2 TIMES DAILY
Qty: 120 TABLET | Refills: 11 | Status: SHIPPED | OUTPATIENT
Start: 2019-04-29 | End: 2019-04-30

## 2019-04-29 ASSESSMENT — PAIN SCALES - GENERAL: PAINLEVEL: SEVERE PAIN (7)

## 2019-04-29 NOTE — TELEPHONE ENCOUNTER
[facility] Call Center    Phone Message    May a detailed message be left on voicemail: yes    Reason for Call: Medication Question or concern regarding medication. Patient would like both his medications from his appointment today sent to [facility] in [place].   Prescription Clarification  Name of Medication:erythromycin (PCE) 500 MG EC tablet   Prescribing Provider: Dr. Sequeira   Pharmacy: [facility] in [place]   What on the order needs clarification? Medication is not covered by insurance. Patient states it is to expensive and would like to know if there is a different medication he can take instead. Please advise.          Action Taken: Message routed to:  Adult Clinics: Urology p [id number]

## 2019-04-29 NOTE — PROGRESS NOTES
I am seeing Oleg Ford in consultation in ER follow-up for testis pain.    HPI:  Oleg Ford is a 59 year old male noting 2 weeks of bilateral scrotal pain.  This feels like pulling or tugging.  Started spontaneously.  No trauma.  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  This is associated with some sciatica/ radiculopathy pain in the left groin, left buttock region.  He states he saw his spine doctor and no pinched nerve was found on MRI imaging.  Pain has history of Peyronie's disease and prostatitis also.  Perhaps some symptoms of referred discomfort into the penis also.    In [place] ER, CT and scrotal ultrasound were not remarkable, other than left varicocele seen.    Scrotal ultrasound 4/24/19  IMPRESSION:  1.  Normal appearance of the left testicle.  2.  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  3.  Large left-sided varicocele.     The varicocele would be suspected to be present for decades, not an acute finding.    REVIEW OF SYSTEMS:  General: negative  Skin: negative  Eyes: negative  Ears/Nose/Throat: negative  Respiratory: negative  Cardiovascular: negative  Gastrointestinal: negative  Genitourinary: see HPI  Musculoskeletal: see HPI  Neurologic: negative  Psychiatric: negative  Hematologic/Lymphatic/Immunologic: negative  Endocrine: negative    PAST MEDICAL HX:  Past Medical History:   Diagnosis Date     Depression, major      Esophageal mass      History of orchiectomy 1982     History of vasectomy 1991     Low back pain      Other chronic pain     Chronic low back pain.     Restless leg      Sleep apnea     had surgery done to resolve sleep apnea       PAST SURG HX:  Past Surgical History:   Procedure Laterality Date     BACK SURGERY  2/6/2013    lumbar surgery     BACK SURGERY      spinal stimulator     COLONOSCOPY       COMBINED ESOPHAGOSCOPY, GASTROSCOPY, DUODENOSCOPY (EGD) WITH CO2 INSUFFLATION N/A 11/14/2018    Procedure: COMBINED  ESOPHAGOSCOPY, GASTROSCOPY, DUODENOSCOPY (EGD) WITH CO2 INSUFFLATION;  Surgeon: Rosendo Guy MD;  Location: [facility]     ESOPHAGOSCOPY, GASTROSCOPY, DUODENOSCOPY (EGD), COMBINED N/A 11/14/2018    Procedure: COMBINED ESOPHAGOSCOPY, GASTROSCOPY, DUODENOSCOPY (EGD), BIOPSY SINGLE OR MULTIPLE;  Surgeon: Rosendo Guy MD;  Location: [facility] OR     FUSION SACRAL ILIAC Right 10/19/2017    Procedure: FUSION SACRAL ILIAC;  Right  side sacroiliac joint fusion with 2 implants ;  Surgeon: Sebastián Szymanski MD;  Location: [facility] OR     FUSION SACRAL ILIAC Left 1/11/2018    Procedure: FUSION SACRAL ILIAC;   left  side sacroiliac joint fusion with 2 implants.(Zyga)   extraction and use of illac bone graft ;  Surgeon: Sebastián Szymanski MD;  Location: [facility] OR     [facility]GI ENDOSCOPY W EUS N/A 7/31/2014    Procedure: COMBINED ENDOSCOPIC ULTRASOUND, ESOPHAGOSCOPY, GASTROSCOPY, DUODENOSCOPY (EGD);  Surgeon: Shorty Stoll MD;  [facility]     ORCHIECTOMY INGUINAL      right radical orchiectomy     REMOVE STIMULATOR VAGUS NERVE  12/23/2011    Procedure:REMOVE STIMULATOR VAGUS NERVE; Vagus Nerve Stimulator Removal; Surgeon:MARTÍN BECERRA; Location:[facility]     REMOVE STIMULATOR VAGUS NERVE  11/8/2013    Procedure: REMOVE STIMULATOR VAGUS NERVE;  Removal Of Vagus Nerve Stimulator Lead In Left Neck ;  Surgeon: Martín Becerra MD;  Location: [facility]     SURGICAL HISTORY OF -       vagal nerve implant; removed     SURGICAL HISTORY OF -       UPPP     VASECTOMY          FAMILY HX:  Family History   Problem Relation Age of Onset     Diabetes Mother      C.A.D. Mother      C.A.D. Maternal Grandmother      Diabetes Maternal Grandmother      Hypertension Maternal Grandmother      Cancer Maternal Grandfather      C.A.D. Maternal Grandfather        SOCIAL HX:  Social History     Tobacco Use     Smoking status: Former Smoker     Packs/day: 0.00     Years: 40.00     Pack years: 0.00     Types: Cigarettes     Smokeless tobacco: Never Used      Tobacco comment: Last cigarette was October 17, 2018   Substance Use Topics     Alcohol use: Yes     Comment: 4 drinks a year     Drug use: No     Types: Marijuana     Comment: morphine, dilaudid       MEDICATIONS:  Current Outpatient Medications   Medication Sig     albuterol (PROAIR HFA/PROVENTIL HFA/VENTOLIN HFA) 108 (90 Base) MCG/ACT inhaler Inhale 2 puffs into the lungs every 4 hours as needed for shortness of breath / dyspnea     budesonide-formoterol (SYMBICORT) 160-4.5 MCG/ACT Inhaler Inhale 2 puffs into the lungs 2 times daily     buPROPion (WELLBUTRIN SR) 150 MG 12 hr tablet Take 150 mg by mouth     cyclobenzaprine (FLEXERIL) 10 MG tablet TAKE 1 TABLET BY MOUTH THREE TIMES DAILY AS NEEDED FOR MUSCLE SPASMS     diazepam (VALIUM) 5 MG tablet Take 1 tablet (5 mg) by mouth daily as needed for anxiety or sleep     EPINEPHrine (EPIPEN) 0.3 MG/0.3ML injection Inject 0.3 mLs (0.3 mg) into the muscle once as needed for anaphylaxis     erythromycin (PCE) 500 MG EC tablet Take 1 tablet by mouth every 8 hours for 10 days     Escitalopram Oxalate (LEXAPRO PO) Take 10 mg by mouth daily     HYDROmorphone (DILAUDID) 4 MG tablet Take 2 tablets (8 mg) by mouth 4 times daily as needed for moderate to severe pain (every 4-6hour prn)     hydrOXYzine (ATARAX) 50 MG tablet Take 1 tablet (50 mg) by mouth every 6 hours as needed for anxiety     lamoTRIgine (LAMICTAL) 150 MG tablet Take 250 mg by mouth daily      methylPREDNISolone (MEDROL DOSEPAK) 4 MG tablet therapy pack Follow Package Directions     morphine (MS CONTIN) 30 MG CR tablet Take 1 tablet (30 mg) by mouth 3 times daily     naloxone (NARCAN) nasal spray Spray 1 spray (4 mg) into one nostril alternating nostrils as needed for opioid reversal every 2-3 minutes until assistance arrives     omeprazole (PRILOSEC) 40 MG DR capsule TAKE ONE CAPSULE BY MOUTH EVERY DAY 30 TO 60 MINUTES BEFORE A MEAL     ondansetron (ZOFRAN-ODT) 4 MG ODT tab PLACE 1 OR 2 TABLETS UNDER THE  TONGUE 3 TIMES A DAY AS NEEDED FOR NAUSEA     oxybutynin (DITROPAN) 5 MG tablet Take 1-2 tablets (5-10 mg) by mouth 2 times daily     oxyCODONE IR (ROXICODONE) 15 MG tablet Take 1 tablet (15 mg) by mouth 3 times daily as needed for breakthrough pain     promethazine (PHENERGAN) 25 MG tablet Take 1 tablet (25 mg) by mouth every 6 hours as needed for nausea     rOPINIRole (REQUIP) 0.25 MG tablet Take 1-2 tablets (0.25-0.5 mg) by mouth At Bedtime     STIOLTO RESPIMAT 2.5-2.5 MCG/ACT AERS INHALE 2 PUFFS BY MOUTH ONE TIME DAILY     tolterodine (DETROL) 1 MG tablet Take 1 mg by mouth 2 times daily     zolpidem (AMBIEN) 10 MG tablet Take 1 tablet (10 mg) by mouth nightly as needed for sleep     Current Facility-Administered Medications   Medication     triamcinolone (KENALOG-40) injection 40 mg       ALLERGIES:  Ciprofloxacin; Doxycycline; Septra [bactrim]; Amoxicillin; and Nuts      GENERAL PHYSICAL EXAM:     /88 (BP Location: Left arm, Patient Position: Sitting, Cuff Size: Adult Regular)   Pulse 68   Resp 18   SpO2 94%    Constitutional: No acute distress. Well nourished.   PSYCH: normal mood and affect.  NEURO: normal gait, no focal deficits.   EYES: anicteric, EOMI, PERR.  CARDIOPULMONARY: breathing non-labored, pulse regular rate/rhythm, no peripheral edema.  GI: Abdomen soft, overweight   MUSCULOSKELETAL: normal limb proportions, no muscle wasting, no contractures.  SKIN: Normal virilized hair distribution, no lesions, warts or rashes over genitalia, abdomen extremities or face.  HEME/LYMPH: no ecchymosis, no lymphedema.     EXAM:  Phallus circumcised.  Left testis descended, not tender to palpation.  Anodular, nontender.   Right testis feels absent.  Cord structures a little tender to palpation on the left.  Pain localizes to the left epididymis on exam.    Prostate exam: deferred     Imaging/labs:  Lab Results   Component Value Date    CR 1.16 10/25/2018    CR 1.08 10/20/2017    CR 1.24 10/06/2017      Lab Results   Component Value Date    PSA 1.50 07/08/2015       ASSESSMENT:     Peyronie's disease history    Left epididymitis    Left varicocele, likely incidental finding.    PLAN:    Advised conservative management: rest, NSAIDS ( when done with spine surgery tomorrow).  - antibiotic course  - warm soaks    - scrotal support     PRN follow-up if symptoms not improving.    Copied cc to Consulting provider Colin         Thank-you for the kind consultation.  Fran Sequeira MD     Urological Surgeon

## 2019-04-29 NOTE — NURSING NOTE
Oleg Ford's goals for this visit include:   Chief Complaint   Patient presents with     Testicular/scrotal Pain     Pt states for about 1-2 weeks has been having testicular/scrotal/groin pain. Currently 7/10.     He requests these members of his care team be copied on today's visit information:     PCP: Cat Shaw    Referring Provider:  No referring provider defined for this encounter.    /88 (BP Location: Left arm, Patient Position: Sitting, Cuff Size: Adult Regular)   Pulse 68   Resp 18   SpO2 94%     Do you need any medication refills at today's visit? No    Keyla Saleh LPN

## 2019-04-30 ENCOUNTER — TRANSFERRED RECORDS (OUTPATIENT)
Dept: HEALTH INFORMATION MANAGEMENT | Facility: CLINIC | Age: 59
End: 2019-04-30

## 2019-04-30 RX ORDER — OXYBUTYNIN CHLORIDE 5 MG/1
5-10 TABLET ORAL 2 TIMES DAILY
Qty: 120 TABLET | Refills: 11 | Status: SHIPPED | OUTPATIENT
Start: 2019-04-30 | End: 2019-08-30

## 2019-04-30 NOTE — TELEPHONE ENCOUNTER
Called and spoke to patient for clarification regarding note below. Patient reports that the erythromycin prescription would cost him over $400 and he is requesting an alternative. Informed patient that a message will be sent to Dr. Sequeira with request to review and give recommendations. Patient aware that he will be contacted with recommendations. Per patient, he would like the alternative prescription and the oxybutynin prescription sent to [facility] in [place]. Oxybutynin reordered and sent to [facility] per refill protocol.    Heidy Rider RN, BSN

## 2019-05-01 ENCOUNTER — TELEPHONE (OUTPATIENT)
Dept: UROLOGY | Facility: CLINIC | Age: 59
End: 2019-05-01

## 2019-05-01 ENCOUNTER — TRANSFERRED RECORDS (OUTPATIENT)
Dept: HEALTH INFORMATION MANAGEMENT | Facility: CLINIC | Age: 59
End: 2019-05-01

## 2019-05-01 NOTE — TELEPHONE ENCOUNTER
[facility] Call Center    Phone Message    May a detailed message be left on voicemail: yes    Reason for Call: Other: Patient would like a call back. Please advise.     Action Taken: Message routed to:  Adult Clinics: Urology p [id number]

## 2019-05-01 NOTE — TELEPHONE ENCOUNTER
Hua, Fran Lehman MD  [facility] Urology Adult [place] 8 minutes ago (10:51 AM)   We should have him see infectious disease because I am not sure what else I can prescribe him that he's not allergic to     YAYA      Received the above message from Dr. Sequeira. Referral for infectious disease order placed and sent for Dr. Sequeira to review and sign order. Attempted to reach patient by phone, but no answer. Left generic message with request for patient to return call to clinic. When patient returns call, will inform him of the above information and request for patient to call [phone] to schedule with infectious disease.    Heidy Rider RN, BSN

## 2019-05-01 NOTE — TELEPHONE ENCOUNTER
Pt returned call to clinic. Advised pt recommendation is to schedule an appointment with infectious disease. Phone number to schedule is given to pt. Pt stated understanding and will call them.     Carmen Schneider LPN

## 2019-05-01 NOTE — TELEPHONE ENCOUNTER
Returned call to pt. Ok'ed to leave detailed message. Asked pt to return call to clinic.    Carmen Schneider LPN

## 2019-05-02 ENCOUNTER — TELEPHONE (OUTPATIENT)
Dept: UROLOGY | Facility: CLINIC | Age: 59
End: 2019-05-02

## 2019-05-02 NOTE — TELEPHONE ENCOUNTER
Medication was sent to wrong pharmacy, please send script for [facility]butinin [facility] in [place].

## 2019-05-02 NOTE — TELEPHONE ENCOUNTER
Called and spoke to [facility] pharmacy in [place] who reports that they received the prescription for oxybutynin but it is too soon to fill. [facility] that the prescription was originally sent to [facility] in [facility] in [place] but was never picked up due to the cost of a different prescription that was sent at the same time. Informed [facility] that patient requested for oxybutynin to be sent to [facility]. Per Omayra Wilson, they will contact [facility] in [facility] to have them reverse the prescription so [facility] can fill it.    Called and spoke with patient who is aware of the above information. Patient verbalized understanding. Encouraged patient to contact [facility] to verify that prescription is ready for . Informed patient to call with any questions or concerns.    Heidy Rider RN, BSN

## 2019-05-03 NOTE — TELEPHONE ENCOUNTER
Central Prior Authorization Team   Phone: [phone]         PA Initiation - I called insurance to see if there were less expensive alternatives on the formulary. There were none. We started a P/A request over the phone. Medication allergies were noted in P/A request.    Medication: erythromycin (PCE) 500 MG EC tablet  Insurance Company: [employer] - Phone [phone] Fax [phone]  Pharmacy Filling the Rx: [id number] [place], [place] - [address] [place] [address]  Filling Pharmacy Phone: [phone]  Filling Pharmacy Fax:    Start Date: 5/3/2019

## 2019-05-06 NOTE — TELEPHONE ENCOUNTER
Prior Authorization Approval    Authorization Effective Date: 5/4/2019  Authorization Expiration Date: 12/31/2019  Medication: erythromycin (PCE) 500 MG EC tablet - P/A APPROVED  Approved Dose/Quantity: 30  Reference #: Ref# [id number]   Insurance Company: [employer] - Phone [phone] Fax [phone]  Expected CoPay:       CoPay Card Available:      Foundation Assistance Needed:    Which Pharmacy is filling the prescription (Not needed for infusion/clinic administered): [id number] [place] [address]  Pharmacy Notified: Yes - via voicemail  Patient Notified:

## 2019-05-08 ENCOUNTER — TRANSFERRED RECORDS (OUTPATIENT)
Dept: HEALTH INFORMATION MANAGEMENT | Facility: CLINIC | Age: 59
End: 2019-05-08

## 2019-05-15 ENCOUNTER — OFFICE VISIT (OUTPATIENT)
Dept: FAMILY MEDICINE | Facility: CLINIC | Age: 59
End: 2019-05-15
Payer: COMMERCIAL

## 2019-05-15 VITALS
DIASTOLIC BLOOD PRESSURE: 82 MMHG | TEMPERATURE: 97.9 F | OXYGEN SATURATION: 99 % | BODY MASS INDEX: 35.62 KG/M2 | HEIGHT: 72 IN | HEART RATE: 66 BPM | WEIGHT: 263 LBS | SYSTOLIC BLOOD PRESSURE: 124 MMHG

## 2019-05-15 DIAGNOSIS — F33.2 MAJOR DEPRESSIVE DISORDER, RECURRENT, SEVERE WITHOUT PSYCHOTIC FEATURES (H): ICD-10-CM

## 2019-05-15 DIAGNOSIS — M51.369 DDD (DEGENERATIVE DISC DISEASE), LUMBAR: ICD-10-CM

## 2019-05-15 DIAGNOSIS — G89.4 CHRONIC PAIN SYNDROME: Primary | ICD-10-CM

## 2019-05-15 DIAGNOSIS — M53.3 SI (SACROILIAC) JOINT DYSFUNCTION: ICD-10-CM

## 2019-05-15 LAB
AMPHETAMINES UR QL: NOT DETECTED NG/ML
BARBITURATES UR QL SCN: NOT DETECTED NG/ML
BENZODIAZ UR QL SCN: ABNORMAL NG/ML
BUPRENORPHINE UR QL: NOT DETECTED NG/ML
CANNABINOIDS UR QL: ABNORMAL NG/ML
COCAINE UR QL SCN: NOT DETECTED NG/ML
D-METHAMPHET UR QL: NOT DETECTED NG/ML
METHADONE UR QL SCN: NOT DETECTED NG/ML
OPIATES UR QL SCN: ABNORMAL NG/ML
OXYCODONE UR QL SCN: ABNORMAL NG/ML
PCP UR QL SCN: NOT DETECTED NG/ML
PROPOXYPH UR QL: NOT DETECTED NG/ML
TRICYCLICS UR QL SCN: ABNORMAL NG/ML

## 2019-05-15 PROCEDURE — 99213 OFFICE O/P EST LOW 20 MIN: CPT | Performed by: FAMILY MEDICINE

## 2019-05-15 PROCEDURE — 80306 DRUG TEST PRSMV INSTRMNT: CPT | Performed by: FAMILY MEDICINE

## 2019-05-15 RX ORDER — HYDROMORPHONE HYDROCHLORIDE 4 MG/1
8 TABLET ORAL 4 TIMES DAILY PRN
Qty: 180 TABLET | Refills: 0 | Status: SHIPPED | OUTPATIENT
Start: 2019-05-15 | End: 2019-06-05

## 2019-05-15 RX ORDER — DIAZEPAM 5 MG
5 TABLET ORAL DAILY PRN
Qty: 30 TABLET | Refills: 0 | Status: SHIPPED | OUTPATIENT
Start: 2019-05-15 | End: 2019-06-05

## 2019-05-15 RX ORDER — MORPHINE SULFATE 30 MG/1
30 TABLET, FILM COATED, EXTENDED RELEASE ORAL 3 TIMES DAILY
Qty: 90 TABLET | Refills: 0 | Status: SHIPPED | OUTPATIENT
Start: 2019-05-15 | End: 2019-06-05

## 2019-05-15 ASSESSMENT — PAIN SCALES - GENERAL: PAINLEVEL: MODERATE PAIN (5)

## 2019-05-15 ASSESSMENT — MIFFLIN-ST. JEOR: SCORE: 2059.93

## 2019-05-16 ENCOUNTER — TELEPHONE (OUTPATIENT)
Dept: PALLIATIVE MEDICINE | Facility: CLINIC | Age: 59
End: 2019-05-16

## 2019-05-16 NOTE — TELEPHONE ENCOUNTER
LM for pt to schedule consult only.    Areli Jama    [place] [facility]

## 2019-05-16 NOTE — RESULT ENCOUNTER NOTE
Jhonathan,  I have reviewed the results of your recent drug screen and noted that it was positive for marijuana.  In response to recommendations from the federal government [facility] now has a policy in place to routinely screen patients on chronic pain medicine to ensure that they are taking the medication as prescribed and not combining it with other potentially dangerous medications.    Despite recent legalization of the use of medical marijuana in certain situations, the recreational use of marijuana is still illegal in the [facility].  I do not have a personal issue with it, but such use is officially in direct conflict with your signed narcotics contract.  So if this is something you find of benefit from a pain standpoint I will need you to go through the legal channels and get set up with the medicinal cannabis program.  Otherwise we can't have you continue to use it.  Just a heads up.    SANTIAGO Shaw M.D.

## 2019-05-16 NOTE — TELEPHONE ENCOUNTER
[facility] Referral      1. Confirmed address with patient? Yes  2. Confirmed phone number with patient? Yes  3. Confirmed referring provider? Yes  4. Is the PCP the same as the referring provider? Yes  5. Has the patient been to any previous pain clinics? No  (If yes, send MOLLY with welcome letter)  6. Which insurance are we to bill for this appointment?  [employer]     7. Informed pt of cancellation (48 hour) policy? Yes    REGARDING OPIOID MEDICATIONS: We will always address appropriateness of opioid pain medications, but we generally will not automatically take on a prescribing role. When we do take on prescribing of opioids for chronic pain, it is in collaboration with the referring physician for an intermediate period of time (months), with an expectation that the primary physician or provider will assume the prescribing role if medications are effective at stable doses with demonstrated compliance. Therefore, please do not assume that your prescribing responsibilities end on the day of pain clinic consultation.  8. Informed pt of prescribing policy? Yes    9.Please be aware that once you are established with a pain provider and location, you will need to continue have all future visits with that provider and location. It is best to determine what location is the most convenient for you and schedule with that one.    ** PATIENT INFORMED OF THIS POLICY Yes      9. Referring Provider: Cat Shaw    [place] [facility]

## 2019-05-17 ENCOUNTER — OFFICE VISIT (OUTPATIENT)
Dept: PALLIATIVE MEDICINE | Facility: CLINIC | Age: 59
End: 2019-05-17
Payer: COMMERCIAL

## 2019-05-17 VITALS
SYSTOLIC BLOOD PRESSURE: 144 MMHG | DIASTOLIC BLOOD PRESSURE: 85 MMHG | WEIGHT: 263 LBS | BODY MASS INDEX: 35.42 KG/M2 | HEART RATE: 76 BPM

## 2019-05-17 DIAGNOSIS — G89.4 CHRONIC PAIN SYNDROME: ICD-10-CM

## 2019-05-17 DIAGNOSIS — M54.41 CHRONIC BILATERAL LOW BACK PAIN WITH RIGHT-SIDED SCIATICA: Primary | ICD-10-CM

## 2019-05-17 DIAGNOSIS — G89.29 CHRONIC BILATERAL LOW BACK PAIN WITH RIGHT-SIDED SCIATICA: Primary | ICD-10-CM

## 2019-05-17 DIAGNOSIS — M54.59 LUMBAR FACET JOINT PAIN: ICD-10-CM

## 2019-05-17 DIAGNOSIS — M51.369 DDD (DEGENERATIVE DISC DISEASE), LUMBAR: ICD-10-CM

## 2019-05-17 DIAGNOSIS — M79.18 MYOFASCIAL PAIN: ICD-10-CM

## 2019-05-17 PROCEDURE — 99207 ZZC CONSULT E&M CHANGED TO SUBSEQUENT LEVEL: CPT | Performed by: NURSE PRACTITIONER

## 2019-05-17 PROCEDURE — 99215 OFFICE O/P EST HI 40 MIN: CPT | Performed by: NURSE PRACTITIONER

## 2019-05-17 ASSESSMENT — PAIN SCALES - GENERAL: PAINLEVEL: MODERATE PAIN (5)

## 2019-05-17 NOTE — PROGRESS NOTES
"  [facility] Consultation    Date of visit: 5/17/2019    Reason for consultation:    Oleg Ford is a 59 year old male who is seen in consultation today at the request of his provider, Dr. Cat Shaw re: patient's CONSULT ONLY WITH RECOMMENDATIONS TO PATIENT Primary Care Provider RE: CHRONIC LOW BACK PAIN S/P MULTIPLE SURGERIES, LOOKING FOR ADVICE ON LONG-TERM STRATEGY.      Primary Care Provider is Cat Shaw.  Pain medications are being prescribed by Dr. Cat Shaw    Please see the [facility] [place] health questionnaire which the patient completed and reviewed with me in detail.    Chief Complaint:    Chief Complaint   Patient presents with     Pain       Pain history:  Oleg Ford is a 59 year old male who first started having problems with pain as follows:    Low back  -Pain is located over the low back and radiates into the bilateral hips and legs. Seventy percent of pain is located in the back and thirty percent is located over the bilateral legs. 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   -First back surgery was in 2012 and was helpful for two years. Then his pain worsened and he needed repeat back surgeries, total of eight back surgeries. He is considering repeat back surgery at [facility]. We discussed why having additional back surgeries may not be a good idea due to current levels of pain medication and failure in the past.    -Spinal cord stimulator removed in the past.  -Left testicular pain that wraps around posteriorly. The pain is improving somewhat and the patient was on a short term oxycodone prescription.     -Marijuana is helpful, does not like the way it makes him feel. Discussed the use of medical cannabis.    Pain rating: intensity ranges from 3/10 to 9/10, and Averages 5/10 on a 0-10 scale.    Describes pain as \"sharp and dull.\"  Pain is Constant.    Home self care " includes: Hot bath, stretching, and pain medication    Aggravating factors include: Standing, walking, prolonged standing    Relieving factors include: Laying flat    Any bowel or bladder incontinence: None        Current pain-related medication treatments include:  -Flexeril 10mg TID PRN muscle spasms (helpful)  -dilaudid 4mg take 2 tablets (8mg) QID PRN pain (usually takes 4-8 tablets/day)  -hydroxyzine 50mg Q 6 hours PRN anxiety (helpful)  -MS Contin 30mg TID (usually takes 2-3 tablets/day)  -naloxone nasal spray PRN opiate overdose   -oxycodone 15mg TID PRN breakthrough pain (acute urinary pain)    Other pertinent medications:  -valium 5mg  every day for sleep or anxiety (helpful)  -zofran 4-8 mg TID PRN nausea  -ambien 10mg nightly as needed for sleep (sparing use)  -Wellbutrin 150mg      Previous medication treatments included:  OPIATES:Diluaid (helpful), Hydrocodone (somewhat helpful), Oxycodone (helpful), Morphine (helpful)  NSAIDS: Ibuprofen  MUSCLE RELAXANTS: Valium (helpful), Flexeril (helpful)  ANTI-MIGRAINE MEDS: Prednisone  ANTI-DEPRESSANTS: Duloxetine (side effects), Effexor (helpful)  SLEEP AIDS: Valium, Hydroxyzine   ANTI-CONVULSANTS: Trileptal, Gabapentin (helpful for nerve pain), Lyrica (sever confusion)  TOPICALS: Bengay (not helpful)  Other meds: None      Other treatments have included:  Oleg MED Ford has been seen at a pain clinic in the past.    PT: Tried it, not helpful  Chiropractic care: Tried it, no help  Acupuncture: Tried it, no help  TENs Unit: Tried it, no help    Injections: Some were helpful  -9/29/2015 CT guided bilateral SI joint steroid and anesthetic injection at [facility]   -10/9/2015 fluoroscopically guided lumbar transforaminal NETO left L3-4 at [facility]  -7/20/2016 CT guided SI joint injection right side at [facility]  -10/10/2016 CT guided bilateral SI joint steroid and local anesthetic injection at [facility]   -5/8/2019 selective lumbar nerve block Left L3 at [facility]     Surgeries: Helpful  temporarily. He has had 8 previous lumbar surgeries per patient report.      Past Medical History:  Past Medical History:   Diagnosis Date     Depression, major      Esophageal mass      History of orchiectomy 1982     History of vasectomy 1991     Low back pain      Other chronic pain     Chronic low back pain.     Restless leg      Sleep apnea     had surgery done to resolve sleep apnea     Past Surgical History:  Past Surgical History:   Procedure Laterality Date     BACK SURGERY  2/6/2013    lumbar surgery     BACK SURGERY      spinal stimulator     COLONOSCOPY       COMBINED ESOPHAGOSCOPY, GASTROSCOPY, DUODENOSCOPY (EGD) WITH CO2 INSUFFLATION N/A 11/14/2018    Procedure: COMBINED ESOPHAGOSCOPY, GASTROSCOPY, DUODENOSCOPY (EGD) WITH CO2 INSUFFLATION;  Surgeon: Rosendo Guy MD;  Location: [facility] OR     ESOPHAGOSCOPY, GASTROSCOPY, DUODENOSCOPY (EGD), COMBINED N/A 11/14/2018    Procedure: COMBINED ESOPHAGOSCOPY, GASTROSCOPY, DUODENOSCOPY (EGD), BIOPSY SINGLE OR MULTIPLE;  Surgeon: Rosendo Guy MD;  Location: [facility] OR     FUSION SACRAL ILIAC Right 10/19/2017    Procedure: FUSION SACRAL ILIAC;  Right  side sacroiliac joint fusion with 2 implants ;  Surgeon: Sebastián Szymanski MD;  Location: [facility] OR     FUSION SACRAL ILIAC Left 1/11/2018    Procedure: FUSION SACRAL ILIAC;   left  side sacroiliac joint fusion with 2 implants.(Zyga)   extraction and use of illac bone graft ;  Surgeon: Sebastián Szymanski MD;  Location: [facility] OR     [facility] UGI ENDOSCOPY W EUS N/A 7/31/2014    Procedure: COMBINED ENDOSCOPIC ULTRASOUND, ESOPHAGOSCOPY, GASTROSCOPY, DUODENOSCOPY (EGD);  Surgeon: Shorty Stoll MD;  Location: [facility]     ORCHIECTOMY INGUINAL      right radical orchiectomy     REMOVE STIMULATOR VAGUS NERVE  12/23/2011    Procedure:REMOVE STIMULATOR VAGUS NERVE; Vagus Nerve Stimulator Removal; Surgeon:MARTÍN GREGORY; Location:[facility] OR     REMOVE STIMULATOR VAGUS NERVE  11/8/2013    Procedure: REMOVE STIMULATOR VAGUS  NERVE;  Removal Of Vagus Nerve Stimulator Lead In Left Neck ;  Surgeon: Alexis Becerra MD;  Location: [facility] OR     SURGICAL HISTORY OF -       vagal nerve implant; removed     SURGICAL HISTORY OF -       UPPP     VASECTOMY       Medications:  Current Outpatient Medications   Medication Sig Dispense Refill     buPROPion (WELLBUTRIN SR) 150 MG 12 hr tablet Take 150 mg by mouth       cyclobenzaprine (FLEXERIL) 10 MG tablet TAKE 1 TABLET BY MOUTH THREE TIMES DAILY AS NEEDED FOR MUSCLE SPASMS 90 tablet 1     diazepam (VALIUM) 5 MG tablet Take 1 tablet (5 mg) by mouth daily as needed for anxiety or sleep 30 tablet 0     EPINEPHrine (EPIPEN) 0.3 MG/0.3ML injection Inject 0.3 mLs (0.3 mg) into the muscle once as needed for anaphylaxis 1 each 2     Escitalopram Oxalate (LEXAPRO PO) Take 10 mg by mouth daily       HYDROmorphone (DILAUDID) 4 MG tablet Take 2 tablets (8 mg) by mouth 4 times daily as needed for moderate to severe pain (every 4-6hour prn) 180 tablet 0     hydrOXYzine (ATARAX) 50 MG tablet Take 1 tablet (50 mg) by mouth every 6 hours as needed for anxiety 120 tablet 3     lamoTRIgine (LAMICTAL) 150 MG tablet Take 250 mg by mouth daily        morphine (MS CONTIN) 30 MG CR tablet Take 1 tablet (30 mg) by mouth 3 times daily 90 tablet 0     naloxone (NARCAN) nasal spray Spray 1 spray (4 mg) into one nostril alternating nostrils as needed for opioid reversal every 2-3 minutes until assistance arrives 0.2 mL 0     omeprazole (PRILOSEC) 40 MG DR capsule TAKE ONE CAPSULE BY MOUTH EVERY DAY 30 TO 60 MINUTES BEFORE A MEAL 30 capsule 9     ondansetron (ZOFRAN-ODT) 4 MG ODT tab PLACE 1 OR 2 TABLETS UNDER THE TONGUE 3 TIMES A DAY AS NEEDED FOR NAUSEA 20 tablet 1     oxybutynin (DITROPAN) 5 MG tablet Take 1-2 tablets (5-10 mg) by mouth 2 times daily 120 tablet 11     promethazine (PHENERGAN) 25 MG tablet Take 1 tablet (25 mg) by mouth every 6 hours as needed for nausea 20 tablet 5     rOPINIRole (REQUIP) 0.25 MG tablet Take 1-2  tablets (0.25-0.5 mg) by mouth At Bedtime 60 tablet 2     tolterodine (DETROL) 1 MG tablet Take 1 mg by mouth 2 times daily       zolpidem (AMBIEN) 10 MG tablet Take 1 tablet (10 mg) by mouth nightly as needed for sleep 30 tablet 5     albuterol (PROAIR HFA/PROVENTIL HFA/VENTOLIN HFA) 108 (90 Base) MCG/ACT inhaler Inhale 2 puffs into the lungs every 4 hours as needed for shortness of breath / dyspnea (Patient not taking: Reported on 5/15/2019) 1 Inhaler 0     budesonide-formoterol (SYMBICORT) 160-4.5 MCG/ACT Inhaler Inhale 2 puffs into the lungs 2 times daily       Allergies:     Allergies   Allergen Reactions     Ciprofloxacin Anaphylaxis     Doxycycline Anaphylaxis     Septra [Bactrim] Anaphylaxis     Amoxicillin      itching     Nuts      Brazil nuts     Social History:  Home situation: , has adult children, and lives with friend's family  Occupation/Schooling: Disability, previously drove a truck.  Tobacco use: Quit smoking December 2018  Alcohol use: 5 times yearly  Drug use: Monthly marijuana user  History of chemical dependency treatment: None    Family history:  Family History   Problem Relation Age of Onset     Diabetes Mother      C.A.D. Mother      C.A.JASMYNE. Maternal Grandmother      Diabetes Maternal Grandmother      Hypertension Maternal Grandmother      Cancer Maternal Grandfather      C.A.D. Maternal Grandfather        Review of Systems:  Skin: itching  Eyes: HA and dizziness   Ears/Nose/Throat: Ringing in ears  Respiratory: Shortness of breath  Cardiovascular: Chest pain  Gastrointestinal: nausea, vomiting, abdominal pain, constipation and diarrhea  Genitourinary: frequency, urgency, hematuria and incontinence  Musculoskeletal: back pain and joint stiffness  Neurologic: local weakness, numbness or tingling of hands and numbness or tingling of feet  Psychiatric: excessive stress, anxiety, depression, suicidal ideation (denies intent or plan) and mood swings  Hematologic/Lymphatic/Immunologic:  negative  Endocrine: negative    This document serves as a record of the services and decisions personally performed and made by Claribel SOUZA. It was created on her behalf by Will Valle, a trained medical scribe. The creation of this document is based on the provider's statements to the medical scribe.  Will Valle 2:30 PM May 17, 2019      Physical Exam:  Vitals:    05/17/19 1400 05/17/19 1409   BP: (!) 150/98 144/85   Pulse: 74 76   Weight: 119.3 kg (263 lb)      Exam:  Constitutional: healthy, alert and no distress  Head: normocephalic. Atraumatic.   Eyes: no redness or jaundice noted   ENT: oropharnx normal.  MMM.  Neck supple.    Cardiovascular: RRR no m/g/r   Respiratory: clear to auscultation A/P. Respirations easy and unlabored. Able to speak in full sentences without SOB or cough noted.    Gastrointestinal: soft, non-tender, normoactive bowel sounds   : deferred  Skin: no suspicious lesions or rashes  Psychiatric: mentation appears normal and affect normal/bright    Musculoskeletal exam:  Gait/Station/Posture: Slow gait, toes pointed outward, able to raise onto toes and heels, and poor balance.    Cervical spine:    Flex:  30 degrees   Ext: 25 degrees   Rotation to right: 80 degrees   Rotation to left: 80 degrees    Thoracic spine:  Normal     Lumbar spine:    Flex:  70 degrees   Ext: 15 degrees   Rotation/ext to right: painful    Rotation/ext to left: painful    SI joints: bilateral tenderness   Piriformis: right sided tenderness    Greater trochanters: non tender      Myofascial tenderness:  Mild low back  Straight leg exam: positive shooting pain past the knee bilaterally      Neurologic exam:  CN:  Cranial nerves 2-12 grossly equal  Motor:  5/5 UE and LE strength  Reflexes:     Biceps:     R:  1/4 L: 1/4   Brachioradialis   R:  1/4 L: 1/4      Patella:  R:  2/4 L: 2/4   Achilles:  R:  2/4 L: 2/4  Other reflexes:  Toes downgoing   Brooks's negative   Sensory:  (upper and lower  "extremities):   Light touch: normal    Vibration: normal    Pin prick: normal    Allodynia: absent    Dysethesia: absent    Hyperalgesia: absent     Diagnostic tests:    MRI lumbar spine without contrast done at [facility] on 4/13/2019  \"Conclusion:  1.  Postoperative changes of interbody instrumented posterior spinal fusion L4-S1.  Fusion status is indeterminate by MRI.  Dorsal decompression at each of these levels without residual central spinal canal stenosis or neural impingement.  2.  Postoperative changes of instrumented sacroiliac joint fusion.  Fusion status is indeterminate by MRI.    3.  L3-4 disc bulge and minimal retrolisthesis with mild central spinal canal and subarticular recess stenosis which has increased over the interval.  Moderate left foraminal stenosis with left L3 ganglion encroachment and mild right foraminal stenosis without interval change.\"      CT thoracic spine with contrast post myelogram done at [facility] on 10/16/2018  \"Conclusion:  Scattered thoracic degenerative changes with spinal stimulator electrodes placed posterior to T7 and T8, and the following specific findings:  1.  T7-8 chronic, left eccentric extruded dorsal disc herniation and dorsal epidural spinal stimulator electrodes contributing to severe AP central stenosis with front to back impingement of the thoracic cord.  2.  Scattered mild dorsal disc protrusion/osteophyte cells were abutting and mildly flattening the ventral cord with described, without central stenosis or cord impingement.  3.  Chronic foraminal stenosis, moderate on the left at T5-6 and mild scattered levels elsewhere.  4.  Facet degeneration, moderate bilaterally at T4-5 and mild at scattered levels elsewhere.  5.  No vertebral collapse, acute fracture or destructive osseous lesion.\"      CT LUMBAR SPINE WITHOUT CONTRAST 8/3/2015 1:12 PM      HISTORY: Low back pain radiating to the legs. Previous surgery.     TECHNIQUE: Axial scans were performed through the lumbar " spine with  sagittal and coronal reconstruction.     COMPARISON: CT scan from 8/5/2010.     FINDINGS: Sagittal images demonstrate L4-L5, and L5-S1 posterior  decompression. Axial scans were performed from T12 to sacrum.     T12-L1: No disc herniation or stenosis. Facet joints are unremarkable.     L1-L2: No disc herniation or stenosis. Facet joints are unremarkable.     L2-L3: Mild disc bulge lateralizes slightly to the left. This results  in moderate to severe left foraminal stenosis. Mild to moderate right  foraminal stenosis. No central stenosis. Facet joints are  unremarkable.     L3-L4: Left foraminal disc protrusion results in moderate to severe if  not severe left foraminal stenosis. Moderate right foraminal stenosis.  No central stenosis. Facet joints are unremarkable.     L4-L5: Posterior decompression. No central stenosis. Broad-based disc  bulge results in moderate to severe if not severe left foraminal  stenosis. Moderate right foraminal stenosis.     L5-S1: Posterior decompression. No central stenosis. Broad-based disc  bulge results in mild bilateral foraminal stenosis.     IMPRESSION  IMPRESSION:  1. At L2-L3 there is moderate to severe left foraminal stenosis. Mild  to moderate right foraminal stenosis. This is slightly more prominent  than on the prior study.  2. At L3-L4 there is moderate to severe if not severe left foraminal  stenosis. Moderate right foraminal stenosis. This appears more  prominent than on the prior study.  3. At L4-L5 there is a posterior decompression that is new since the  prior study. Broad-based disc bulge results in moderate if not  moderate to severe left foraminal stenosis. Moderate right foraminal  stenosis. This is more prominent than on the prior study.  4. At L5-S1 there is a posterior decompression. Mild bilateral  foraminal stenosis.       Other testing (labs, diagnostics):  2/15/2019  Cr. 0.96  Est GFR >60      Screening tools:     DIRE Score for ongoing opioid  management is calculated as follows:    Diagnosis = 2    Intractability = 2    Risk: Psych = 1-2  Chem Hlth = 2  Reliability = 2  Social = 2    Efficacy = 2    Total DIRE Score = 13-14 (14 or higher predicts good candidate for ongoing opioid management; 13 or lower predicts poor candidate for opioid management)         Assessment:  1. Chronic bilateral low back pain with right-sided sciatica  2. DDD, lumbar  3. Lumbar facet joint pain  4. Myofacial pain  5. Chronic pain syndrome   6. Chronic opiate use in the form of MSContin 90mg/day plus Dilaudid 32mg/day, this is equal to 218mg of OME (oral morphine equivalent, also known as morphine milligram equivalent-MME)  7. PMHx includes: Vasectomy 1991, orchiectomy 1982, depression, esophageal mass, low back pain, other chronic pain, restless leg, and sleep apnea  8. PSHx includes: EGD 11/14/2018, /31/2014. Fusion sacral iliac left 1/11/2018. Fusion sacral iliac right 10/19/2017. Remove stimulator vagus nerve 11/8/2013. Back surgery 2/6/2013. Remove vagus stimulator vagus nerve 12/23/2011. Back surgery. Colonoscopy. Orchiectomy inguinal.         Plan:  This was a consult-only visit.     1. Physical Therapy: None at present   2. Clinical Health Psychologist to address issues of relaxation, behavioral change, coping style, and other factors important to improvement: None at present   3. Diagnostic Studies: None at present  4. Medication Management:   1. Continue Flexeril 10mg TID PRN as managed by Primary Care Provider  2. Continue Dilaudid 4-8mg QID PRN as managed by PCP. I would recommend tapering down to half the current dose. This can be done by reducing patient's dosage by 2mg/day every 2-4 weeks  3. Continue MSContin 30mg TID as managed by PCP. Discussed with the patient that he must take this medication consistently as he has been using it intermittently. I would recommend tapering down to BID. This can be done by having patient take 30mg twice daily and 15mg once  daily for 2-4 weeks, then down to 30mg Q 12 hours.   4. In general, usually long-acting is reduced first, then short-acting medication.   5. IF patient is planning on having repeat surgery, I would work on reducing patient doses every 2 weeks vs Q 4 weeks. Being on high dose opiates can make post-op pain management markedly more challenging.  6. Additionally, patient should continue to have regular urine drug screening and sign controlled substance agreements with his prescriber of controlled substances  7. I agree with patient having a script for naloxone nasal spray for opiate reversal given high dose opiate use.   8. Could consider patient for medical cannabis certification, this can be helpful for pain and help patient's be able to taper their opiate doses easier.  5. Further procedures recommended: None at present, could be a good candidate for facet joint injections.  6. Acupuncture: None at present  7. Urine toxicology screen today: None at present    8. Recommendations/follow-up for PCP:  See above  9. Release of information: None    Total time spent was 70 minutes, and more than 50% of face to face time was spent in counseling and/or coordination of care regarding principles of multidisciplinary care, medication management, and possible future injections.     The information in this document, created by the medical scribe for me, accurately reflects the services I personally performed and the decisions made by me. I have reviewed and approved this document for accuracy prior to leaving the patient care area.  May 17, 2019     Claribel SOUZA RN CNP, FNP  [facility]

## 2019-05-17 NOTE — PATIENT INSTRUCTIONS
PLAN  1. Medications:   1. Continue Flexeril 10mg take 3 times daily as needed for muscle spasms.  2. Continue Dilaudid 4-8mg take 4 times daily as needed for pain, as managed by primary care provider. I would recommend tapering down on this medication to max of 4 tablets/day.  3. Continue MSContin 30mg take 3 times daily, as managed by primary care provider. I would recommend tapering down to twice daily and will send Dr. Shaw specific instructions. I would recommend reducing every 2-4 weeks, but if having surgery, would recommend every 2 week reduction  4. Consider starting Gabapentin in the future.  5. Consider medical cannabis certification.   2. Procedures: None at present  1. You would be a good candidate for lumbar facet joint injections    ----------------------------------------------------------------  Clinic Number:  [phone]   Call this number with any questions about your care and for scheduling assistance. Calls are returned Monday through Friday between 8 AM and 4:30 PM. We usually get back to you within 2 business days depending on the issue/request.       Medication refills:    For non-opioid medications, call your pharmacy directly to request a refill. The pharmacy will contact the Pain Management Center for authorization. Please allow 3-4 days for these refills to be processed.     For opioid refills, call the clinic number or send a Catch[employer] message. Please contact us 7-10 days before your refill is due. The message MUST include the name of the specific medication(s) requested and how you would like to receive the prescription(s). The options are as follows:    Pain Clinic staff can mail the prescription to your pharmacy. Please tell us the name of the pharmacy.    You may pick the prescription up at the Pain Clinic (tell us the location) or during a clinic visit with your pain provider    Pain Clinic staff can deliver the prescription to the [place] pharmacy in the clinic building. Please  tell us the location.      We believe regular attendance is key to your success in our program.    Any time you are unable to keep your appointment we ask that you call us at least 24 hours in advance to let us know. This will allow us to offer the appointment time to another patient.

## 2019-05-17 NOTE — Clinical Note
I have given some recommendations for opiate taper. He is currently on about 218mg of oral morphine equivalents if he takes all he has available on a daily basis. My recommendations are to try to get him to about 124mg OME per day. CDC guidelines are to try to keep chronic pain patients to at or below 90mg of OME per day.

## 2019-05-22 ENCOUNTER — OFFICE VISIT (OUTPATIENT)
Dept: GASTROENTEROLOGY | Facility: CLINIC | Age: 59
End: 2019-05-22
Payer: COMMERCIAL

## 2019-05-22 VITALS
OXYGEN SATURATION: 99 % | DIASTOLIC BLOOD PRESSURE: 86 MMHG | HEART RATE: 78 BPM | SYSTOLIC BLOOD PRESSURE: 120 MMHG | HEIGHT: 72 IN | WEIGHT: 259.9 LBS | BODY MASS INDEX: 35.2 KG/M2

## 2019-05-22 DIAGNOSIS — R68.81 EARLY SATIETY: ICD-10-CM

## 2019-05-22 DIAGNOSIS — R11.10 HEAVING: ICD-10-CM

## 2019-05-22 DIAGNOSIS — R11.0 NAUSEA: Primary | ICD-10-CM

## 2019-05-22 PROCEDURE — 99214 OFFICE O/P EST MOD 30 MIN: CPT | Performed by: PHYSICIAN ASSISTANT

## 2019-05-22 RX ORDER — ONDANSETRON 4 MG/1
4 TABLET, FILM COATED ORAL EVERY 8 HOURS PRN
Qty: 30 TABLET | Refills: 1 | Status: SHIPPED | OUTPATIENT
Start: 2019-05-22 | End: 2019-09-16

## 2019-05-22 ASSESSMENT — PAIN SCALES - GENERAL: PAINLEVEL: SEVERE PAIN (6)

## 2019-05-22 ASSESSMENT — MIFFLIN-ST. JEOR: SCORE: 2045.87

## 2019-05-22 NOTE — NURSING NOTE
"Oleg Ford's goals for this visit include:   Chief Complaint   Patient presents with     RECHECK     Nausea consistently; Dry heaves occassionally; Loss of appetite; previously seen by Dr. Guy; abdominal cramping       He requests these members of his care team be copied on today's visit information: Yes    PCP: Cat Shaw    Referring Provider:  No referring provider defined for this encounter.    /86 (BP Location: Left arm, Patient Position: Sitting, Cuff Size: Adult Large)   Pulse 78   Ht 1.835 m (6' 0.25\")   Wt 117.9 kg (259 lb 14.4 oz)   SpO2 99%   BMI 35.01 kg/m      Do you need any medication refills at today's visit? Yes, Mimi Amin LPN      "

## 2019-05-22 NOTE — PATIENT INSTRUCTIONS
Start taking miralax 1 capful once daily for constipation.   You may take zofran (ondansetron) for nausea as needed. Zofran can constipate you.   Adjust your diet as discussed. This means smaller portions, not eating late. Avoiding acidic foods, greasy foods or fatty foods.     Please call [phone] to schedule an upper endoscopy with Dr. Guy. You must have clearance for this procedure by your primary care Dr. Shaw and your pulmonologist.

## 2019-05-22 NOTE — PROGRESS NOTES
GASTROENTEROLOGY FOLLOW UP CLINIC VISIT    CC/REFERRING MD:    Cat Shaw      REASON FOR CONSULTATION:  RECHECK (Nausea consistently; Dry heaves occassionally; Loss of appetite; previously seen by Dr. Guy; abdominal cramping)      HISTORY OF PRESENT ILLNESS:    Oleg Ford is 59 year old male who presents for follow up of nausea and dry heaving.  He was previously evaluated with an upper endoscopy by Dr. Ferreira in November 2018.  He was noted to have a 2 cm hiatal hernia, erythematous mucosa in the antrum and a large amount of food residue in his stomach as well as retained food in the duodenum.  Biopsies of stomach and esophagus revealed nonspecific inflammation likely related to reflux.  Biopsies were negative for H. pylori and celiac sprue.    He was advised to have a repeat upper endoscopy in several months to examine and visualized portions of the gastric body.  His upper endoscopy was concerning for gastroparesis related to narcotic medications. He currently has one meal a day usually closer to bedtime. He also admits to snacking in the middle of the night if he finds that he is hungry. He has been eating more salads recently.     He was advised to continue omeprazole 40 mg daily which he is taking.  He however continues to have nausea throughout the day whether he eats or not as well as occasional dry heaving.  He notes generalized abdominal cramping and continued loss of appetite.     He was previously noting dyspnea upon exertion was referred to pulmonologist for further work-up.  Patient states he has seen Dr. Pratt of [place] [facility] and has a follow up in the coming weeks.         PREVIOUS ENDOSCOPY:    Upper Endoscopy 11/2018  Impression:       - Esophagogastric landmarks identified.                             - Gastroesophageal flap valve classified as Hill Grade III (minimal fold, loose to endoscope, hiatal                             hernia likely).                              - 2 cm hiatal hernia.                             -No esophageal mass present as noted above. No obvious EoE, stricture or other etiology of dysphagia. Biopsies taken to r/o EoE.                             - Erythematous mucosa in the antrum. Biopsied.                             - A large amount of food (residue) in the stomach. Most likely gastroparesis secondary to narcotic mediations. This is the most likely etiology of his nausea.                             - Retained food in the duodenum.                             - Normal ampulla, first portion of the duodenum and second portion of the duodenum. Biopsied.                             - Six biopsies were obtained in the upper third of the esophagus, in the middle third of the esophagus and in the lower third of the esophagus.   Rosendo Guy MD     SPECIMEN(S):   A: Duodenal biopsy   B: Stomach biopsy   C: Esophageal biopsy     FINAL DIAGNOSIS:   A. DUODENAL BIOPSY:   - Duodenal mucosa with no significant histologic abnormality   - No evidence of celiac sprue or peptic duodenitis     B. STOMACH BIOPSY:   - Antral and body type mucosa with mild chronic gastritis   - No H. pylori like organisms identified on routine staining   - Negative for intestinal metaplasia or dysplasia     C. ESOPHAGEAL BIOPSY:   - Squamous epithelium with mild chronic inflammation and basal layer hyperplasia suggestive of reflux     I have personally reviewed all specimens and/or slides, including the listed special stains, and used them with my medical judgement to determine or confirm the final diagnosis.     Electronically signed out by:     Richard Shoemaker M.D., PhD, Physicians           PERTINENT PAST MEDICAL HISTORY:    Past Medical History:   Diagnosis Date     Depression, major      Esophageal mass      History of orchiectomy 1982     History of vasectomy 1991     Low back pain      Other chronic pain     Chronic low back pain.     Restless leg       Sleep apnea     had surgery done to resolve sleep apnea       PREVIOUS SURGERIES:   Past Surgical History:   Procedure Laterality Date     BACK SURGERY  2/6/2013    lumbar surgery     BACK SURGERY      spinal stimulator     COLONOSCOPY       COMBINED ESOPHAGOSCOPY, GASTROSCOPY, DUODENOSCOPY (EGD) WITH CO2 INSUFFLATION N/A 11/14/2018    Procedure: COMBINED ESOPHAGOSCOPY, GASTROSCOPY, DUODENOSCOPY (EGD) WITH CO2 INSUFFLATION;  Surgeon: Rosendo Guy MD;  Location: [facility] OR     ESOPHAGOSCOPY, GASTROSCOPY, DUODENOSCOPY (EGD), COMBINED N/A 11/14/2018    Procedure: COMBINED ESOPHAGOSCOPY, GASTROSCOPY, DUODENOSCOPY (EGD), BIOPSY SINGLE OR MULTIPLE;  Surgeon: Rosendo Guy MD;  Location: [facility] OR     FUSION SACRAL ILIAC Right 10/19/2017    Procedure: FUSION SACRAL ILIAC;  Right  side sacroiliac joint fusion with 2 implants ;  Surgeon: Sebastián Szymanski MD;  Location: [facility]     FUSION SACRAL ILIAC Left 1/11/2018    Procedure: FUSION SACRAL ILIAC;   left  side sacroiliac joint fusion with 2 implants.(Zyga)   extraction and use of illac bone graft ;  Surgeon: Sebastián Szymanski MD;  Location: [facility] OR     [facility] UGI ENDOSCOPY W EUS N/A 7/31/2014    Procedure: COMBINED ENDOSCOPIC ULTRASOUND, ESOPHAGOSCOPY, GASTROSCOPY, DUODENOSCOPY (EGD);  Surgeon: Shorty Stoll MD;  [facility]     ORCHIECTOMY INGUINAL      right radical orchiectomy     REMOVE STIMULATOR VAGUS NERVE  12/23/2011    Procedure:REMOVE STIMULATOR VAGUS NERVE; Vagus Nerve Stimulator Removal; Surgeon:MARTÍN BECERRA; Location:[facility]     REMOVE STIMULATOR VAGUS NERVE  11/8/2013    Procedure: REMOVE STIMULATOR VAGUS NERVE;  Removal Of Vagus Nerve Stimulator Lead In Left Neck ;  Surgeon: Martín Becerra MD;  Location: [facility]     SURGICAL HISTORY OF -       vagal nerve implant; removed     SURGICAL HISTORY OF -       UPPP     VASECTOMY         ALLERGIES:     Allergies   Allergen Reactions     Ciprofloxacin Anaphylaxis     Doxycycline Anaphylaxis      "Lyrica [Pregabalin]      Felt \"out of his mind\" and confused     Septra [Bactrim] Anaphylaxis     Amoxicillin      itching     Nuts      [place] nuts       PERTINENT MEDICATIONS:    Current Outpatient Medications:      buPROPion (WELLBUTRIN SR) 150 MG 12 hr tablet, Take 150 mg by mouth, Disp: , Rfl:      cyclobenzaprine (FLEXERIL) 10 MG tablet, TAKE 1 TABLET BY MOUTH THREE TIMES DAILY AS NEEDED FOR MUSCLE SPASMS, Disp: 90 tablet, Rfl: 1     diazepam (VALIUM) 5 MG tablet, Take 1 tablet (5 mg) by mouth daily as needed for anxiety or sleep, Disp: 30 tablet, Rfl: 0     EPINEPHrine (EPIPEN) 0.3 MG/0.3ML injection, Inject 0.3 mLs (0.3 mg) into the muscle once as needed for anaphylaxis, Disp: 1 each, Rfl: 2     Escitalopram Oxalate (LEXAPRO PO), Take 10 mg by mouth daily, Disp: , Rfl:      HYDROmorphone (DILAUDID) 4 MG tablet, Take 2 tablets (8 mg) by mouth 4 times daily as needed for moderate to severe pain (every 4-6hour prn), Disp: 180 tablet, Rfl: 0     hydrOXYzine (ATARAX) 50 MG tablet, Take 1 tablet (50 mg) by mouth every 6 hours as needed for anxiety, Disp: 120 tablet, Rfl: 3     lamoTRIgine (LAMICTAL) 150 MG tablet, Take 250 mg by mouth daily , Disp: , Rfl:      morphine (MS CONTIN) 30 MG CR tablet, Take 1 tablet (30 mg) by mouth 3 times daily, Disp: 90 tablet, Rfl: 0     naloxone (NARCAN) nasal spray, Spray 1 spray (4 mg) into one nostril alternating nostrils as needed for opioid reversal every 2-3 minutes until assistance arrives, Disp: 0.2 mL, Rfl: 0     omeprazole (PRILOSEC) 40 MG DR capsule, TAKE ONE CAPSULE BY MOUTH EVERY DAY 30 TO 60 MINUTES BEFORE A MEAL, Disp: 30 capsule, Rfl: 9     ondansetron (ZOFRAN-ODT) 4 MG ODT tab, PLACE 1 OR 2 TABLETS UNDER THE TONGUE 3 TIMES A DAY AS NEEDED FOR NAUSEA, Disp: 20 tablet, Rfl: 1     oxybutynin (DITROPAN) 5 MG tablet, Take 1-2 tablets (5-10 mg) by mouth 2 times daily, Disp: 120 tablet, Rfl: 11     promethazine (PHENERGAN) 25 MG tablet, Take 1 tablet (25 mg) by mouth " "every 6 hours as needed for nausea, Disp: 20 tablet, Rfl: 5     rOPINIRole (REQUIP) 0.25 MG tablet, Take 1-2 tablets (0.25-0.5 mg) by mouth At Bedtime, Disp: 60 tablet, Rfl: 2     tolterodine (DETROL) 1 MG tablet, Take 1 mg by mouth 2 times daily, Disp: , Rfl:      TRELEGY ELLIPTA 100-62.5-25 MCG/INH oral inhaler, INHALE 1 PUFF BY MOUTH ONCE EVERY DAY AT THE SAME TIME EACH DAY, Disp: , Rfl: 6     zolpidem (AMBIEN) 10 MG tablet, Take 1 tablet (10 mg) by mouth nightly as needed for sleep, Disp: 30 tablet, Rfl: 5     albuterol (PROAIR HFA/PROVENTIL HFA/VENTOLIN HFA) 108 (90 Base) MCG/ACT inhaler, Inhale 2 puffs into the lungs every 4 hours as needed for shortness of breath / dyspnea (Patient not taking: Reported on 5/15/2019), Disp: 1 Inhaler, Rfl: 0     budesonide-formoterol (SYMBICORT) 160-4.5 MCG/ACT Inhaler, Inhale 2 puffs into the lungs 2 times daily, Disp: , Rfl:     Current Facility-Administered Medications:      triamcinolone (KENALOG-40) injection 40 mg, 40 mg, Intramuscular, Once, Cat Shaw MD    FAMILY HISTORY:   Family History   Problem Relation Age of Onset     Diabetes Mother      C.A.D. Mother      C.A.D. Maternal Grandmother      Diabetes Maternal Grandmother      Hypertension Maternal Grandmother      Cancer Maternal Grandfather      C.A.D. Maternal Grandfather         ROS:    No fevers or chills  No blurry vision, double vision or change in vision  No sore throat  No wheezing  No chest pressure  + chronic back pain   No rashes or skin changes  No odynophagia  No BRBPR, hematochezia, melena  No dysuria, frequency or urgency  No hot/cold intolerance or polyria    PHYSICAL EXAMINATION:  Constitutional: aaox3, cooperative, pleasant, not dyspneic/diaphoretic, no acute distress  Vitals reviewed: /86 (BP Location: Left arm, Patient Position: Sitting, Cuff Size: Adult Large)   Pulse 78   Ht 1.835 m (6' 0.25\")   Wt 117.9 kg (259 lb 14.4 oz)   SpO2 99%   BMI 35.01 kg/m     Wt:   Wt " Readings from Last 2 Encounters:   05/22/19 117.9 kg (259 lb 14.4 oz)   05/17/19 119.3 kg (263 lb)          Eyes: Sclera anicteric/injected  Ears/nose/mouth/throat: Normal oropharynx without ulcers or exudate, mucus membranes moist, hearing intact  Neck: supple, thyroid normal size  CV: No edema  Respiratory: Unlabored breathing   Lymph: No submandibular, supraclavicular or inguinal lymphadenopathy  Abd: Nondistended, no masses, +bs, no hepatosplenomegaly, nontender, no peritoneal signs  Skin: warm, perfused, no jaundice  Psych: Normal affect  MSK: Normal gait      PERTINENT STUDIES:   Most recent CBC:  Recent Labs   Lab Test 12/18/18  1105 10/25/18  1439   WBC 7.4 7.3   HGB 15.2 15.4   HCT 44.5 45.0    298     Most recent hepatic panel:  Recent Labs   Lab Test 02/15/19 10/25/18  1439   ALT 19 30   AST 16 22     Most recent creatinine:  Recent Labs   Lab Test 02/15/19 10/25/18  1439   CR 0.96 1.16       RADIOLOGY:      EXAM: CT of the Abdomen and Pelvis with Intravenous Contrast    Date of Study: 4/24/2019 8:10 PM    CLINICAL DATA: Severe lower abdominal pain and back pain.    COMPARISON: 7/16/2014.    TECHNIQUE: Thin-section contiguous transaxial images were obtained through the abdomen and pelvis with intravenous  contrast. Multiplanar reformations were also obtained through the abdomen and pelvis.  A total of 100 cc of Omnipaque 350 were administered intravenously for this study. No oral contrast was administered.    FINDINGS:     Liver: There is a small cyst in the right lobe of the liver unchanged. The liver otherwise appears normal.    Spleen: Normal.    Pancreas: Normal.    Adrenal Glands: Normal.    Kidneys: There is bilateral symmetric renal excretion without obstruction. Small bilateral renal cysts are unchanged.    Gallbladder: Normal CT appearance.    Lymph Nodes: No lymphadenopathy in the abdomen or pelvis.    Bowel: No abnormally dilated or thickened loops of bowel seen. There are some scattered  colonic diverticula predominantly involving the left colon without CT evidence of diverticulitis. Normal appendix is visualized.    No acute inflammatory changes seen within the abdomen or pelvis. No ascites. Small fat-containing left inguinal hernia.    The lung bases are clear.    Interval L4-S1 lumbar fusion and posterior laminectomy. There are bilateral surgical screws also seen across the sacroiliac joints.    IMPRESSION  IMPRESSION:   No acute abnormality of the abdomen or pelvis. No inflammatory changes.      REPORT SIGNED BY KAI DURANT M.D.          ASSESSMENT/PLAN:    Oleg Ford is a 59 year old male who presents for follow up of nausea and dry heaves.    Concern for gastroparesis on previous upper endoscopy related to his narcotic medication use for back pain.  Reviewed gastroparesis and its management.  Advised patient to adjust his diet.  Recommended 5-6 small meals avoiding foods high in fiber and fats.  Also advised to avoid raw vegetables as this can be harder to digest.  Recommended nutrition referral to assist in necessary dietary changes. Recommend to decrease chronic pain medication use if possible.  Patient states he is working with pain management to reduce his doses of medication.  He is due to have a repeat upper endoscopy at this time previous upper endoscopy in November 2018 with poor visualization of the entire gastric body due to retained food.  He is to have a 36-hour liquid diet prior to upper endoscopy.  He is also to have clearance from primary care and/or pulmonologist prior to egd.     Follow up in 2-3 months     Nausea  - ondansetron (ZOFRAN) 4 MG tablet  Dispense: 30 tablet; Refill: 1  Heaving  Early satiety      Orders Placed This Encounter   Procedures     GASTROENTEROLOGY ADULT REF PROCEDURE ONLY [facility] [phone] (Dr. Guy with [facility])     NUTRITION REFERRAL         Thank you for this consultation.  It was a pleasure to participate in the care of this  patient; please contact us with any further questions.      This note was created with voice recognition software, and while reviewed for accuracy, typos may remain.     Jay Bailey PA-C  Gastroenterology   [facility]

## 2019-05-23 ENCOUNTER — HOSPITAL ENCOUNTER (OUTPATIENT)
Facility: AMBULATORY SURGERY CENTER | Age: 59
End: 2019-05-23
Attending: INTERNAL MEDICINE
Payer: COMMERCIAL

## 2019-05-29 ENCOUNTER — TELEPHONE (OUTPATIENT)
Dept: FAMILY MEDICINE | Facility: CLINIC | Age: 59
End: 2019-05-29

## 2019-05-29 DIAGNOSIS — N45.1 EPIDIDYMITIS: Primary | ICD-10-CM

## 2019-05-29 NOTE — TELEPHONE ENCOUNTER
Reason for Call:  Other Referral    Detailed comments: Oleg needs a referral for Infections Disease in [facility], Dr Lorena Dimas phone # [phone]. Could you please call and let him know when this has been sent so he can make appointment. Thank you     Phone Number Patient can be reached at: Home number on file [phone] (home)    Best Time: Any    Can we leave a detailed message on this number? YES    Call taken on 5/29/2019 at 1:02 PM by Jerson Calvert        Infection disease    Lorena dimas [phone]

## 2019-05-31 ENCOUNTER — TELEPHONE (OUTPATIENT)
Dept: FAMILY MEDICINE | Facility: CLINIC | Age: 59
End: 2019-05-31

## 2019-05-31 NOTE — TELEPHONE ENCOUNTER
Call from Kenna at [facility] looking for orders for patient. Please call her back at [phone].     Thank you,   Teri Camacho   [facility]  [phone]

## 2019-05-31 NOTE — TELEPHONE ENCOUNTER
What orders are they looking for?  I did place the infectious disease referral yesterday.  Can be printed and faxed if need be

## 2019-06-01 ENCOUNTER — NURSE TRIAGE (OUTPATIENT)
Dept: NURSING | Facility: CLINIC | Age: 59
End: 2019-06-01

## 2019-06-01 ENCOUNTER — TELEPHONE (OUTPATIENT)
Dept: NURSING | Facility: CLINIC | Age: 59
End: 2019-06-01

## 2019-06-02 NOTE — TELEPHONE ENCOUNTER
"Pt states he has had pain in L testicle which he saw his PCP. PCP referred to I.D. Has appt w/ ID scheduled in 3 days (Tues, 6/4). Today pt says pain is much more severe. States pain is 9 out of 10 even lying down and after ibuprofen and ice pack. \"I don't think I can make it until Tues\" No swelling of testicle/scrotum. No fever. Advised ED now.     Reason for Disposition    SEVERE pain (e.g., excruciating)    Additional Information    Negative: [1] Rash or color change of scrotum BUT [2] no swelling or pain    Negative: Inguinal hernia previously diagnosed by a physician    Negative: Followed a genital injury (e.g., penis, scrotum)    Negative: Swelling of scrotum is main symptom    Protocols used: SCROTAL PAIN-A-AH      "

## 2019-06-05 ENCOUNTER — OFFICE VISIT (OUTPATIENT)
Dept: FAMILY MEDICINE | Facility: CLINIC | Age: 59
End: 2019-06-05
Payer: COMMERCIAL

## 2019-06-05 VITALS
SYSTOLIC BLOOD PRESSURE: 122 MMHG | HEIGHT: 72 IN | BODY MASS INDEX: 34.67 KG/M2 | OXYGEN SATURATION: 99 % | TEMPERATURE: 97.7 F | HEART RATE: 80 BPM | WEIGHT: 256 LBS | DIASTOLIC BLOOD PRESSURE: 88 MMHG

## 2019-06-05 DIAGNOSIS — G89.4 CHRONIC PAIN SYNDROME: ICD-10-CM

## 2019-06-05 DIAGNOSIS — M51.369 DDD (DEGENERATIVE DISC DISEASE), LUMBAR: ICD-10-CM

## 2019-06-05 DIAGNOSIS — N45.1 LEFT EPIDIDYMITIS: ICD-10-CM

## 2019-06-05 DIAGNOSIS — F33.2 MAJOR DEPRESSIVE DISORDER, RECURRENT, SEVERE WITHOUT PSYCHOTIC FEATURES (H): ICD-10-CM

## 2019-06-05 DIAGNOSIS — M53.3 SI (SACROILIAC) JOINT DYSFUNCTION: ICD-10-CM

## 2019-06-05 DIAGNOSIS — R11.0 CHRONIC NAUSEA: ICD-10-CM

## 2019-06-05 DIAGNOSIS — Z01.818 PREOP GENERAL PHYSICAL EXAM: Primary | ICD-10-CM

## 2019-06-05 PROCEDURE — 20552 NJX 1/MLT TRIGGER POINT 1/2: CPT | Performed by: FAMILY MEDICINE

## 2019-06-05 PROCEDURE — 99215 OFFICE O/P EST HI 40 MIN: CPT | Mod: 25 | Performed by: FAMILY MEDICINE

## 2019-06-05 RX ORDER — MORPHINE SULFATE 15 MG/1
15 TABLET, FILM COATED, EXTENDED RELEASE ORAL DAILY
Qty: 30 TABLET | Refills: 0 | Status: SHIPPED | OUTPATIENT
Start: 2019-06-14 | End: 2019-07-12

## 2019-06-05 RX ORDER — GABAPENTIN 100 MG/1
CAPSULE ORAL
Qty: 60 CAPSULE | Refills: 0 | Status: SHIPPED | OUTPATIENT
Start: 2019-06-05 | End: 2019-07-02

## 2019-06-05 RX ORDER — DIAZEPAM 5 MG
5 TABLET ORAL DAILY PRN
Qty: 30 TABLET | Refills: 0 | Status: SHIPPED | OUTPATIENT
Start: 2019-06-14 | End: 2019-07-12

## 2019-06-05 RX ORDER — TRIAMCINOLONE ACETONIDE 40 MG/ML
40 INJECTION, SUSPENSION INTRA-ARTICULAR; INTRAMUSCULAR ONCE
Status: DISCONTINUED | OUTPATIENT
Start: 2019-06-05 | End: 2021-08-06

## 2019-06-05 RX ORDER — CEPHALEXIN 500 MG/1
500 CAPSULE ORAL 3 TIMES DAILY
Qty: 30 CAPSULE | Refills: 0 | Status: SHIPPED | OUTPATIENT
Start: 2019-06-05 | End: 2019-08-19

## 2019-06-05 RX ORDER — IBUPROFEN 600 MG/1
600 TABLET, FILM COATED ORAL EVERY 6 HOURS PRN
Qty: 120 TABLET | Refills: 2 | Status: SHIPPED | OUTPATIENT
Start: 2019-06-05 | End: 2019-09-16

## 2019-06-05 RX ORDER — MORPHINE SULFATE 30 MG/1
30 TABLET, FILM COATED, EXTENDED RELEASE ORAL 2 TIMES DAILY
Qty: 60 TABLET | Refills: 0 | Status: SHIPPED | OUTPATIENT
Start: 2019-06-05 | End: 2019-07-12

## 2019-06-05 RX ORDER — HYDROMORPHONE HYDROCHLORIDE 4 MG/1
8 TABLET ORAL 4 TIMES DAILY PRN
Qty: 180 TABLET | Refills: 0 | Status: SHIPPED | OUTPATIENT
Start: 2019-06-14 | End: 2019-07-12

## 2019-06-05 ASSESSMENT — MIFFLIN-ST. JEOR: SCORE: 2028.18

## 2019-06-05 NOTE — PROGRESS NOTES
[address] [address]  [phone]  Dept: [phone]    PRE-OP EVALUATION:  Today's date: 2019    Oleg Ford (: 1960) presents for pre-operative evaluation assessment as requested by Dr. Guy.  He requires evaluation and anesthesia risk assessment prior to undergoing surgery/procedure for treatment of chronic nausea and early satiety.    Proposed Surgery/ Procedure: ESOPHAGOGASTRODUODENOSCOPY, WITH CO2 INSUFFLATION  Date of Surgery/ Procedure: 19  Time of Surgery/ Procedure: 1:45PM  Hospital/Surgical Facility: [facility]  Fax number for surgical facility: Available Online   Primary Physician: Cat Shaw  Type of Anesthesia Anticipated: Monitor Anesthesia Care    Patient has a Health Care Directive or Living Will:  YES, but patient would like to update Will    1. NO - Do you have a history of heart attack, stroke, stent, bypass or surgery on an artery in the head, neck, heart or legs?  2. YES - DO YOU EVER HAVE ANY PAIN OR DISCOMFORT IN YOUR CHEST? SHORTNESS OF BREATH -working with pulmonary  3. NO - Do you have a history of  Heart Failure?  4. YES - ARE YOUR TROUBLED BY SHORTNESS OF BREATH WHEN WALKING ON THE LEVEL, UP A SLIGHT HILL OR AT NIGHT?  As above  5. NO - Do you currently have a cold, bronchitis or other respiratory infection?  6. NO - Do you have a cough, shortness of breath or wheezing?  7. NO - Do you sometimes get pains in the calves of your legs when you walk?  8. NO - Do you or anyone in your family have previous history of blood clots?  9. NO - Do you or does anyone in your family have a serious bleeding problem such as prolonged bleeding following surgeries or cuts?  10. NO - Have you ever had problems with anemia or been told to take iron pills?  11. NO - Have you had any abnormal blood loss such as black, tarry or bloody stools, or abnormal vaginal bleeding?  12. NO - Have you ever had a  blood transfusion?  13. NO - Have you or any of your relatives ever had problems with anesthesia?  14. NO - Do you have sleep apnea, excessive snoring or daytime drowsiness?  15. NO - Do you have any prosthetic heart valves?  16. NO - Do you have prosthetic joints?  17. NO - Is there any chance that you may be pregnant?      HPI:     HPI related to upcoming procedure:   Chronic nausea and early satiety.  Underwent EGD previously but there is quite a bit of food still in the stomach and full evaluation was unable to be done.  Chronic gastroparesis possible etiology but also looking for ulcers or other disease as well.    DEPRESSION - Patient has a long history of Depression of moderate severity requiring medication for control with recent symptoms being stable..Current symptoms of depression include depressed mood, hopelessness.                                                                                                                                                                                    .    Chronic back pain has been his primary issue.  On scheduled narcotics, working to taper these down.    MEDICAL HISTORY:     Patient Active Problem List    Diagnosis Date Noted     Thoracic aortic aneurysm without rupture (H) 02/18/2019     Priority: Medium     4.3 cm  Found incidentally during chest CT for unrelated reasons.  1) control cardiovascular risks with consideration for ARB and statin therapy  2) plan to recheck with echocardiogram in August 2019.  If stable can follow annually.       Dysphagia, unspecified type 10/25/2018     Priority: Medium     Weight loss 10/25/2018     Priority: Medium     Intractable vomiting with nausea, unspecified vomiting type 10/25/2018     Priority: Medium     Morbid obesity (H) 06/06/2018     Priority: Medium     Advance Care Planning 01/24/2018     Priority: Medium     SI (sacroiliac) joint dysfunction 10/19/2017     Priority: Medium     Gastroesophageal reflux disease  with esophagitis 01/25/2017     Priority: Medium     Major depressive disorder, recurrent, severe without psychotic features (H) 02/22/2016     Priority: Medium     S/P lumbar fusion 01/12/2016     Priority: Medium     Chronic pain syndrome 12/01/2015     Priority: Medium     Patient is followed by NORY LINARES for ongoing prescription of pain medication.  All refills should be approved by this provider, or covering partner.    Medication(s): MS Contin 30 / dilaudid 4  Maximum quantity per month: 90 / 180 = 186 MED    Plan per Pain Team:  1. Medication Management:   1. Continue Flexeril 10mg TID PRN as managed by Primary Care Provider  2. Continue Dilaudid 4-8mg QID PRN as managed by PCP. I would recommend tapering down to half the current dose. This can be done by reducing patient's dosage by 2mg/day every 2-4 weeks  3. Continue MSContin 30mg TID as managed by PCP. Discussed with the patient that he must take this medication consistently as he has been using it intermittently. I would recommend tapering down to BID. This can be done by having patient take 30mg twice daily and 15mg once daily for 2-4 weeks, then down to 30mg Q 12 hours.   4. In general, usually long-acting is reduced first, then short-acting medication.   5. IF patient is planning on having repeat surgery, I would work on reducing patient doses every 2 weeks vs Q 4 weeks. Being on high dose opiates can make post-op pain management markedly more challenging.  6. Additionally, patient should continue to have regular urine drug screening and sign controlled substance agreements with his prescriber of controlled substances  7. I agree with patient having a script for naloxone nasal spray for opiate reversal given high dose opiate use.   8. Could consider patient for medical cannabis certification, this can be helpful for pain and help patient's be able to taper their opiate doses easier    Narcan prescribed   Clinic visit frequency required: Q  3 months     Controlled substance agreement on file: Yes       Date(s): 12/1/15, 4/24/19    Pain Clinic evaluation in the past: Yes    DIRE Total Score(s):    12/1/2015   Total Score 18       Last [facility] website verification:  done on 12/1/15  4/24/19     [email or web address]        Simple hepatic cyst 03/16/2015     Priority: Medium     Simple renal cyst 03/16/2015     Priority: Medium     Esophageal cyst 09/05/2014     Priority: Medium     RLS (restless legs syndrome) 01/29/2013     Priority: Medium     DDD (degenerative disc disease), lumbar 10/18/2012     Priority: Medium     CARDIOVASCULAR SCREENING; LDL GOAL LESS THAN 130 10/31/2010     Priority: Medium     Chronic low back pain 07/19/2010     Priority: Medium      Past Medical History:   Diagnosis Date     Depression, major      Esophageal mass      History of orchiectomy 1982     History of vasectomy 1991     Low back pain      Other chronic pain     Chronic low back pain.     Restless leg      Sleep apnea     had surgery done to resolve sleep apnea     Past Surgical History:   Procedure Laterality Date     BACK SURGERY  2/6/2013    lumbar surgery     BACK SURGERY      spinal stimulator     COLONOSCOPY       COMBINED ESOPHAGOSCOPY, GASTROSCOPY, DUODENOSCOPY (EGD) WITH CO2 INSUFFLATION N/A 11/14/2018    Procedure: COMBINED ESOPHAGOSCOPY, GASTROSCOPY, DUODENOSCOPY (EGD) WITH CO2 INSUFFLATION;  Surgeon: Rosendo Guy MD;  Location: [facility] OR     ESOPHAGOSCOPY, GASTROSCOPY, DUODENOSCOPY (EGD), COMBINED N/A 11/14/2018    Procedure: COMBINED ESOPHAGOSCOPY, GASTROSCOPY, DUODENOSCOPY (EGD), BIOPSY SINGLE OR MULTIPLE;  Surgeon: Rosendo Guy MD;  Location: [facility] OR     FUSION SACRAL ILIAC Right 10/19/2017    Procedure: FUSION SACRAL ILIAC;  Right  side sacroiliac joint fusion with 2 implants ;  Surgeon: Sebastián Szymanski MD;  Location: [facility] OR     FUSION SACRAL ILIAC Left 1/11/2018    Procedure: FUSION SACRAL ILIAC;   left  side sacroiliac  joint fusion with 2 implants.(Zyga)   extraction and use of illac bone graft ;  Surgeon: Sebastián Szymanski MD;  Location: [facility] OR     [facility] UGI ENDOSCOPY W EUS N/A 7/31/2014    Procedure: COMBINED ENDOSCOPIC ULTRASOUND, ESOPHAGOSCOPY, GASTROSCOPY, DUODENOSCOPY (EGD);  Surgeon: Shorty Stoll MD;  [facility]     ORCHIECTOMY INGUINAL      right radical orchiectomy     REMOVE STIMULATOR VAGUS NERVE  12/23/2011    Procedure:REMOVE STIMULATOR VAGUS NERVE; Vagus Nerve Stimulator Removal; Surgeon:ALEXIS BECERRA; Location:[facility] OR     REMOVE STIMULATOR VAGUS NERVE  11/8/2013    Procedure: REMOVE STIMULATOR VAGUS NERVE;  Removal Of Vagus Nerve Stimulator Lead In Left Neck ;  Surgeon: Alexis Becerra MD;  Location: [facility] OR     SURGICAL HISTORY OF -       vagal nerve implant; removed     SURGICAL HISTORY OF -       UPPP     VASECTOMY       Current Outpatient Medications   Medication Sig Dispense Refill     budesonide-formoterol (SYMBICORT) 160-4.5 MCG/ACT Inhaler Inhale 2 puffs into the lungs 2 times daily       buPROPion (WELLBUTRIN SR) 150 MG 12 hr tablet Take 150 mg by mouth       cyclobenzaprine (FLEXERIL) 10 MG tablet TAKE 1 TABLET BY MOUTH THREE TIMES DAILY AS NEEDED FOR MUSCLE SPASMS 90 tablet 1     diazepam (VALIUM) 5 MG tablet Take 1 tablet (5 mg) by mouth daily as needed for anxiety or sleep 30 tablet 0     EPINEPHrine (EPIPEN) 0.3 MG/0.3ML injection Inject 0.3 mLs (0.3 mg) into the muscle once as needed for anaphylaxis 1 each 2     Escitalopram Oxalate (LEXAPRO PO) Take 10 mg by mouth daily       HYDROmorphone (DILAUDID) 4 MG tablet Take 2 tablets (8 mg) by mouth 4 times daily as needed for moderate to severe pain (every 4-6hour prn) 180 tablet 0     hydrOXYzine (ATARAX) 50 MG tablet Take 1 tablet (50 mg) by mouth every 6 hours as needed for anxiety 120 tablet 3     lamoTRIgine (LAMICTAL) 150 MG tablet Take 250 mg by mouth daily        morphine (MS CONTIN) 30 MG CR tablet Take 1 tablet (30 mg) by mouth 3 times daily  "90 tablet 0     naloxone (NARCAN) nasal spray Spray 1 spray (4 mg) into one nostril alternating nostrils as needed for opioid reversal every 2-3 minutes until assistance arrives 0.2 mL 0     omeprazole (PRILOSEC) 40 MG DR capsule TAKE ONE CAPSULE BY MOUTH EVERY DAY 30 TO 60 MINUTES BEFORE A MEAL 30 capsule 9     ondansetron (ZOFRAN) 4 MG tablet Take 1 tablet (4 mg) by mouth every 8 hours as needed for nausea 30 tablet 1     ondansetron (ZOFRAN-ODT) 4 MG ODT tab PLACE 1 OR 2 TABLETS UNDER THE TONGUE 3 TIMES A DAY AS NEEDED FOR NAUSEA 20 tablet 1     oxybutynin (DITROPAN) 5 MG tablet Take 1-2 tablets (5-10 mg) by mouth 2 times daily 120 tablet 11     promethazine (PHENERGAN) 25 MG tablet Take 1 tablet (25 mg) by mouth every 6 hours as needed for nausea 20 tablet 5     rOPINIRole (REQUIP) 0.25 MG tablet Take 1-2 tablets (0.25-0.5 mg) by mouth At Bedtime 60 tablet 2     tolterodine (DETROL) 1 MG tablet Take 1 mg by mouth 2 times daily       TRELEGY ELLIPTA 100-62.5-25 MCG/INH oral inhaler INHALE 1 PUFF BY MOUTH ONCE EVERY DAY AT THE SAME TIME EACH DAY  6     zolpidem (AMBIEN) 10 MG tablet Take 1 tablet (10 mg) by mouth nightly as needed for sleep 30 tablet 5     albuterol (PROAIR HFA/PROVENTIL HFA/VENTOLIN HFA) 108 (90 Base) MCG/ACT inhaler Inhale 2 puffs into the lungs every 4 hours as needed for shortness of breath / dyspnea (Patient not taking: Reported on 5/15/2019) 1 Inhaler 0     OTC products: None, except as noted above    Allergies   Allergen Reactions     Ciprofloxacin Anaphylaxis     Doxycycline Anaphylaxis     Lyrica [Pregabalin]      Felt \"out of his mind\" and confused     Septra [Bactrim] Anaphylaxis     Amoxicillin      itching     Nuts      Brazil nuts      Latex Allergy: NO    Social History     Tobacco Use     Smoking status: Former Smoker     Packs/day: 0.00     Years: 40.00     Pack years: 0.00     Types: Cigarettes     Smokeless tobacco: Never Used     Tobacco comment: Last cigarette was October 17, " "2018   Substance Use Topics     Alcohol use: Yes     Comment: 4 drinks a year     History   Drug Use No     Comment: morphine, dilaudid       REVIEW OF SYSTEMS:   CONSTITUTIONAL: NEGATIVE for fever, chills, change in weight  INTEGUMENTARY/SKIN: NEGATIVE for worrisome rashes, moles or lesions  EYES: NEGATIVE for vision changes or irritation  ENT/MOUTH: NEGATIVE for ear, mouth and throat problems  RESP: NEGATIVE for significant cough or SOB  BREAST: NEGATIVE for masses, tenderness or discharge  CV: NEGATIVE for chest pain, palpitations or peripheral edema  GI: As above  : NEGATIVE for frequency, dysuria, or hematuria  MUSCULOSKELETAL: Chronic back pain as above  NEURO: NEGATIVE for weakness, dizziness or paresthesias  ENDOCRINE: NEGATIVE for temperature intolerance, skin/hair changes  HEME: NEGATIVE for bleeding problems  PSYCHIATRIC: Stable depression as above    EXAM:   /88 (BP Location: Right arm, Patient Position: Chair, Cuff Size: Adult Large)   Pulse 80   Temp 97.7  F (36.5  C) (Oral)   Ht 1.835 m (6' 0.25\")   Wt 116.1 kg (256 lb)   SpO2 99%   BMI 34.48 kg/m      GENERAL APPEARANCE: healthy, alert and no distress     EYES: EOMI,  PERRL     HENT: ear canals and TM's normal and nose and mouth without ulcers or lesions     NECK: no adenopathy, no asymmetry, masses, or scars and thyroid normal to palpation     RESP: lungs clear to auscultation - no rales, rhonchi or wheezes     CV: regular rates and rhythm, normal S1 S2, no S3 or S4 and no murmur, click or rub     ABDOMEN:  soft, nontender, no HSM or masses and bowel sounds normal     MS: extremities normal- no gross deformities noted, no evidence of inflammation in joints, FROM in all extremities.     SKIN: no suspicious lesions or rashes     NEURO: Normal strength and tone, sensory exam grossly normal, mentation intact and speech normal     PSYCH: mentation appears normal. and affect normal/bright     LYMPHATICS: No cervical " adenopathy    DIAGNOSTICS:   Recent EKGs on file.  No further lab work needed    Recent Labs   Lab Test 02/15/19 12/18/18  1105 10/25/18  1439  12/01/17  1145 10/20/17  0708 10/06/17  1318   HGB  --  15.2 15.4   < >  --  12.8* 15.4   PLT  --  273 298  --   --  211 271   INR 0.98 0.97  --   --   --   --  1.06   NA  --   --  139  --   --  133 139   POTASSIUM 3.8  --  3.8  --   --  3.8 4.0   CR 0.96  --  1.16  --   --  1.08 1.24   A1C  --   --   --   --  5.5  --  5.6    < > = values in this interval not displayed.        IMPRESSION:   Reason for surgery/procedure: Chronic nausea and early satiety  Diagnosis/reason for consult: Preoperative clearance    The proposed surgical procedure is considered LOW risk.    REVISED CARDIAC RISK INDEX  The patient has the following serious cardiovascular risks for perioperative complications such as (MI, PE, VFib and 3  AV Block):  No serious cardiac risks  INTERPRETATION: 0 risks: Class I (very low risk - 0.4% complication rate)    The patient has the following additional risks for perioperative complications:  High tolerance to opioid analgesics due to chronic usage      ICD-10-CM    1. Preop general physical exam Z01.818    2. Chronic nausea R11.0    3. DDD (degenerative disc disease), lumbar M51.36    4. Chronic pain syndrome G89.4        RECOMMENDATIONS:         --Patient is to take all scheduled medications on the day of surgery.    APPROVAL GIVEN to proceed with proposed procedure, without further diagnostic evaluation       Signed Electronically by: Cat Shaw MD    Copy of this evaluation report is provided to requesting physician.    [place] Preop Guidelines    Revised Cardiac Risk Index

## 2019-06-05 NOTE — PROGRESS NOTES
Patient also had recurrence of significant trigger points in the low back that have responded well to cortisone injection in the past.  Identified one site in the lumbar region bilaterally and these were injected with 2-1/2 cc of a 4-1 mixture of lidocaine 1% and Kenalog 40.  No complications.    We will be following his recommended taper plan and starting gabapentin as well.  SANTIAGO Shaw M.D.

## 2019-06-06 ENCOUNTER — TRANSFERRED RECORDS (OUTPATIENT)
Dept: HEALTH INFORMATION MANAGEMENT | Facility: CLINIC | Age: 59
End: 2019-06-06

## 2019-06-11 RX ORDER — DEXAMETHASONE SODIUM PHOSPHATE 4 MG/ML
4 INJECTION, SOLUTION INTRA-ARTICULAR; INTRALESIONAL; INTRAMUSCULAR; INTRAVENOUS; SOFT TISSUE EVERY 10 MIN PRN
Status: CANCELLED | OUTPATIENT
Start: 2019-06-11

## 2019-06-11 RX ORDER — HYDRALAZINE HYDROCHLORIDE 20 MG/ML
2.5-5 INJECTION INTRAMUSCULAR; INTRAVENOUS EVERY 10 MIN PRN
Status: CANCELLED | OUTPATIENT
Start: 2019-06-11

## 2019-06-11 RX ORDER — ONDANSETRON 2 MG/ML
4 INJECTION INTRAMUSCULAR; INTRAVENOUS EVERY 30 MIN PRN
Status: CANCELLED | OUTPATIENT
Start: 2019-06-11

## 2019-06-11 RX ORDER — ONDANSETRON 4 MG/1
4 TABLET, ORALLY DISINTEGRATING ORAL EVERY 30 MIN PRN
Status: CANCELLED | OUTPATIENT
Start: 2019-06-11

## 2019-06-11 RX ORDER — SODIUM CHLORIDE, SODIUM LACTATE, POTASSIUM CHLORIDE, CALCIUM CHLORIDE 600; 310; 30; 20 MG/100ML; MG/100ML; MG/100ML; MG/100ML
INJECTION, SOLUTION INTRAVENOUS CONTINUOUS
Status: CANCELLED | OUTPATIENT
Start: 2019-06-11

## 2019-06-11 RX ORDER — NALOXONE HYDROCHLORIDE 0.4 MG/ML
.1-.4 INJECTION, SOLUTION INTRAMUSCULAR; INTRAVENOUS; SUBCUTANEOUS
Status: CANCELLED | OUTPATIENT
Start: 2019-06-11 | End: 2019-06-12

## 2019-06-11 RX ORDER — ALBUTEROL SULFATE 0.83 MG/ML
2.5 SOLUTION RESPIRATORY (INHALATION) EVERY 4 HOURS PRN
Status: CANCELLED | OUTPATIENT
Start: 2019-06-11

## 2019-06-11 RX ORDER — MEPERIDINE HYDROCHLORIDE 25 MG/ML
12.5 INJECTION INTRAMUSCULAR; INTRAVENOUS; SUBCUTANEOUS
Status: CANCELLED | OUTPATIENT
Start: 2019-06-11

## 2019-06-11 RX ORDER — PHYSOSTIGMINE SALICYLATE 1 MG/ML
1.2 INJECTION INTRAVENOUS
Status: CANCELLED | OUTPATIENT
Start: 2019-06-11

## 2019-07-01 ENCOUNTER — TELEPHONE (OUTPATIENT)
Dept: FAMILY MEDICINE | Facility: CLINIC | Age: 59
End: 2019-07-01

## 2019-07-01 NOTE — TELEPHONE ENCOUNTER
Reason for call:  Medication   If this is a refill request, has the caller requested the refill from the pharmacy already? Asking for end dates for scripts  Will the patient be using a [facility]? N/A  Name of the pharmacy and phone number for the current request:     Name of the medication requested: N/A    Other request: Patient is hospitalized at [facility] for behavioral issues. Unable to make appointment on 7/5/2019. Patient hospitalized on 6/12/2019.    Phone number to reach patient:  Other phone number:  [phone]    Best Time:  ANYTIME    Can we leave a detailed message on this number?  YES

## 2019-07-01 NOTE — TELEPHONE ENCOUNTER
Spoke with pt, pt states he has 3 prescriptions in his car that he has not filled.  Pt states he is in the [facility], unsure when he will be let out.  Wondering if he can fill these Rxs when he gets out, or if he will have to make an appt with Dr. Shaw to get new ones.    Pt can be reached at [phone]  Sally ALFARO, Patient Care

## 2019-07-01 NOTE — TELEPHONE ENCOUNTER
Those will certainly be fine to fill.  And let him know I have been following along and hope he is doing well

## 2019-07-02 DIAGNOSIS — G89.4 CHRONIC PAIN SYNDROME: ICD-10-CM

## 2019-07-02 DIAGNOSIS — M51.369 DDD (DEGENERATIVE DISC DISEASE), LUMBAR: ICD-10-CM

## 2019-07-02 RX ORDER — GABAPENTIN 100 MG/1
200 CAPSULE ORAL AT BEDTIME
Qty: 60 CAPSULE | Refills: 1 | Status: SHIPPED | OUTPATIENT
Start: 2019-07-02 | End: 2019-09-06

## 2019-07-02 NOTE — TELEPHONE ENCOUNTER
Requested Prescriptions   Pending Prescriptions Disp Refills     gabapentin (NEURONTIN) 100 MG capsule [Pharmacy Med Name: GABAPENTIN 100 MG CAPSULE]  Last Written Prescription Date:  6/5/19  Last Fill Quantity: 60 capsule,  # refills: 0   Last office visit: 6/5/2019 with prescribing provider:  Dr. Shaw   Future Office Visit:    Routing refill request to provider for review/approval because:  Drug not on the [facility], [facility] or [facility] refill protocol or controlled substance   60 capsule 0     Sig: TAKE 1 CAPSULE (100 MG) BY MOUTH AT BEDTIME FOR 7 DAYS, THEN 2 CAPSULES (200 MG) AT BEDTIME.       There is no refill protocol information for this order

## 2019-07-02 NOTE — TELEPHONE ENCOUNTER
Routing refill request to provider for review/approval because:  Drug not on the [facility] refill protocol   Sigrid Rhodes RN

## 2019-07-11 DIAGNOSIS — M51.369 DDD (DEGENERATIVE DISC DISEASE), LUMBAR: ICD-10-CM

## 2019-07-11 DIAGNOSIS — G89.4 CHRONIC PAIN SYNDROME: ICD-10-CM

## 2019-07-11 DIAGNOSIS — F33.2 MAJOR DEPRESSIVE DISORDER, RECURRENT, SEVERE WITHOUT PSYCHOTIC FEATURES (H): ICD-10-CM

## 2019-07-11 DIAGNOSIS — M53.3 SI (SACROILIAC) JOINT DYSFUNCTION: ICD-10-CM

## 2019-07-11 NOTE — TELEPHONE ENCOUNTER
Patient is now in [facility] he was hospitaled at [facility] for 1 month.  He needs the following reprinted as the date is June 5 2019 and the Pharmacy will not honor a prescription more than 30 days old.    MS CONTIN 30 mg  MS CONTIN 15 mg  Dilaudid         4 mg  Valium           5 mg     Patient has original hard copies to exchange.  Patient is all out of this medication.        Reason for call:  Other   Patient called regarding (reason for call): Pt needs scripts for morphine 30/15 dilated 4mg valium ; wants to  in the morning  Additional comments:     Phone number to reach patient:  Home number on file [phone] (home)    Best Time:  any    Can we leave a detailed message on this number?  YES

## 2019-07-12 ENCOUNTER — OFFICE VISIT (OUTPATIENT)
Dept: FAMILY MEDICINE | Facility: CLINIC | Age: 59
End: 2019-07-12
Payer: COMMERCIAL

## 2019-07-12 VITALS
HEIGHT: 72 IN | TEMPERATURE: 98.4 F | BODY MASS INDEX: 34.67 KG/M2 | WEIGHT: 256 LBS | OXYGEN SATURATION: 97 % | DIASTOLIC BLOOD PRESSURE: 80 MMHG | SYSTOLIC BLOOD PRESSURE: 112 MMHG | HEART RATE: 96 BPM

## 2019-07-12 DIAGNOSIS — G89.4 CHRONIC PAIN SYNDROME: ICD-10-CM

## 2019-07-12 DIAGNOSIS — G47.00 INSOMNIA, UNSPECIFIED TYPE: ICD-10-CM

## 2019-07-12 DIAGNOSIS — M53.3 SI (SACROILIAC) JOINT DYSFUNCTION: ICD-10-CM

## 2019-07-12 DIAGNOSIS — M51.369 DDD (DEGENERATIVE DISC DISEASE), LUMBAR: ICD-10-CM

## 2019-07-12 DIAGNOSIS — F33.2 MAJOR DEPRESSIVE DISORDER, RECURRENT, SEVERE WITHOUT PSYCHOTIC FEATURES (H): Primary | ICD-10-CM

## 2019-07-12 PROCEDURE — 99000 SPECIMEN HANDLING OFFICE-LAB: CPT | Performed by: NURSE PRACTITIONER

## 2019-07-12 PROCEDURE — 80307 DRUG TEST PRSMV CHEM ANLYZR: CPT | Mod: 90 | Performed by: NURSE PRACTITIONER

## 2019-07-12 PROCEDURE — 99214 OFFICE O/P EST MOD 30 MIN: CPT | Performed by: NURSE PRACTITIONER

## 2019-07-12 RX ORDER — MORPHINE SULFATE 15 MG/1
15 TABLET, FILM COATED, EXTENDED RELEASE ORAL DAILY
Qty: 30 TABLET | Refills: 0 | Status: CANCELLED | OUTPATIENT
Start: 2019-07-12

## 2019-07-12 RX ORDER — MORPHINE SULFATE 30 MG/1
30 TABLET, FILM COATED, EXTENDED RELEASE ORAL 2 TIMES DAILY
Qty: 60 TABLET | Refills: 0 | Status: CANCELLED | OUTPATIENT
Start: 2019-07-12

## 2019-07-12 RX ORDER — ESCITALOPRAM OXALATE 20 MG/1
20 TABLET ORAL DAILY
Qty: 30 TABLET | Refills: 1 | Status: SHIPPED | OUTPATIENT
Start: 2019-07-12 | End: 2019-09-16

## 2019-07-12 RX ORDER — TRAZODONE HYDROCHLORIDE 50 MG/1
50 TABLET, FILM COATED ORAL
Qty: 30 TABLET | Refills: 1 | Status: SHIPPED | OUTPATIENT
Start: 2019-07-12 | End: 2019-09-16

## 2019-07-12 RX ORDER — DIAZEPAM 5 MG
5 TABLET ORAL DAILY PRN
Qty: 30 TABLET | Refills: 0 | Status: CANCELLED | OUTPATIENT
Start: 2019-07-12

## 2019-07-12 RX ORDER — HYDROMORPHONE HYDROCHLORIDE 4 MG/1
TABLET ORAL
Qty: 165 TABLET | Refills: 0 | Status: SHIPPED | OUTPATIENT
Start: 2019-07-12 | End: 2019-08-19

## 2019-07-12 RX ORDER — MORPHINE SULFATE 30 MG/1
30 TABLET, FILM COATED, EXTENDED RELEASE ORAL EVERY 12 HOURS
Qty: 60 TABLET | Refills: 0 | Status: SHIPPED | OUTPATIENT
Start: 2019-07-12 | End: 2019-08-19

## 2019-07-12 RX ORDER — MORPHINE SULFATE 15 MG/1
15 TABLET, FILM COATED, EXTENDED RELEASE ORAL DAILY
Qty: 30 TABLET | Refills: 0 | Status: SHIPPED | OUTPATIENT
Start: 2019-07-12 | End: 2019-08-19

## 2019-07-12 RX ORDER — HYDROMORPHONE HYDROCHLORIDE 4 MG/1
8 TABLET ORAL 4 TIMES DAILY PRN
Qty: 180 TABLET | Refills: 0 | Status: CANCELLED | OUTPATIENT
Start: 2019-07-12

## 2019-07-12 RX ORDER — HYDROXYZINE PAMOATE 50 MG/1
50 CAPSULE ORAL EVERY 6 HOURS PRN
Qty: 60 CAPSULE | Refills: 1 | Status: SHIPPED | OUTPATIENT
Start: 2019-07-12 | End: 2019-09-16

## 2019-07-12 RX ORDER — ZOLPIDEM TARTRATE 5 MG/1
5 TABLET ORAL
Qty: 30 TABLET | Refills: 1 | Status: SHIPPED | OUTPATIENT
Start: 2019-07-12 | End: 2019-09-16

## 2019-07-12 ASSESSMENT — ANXIETY QUESTIONNAIRES
3. WORRYING TOO MUCH ABOUT DIFFERENT THINGS: SEVERAL DAYS
GAD7 TOTAL SCORE: 4
IF YOU CHECKED OFF ANY PROBLEMS ON THIS QUESTIONNAIRE, HOW DIFFICULT HAVE THESE PROBLEMS MADE IT FOR YOU TO DO YOUR WORK, TAKE CARE OF THINGS AT HOME, OR GET ALONG WITH OTHER PEOPLE: SOMEWHAT DIFFICULT
7. FEELING AFRAID AS IF SOMETHING AWFUL MIGHT HAPPEN: NOT AT ALL
5. BEING SO RESTLESS THAT IT IS HARD TO SIT STILL: NOT AT ALL
1. FEELING NERVOUS, ANXIOUS, OR ON EDGE: SEVERAL DAYS
2. NOT BEING ABLE TO STOP OR CONTROL WORRYING: SEVERAL DAYS
6. BECOMING EASILY ANNOYED OR IRRITABLE: NOT AT ALL

## 2019-07-12 ASSESSMENT — MIFFLIN-ST. JEOR: SCORE: 2028.18

## 2019-07-12 ASSESSMENT — PATIENT HEALTH QUESTIONNAIRE - PHQ9
SUM OF ALL RESPONSES TO PHQ QUESTIONS 1-9: 4
5. POOR APPETITE OR OVEREATING: SEVERAL DAYS

## 2019-07-12 NOTE — PATIENT INSTRUCTIONS
Will continue Morphine 30 mg in morning and evening and 15 mg at noon--will leave this due to he has not started the gabapentin yet.     Will decrease dilaudid to 30 mg daily per Dr. Shaw's weaning from 32mg total daily.     See Dr. Shaw in 4 weeks

## 2019-07-12 NOTE — PROGRESS NOTES
"Subjective     Oleg Ford is a 59 year old male who presents to clinic today for the following health issues:    HPI   Depression Followup    How are you doing with your depression since your last visit? No change    Are you having other symptoms that might be associated with depression? Yes:  anxiety    Have you had a significant life event?  OTHER: recent hospital admission      Are you feeling anxious or having panic attacks?   Yes:  general anxiety     Do you have any concerns with your use of alcohol or other drugs? No    Social History     Tobacco Use     Smoking status: Former Smoker     Packs/day: 0.00     Years: 40.00     Pack years: 0.00     Types: Cigarettes     Smokeless tobacco: Never Used     Tobacco comment: Last cigarette was October 17, 2018   Substance Use Topics     Alcohol use: Yes     Comment: 4 drinks a year     Drug use: No     Types: Marijuana     Comment: morphine, dilaudid     PHQ 10/5/2018 4/15/2019 7/12/2019   PHQ-9 Total Score 26 26 4   Q9: Thoughts of better off dead/self-harm past 2 weeks Nearly every day Nearly every day Not at all     GINI-7 SCORE 10/5/2018 4/15/2019 7/12/2019   Total Score - - -   Total Score 20 20 4     No flowsheet data found.  No flowsheet data found.  In the past two weeks have you had thoughts of suicide or self-harm?  No.    Do you have concerns about your personal safety or the safety of others?   No    Admitted for suicidal ideation and depression. Hearing voices. Discharged to home on 7/11/19. From the discharge summary:  \"Over the course of the hospitalization the following changes to medications were made: Start Vistaril 50mg Q6H prn for anxiety, and Trazodone 50mg QHS prn for sleep. Ambien was changed to 5mg QHS prn for sleep. Ativan, Wellbutrin, Valium, Atarax, Lamictal, and Risperdal were discontinued. Patient will continue Lexapo 20mg QD for depression. The patient tolerated these changes with the side effects as noted above, which improved " "throughout his stay and were absent at discharge.\"  \"He will follow with Soni Villanueva at [facility] in [place] for therapy, and with Dr. Shayne Fragoso for psychiatry on 7/23/19 in [place].\"    Mood is better, no thoughts of hurting himself or having a plan to do so.  He feels much better since being home.  He is looking forward to going home tonight and sleeping in his own bed.  He is sleeping at his son's last night.  He is looking for some refills of medications.        Suicide Assessment Five-step Evaluation and Treatment (SAFE-T)  Chronic/Recurring Back Pain Follow Up      Where is your back pain located? (Select all that apply) low back bilateral    How would you describe your back pain?  burning and stabbing    Where does your back pain spread? the right and left buttock    Since your last clinic visit for back pain, how has your pain changed? unchanged    Does your back pain interfere with your job? No    Since your last visit, have you tried any new treatment? No      Amount of exercise or physical activity: None    Problems taking medications regularly: No    Medication side effects: none    Diet: regular (no restrictions)      Requesting dilaudid refill: last script was 8 mg QID (180) tabs = 32 mg total dilaudid per day currently, so we will decrease to 30 mg daily.   MS contin should go down to 30 mg BID but has not started gabapentin yet.   He does take daily stool softeners.      Plan per Pain Team:  1. Medication Management:   1. Continue Flexeril 10mg TID PRN as managed by Primary Care Provider  2. Continue Dilaudid 4-8mg QID PRN as managed by PCP. I would recommend tapering down to half the current dose. This can be done by reducing patient's dosage by 2mg/day every 2-4 weeks  3. Continue MSContin 30mg TID as managed by PCP. Discussed with the patient that he must take this medication consistently as he has been using it intermittently. I would recommend tapering down to BID. This can be " done by having patient take 30mg twice daily and 15mg once daily for 2-4 weeks, then down to 30mg Q 12 hours.   4. In general, usually long-acting is reduced first, then short-acting medication.   5. IF patient is planning on having repeat surgery, I would work on reducing patient doses every 2 weeks vs Q 4 weeks. Being on high dose opiates can make post-op pain management markedly more challenging.  6. Additionally, patient should continue to have regular urine drug screening and sign controlled substance agreements with his prescriber of controlled substances  7. I agree with patient having a script for naloxone nasal spray for opiate reversal given high dose opiate use.   8. Could consider patient for medical cannabis certification, this can be helpful for pain and help patient's be able to taper their opiate doses easier        Patient Active Problem List   Diagnosis     Chronic low back pain     CARDIOVASCULAR SCREENING; LDL GOAL LESS THAN 130     DDD (degenerative disc disease), lumbar     RLS (restless legs syndrome)     Esophageal cyst     Simple hepatic cyst     Simple renal cyst     Chronic pain syndrome     S/P lumbar fusion     Major depressive disorder, recurrent, severe without psychotic features (H)     Gastroesophageal reflux disease with esophagitis     SI (sacroiliac) joint dysfunction     Advance Care Planning     Morbid obesity (H)     Dysphagia, unspecified type     Weight loss     Intractable vomiting with nausea, unspecified vomiting type     Thoracic aortic aneurysm without rupture (H)     Past Surgical History:   Procedure Laterality Date     BACK SURGERY  2/6/2013    lumbar surgery     BACK SURGERY      spinal stimulator     COLONOSCOPY       COMBINED ESOPHAGOSCOPY, GASTROSCOPY, DUODENOSCOPY (EGD) WITH CO2 INSUFFLATION N/A 11/14/2018    Procedure: COMBINED ESOPHAGOSCOPY, GASTROSCOPY, DUODENOSCOPY (EGD) WITH CO2 INSUFFLATION;  Surgeon: Rosendo Guy MD;  Location:  OR      ESOPHAGOSCOPY, GASTROSCOPY, DUODENOSCOPY (EGD), COMBINED N/A 11/14/2018    Procedure: COMBINED ESOPHAGOSCOPY, GASTROSCOPY, DUODENOSCOPY (EGD), BIOPSY SINGLE OR MULTIPLE;  Surgeon: Rosendo Guy MD;  Location: [facility] OR     FUSION SACRAL ILIAC Right 10/19/2017    Procedure: FUSION SACRAL ILIAC;  Right  side sacroiliac joint fusion with 2 implants ;  Surgeon: Sebastián Szymanski MD;  Location: [facility] OR     FUSION SACRAL ILIAC Left 1/11/2018    Procedure: FUSION SACRAL ILIAC;   left  side sacroiliac joint fusion with 2 implants.(Zyga)   extraction and use of illac bone graft ;  Surgeon: Sebastián Szymanski MD;  Location: [facility] OR     [facility] UGI ENDOSCOPY W EUS N/A 7/31/2014    Procedure: COMBINED ENDOSCOPIC ULTRASOUND, ESOPHAGOSCOPY, GASTROSCOPY, DUODENOSCOPY (EGD);  Surgeon: Shorty Stoll MD;  Location: [facility] GI     ORCHIECTOMY INGUINAL      right radical orchiectomy     REMOVE STIMULATOR VAGUS NERVE  12/23/2011    Procedure:REMOVE STIMULATOR VAGUS NERVE; Vagus Nerve Stimulator Removal; Surgeon:MARTÍN BECERRA; Location:[facility] OR     REMOVE STIMULATOR VAGUS NERVE  11/8/2013    Procedure: REMOVE STIMULATOR VAGUS NERVE;  Removal Of Vagus Nerve Stimulator Lead In Left Neck ;  Surgeon: Martín Becerra MD;  Location: [facility] OR     SURGICAL HISTORY OF -       vagal nerve implant; removed     SURGICAL HISTORY OF -       UPPP     VASECTOMY         Social History     Tobacco Use     Smoking status: Former Smoker     Packs/day: 0.00     Years: 40.00     Pack years: 0.00     Types: Cigarettes     Smokeless tobacco: Never Used     Tobacco comment: Last cigarette was October 17, 2018   Substance Use Topics     Alcohol use: Yes     Comment: 4 drinks a year     Family History   Problem Relation Age of Onset     Diabetes Mother      C.A.D. Mother      C.A.D. Maternal Grandmother      Diabetes Maternal Grandmother      Hypertension Maternal Grandmother      Cancer Maternal Grandfather      C.A.D. Maternal Grandfather          Current Outpatient  Medications   Medication Sig Dispense Refill     cyclobenzaprine (FLEXERIL) 10 MG tablet TAKE 1 TABLET BY MOUTH THREE TIMES DAILY AS NEEDED FOR MUSCLE SPASMS 90 tablet 1     EPINEPHrine (EPIPEN) 0.3 MG/0.3ML injection Inject 0.3 mLs (0.3 mg) into the muscle once as needed for anaphylaxis 1 each 2     escitalopram (LEXAPRO) 20 MG tablet Take 1 tablet (20 mg) by mouth daily 30 tablet 1     gabapentin (NEURONTIN) 100 MG capsule Take 2 capsules (200 mg) by mouth At Bedtime 60 capsule 1     HYDROmorphone (DILAUDID) 4 MG tablet Three times a day take 8 mg (2 tabs) and one time a day take 6 mg (1.5 tabs) for pain 165 tablet 0     hydrOXYzine (VISTARIL) 50 MG capsule Take 1 capsule (50 mg) by mouth every 6 hours as needed for anxiety 60 capsule 1     ibuprofen (ADVIL/MOTRIN) 600 MG tablet Take 1 tablet (600 mg) by mouth every 6 hours as needed for moderate pain 120 tablet 2     morphine (MS CONTIN) 15 MG CR tablet Take 1 tablet (15 mg) by mouth daily At noon, in addition to 30 mg q 12hr. 30 tablet 0     morphine (MS CONTIN) 30 MG CR tablet Take 1 tablet (30 mg) by mouth every 12 hours With 15 mg in between. 60 tablet 0     naloxone (NARCAN) nasal spray Spray 1 spray (4 mg) into one nostril alternating nostrils as needed for opioid reversal every 2-3 minutes until assistance arrives 0.2 mL 0     omeprazole (PRILOSEC) 40 MG DR capsule TAKE ONE CAPSULE BY MOUTH EVERY DAY 30 TO 60 MINUTES BEFORE A MEAL 30 capsule 9     ondansetron (ZOFRAN-ODT) 4 MG ODT tab PLACE 1 OR 2 TABLETS UNDER THE TONGUE 3 TIMES A DAY AS NEEDED FOR NAUSEA 20 tablet 1     oxybutynin (DITROPAN) 5 MG tablet Take 1-2 tablets (5-10 mg) by mouth 2 times daily 120 tablet 11     promethazine (PHENERGAN) 25 MG tablet Take 1 tablet (25 mg) by mouth every 6 hours as needed for nausea 20 tablet 5     rOPINIRole (REQUIP) 0.25 MG tablet Take 1-2 tablets (0.25-0.5 mg) by mouth At Bedtime 60 tablet 2     tolterodine (DETROL) 1 MG tablet Take 1 mg by mouth 2 times daily    "    traZODone (DESYREL) 50 MG tablet Take 1 tablet (50 mg) by mouth nightly as needed for sleep 30 tablet 1     TRELEGY ELLIPTA 100-62.5-25 MCG/INH oral inhaler INHALE 1 PUFF BY MOUTH ONCE EVERY DAY AT THE SAME TIME EACH DAY  6     zolpidem (AMBIEN) 5 MG tablet Take 1 tablet (5 mg) by mouth nightly as needed for sleep 30 tablet 1     albuterol (PROAIR HFA/PROVENTIL HFA/VENTOLIN HFA) 108 (90 Base) MCG/ACT inhaler Inhale 2 puffs into the lungs every 4 hours as needed for shortness of breath / dyspnea (Patient not taking: Reported on 5/15/2019) 1 Inhaler 0     ondansetron (ZOFRAN) 4 MG tablet Take 1 tablet (4 mg) by mouth every 8 hours as needed for nausea (Patient not taking: Reported on 7/12/2019) 30 tablet 1     Allergies   Allergen Reactions     Ciprofloxacin Anaphylaxis     Doxycycline Anaphylaxis     Lyrica [Pregabalin]      Felt \"out of his mind\" and confused     Septra [Bactrim] Anaphylaxis     Amoxicillin      itching     Nuts      [place] nuts       Reviewed and updated as needed this visit by Provider  Tobacco  Allergies  Meds  Problems  Med Hx  Surg Hx  Fam Hx         Review of Systems   ROS COMP: Constitutional, cardiovascular, pulmonary, gi and gu MS as above, systems are negative, except as otherwise noted.      Objective    /80 (BP Location: Right arm, Patient Position: Chair, Cuff Size: Adult Large)   Pulse 96   Temp 98.4  F (36.9  C) (Oral)   Ht 1.835 m (6' 0.25\")   Wt 116.1 kg (256 lb)   SpO2 97%   BMI 34.48 kg/m    Body mass index is 34.48 kg/m .  Physical Exam   GENERAL: healthy, alert and no distress  RESP: lungs clear to auscultation - no rales, rhonchi or wheezes  CV: regular rate and rhythm, normal S1 S2, no S3 or S4, no murmur  ABDOMEN: soft, obese, nontender, no hepatosplenomegaly, no masses and bowel sounds normal  MS: no gross musculoskeletal defects noted    Diagnostic Test Results:  Labs reviewed in Epic  No results found for this or any previous visit (from the past 24 " hour(s)).        Assessment & Plan     1. Major depressive disorder, recurrent, severe without psychotic features (H)  Patient due for refills of his medications.  It appears Lexapro was not 20 mg daily.  - hydrOXYzine (VISTARIL) 50 MG capsule; Take 1 capsule (50 mg) by mouth every 6 hours as needed for anxiety  Dispense: 60 capsule; Refill: 1  - escitalopram (LEXAPRO) 20 MG tablet; Take 1 tablet (20 mg) by mouth daily  Dispense: 30 tablet; Refill: 1    2. Insomnia, unspecified type  Refill of Ambien, which is now 5 mg nightly instead of 10.  Also giving small refill of trazodone which is 50 mg at night as needed  - zolpidem (AMBIEN) 5 MG tablet; Take 1 tablet (5 mg) by mouth nightly as needed for sleep  Dispense: 30 tablet; Refill: 1  - traZODone (DESYREL) 50 MG tablet; Take 1 tablet (50 mg) by mouth nightly as needed for sleep  Dispense: 30 tablet; Refill: 1    3. Chronic pain syndrome  Decreasing the Dilaudid from 32 mg total daily to 30 mg total daily.  We will continue the MS Contin 30 mg twice a day +15 mg at noon.  Patient advised to start the gabapentin which he has not yet.  He is to follow-up with his primary in 1 month, he will leave a urine sample today.    - HYDROmorphone (DILAUDID) 4 MG tablet; Three times a day take 8 mg (2 tabs) and one time a day take 6 mg (1.5 tabs) for pain  Dispense: 165 tablet; Refill: 0  - morphine (MS CONTIN) 30 MG CR tablet; Take 1 tablet (30 mg) by mouth every 12 hours With 15 mg in between.  Dispense: 60 tablet; Refill: 0  - morphine (MS CONTIN) 15 MG CR tablet; Take 1 tablet (15 mg) by mouth daily At noon, in addition to 30 mg q 12hr.  Dispense: 30 tablet; Refill: 0  - Comprehensive Drug Analysis, Urine    4. DDD (degenerative disc disease), lumbar  As above  - HYDROmorphone (DILAUDID) 4 MG tablet; Three times a day take 8 mg (2 tabs) and one time a day take 6 mg (1.5 tabs) for pain  Dispense: 165 tablet; Refill: 0  - morphine (MS CONTIN) 30 MG CR tablet; Take 1 tablet (30  "mg) by mouth every 12 hours With 15 mg in between.  Dispense: 60 tablet; Refill: 0    5. SI (sacroiliac) joint dysfunction  As above  - HYDROmorphone (DILAUDID) 4 MG tablet; Three times a day take 8 mg (2 tabs) and one time a day take 6 mg (1.5 tabs) for pain  Dispense: 165 tablet; Refill: 0  - morphine (MS CONTIN) 30 MG CR tablet; Take 1 tablet (30 mg) by mouth every 12 hours With 15 mg in between.  Dispense: 60 tablet; Refill: 0     BMI:   Estimated body mass index is 34.48 kg/m  as calculated from the following:    Height as of this encounter: 1.835 m (6' 0.25\").    Weight as of this encounter: 116.1 kg (256 lb).       Return in about 4 weeks (around 8/9/2019) for Chronic Pain Recheck.    LIBBY Wallace, NP-C  [facility]    This chart was documented by provider using a voice activated software called Dragon in addition to manual typing. There may be vocabulary errors or other grammatical errors due to this.       "

## 2019-07-12 NOTE — TELEPHONE ENCOUNTER
See office visit for medications that were prescribed today.  LIBBY Wallace, NP-C  [facility]

## 2019-07-13 ASSESSMENT — ANXIETY QUESTIONNAIRES: GAD7 TOTAL SCORE: 4

## 2019-07-19 LAB — COMPREHEN DRUG ANALYSIS UR: NORMAL

## 2019-07-20 NOTE — RESULT ENCOUNTER NOTE
Shahid Murray is out of the office and I am reviewing your results.   Your urine test shows THC or marijuana.  Are you participating in the medical marijuana program?   Erin Aguilar, PAC

## 2019-07-22 PROBLEM — R82.5 POSITIVE URINE DRUG SCREEN: Status: ACTIVE | Noted: 2019-07-22

## 2019-08-19 ENCOUNTER — OFFICE VISIT (OUTPATIENT)
Dept: FAMILY MEDICINE | Facility: CLINIC | Age: 59
End: 2019-08-19
Payer: COMMERCIAL

## 2019-08-19 VITALS
BODY MASS INDEX: 34.61 KG/M2 | HEART RATE: 80 BPM | SYSTOLIC BLOOD PRESSURE: 124 MMHG | WEIGHT: 257 LBS | OXYGEN SATURATION: 98 % | DIASTOLIC BLOOD PRESSURE: 78 MMHG | TEMPERATURE: 98.3 F

## 2019-08-19 DIAGNOSIS — R10.13 EPIGASTRIC PAIN: Primary | ICD-10-CM

## 2019-08-19 DIAGNOSIS — M53.3 SI (SACROILIAC) JOINT DYSFUNCTION: ICD-10-CM

## 2019-08-19 DIAGNOSIS — M54.50 ACUTE RIGHT-SIDED LOW BACK PAIN WITHOUT SCIATICA: ICD-10-CM

## 2019-08-19 DIAGNOSIS — N50.819 TESTICLE PAIN: ICD-10-CM

## 2019-08-19 DIAGNOSIS — G89.4 CHRONIC PAIN SYNDROME: ICD-10-CM

## 2019-08-19 DIAGNOSIS — G25.81 RLS (RESTLESS LEGS SYNDROME): ICD-10-CM

## 2019-08-19 DIAGNOSIS — M51.369 DDD (DEGENERATIVE DISC DISEASE), LUMBAR: ICD-10-CM

## 2019-08-19 LAB
ALBUMIN SERPL-MCNC: 4 G/DL (ref 3.4–5)
ALP SERPL-CCNC: 75 U/L (ref 40–150)
ALT SERPL W P-5'-P-CCNC: 43 U/L (ref 0–70)
AST SERPL W P-5'-P-CCNC: 28 U/L (ref 0–45)
BILIRUB DIRECT SERPL-MCNC: <0.1 MG/DL (ref 0–0.2)
BILIRUB SERPL-MCNC: 0.4 MG/DL (ref 0.2–1.3)
LIPASE SERPL-CCNC: 88 U/L (ref 73–393)
PROT SERPL-MCNC: 7.8 G/DL (ref 6.8–8.8)

## 2019-08-19 PROCEDURE — 99214 OFFICE O/P EST MOD 30 MIN: CPT | Mod: 25 | Performed by: FAMILY MEDICINE

## 2019-08-19 PROCEDURE — 80076 HEPATIC FUNCTION PANEL: CPT | Performed by: FAMILY MEDICINE

## 2019-08-19 PROCEDURE — 83690 ASSAY OF LIPASE: CPT | Performed by: FAMILY MEDICINE

## 2019-08-19 PROCEDURE — 36415 COLL VENOUS BLD VENIPUNCTURE: CPT | Performed by: FAMILY MEDICINE

## 2019-08-19 PROCEDURE — 20552 NJX 1/MLT TRIGGER POINT 1/2: CPT | Performed by: FAMILY MEDICINE

## 2019-08-19 RX ORDER — HYDROMORPHONE HYDROCHLORIDE 4 MG/1
TABLET ORAL
Qty: 165 TABLET | Refills: 0 | Status: SHIPPED | OUTPATIENT
Start: 2019-08-19 | End: 2019-09-16

## 2019-08-19 RX ORDER — SUCRALFATE 1 G/1
1 TABLET ORAL 4 TIMES DAILY
Qty: 60 TABLET | Refills: 1 | Status: SHIPPED | OUTPATIENT
Start: 2019-08-19 | End: 2019-11-19

## 2019-08-19 RX ORDER — ROPINIROLE 1 MG/1
1 TABLET, FILM COATED ORAL AT BEDTIME
Qty: 30 TABLET | Refills: 2 | Status: SHIPPED | OUTPATIENT
Start: 2019-08-19 | End: 2019-11-11

## 2019-08-19 RX ORDER — MORPHINE SULFATE 30 MG/1
30 TABLET, FILM COATED, EXTENDED RELEASE ORAL EVERY 12 HOURS
Qty: 60 TABLET | Refills: 0 | Status: SHIPPED | OUTPATIENT
Start: 2019-08-19 | End: 2019-09-16

## 2019-08-19 RX ORDER — TRIAMCINOLONE ACETONIDE 40 MG/ML
40 INJECTION, SUSPENSION INTRA-ARTICULAR; INTRAMUSCULAR ONCE
Status: DISCONTINUED | OUTPATIENT
Start: 2019-08-19 | End: 2021-08-06

## 2019-08-19 NOTE — PROGRESS NOTES
Subjective     Oleg Ford is a 59 year old male who presents to clinic today for the following health issues:    HPI   ABDOMINAL   PAIN     Onset: 4-5 days     Description:   Character: Sharp and Dull ache  Location: epigastric region right upper quadrant  Radiation: to right abdomin     Intensity: moderate to  severe    Progression of Symptoms:  worsening    Accompanying Signs & Symptoms:  Fever/Chills?: YES  Gas/Bloating: YES- bloated  Nausea: YES  Vomitting: YES  Diarrhea?: YES  Constipation:no   Dysuria or Hematuria: no    History:   Trauma: no   Previous similar pain: no    Previous tests done: x-ray    Precipitating factors:   Does the pain change with:     Food: YES     BM: no     Urination: no     Alleviating factors:  None      Therapies Tried and outcome: Pepto with no relief    LMP:  not applicable     SUBJECTIVE:  Here today in follow-up of multiple medical issues.  Patient is well-known to me and since I saw him last he has had a hospitalization for severe depression with psychotic features.  Medications were changed significantly in his chart is updated thusly.  We have started a tapering back on his pain medication as per recommendations from the pain clinic and that we will need to continue.  Patient is having an ongoing issue of some epigastric pain.  This seemed of started up in the last week or so without any particular illness or trigger.  Food and drinking seems to make it worse.  Taking antiacid treatment does not really seem to change much.  Feeling very bloated and other symptoms as above.    Sleep is quite disturbed and a lot of this has to do with what sounds like restless leg and arm symptoms.  The patient says he feels jumpy all the time.  Hard to know what of this may be related to anxiety and he is now off benzodiazepine therapy.  Continuing to deal with testicular pain and said that he was seen by urology in the hospital they thought there was some nodularity to his penis as well.   He like to set up an appoint with urology to discuss.  Still has his underlying back pain but his trigger point in the right lower back has returned.  We have injected that with cortisone and lidocaine a few months ago and it really provided some temporary relief.    Review of systems otherwise negative.  Past medical, family, and social history reviewed and updated in chart.    OBJECTIVE:  /78 (BP Location: Right arm, Patient Position: Chair, Cuff Size: Adult Large)   Pulse 80   Temp 98.3  F (36.8  C) (Oral)   Wt 116.6 kg (257 lb)   SpO2 98%   BMI 34.61 kg/m    Alert and pleasant.  Not toxic but does appear uncomfortable  S1 and S2 normal, no murmurs, clicks, gallops or rubs. Regular rate and rhythm. Chest is clear; no wheezes or rales. No edema or JVD.  Abdomen is soft with some upper epigastric tenderness though no rebound or guarding  Back - very stiff and surgical scar in place.  just above his right posterior superior iliac crest he has a very tender hotspot   Past labs reviewed with the patient.     The risks and benefits, as well as expected effect, of trigger point injection was discussed with patient and he agrees to proceed.  After identifying the area of maximal point tenderness the area was prepped clean with alcohol.  The trigger point was injected with 5 cc of a 4:1 mixture of lidocaine 1% and kenalog 40.  Pain experienced initially, followed by pain relief.  Bandaid.  No complications.    ASSESSMENT / PLAN:  (R10.13) Epigastric pain  (primary encounter diagnosis)  Comment: Somewhat reminiscent of pancreatic pain.  Will take a look at liver and pancreas function.  In the meantime could try some Carafate as a soothing agent.  I will contact him with results  Plan: Lipase, Hepatic panel, sucralfate (CARAFATE) 1         GM tablet            (G25.81) RLS (restless legs syndrome)  Comment: Very underdosed and this may be a big issue.  Will increase to 1 mg at bedtime and continue to increase as  high as 4 mg if it is helping  Plan: rOPINIRole (REQUIP) 1 MG tablet            (M54.5) Acute right-sided low back pain without sciatica  Comment: Status post trigger point injection.  Aftercare discussed  Plan: INJECTION SNGL/MULT TRIGGER POINT, 1 OR 2         MUSCLES, triamcinolone (KENALOG-40) injection         40 mg            (N50.819) Testicle pain  Comment: I will get him set up with Dr. Britton  Plan: UROLOGY ADULT REFERRAL            (G89.4) Chronic pain syndrome  Comment: Continue a taper back to 30 mg twice daily of MS Contin, maintaining his Dilaudid where it is at.  Plan to recheck in a month  Plan: HYDROmorphone (DILAUDID) 4 MG tablet, morphine         (MS CONTIN) 30 MG CR tablet            (M51.36) DDD (degenerative disc disease), lumbar  Comment:   Plan: HYDROmorphone (DILAUDID) 4 MG tablet, morphine         (MS CONTIN) 30 MG CR tablet            (M53.3) SI (sacroiliac) joint dysfunction  Comment:   Plan: HYDROmorphone (DILAUDID) 4 MG tablet, morphine         (MS CONTIN) 30 MG CR tablet            Follow up 1 month  SANTIAGO Shaw MD    (Chart documentation completed in part with Dragon voice-recognition software.  Even though reviewed some grammatical, spelling, and word errors may remain.)

## 2019-08-20 NOTE — RESULT ENCOUNTER NOTE
Call patient (and send a copy of labs):  His liver function and pancreas enzymes were totally normal.  So I do not think this represents something bad like the pancreatitis.  Let us give that new medication a chance to see if it helps and we should see results in the next couple of days if not already.    SANTIAGO Shaw M.D.

## 2019-08-23 ENCOUNTER — TELEPHONE (OUTPATIENT)
Dept: FAMILY MEDICINE | Facility: CLINIC | Age: 59
End: 2019-08-23

## 2019-08-23 DIAGNOSIS — G89.4 CHRONIC PAIN SYNDROME: ICD-10-CM

## 2019-08-23 DIAGNOSIS — M51.369 DDD (DEGENERATIVE DISC DISEASE), LUMBAR: ICD-10-CM

## 2019-08-23 DIAGNOSIS — M53.3 SI (SACROILIAC) JOINT DYSFUNCTION: ICD-10-CM

## 2019-08-23 NOTE — TELEPHONE ENCOUNTER
"Pharmacy is currently being Audited and is missing hard copy of Morphine ER 30mg script from 3/27/18 for patient.    Pharmacy requesting a script for their documents, requesting it to be signed and faxed to [facility] [phone] with the date 3/27/18 as the start date.  Also a written note saying \"this is for clarification of missing script\" on the script.     Meg Swenson MA on 8/23/2019 at 4:58 PM    "

## 2019-08-23 NOTE — TELEPHONE ENCOUNTER
Reason for Call:  Other prescription    Detailed comments: pharmacy do audits of hard copy scripts and they are unable to find the morphine script from last year and asking if they could get some information.    Phone Number Patient can be reached at: Other phone number:  [phone]    Best Time: any    Can we leave a detailed message on this number? YES    Call taken on 8/23/2019 at 3:42 PM by Monica Resendiz

## 2019-08-26 RX ORDER — MORPHINE SULFATE 30 MG/1
30 TABLET, FILM COATED, EXTENDED RELEASE ORAL 3 TIMES DAILY
Qty: 90 TABLET | Refills: 0 | Status: SHIPPED | OUTPATIENT
Start: 2018-03-27 | End: 2019-08-28

## 2019-08-28 ENCOUNTER — TELEPHONE (OUTPATIENT)
Dept: FAMILY MEDICINE | Facility: CLINIC | Age: 59
End: 2019-08-28

## 2019-08-28 DIAGNOSIS — G89.4 CHRONIC PAIN SYNDROME: ICD-10-CM

## 2019-08-28 DIAGNOSIS — M51.369 DDD (DEGENERATIVE DISC DISEASE), LUMBAR: ICD-10-CM

## 2019-08-28 DIAGNOSIS — M53.3 SI (SACROILIAC) JOINT DYSFUNCTION: ICD-10-CM

## 2019-08-28 RX ORDER — MORPHINE SULFATE 30 MG/1
30 TABLET, FILM COATED, EXTENDED RELEASE ORAL 3 TIMES DAILY
Qty: 90 TABLET | Refills: 0 | Status: SHIPPED | OUTPATIENT
Start: 2018-07-06 | End: 2019-09-16

## 2019-08-28 NOTE — TELEPHONE ENCOUNTER
Spoke with Rosalinda the pharmacist, she states they need a new Morphine Rx sent to them dated 7/6/18, for an audit.    States when she called the other day she gave the wrong date.    Sally ALFARO, Patient Care

## 2019-08-28 NOTE — TELEPHONE ENCOUNTER
.Reason for Call:    prescription    Detailed comments: correction on the date originally given for the morphine PRESCRIPTION / for an audit  Caller had previously contacted the clinic as hard copy could not be located - received a new one only the date was incorrect(error on their part); the correct date should be 07- and that should be the start date;  Fax to: [phone]   Phone Number Patient can be reached at:  phone number:  [phone]    Best Time: any    Can we leave a detailed message on this number? Not Applicable    Call taken on 8/28/2019 at 1:23 PM by Magy Rodriguez

## 2019-08-30 ENCOUNTER — OFFICE VISIT (OUTPATIENT)
Dept: UROLOGY | Facility: CLINIC | Age: 59
End: 2019-08-30
Payer: COMMERCIAL

## 2019-08-30 VITALS — HEART RATE: 76 BPM | SYSTOLIC BLOOD PRESSURE: 122 MMHG | RESPIRATION RATE: 16 BRPM | DIASTOLIC BLOOD PRESSURE: 84 MMHG

## 2019-08-30 DIAGNOSIS — N50.819 TESTIS PAIN: ICD-10-CM

## 2019-08-30 DIAGNOSIS — N48.6 PEYRONIE'S DISEASE: ICD-10-CM

## 2019-08-30 DIAGNOSIS — N40.1 BENIGN PROSTATIC HYPERPLASIA WITH LOWER URINARY TRACT SYMPTOMS, SYMPTOM DETAILS UNSPECIFIED: Primary | ICD-10-CM

## 2019-08-30 LAB
ALBUMIN UR-MCNC: NEGATIVE MG/DL
APPEARANCE UR: CLEAR
BILIRUB UR QL STRIP: NEGATIVE
COLOR UR AUTO: YELLOW
GLUCOSE UR STRIP-MCNC: NEGATIVE MG/DL
HGB UR QL STRIP: ABNORMAL
KETONES UR STRIP-MCNC: NEGATIVE MG/DL
LEUKOCYTE ESTERASE UR QL STRIP: NEGATIVE
NITRATE UR QL: NEGATIVE
PH UR STRIP: 5.5 PH (ref 5–7)
PSA SERPL-MCNC: 0.71 UG/L (ref 0–4)
RBC #/AREA URNS AUTO: NORMAL /HPF
SOURCE: ABNORMAL
SP GR UR STRIP: 1.02 (ref 1–1.03)
UROBILINOGEN UR STRIP-ACNC: 0.2 EU/DL (ref 0.2–1)
WBC #/AREA URNS AUTO: NORMAL /HPF

## 2019-08-30 PROCEDURE — 84153 ASSAY OF PSA TOTAL: CPT | Performed by: PATHOLOGY

## 2019-08-30 PROCEDURE — 36415 COLL VENOUS BLD VENIPUNCTURE: CPT | Performed by: UROLOGY

## 2019-08-30 PROCEDURE — 81001 URINALYSIS AUTO W/SCOPE: CPT | Performed by: UROLOGY

## 2019-08-30 PROCEDURE — 99214 OFFICE O/P EST MOD 30 MIN: CPT | Mod: 25 | Performed by: UROLOGY

## 2019-08-30 PROCEDURE — 51798 US URINE CAPACITY MEASURE: CPT | Performed by: UROLOGY

## 2019-08-30 RX ORDER — TAMSULOSIN HYDROCHLORIDE 0.4 MG/1
0.4 CAPSULE ORAL AT BEDTIME
Qty: 90 CAPSULE | Refills: 3 | Status: SHIPPED | OUTPATIENT
Start: 2019-08-30 | End: 2019-10-15

## 2019-08-30 NOTE — PROGRESS NOTES
S: Patient is a pleasant 59-year-old  male who is request to be seen by Dr. Cat Shaw for a consultation with regard to several urological complaints.  Patient complains of penile pain and some penile curvature for last several months.  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  In addition to that he has some left groin pain which is chronic for patient.  It is sensitive to touch.  He has been treated for epididymitis in the past with antibiotics but did not seem to solve the issue.  His main concerns are increasing irritative voiding symptoms.  This happened since his discharge from the hospital.  He complains of frequency urgency nocturia and slow urinary stream.  In the hospital there were several times while he had a catheter placed to drain his bladder.  His [employer] symptom score today is 32 with a quality-of-life score of 5-6.  He has no dysuria or hematuria.  Current Outpatient Medications   Medication Sig Dispense Refill     cyclobenzaprine (FLEXERIL) 10 MG tablet TAKE 1 TABLET BY MOUTH THREE TIMES DAILY AS NEEDED FOR MUSCLE SPASMS 90 tablet 1     EPINEPHrine (EPIPEN) 0.3 MG/0.3ML injection Inject 0.3 mLs (0.3 mg) into the muscle once as needed for anaphylaxis 1 each 2     escitalopram (LEXAPRO) 20 MG tablet Take 1 tablet (20 mg) by mouth daily 30 tablet 1     gabapentin (NEURONTIN) 100 MG capsule Take 2 capsules (200 mg) by mouth At Bedtime 60 capsule 1     HYDROmorphone (DILAUDID) 4 MG tablet Three times a day take 8 mg (2 tabs) and one time a day take 6 mg (1.5 tabs) for pain 165 tablet 0     hydrOXYzine (VISTARIL) 50 MG capsule Take 1 capsule (50 mg) by mouth every 6 hours as needed for anxiety 60 capsule 1     ibuprofen (ADVIL/MOTRIN) 600 MG tablet Take 1 tablet (600 mg) by mouth every 6 hours as needed for moderate pain 120 tablet 2     morphine (MS CONTIN) 30 MG CR tablet Take 1 tablet (30 mg) by mouth 3 times daily 90 tablet 0     morphine (MS  "CONTIN) 30 MG CR tablet Take 1 tablet (30 mg) by mouth every 12 hours With 15 mg in between. 60 tablet 0     naloxone (NARCAN) nasal spray Spray 1 spray (4 mg) into one nostril alternating nostrils as needed for opioid reversal every 2-3 minutes until assistance arrives 0.2 mL 0     omeprazole (PRILOSEC) 40 MG DR capsule TAKE ONE CAPSULE BY MOUTH EVERY DAY 30 TO 60 MINUTES BEFORE A MEAL 30 capsule 9     ondansetron (ZOFRAN) 4 MG tablet Take 1 tablet (4 mg) by mouth every 8 hours as needed for nausea 30 tablet 1     ondansetron (ZOFRAN-ODT) 4 MG ODT tab PLACE 1 OR 2 TABLETS UNDER THE TONGUE 3 TIMES A DAY AS NEEDED FOR NAUSEA 20 tablet 1     promethazine (PHENERGAN) 25 MG tablet Take 1 tablet (25 mg) by mouth every 6 hours as needed for nausea 20 tablet 5     rOPINIRole (REQUIP) 1 MG tablet Take 1 tablet (1 mg) by mouth At Bedtime 30 tablet 2     sucralfate (CARAFATE) 1 GM tablet Take 1 tablet (1 g) by mouth 4 times daily 60 tablet 1     tamsulosin (FLOMAX) 0.4 MG capsule Take 1 capsule (0.4 mg) by mouth At Bedtime 90 capsule 3     traZODone (DESYREL) 50 MG tablet Take 1 tablet (50 mg) by mouth nightly as needed for sleep 30 tablet 1     TRELEGY ELLIPTA 100-62.5-25 MCG/INH oral inhaler INHALE 1 PUFF BY MOUTH ONCE EVERY DAY AT THE SAME TIME EACH DAY  6     zolpidem (AMBIEN) 5 MG tablet Take 1 tablet (5 mg) by mouth nightly as needed for sleep 30 tablet 1     albuterol (PROAIR HFA/PROVENTIL HFA/VENTOLIN HFA) 108 (90 Base) MCG/ACT inhaler Inhale 2 puffs into the lungs every 4 hours as needed for shortness of breath / dyspnea (Patient not taking: Reported on 5/15/2019) 1 Inhaler 0     Allergies   Allergen Reactions     Ciprofloxacin Anaphylaxis     Doxycycline Anaphylaxis     Lyrica [Pregabalin]      Felt \"out of his mind\" and confused     Septra [Bactrim] Anaphylaxis     Amoxicillin      itching     Nuts      [place] nuts     Past Medical History:   Diagnosis Date     Depression, major      Esophageal mass      History of " orchiectomy 1982     History of vasectomy 1991     Low back pain      Other chronic pain     Chronic low back pain.     Restless leg      Sleep apnea     had surgery done to resolve sleep apnea     Past Surgical History:   Procedure Laterality Date     BACK SURGERY  2/6/2013    lumbar surgery     BACK SURGERY      spinal stimulator     COLONOSCOPY       COMBINED ESOPHAGOSCOPY, GASTROSCOPY, DUODENOSCOPY (EGD) WITH CO2 INSUFFLATION N/A 11/14/2018    Procedure: COMBINED ESOPHAGOSCOPY, GASTROSCOPY, DUODENOSCOPY (EGD) WITH CO2 INSUFFLATION;  Surgeon: Rosendo Guy MD;  Location: [facility]     ESOPHAGOSCOPY, GASTROSCOPY, DUODENOSCOPY (EGD), COMBINED N/A 11/14/2018    Procedure: COMBINED ESOPHAGOSCOPY, GASTROSCOPY, DUODENOSCOPY (EGD), BIOPSY SINGLE OR MULTIPLE;  Surgeon: Rosendo Guy MD;  Location: [facility]     FUSION SACRAL ILIAC Right 10/19/2017    Procedure: FUSION SACRAL ILIAC;  Right  side sacroiliac joint fusion with 2 implants ;  Surgeon: Sebastián Szymanski MD;  Location: [facility]     FUSION SACRAL ILIAC Left 1/11/2018    Procedure: FUSION SACRAL ILIAC;   left  side sacroiliac joint fusion with 2 implants.(Zyga)   extraction and use of illac bone graft ;  Surgeon: Sebastián Szymanski MD;  Location: [facility] OR     [facility] UGI ENDOSCOPY W EUS N/A 7/31/2014    Procedure: COMBINED ENDOSCOPIC ULTRASOUND, ESOPHAGOSCOPY, GASTROSCOPY, DUODENOSCOPY (EGD);  Surgeon: Shorty Stoll MD;  [facility]     ORCHIECTOMY INGUINAL      right radical orchiectomy     REMOVE STIMULATOR VAGUS NERVE  12/23/2011    Procedure:REMOVE STIMULATOR VAGUS NERVE; Vagus Nerve Stimulator Removal; Surgeon:MARTÍN BECERRA; Location:[facility]     REMOVE STIMULATOR VAGUS NERVE  11/8/2013    Procedure: REMOVE STIMULATOR VAGUS NERVE;  Removal Of Vagus Nerve Stimulator Lead In Left Neck ;  Surgeon: Martín Becerra MD;  Location: [facility]     SURGICAL HISTORY OF -       vagal nerve implant; removed     SURGICAL HISTORY OF -       UPPP     VASECTOMY         Family History   Problem Relation Age of Onset     Diabetes Mother      C.A.JASMYNE. Mother      CL.AASHA Maternal Grandmother      Diabetes Maternal Grandmother      Hypertension Maternal Grandmother      Cancer Maternal Grandfather      JASWANT. Maternal Grandfather      Social History     Socioeconomic History     Marital status:      Spouse name: None     Number of children: None     Years of education: None     Highest education level: None   Occupational History     None   Social Needs     Financial resource strain: None     Food insecurity:     Worry: None     Inability: None     Transportation needs:     Medical: None     Non-medical: None   Tobacco Use     Smoking status: Former Smoker     Packs/day: 0.00     Years: 40.00     Pack years: 0.00     Types: Cigarettes     Smokeless tobacco: Never Used     Tobacco comment: Last cigarette was October 17, 2018   Substance and Sexual Activity     Alcohol use: Yes     Comment: 4 drinks a year     Drug use: No     Types: Marijuana     Comment: morphine, dilaudid     Sexual activity: Yes     Partners: Female   Lifestyle     Physical activity:     Days per week: None     Minutes per session: None     Stress: None   Relationships     Social connections:     Talks on phone: None     Gets together: None     Attends Sabianism service: None     Active member of club or organization: None     Attends meetings of clubs or organizations: None     Relationship status: None     Intimate partner violence:     Fear of current or ex partner: None     Emotionally abused: None     Physically abused: None     Forced sexual activity: None   Other Topics Concern     Parent/sibling w/ CABG, MI or angioplasty before 65F 55M? Yes   Social History Narrative     None       REVIEW OF SYSTEMS  =================  C: NEGATIVE for fever, chills, change in weight  I: NEGATIVE for worrisome rashes, moles or lesions  E/M: NEGATIVE for ear, mouth and throat problems  R: NEGATIVE for significant cough  or SHORTNESS OF BREATH  CV:  NEGATIVE for chest pain, palpitations or peripheral edema  GI: NEGATIVE for nausea, abdominal pain, heartburn, or change in bowel habits  NEURO: NEGATIVE numbness/weakness  : see HPI  PSYCH: NEGATIVE depression/anxiety  LYmph: no new enlarged lymph nodes  Ortho: no new trauma/movements      Physical Exam:  /84 (BP Location: Right arm, Patient Position: Chair, Cuff Size: Adult Large)   Pulse 76   Resp 16    Patient is pleasant, in no acute distress, good general condition.  Heart:  negative, PMI normal  Lung: no evidence of respiratory distress    Abdomen: Soft, nondistended, non tender. No masses. No rebound or guarding.   Exam: Dorsal penile plague.  Testes right prosthesis.  Left testes sensitive to touch but there is no evidence of epididymitis.  Prostate apex only.  Feel large.  Nontender.  Bladder scan with minimal residual urine.  Skin: Warm and dry.  No redness.  Neuro: grossly normal  Musculaskeletal: moving all extremities  Psych normal mood and affect  Musculoskeletal  moving all extremities  Hematologic/Lymphatic/Immunologic: normal ant/post cervical, axillary, supraclavicular and inguinal nodes    Urinalysis normal.    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  With regard to his penis curvature, since it is not severe enough I would recommend observation.  This is commonly seen with Peyronie's disease.  The penile pain should improve eventually.  There is not much I can do with his left testicular pain.  Patient has chronic pain with from this in the past.  Previous imaging studies have been unremarkable.  Patient was reassured.  With regard to his knee symptoms, I will start patient on Flomax which patient took while he was in the hospital.  He reported it did help him to urinate better.  Will get PSA also today.

## 2019-09-06 DIAGNOSIS — M51.369 DDD (DEGENERATIVE DISC DISEASE), LUMBAR: ICD-10-CM

## 2019-09-06 DIAGNOSIS — G89.4 CHRONIC PAIN SYNDROME: ICD-10-CM

## 2019-09-06 RX ORDER — GABAPENTIN 100 MG/1
200 CAPSULE ORAL AT BEDTIME
Qty: 60 CAPSULE | Refills: 0 | Status: SHIPPED | OUTPATIENT
Start: 2019-09-06 | End: 2019-09-16

## 2019-09-06 NOTE — TELEPHONE ENCOUNTER
Routing refill request to provider for review/approval because:  Drug not on the [facility] refill protocol   Shu Jaimes RN

## 2019-09-06 NOTE — TELEPHONE ENCOUNTER
Requested Prescriptions   Pending Prescriptions Disp Refills     gabapentin (NEURONTIN) 100 MG capsule [Pharmacy Med Name: GABAPENTIN 100 MG CAPSULE]  Last Written Prescription Date:  7/2/19  Last Fill Quantity: 60 capsule,  # refills: 1   Last office visit: 8/19/2019 with prescribing provider:  Dr. Shaw   Future Office Visit:   Next 5 appointments (look out 90 days)    Sep 16, 2019 10:40 AM CDT  Office Visit with Cat Shaw MD  [facility] ([address] [id number] [phone]        Routing refill request to provider for review/approval because:  Drug not on the [facility], [facility] or  Health refill protocol or controlled substance     60 capsule 1     Sig: TAKE 2 CAPSULES (200 MG) BY MOUTH AT BEDTIME       There is no refill protocol information for this order

## 2019-09-16 ENCOUNTER — OFFICE VISIT (OUTPATIENT)
Dept: FAMILY MEDICINE | Facility: CLINIC | Age: 59
End: 2019-09-16
Payer: COMMERCIAL

## 2019-09-16 VITALS
OXYGEN SATURATION: 94 % | DIASTOLIC BLOOD PRESSURE: 78 MMHG | WEIGHT: 260 LBS | SYSTOLIC BLOOD PRESSURE: 116 MMHG | HEIGHT: 72 IN | HEART RATE: 73 BPM | BODY MASS INDEX: 35.21 KG/M2 | TEMPERATURE: 98 F

## 2019-09-16 DIAGNOSIS — F33.2 MAJOR DEPRESSIVE DISORDER, RECURRENT, SEVERE WITHOUT PSYCHOTIC FEATURES (H): ICD-10-CM

## 2019-09-16 DIAGNOSIS — R11.0 NAUSEA: ICD-10-CM

## 2019-09-16 DIAGNOSIS — M51.369 DDD (DEGENERATIVE DISC DISEASE), LUMBAR: ICD-10-CM

## 2019-09-16 DIAGNOSIS — G47.00 INSOMNIA, UNSPECIFIED TYPE: ICD-10-CM

## 2019-09-16 DIAGNOSIS — G89.4 CHRONIC PAIN SYNDROME: ICD-10-CM

## 2019-09-16 DIAGNOSIS — M53.3 SI (SACROILIAC) JOINT DYSFUNCTION: ICD-10-CM

## 2019-09-16 PROCEDURE — 99214 OFFICE O/P EST MOD 30 MIN: CPT | Performed by: FAMILY MEDICINE

## 2019-09-16 RX ORDER — ESCITALOPRAM OXALATE 20 MG/1
20 TABLET ORAL DAILY
Qty: 90 TABLET | Refills: 1 | Status: SHIPPED | OUTPATIENT
Start: 2019-09-16 | End: 2020-03-11

## 2019-09-16 RX ORDER — ZOLPIDEM TARTRATE 5 MG/1
5 TABLET ORAL
Qty: 30 TABLET | Refills: 2 | Status: SHIPPED | OUTPATIENT
Start: 2019-09-16 | End: 2019-11-19

## 2019-09-16 RX ORDER — ONDANSETRON 4 MG/1
4 TABLET, FILM COATED ORAL EVERY 8 HOURS PRN
Qty: 30 TABLET | Refills: 1 | Status: SHIPPED | OUTPATIENT
Start: 2019-09-16 | End: 2019-11-19

## 2019-09-16 RX ORDER — MORPHINE SULFATE 15 MG/1
TABLET, FILM COATED, EXTENDED RELEASE ORAL
Qty: 90 TABLET | Refills: 0 | Status: SHIPPED | OUTPATIENT
Start: 2019-09-16 | End: 2019-10-14

## 2019-09-16 RX ORDER — IBUPROFEN 600 MG/1
600 TABLET, FILM COATED ORAL EVERY 6 HOURS PRN
Qty: 120 TABLET | Refills: 5 | Status: SHIPPED | OUTPATIENT
Start: 2019-09-16 | End: 2019-11-19 | Stop reason: SINTOL

## 2019-09-16 RX ORDER — GABAPENTIN 300 MG/1
300 CAPSULE ORAL AT BEDTIME
Qty: 90 CAPSULE | Refills: 1 | Status: SHIPPED | OUTPATIENT
Start: 2019-09-16 | End: 2020-03-11

## 2019-09-16 RX ORDER — HYDROXYZINE PAMOATE 50 MG/1
50 CAPSULE ORAL EVERY 6 HOURS PRN
Qty: 60 CAPSULE | Refills: 5 | Status: SHIPPED | OUTPATIENT
Start: 2019-09-16 | End: 2020-03-11

## 2019-09-16 RX ORDER — TRAZODONE HYDROCHLORIDE 50 MG/1
50 TABLET, FILM COATED ORAL
Qty: 90 TABLET | Refills: 1 | Status: SHIPPED | OUTPATIENT
Start: 2019-09-16 | End: 2019-12-13

## 2019-09-16 RX ORDER — HYDROMORPHONE HYDROCHLORIDE 4 MG/1
TABLET ORAL
Qty: 165 TABLET | Refills: 0 | Status: SHIPPED | OUTPATIENT
Start: 2019-09-16 | End: 2019-10-14

## 2019-09-16 ASSESSMENT — MIFFLIN-ST. JEOR: SCORE: 2046.32

## 2019-09-16 NOTE — PROGRESS NOTES
"Subjective     Oleg Ford is a 59 year old male who presents to clinic today for the following health issues:    HPI   Chronic/Recurring Back Pain Follow Up      Where is your back pain located? (Select all that apply) low back bilateral    How would you describe your back pain?  dull ache    Where does your back pain spread? the right buttock    Since your last clinic visit for back pain, how has your pain changed? unchanged    Does your back pain interfere with your job? Not applicable    Since your last visit, have you tried any new treatment? No        How many servings of fruits and vegetables do you eat daily?  0-1    On average, how many sweetened beverages do you drink each day (soda, juice, sweet tea, etc)?   1    How many days per week do you miss taking your medication? 0  SUBJECTIVE:  Here today in follow-up of chronic back dysfunction.  Well-known to me.  We have started him on a tapering plan and so far it is going okay.  Dealing with lots of pain and restless leg symptoms at nighttime and doing okay with the low-dose Neurontin.  We discussed increasing this at the same time decreasing his pain medication overall.  Depression still an ongoing issue.  About stable.  Working with urology on prostate enlargement as well.  Recently started Flomax and this does seem to help.    Review of systems otherwise negative.  Past medical, family, and social history reviewed and updated in chart.    OBJECTIVE:  /78 (BP Location: Right arm, Patient Position: Chair, Cuff Size: Adult Large)   Pulse 73   Temp 98  F (36.7  C) (Oral)   Ht 1.835 m (6' 0.25\")   Wt 117.9 kg (260 lb)   SpO2 94%   BMI 35.02 kg/m    Alert, pleasant, upbeat, and in no apparent discomfort.  S1 and S2 normal, no murmurs, clicks, gallops or rubs. Regular rate and rhythm. Chest is clear; no wheezes or rales. No edema or JVD.  Past labs reviewed with the patient.     ASSESSMENT / PLAN:  (G89.4) Chronic pain syndrome  Comment: We will " decrease the MS Contin down to 45 mg daily continue the Dilaudid as is.  Increase gabapentin to 300 mg at bedtime.  Plan to follow-up in 3 months but in between we will continue with our tapering  Plan: morphine (MS CONTIN) 15 MG CR tablet,         HYDROmorphone (DILAUDID) 4 MG tablet,         gabapentin (NEURONTIN) 300 MG capsule,         ibuprofen (ADVIL/MOTRIN) 600 MG tablet            (M51.36) DDD (degenerative disc disease), lumbar  Comment: As above  Plan: morphine (MS CONTIN) 15 MG CR tablet,         HYDROmorphone (DILAUDID) 4 MG tablet,         gabapentin (NEURONTIN) 300 MG capsule,         ibuprofen (ADVIL/MOTRIN) 600 MG tablet            (M53.3) SI (sacroiliac) joint dysfunction  Comment: As above  Plan: morphine (MS CONTIN) 15 MG CR tablet,         HYDROmorphone (DILAUDID) 4 MG tablet            (F33.2) Major depressive disorder, recurrent, severe without psychotic features (H)  Comment: Stable on current regimen.  Continue same  Plan: escitalopram (LEXAPRO) 20 MG tablet,         hydrOXYzine (VISTARIL) 50 MG capsule            (R11.0) Nausea  Comment:   Plan: ondansetron (ZOFRAN) 4 MG tablet            (G47.00) Insomnia, unspecified type  Comment:   Plan: traZODone (DESYREL) 50 MG tablet, zolpidem         (AMBIEN) 5 MG tablet            Follow up 3 months.  Can call for refills  SMaureen Shaw MD    (Chart documentation completed in part with Dragon voice-recognition software.  Even though reviewed some grammatical, spelling, and word errors may remain.)

## 2019-10-03 ENCOUNTER — HEALTH MAINTENANCE LETTER (OUTPATIENT)
Age: 59
End: 2019-10-03

## 2019-10-14 ENCOUNTER — OFFICE VISIT (OUTPATIENT)
Dept: FAMILY MEDICINE | Facility: CLINIC | Age: 59
End: 2019-10-14
Payer: COMMERCIAL

## 2019-10-14 VITALS
SYSTOLIC BLOOD PRESSURE: 128 MMHG | HEART RATE: 80 BPM | HEIGHT: 72 IN | WEIGHT: 267 LBS | OXYGEN SATURATION: 97 % | TEMPERATURE: 98.3 F | DIASTOLIC BLOOD PRESSURE: 80 MMHG | BODY MASS INDEX: 36.16 KG/M2

## 2019-10-14 DIAGNOSIS — M51.369 DDD (DEGENERATIVE DISC DISEASE), LUMBAR: ICD-10-CM

## 2019-10-14 DIAGNOSIS — R11.2 INTRACTABLE VOMITING WITH NAUSEA, UNSPECIFIED VOMITING TYPE: ICD-10-CM

## 2019-10-14 DIAGNOSIS — M53.3 SI (SACROILIAC) JOINT DYSFUNCTION: ICD-10-CM

## 2019-10-14 DIAGNOSIS — G89.4 CHRONIC PAIN SYNDROME: Primary | ICD-10-CM

## 2019-10-14 DIAGNOSIS — Z23 NEED FOR PROPHYLACTIC VACCINATION AND INOCULATION AGAINST INFLUENZA: ICD-10-CM

## 2019-10-14 PROCEDURE — 90686 IIV4 VACC NO PRSV 0.5 ML IM: CPT | Performed by: FAMILY MEDICINE

## 2019-10-14 PROCEDURE — G0008 ADMIN INFLUENZA VIRUS VAC: HCPCS | Performed by: FAMILY MEDICINE

## 2019-10-14 PROCEDURE — 99214 OFFICE O/P EST MOD 30 MIN: CPT | Mod: 25 | Performed by: FAMILY MEDICINE

## 2019-10-14 RX ORDER — METOCLOPRAMIDE 10 MG/1
10 TABLET ORAL
Qty: 90 TABLET | Refills: 0 | Status: SHIPPED | OUTPATIENT
Start: 2019-10-14 | End: 2019-11-19

## 2019-10-14 RX ORDER — LIDOCAINE HYDROCHLORIDE 20 MG/ML
15 SOLUTION OROPHARYNGEAL EVERY 4 HOURS PRN
Qty: 200 ML | Refills: 0 | Status: SHIPPED | OUTPATIENT
Start: 2019-10-14 | End: 2019-11-19

## 2019-10-14 RX ORDER — HYDROMORPHONE HYDROCHLORIDE 4 MG/1
TABLET ORAL
Qty: 165 TABLET | Refills: 0 | Status: SHIPPED | OUTPATIENT
Start: 2019-10-14 | End: 2019-11-11

## 2019-10-14 RX ORDER — MORPHINE SULFATE 15 MG/1
15 TABLET, FILM COATED, EXTENDED RELEASE ORAL 2 TIMES DAILY
Qty: 60 TABLET | Refills: 0 | Status: SHIPPED | OUTPATIENT
Start: 2019-10-14 | End: 2019-11-11

## 2019-10-14 ASSESSMENT — MIFFLIN-ST. JEOR: SCORE: 2078.07

## 2019-10-14 NOTE — PROGRESS NOTES
"Subjective     Oleg Ford is a 59 year old male who presents to clinic today for the following health issues:    HPI   ABDOMINAL   PAIN     Onset: August 2019    Description:   Character: Sharp  Location: left upper quadrant right flank  Radiation: Back/ right flank    Intensity: severe    Progression of Symptoms:  worsening    Accompanying Signs & Symptoms:  Fever/Chills?: YES  Gas/Bloating: YES  Nausea: YES  Vomitting: YES  Diarrhea?: YES- on and off  Constipation:no   Dysuria or Hematuria: no    History:   Trauma: no   Previous similar pain: YES   Previous tests done: x-ray    Precipitating factors:   Does the pain change with:     Food: YES- worsens with food      BM: no     Urination: no     Alleviating factors:  None    Therapies Tried and outcome: Carafate with no relief     LMP:  not applicable     SUBJECTIVE:  Here today primarily in follow-up of chronic back pain.  Well-known to me and we are continuing on a taper back to a safer level.  Is also been dealing with ongoing chronic abdominal symptomatology.  Reviewed interval history including GI findings.  Patient just feels bloated and uncomfortable.  Feels as though food does not go down into his stomach, and when it is present it is somewhat painful in his upper epigastric region.  Is worried because he has an upcoming trip to [place].    Review of systems otherwise negative.  Past medical, family, and social history reviewed and updated in chart.    OBJECTIVE:  /80   Pulse 80   Temp 98.3  F (36.8  C) (Oral)   Ht 1.835 m (6' 0.25\")   Wt 121.1 kg (267 lb)   SpO2 97%   BMI 35.96 kg/m    Alert, pleasant, upbeat, and in no apparent discomfort.  S1 and S2 normal, no murmurs, clicks, gallops or rubs. Regular rate and rhythm. Chest is clear; no wheezes or rales. No edema or JVD.   abd -tender in the upper abdomen without specific rebound.  No masses are noted.  Past labs reviewed with the patient.     ASSESSMENT / PLAN:  (G89.4) Chronic pain " syndrome  (primary encounter diagnosis)  Comment: We will decrease MS Contin to 15 mg twice daily and maintain Dilaudid at the same level.  Continue reductions over time.  At this point we may start backing down on the Dilaudid  Plan: morphine (MS CONTIN) 15 MG CR tablet,         HYDROmorphone (DILAUDID) 4 MG tablet            (M51.36) DDD (degenerative disc disease), lumbar  Comment:   Plan: morphine (MS CONTIN) 15 MG CR tablet,         HYDROmorphone (DILAUDID) 4 MG tablet            (M53.3) SI (sacroiliac) joint dysfunction  Comment:   Plan: morphine (MS CONTIN) 15 MG CR tablet,         HYDROmorphone (DILAUDID) 4 MG tablet            (R11.2) Intractable vomiting with nausea, unspecified vomiting type  Comment: I really do not have a great reason for this.  Some of it is gastro paresis like.  Okay to use some viscous lidocaine for short-term pain relief when things feel stuck.  But we may want to try a month or 2 of Reglan to see if this gets things moving  Plan: metoclopramide (REGLAN) 10 MG tablet, lidocaine        (XYLOCAINE) 2 % solution        Discussed mechanism of action of the proposed medication, as well as potential effects, both good and bad.  Patient expressed understanding and agreed with treatment.     (Z23) Need for prophylactic vaccination and inoculation against influenza  Comment:   Plan: Vaccine Administration, Initial [74252],         INFLUENZA VACCINE IM > 6 MONTHS VALENT IIV4         [99524]            Follow up 1 month  SANTIAGO Shaw MD    (Chart documentation completed in part with Dragon voice-recognition software.  Even though reviewed some grammatical, spelling, and word errors may remain.)

## 2019-10-14 NOTE — PATIENT INSTRUCTIONS
1) start metoclopramide 3 times daily   -Designed to increase how fast things move through the stomach and intestines    2) mix lidocaine and over-the-counter Maalox or Mylanta in equal ratio   - use 2 Tbsp (30 ml)  4 times daily as needed for pain   -Should numb the area

## 2019-10-15 ENCOUNTER — OFFICE VISIT (OUTPATIENT)
Dept: UROLOGY | Facility: CLINIC | Age: 59
End: 2019-10-15
Payer: COMMERCIAL

## 2019-10-15 VITALS
WEIGHT: 267 LBS | SYSTOLIC BLOOD PRESSURE: 139 MMHG | BODY MASS INDEX: 35.96 KG/M2 | HEART RATE: 85 BPM | OXYGEN SATURATION: 97 % | DIASTOLIC BLOOD PRESSURE: 91 MMHG

## 2019-10-15 DIAGNOSIS — N40.1 BENIGN PROSTATIC HYPERPLASIA WITH LOWER URINARY TRACT SYMPTOMS, SYMPTOM DETAILS UNSPECIFIED: Primary | ICD-10-CM

## 2019-10-15 DIAGNOSIS — R03.0 ELEVATED BP WITHOUT DIAGNOSIS OF HYPERTENSION: ICD-10-CM

## 2019-10-15 PROCEDURE — 99213 OFFICE O/P EST LOW 20 MIN: CPT | Performed by: UROLOGY

## 2019-10-15 RX ORDER — TAMSULOSIN HYDROCHLORIDE 0.4 MG/1
0.4 CAPSULE ORAL AT BEDTIME
Qty: 90 CAPSULE | Refills: 3 | Status: SHIPPED | OUTPATIENT
Start: 2019-10-15 | End: 2020-04-13

## 2019-10-15 NOTE — PROGRESS NOTES
Chief Complaint   Patient presents with     RECHECK     Follow up visit       Oleg Ford is a 59 year old male who presents today for follow up of   Chief Complaint   Patient presents with     RECHECK     Follow up visit    f/u for benign prostatic hyperplasia.  He is doing well on flomax with better flow and irritative voiding symptoms.    Current Outpatient Medications   Medication Sig Dispense Refill     cyclobenzaprine (FLEXERIL) 10 MG tablet TAKE 1 TABLET BY MOUTH THREE TIMES DAILY AS NEEDED FOR MUSCLE SPASMS 90 tablet 1     EPINEPHrine (EPIPEN) 0.3 MG/0.3ML injection Inject 0.3 mLs (0.3 mg) into the muscle once as needed for anaphylaxis 1 each 2     escitalopram (LEXAPRO) 20 MG tablet Take 1 tablet (20 mg) by mouth daily 90 tablet 1     gabapentin (NEURONTIN) 300 MG capsule Take 1 capsule (300 mg) by mouth At Bedtime 90 capsule 1     HYDROmorphone (DILAUDID) 4 MG tablet Three times a day take 8 mg (2 tabs) and one time a day take 6 mg (1.5 tabs) for pain 165 tablet 0     hydrOXYzine (VISTARIL) 50 MG capsule Take 1 capsule (50 mg) by mouth every 6 hours as needed for anxiety 60 capsule 5     ibuprofen (ADVIL/MOTRIN) 600 MG tablet Take 1 tablet (600 mg) by mouth every 6 hours as needed for moderate pain 120 tablet 5     lidocaine (XYLOCAINE) 2 % solution Swish and swallow 15 mLs in mouth every 4 hours as needed for pain ; Max 4 doses/24 hour period. 200 mL 0     metoclopramide (REGLAN) 10 MG tablet Take 1 tablet (10 mg) by mouth 3 times daily (before meals) 90 tablet 0     morphine (MS CONTIN) 15 MG CR tablet Take 1 tablet (15 mg) by mouth 2 times daily 60 tablet 0     naloxone (NARCAN) nasal spray Spray 1 spray (4 mg) into one nostril alternating nostrils as needed for opioid reversal every 2-3 minutes until assistance arrives 0.2 mL 0     omeprazole (PRILOSEC) 40 MG DR capsule TAKE ONE CAPSULE BY MOUTH EVERY DAY 30 TO 60 MINUTES BEFORE A MEAL 30 capsule 9     ondansetron (ZOFRAN) 4 MG tablet Take 1 tablet  "(4 mg) by mouth every 8 hours as needed for nausea 30 tablet 1     rOPINIRole (REQUIP) 1 MG tablet Take 1 tablet (1 mg) by mouth At Bedtime 30 tablet 2     sucralfate (CARAFATE) 1 GM tablet Take 1 tablet (1 g) by mouth 4 times daily 60 tablet 1     tamsulosin (FLOMAX) 0.4 MG capsule Take 1 capsule (0.4 mg) by mouth At Bedtime 90 capsule 3     traZODone (DESYREL) 50 MG tablet Take 1 tablet (50 mg) by mouth nightly as needed for sleep 90 tablet 1     TRELEGY ELLIPTA 100-62.5-25 MCG/INH oral inhaler INHALE 1 PUFF BY MOUTH ONCE EVERY DAY AT THE SAME TIME EACH DAY  6     zolpidem (AMBIEN) 5 MG tablet Take 1 tablet (5 mg) by mouth nightly as needed for sleep 30 tablet 2     albuterol (PROAIR HFA/PROVENTIL HFA/VENTOLIN HFA) 108 (90 Base) MCG/ACT inhaler Inhale 2 puffs into the lungs every 4 hours as needed for shortness of breath / dyspnea (Patient not taking: Reported on 5/15/2019) 1 Inhaler 0     Allergies   Allergen Reactions     Ciprofloxacin Anaphylaxis     Doxycycline Anaphylaxis     Lyrica [Pregabalin]      Felt \"out of his mind\" and confused     Septra [Bactrim] Anaphylaxis     Amoxicillin      itching     Nuts      [place] nuts      Past Medical History:   Diagnosis Date     Depression, major      Esophageal mass      History of orchiectomy 1982     History of vasectomy 1991     Low back pain      Other chronic pain     Chronic low back pain.     Restless leg      Sleep apnea     had surgery done to resolve sleep apnea     Past Surgical History:   Procedure Laterality Date     BACK SURGERY  2/6/2013    lumbar surgery     BACK SURGERY      spinal stimulator     COLONOSCOPY       COMBINED ESOPHAGOSCOPY, GASTROSCOPY, DUODENOSCOPY (EGD) WITH CO2 INSUFFLATION N/A 11/14/2018    Procedure: COMBINED ESOPHAGOSCOPY, GASTROSCOPY, DUODENOSCOPY (EGD) WITH CO2 INSUFFLATION;  Surgeon: Rosendo Guy MD;  Location:  OR     ESOPHAGOSCOPY, GASTROSCOPY, DUODENOSCOPY (EGD), COMBINED N/A 11/14/2018    Procedure: COMBINED " ESOPHAGOSCOPY, GASTROSCOPY, DUODENOSCOPY (EGD), BIOPSY SINGLE OR MULTIPLE;  Surgeon: Rosendo Guy MD;  Location: [facility] OR     FUSION SACRAL ILIAC Right 10/19/2017    Procedure: FUSION SACRAL ILIAC;  Right  side sacroiliac joint fusion with 2 implants ;  Surgeon: Sebastián Szymanski MD;  Location: [facility] OR     FUSION SACRAL ILIAC Left 1/11/2018    Procedure: FUSION SACRAL ILIAC;   left  side sacroiliac joint fusion with 2 implants.(Zyga)   extraction and use of illac bone graft ;  Surgeon: Sebastián Szymanski MD;  Location: [facility] OR     [facility] UGI ENDOSCOPY W EUS N/A 7/31/2014    Procedure: COMBINED ENDOSCOPIC ULTRASOUND, ESOPHAGOSCOPY, GASTROSCOPY, DUODENOSCOPY (EGD);  Surgeon: Shorty Stoll MD;  [facility]: [facility]     ORCHIECTOMY INGUINAL      right radical orchiectomy     REMOVE STIMULATOR VAGUS NERVE  12/23/2011    Procedure:REMOVE STIMULATOR VAGUS NERVE; Vagus Nerve Stimulator Removal; Surgeon:MARTÍN BECERRA; Location:[facility] OR     REMOVE STIMULATOR VAGUS NERVE  11/8/2013    Procedure: REMOVE STIMULATOR VAGUS NERVE;  Removal Of Vagus Nerve Stimulator Lead In Left Neck ;  Surgeon: Martín Becerra MD;  Location: [facility] OR     SURGICAL HISTORY OF -       vagal nerve implant; removed     SURGICAL HISTORY OF -       UPPP     VASECTOMY       Family History   Problem Relation Age of Onset     Diabetes Mother      C.A.D. Mother      C.A.D. Maternal Grandmother      Diabetes Maternal Grandmother      Hypertension Maternal Grandmother      Cancer Maternal Grandfather      C.A.D. Maternal Grandfather      Social History     Socioeconomic History     Marital status:      Spouse name: None     Number of children: None     Years of education: None     Highest education level: None   Occupational History     None   Social Needs     Financial resource strain: None     Food insecurity:     Worry: None     Inability: None     Transportation needs:     Medical: None     Non-medical: None   Tobacco Use     Smoking status: Former  Smoker     Packs/day: 0.00     Years: 40.00     Pack years: 0.00     Types: Cigarettes     Smokeless tobacco: Never Used     Tobacco comment: Last cigarette was October 17, 2018   Substance and Sexual Activity     Alcohol use: Yes     Comment: 4 drinks a year     Drug use: No     Types: Marijuana     Comment: morphine, dilaudid     Sexual activity: Yes     Partners: Female   Lifestyle     Physical activity:     Days per week: None     Minutes per session: None     Stress: None   Relationships     Social connections:     Talks on phone: None     Gets together: None     Attends Faith service: None     Active member of club or organization: None     Attends meetings of clubs or organizations: None     Relationship status: None     Intimate partner violence:     Fear of current or ex partner: None     Emotionally abused: None     Physically abused: None     Forced sexual activity: None   Other Topics Concern     Parent/sibling w/ CABG, MI or angioplasty before 65F 55M? Yes   Social History Narrative     None       REVIEW OF SYSTEMS  =================  C: NEGATIVE for fever, chills, change in weight  I: NEGATIVE for worrisome rashes, moles or lesions  E/M: NEGATIVE for ear, mouth and throat problems  R: NEGATIVE for significant cough or SHORTNESS OF BREATH  CV:  NEGATIVE for chest pain, palpitations or peripheral edema  GI: NEGATIVE for nausea, abdominal pain, heartburn, or change in bowel habits  NEURO: NEGATIVE numbness/weakness  : see HPI  PSYCH: NEGATIVE depression/anxiety  LYmph: no new enlarged lymph nodes  Ortho: no new trauma/movements    Physical Exam:  BP (!) 139/91 (BP Location: Right arm, Patient Position: Sitting, Cuff Size: Adult Large)   Pulse 85   Wt 121.1 kg (267 lb)   SpO2 97%   BMI 35.96 kg/m     Patient is pleasant, in no acute distress, good general condition.  Lung: no evidence of respiratory distress    Abdomen: Soft, nondistended, non tender. No masses. No rebound or guarding.   Exam:  normal male  Skin: Warm and dry.  No redness.  Psych: normal mood and affect  Neuro: alert and oriented  Musculaskeletal: moving all extremities    Assessment/Plan:   (N40.1) Benign prostatic hyperplasia with lower urinary tract symptoms, symptom details unspecified  (primary encounter diagnosis)  Comment:  Doing well meds  Plan: cont with flomax.    Elevated bp: Oleg to follow up with Primary Care provider regarding elevated blood pressure.

## 2019-10-17 ENCOUNTER — MYC MEDICAL ADVICE (OUTPATIENT)
Dept: FAMILY MEDICINE | Facility: CLINIC | Age: 59
End: 2019-10-17

## 2019-11-11 ENCOUNTER — MYC REFILL (OUTPATIENT)
Dept: FAMILY MEDICINE | Facility: CLINIC | Age: 59
End: 2019-11-11

## 2019-11-11 DIAGNOSIS — M51.369 DDD (DEGENERATIVE DISC DISEASE), LUMBAR: ICD-10-CM

## 2019-11-11 DIAGNOSIS — G25.81 RLS (RESTLESS LEGS SYNDROME): ICD-10-CM

## 2019-11-11 DIAGNOSIS — G89.4 CHRONIC PAIN SYNDROME: ICD-10-CM

## 2019-11-11 DIAGNOSIS — M53.3 SI (SACROILIAC) JOINT DYSFUNCTION: ICD-10-CM

## 2019-11-11 NOTE — TELEPHONE ENCOUNTER
"Requested Prescriptions   Pending Prescriptions Disp Refills     cyclobenzaprine (FLEXERIL) 10 MG tablet 90 tablet 1       There is no refill protocol information for this order        rOPINIRole (REQUIP) 1 MG tablet 30 tablet 2     Sig: Take 1 tablet (1 mg) by mouth At Bedtime       Antiparkinson's Agents Protocol Failed - 11/11/2019 12:54 PM        Failed - Blood pressure under 140/90 in past 12 months     BP Readings from Last 3 Encounters:   10/15/19 (!) 139/91   10/14/19 128/80   09/16/19 116/78                 Passed - CBC on record in past 12 months     Recent Labs   Lab Test 12/18/18  1105   WBC 7.4   RBC 5.29   HGB 15.2   HCT 44.5                    Passed - ALT on record in past 12 months         Recent Labs   Lab Test 08/19/19  1335   ALT 43             Passed - Serum Creatinine on file in past 12 months     Recent Labs   Lab Test 02/15/19  07/25/14  1649   CR 0.96   < >  --    CRPOC  --   --  1.2    < > = values in this interval not displayed.             Passed - Medication is active on med list        Passed - Patient is age 18 or older        Passed - Recent (6 mo) or future (30 days) visit within the authorizing provider's specialty     Patient had office visit in the last 6 months or has a visit in the next 30 days with authorizing provider or within the authorizing provider's specialty.  See \"Patient Info\" tab in inbasket, or \"Choose Columns\" in Meds & Orders section of the refill encounter.            morphine (MS CONTIN) 15 MG CR tablet 60 tablet 0     Sig: Take 1 tablet (15 mg) by mouth 2 times daily       There is no refill protocol information for this order        HYDROmorphone (DILAUDID) 4 MG tablet 165 tablet 0     Sig: Three times a day take 8 mg (2 tabs) and one time a day take 6 mg (1.5 tabs) for pain       There is no refill protocol information for this order        cyclobenzaprine (FLEXERIL) 10 MG tablet      Last Written Prescription Date:  8/13/19  Last Fill Quantity: 90,   # " refills: 1  Last Office Visit: 10/14/19  Future Office visit:       Routing refill request to provider for review/approval because:  Drug not on the [facility], [facility] or [facility] refill protocol or controlled substance      rOPINIRole (REQUIP) 1 MG tablet  Last Written Prescription Date:  8/19/19  Last Fill Quantity: 30,  # refills: 2   Last office visit: 10/14/2019 with prescribing provider:  Dr. Shaw   Future Office Visit:        morphine (MS CONTIN) 15 MG CR tablet      Last Written Prescription Date:  10/14/19  Last Fill Quantity: 60,   # refills: 0  Last Office Visit: 10/14/19  Future Office visit:       Routing refill request to provider for review/approval because:  Drug not on the [facility], [facility] or [facility] refill protocol or controlled substance      HYDROmorphone (DILAUDID) 4 MG tablet      Last Written Prescription Date:  10/14/19  Last Fill Quantity: 165,   # refills: 0  Last Office Visit: 10/14/19  Future Office visit:       Routing refill request to provider for review/approval because:  Drug not on the [facility], [facility] or [facility] refill protocol or controlled substance

## 2019-11-12 RX ORDER — ROPINIROLE 1 MG/1
1 TABLET, FILM COATED ORAL AT BEDTIME
Qty: 30 TABLET | Refills: 9 | Status: SHIPPED | OUTPATIENT
Start: 2019-11-12 | End: 2019-11-14

## 2019-11-12 RX ORDER — CYCLOBENZAPRINE HCL 10 MG
TABLET ORAL
Qty: 90 TABLET | Refills: 1 | Status: SHIPPED | OUTPATIENT
Start: 2019-11-12 | End: 2020-03-11

## 2019-11-12 RX ORDER — MORPHINE SULFATE 15 MG/1
15 TABLET, FILM COATED, EXTENDED RELEASE ORAL 2 TIMES DAILY
Qty: 60 TABLET | Refills: 0 | Status: SHIPPED | OUTPATIENT
Start: 2019-11-12 | End: 2019-12-13

## 2019-11-12 RX ORDER — HYDROMORPHONE HYDROCHLORIDE 4 MG/1
TABLET ORAL
Qty: 165 TABLET | Refills: 0 | Status: SHIPPED | OUTPATIENT
Start: 2019-11-12 | End: 2019-12-13

## 2019-11-12 NOTE — TELEPHONE ENCOUNTER
Prescription [facility] per [facility] Refill Protocol. - Requip     Routing refill request to provider for review/approval because:  Drug not on the [facility] refill protocol - Flexeril   Recent ED visit- 4 tabs Valium given to patient for sleep    Controlled Substance Refill Request for morphine (MS CONTIN) 15 MG CR tablet; HYDROmorphone (DILAUDID) 4 MG tablet   Problem List Complete:  Yes  Overview Addendum 10/14/2019  5:56 PM by Nory Shaw MD   Patient is followed by NORY SHAW for ongoing prescription of pain medication.  All refills should be approved by this provider, or covering partner.     Medication(s): MS Contin 15 2x / day = 30 MME  dilaudid 22 mg total = 88 MME  148 MME total     Tapering per plan        Plan per Pain Team:  1. Medication Management:   1. Continue Flexeril 10mg TID PRN as managed by Primary Care Provider  2. Continue Dilaudid 4-8mg QID PRN as managed by PCP. I would recommend tapering down to half the current dose. This can be done by reducing patient's dosage by 2mg/day every 2-4 weeks  3. Continue MSContin 30mg TID as managed by PCP. Discussed with the patient that he must take this medication consistently as he has been using it intermittently. I would recommend tapering down to BID. This can be done by having patient take 30mg twice daily and 15mg once daily for 2-4 weeks, then down to 30mg Q 12 hours.   4. In general, usually long-acting is reduced first, then short-acting medication.   5. IF patient is planning on having repeat surgery, I would work on reducing patient doses every 2 weeks vs Q 4 weeks. Being on high dose opiates can make post-op pain management markedly more challenging.  6. Additionally, patient should continue to have regular urine drug screening and sign controlled substance agreements with his prescriber of controlled substances  7. I agree with patient having a script for naloxone nasal spray for opiate reversal given high dose opiate use.    8. Could consider patient for medical cannabis certification, this can be helpful for pain and help patient's be able to taper their opiate doses easier     Narcan prescribed   Clinic visit frequency required: Q 3 months      Controlled substance agreement on file: Yes       Date(s): 12/1/15, 4/24/19     Pain Clinic evaluation in the past: Yes     DIRE Total Score(s):    12/1/2015   Total Score 18         Last [place] verification:  done on 12/1/15  4/24/19      [email or web address]           checked in past 3 months? No    RX [place] ([place]) reviewed:  reviewed- recommend provider review    [place]:  [email or web address]    See below for last Rx dates, qty, etc.     Shu Jiames RN  [facility]

## 2019-11-13 ENCOUNTER — E-VISIT (OUTPATIENT)
Dept: FAMILY MEDICINE | Facility: CLINIC | Age: 59
End: 2019-11-13
Payer: COMMERCIAL

## 2019-11-13 DIAGNOSIS — G25.81 RLS (RESTLESS LEGS SYNDROME): ICD-10-CM

## 2019-11-13 PROCEDURE — 99444 ZZC PHYSICIAN ONLINE EVALUATION & MANAGEMENT SERVICE: CPT | Performed by: FAMILY MEDICINE

## 2019-11-14 RX ORDER — ROPINIROLE 1 MG/1
3 TABLET, FILM COATED ORAL AT BEDTIME
Qty: 90 TABLET | Refills: 11 | Status: SHIPPED | OUTPATIENT
Start: 2019-11-14 | End: 2019-11-19

## 2019-11-18 ENCOUNTER — PRE VISIT (OUTPATIENT)
Dept: NEUROLOGY | Facility: CLINIC | Age: 59
End: 2019-11-18

## 2019-11-18 NOTE — TELEPHONE ENCOUNTER
FUTURE VISIT INFORMATION        FUTURE VISIT INFORMATION:    Date: 11/19/19    Time:  0800    Location: [facility]   REFERRAL INFORMATION:    Referring provider:  Cat Shaw MD    Referring providers clinic: [facility]   Reason for visit/diagnosis:    Per referral:  DDD (degenerative disc disease), lumbar   SI (sacroiliac) joint dysfunction     Per pt:  Headaches, dizziness, near syncope, RLS          NOTES (FOR ALL VISITS) STATUS DETAILS   OFFICE NOTE from referring provider Printed (Headache) 4/24/10  (RLS) 4/25/16, 8/19/19, 11/13/19  (Dizzy) 3/12/19  (Pain) 12/1/15, 2/28/18, 10/5/18, 9/16/19   OFFICE NOTE from other specialist Printed  GARRET Coley (FP) RLS  PT 7/10/18, 7/17/18, 7/24/18 (dizzy)  Dr. Bernal (ortho) 8/27/10 (pain)  Dr. Ortega (Pain) (pain)  JOHN Aguilar (FP) (pain)  VICTORIA Duvall (FP) (pain)  L. Newcomer (Pain) (pain)   DISCHARGE SUMMARY from [facility] 11/9/19 (RLS)   DISCHARGE REPORT from the ER Printed [facility] 4/7/10 (headache)   MEDICATION LIST In Epic     IMAGING  (FOR ALL VISITS)       EMG   None   EEG      MRI (HEAD, NECK, SPINE)  Reports printed   Images in PACs (requested)  CT head 8/15/19  MRI Lumbar 4/13/19  CT Thoracic 10/16/18   CARDIAC STUDIES      FORMAL COGNITIVE TESTING      OTHER

## 2019-11-19 ENCOUNTER — MYC MEDICAL ADVICE (OUTPATIENT)
Dept: FAMILY MEDICINE | Facility: CLINIC | Age: 59
End: 2019-11-19

## 2019-11-19 ENCOUNTER — OFFICE VISIT (OUTPATIENT)
Dept: NEUROLOGY | Facility: CLINIC | Age: 59
End: 2019-11-19
Payer: COMMERCIAL

## 2019-11-19 ENCOUNTER — MYC REFILL (OUTPATIENT)
Dept: UROLOGY | Facility: CLINIC | Age: 59
End: 2019-11-19

## 2019-11-19 ENCOUNTER — TELEPHONE (OUTPATIENT)
Dept: FAMILY MEDICINE | Facility: CLINIC | Age: 59
End: 2019-11-19

## 2019-11-19 ENCOUNTER — MYC REFILL (OUTPATIENT)
Dept: FAMILY MEDICINE | Facility: CLINIC | Age: 59
End: 2019-11-19

## 2019-11-19 VITALS
HEART RATE: 88 BPM | BODY MASS INDEX: 35.07 KG/M2 | WEIGHT: 260.4 LBS | TEMPERATURE: 98.6 F | DIASTOLIC BLOOD PRESSURE: 78 MMHG | RESPIRATION RATE: 20 BRPM | SYSTOLIC BLOOD PRESSURE: 117 MMHG

## 2019-11-19 DIAGNOSIS — R11.0 NAUSEA: ICD-10-CM

## 2019-11-19 DIAGNOSIS — G25.81 RLS (RESTLESS LEGS SYNDROME): Primary | ICD-10-CM

## 2019-11-19 DIAGNOSIS — G47.00 INSOMNIA, UNSPECIFIED TYPE: ICD-10-CM

## 2019-11-19 DIAGNOSIS — F51.01 PRIMARY INSOMNIA: ICD-10-CM

## 2019-11-19 DIAGNOSIS — N40.1 BENIGN PROSTATIC HYPERPLASIA WITH LOWER URINARY TRACT SYMPTOMS, SYMPTOM DETAILS UNSPECIFIED: ICD-10-CM

## 2019-11-19 DIAGNOSIS — R55 PRE-SYNCOPE: ICD-10-CM

## 2019-11-19 DIAGNOSIS — E61.1 IRON DEFICIENCY: ICD-10-CM

## 2019-11-19 LAB — FERRITIN SERPL-MCNC: 134 NG/ML (ref 26–388)

## 2019-11-19 PROCEDURE — 36415 COLL VENOUS BLD VENIPUNCTURE: CPT | Performed by: PSYCHIATRY & NEUROLOGY

## 2019-11-19 PROCEDURE — 82728 ASSAY OF FERRITIN: CPT | Performed by: PSYCHIATRY & NEUROLOGY

## 2019-11-19 PROCEDURE — 99204 OFFICE O/P NEW MOD 45 MIN: CPT | Performed by: PSYCHIATRY & NEUROLOGY

## 2019-11-19 RX ORDER — ROPINIROLE 1 MG/1
TABLET, FILM COATED ORAL
Qty: 120 TABLET | Refills: 4 | Status: SHIPPED | OUTPATIENT
Start: 2019-11-19 | End: 2020-04-13

## 2019-11-19 RX ORDER — TAMSULOSIN HYDROCHLORIDE 0.4 MG/1
0.4 CAPSULE ORAL AT BEDTIME
Qty: 90 CAPSULE | Refills: 3 | Status: CANCELLED | OUTPATIENT
Start: 2019-11-19

## 2019-11-19 ASSESSMENT — PAIN SCALES - GENERAL: PAINLEVEL: MODERATE PAIN (4)

## 2019-11-19 NOTE — LETTER
"    11/19/2019         RE: Oleg Ford  [address]  [facility] [address]        Dear Colleague,    Thank you for referring your patient, Oleg Ford, to the [facility]. Please see a copy of my visit note below.    INITIAL NEUROLOGY CONSULTATION    DATE OF VISIT: 11/19/2019  MRN: [id number]  PATIENT NAME: Oleg Ford  YOB: 1960    REFERRING PROVIDER: No ref. provider found    Chief Complaint   Patient presents with     New Patient     Headaches, near syncope, RLS.  Referral by Cat Shaw MD       SUBJECTIVE:                                                      HPI:   Oleg Ford is a 59 year old male whom I have been asked by Dr. Shaw to see in consultation for DDD of the lumbar region, however the pre-visit note indicates patient wants to discuss \"headaches, dizziness, near syncope and RLS.\" Per  Chart review, the headaches date back to at least 2010. The restless legs syndrome dates back to 2014 when he reported that neither Mirapex nor ropinirole were helpful. It appears that very recently the ropinirole dose was increased by his primary care provider. Head CT report from 8.2019 for headache was reported as normal.      The patient has additional history of severe depression. The psychiatric history, upon brief review, appears to be complex. He has also been seen in Pain clinic.    The patient tells my medical assistant that the main issue is the restless legs syndrome. He says that he has a lot of trouble sleeping. He says that he generally has trouble with other things that keep him up at night, besides the restless legs syndrome symptoms. Over the past year, he has been having a lot of trouble with lightheadedness. He has spells of lightheadedness and dizziness without blacking out, but he does experience tunnel vision. Position does not matter. He says this happens about 3 times per week. No triggers. Not necessarily after a bad night of sleep. No history " "of seizures.     He says that he has a lot of problems with suicidal ideation. He says he is doing ok now. He does have a mental health provider, but admits he has not seen his Psychiatrist in a while. He does have an appointment with his therapist later this week.     He says he just started the increased Requip, and he is not sure if this helpful yet or not. He was previously on a dose of 1mg in the evenings. He does get symptoms earlier in the evening now he notices too. It appears that in 2014, the gabapentin was added to Mirapex for the restless legs syndrome, but the patient is unaware that gabapentin is also used to treat restless legs syndrome.    He takes Ambien and trazodone and sometimes a narcotic just to get to sleep and this only \"sometimes\" works. He says he has had \"so many sleep studies,\" but no longer has a sleep specialist. He says he no longer has sleep apnea but was previously on CPAP. I cannot find any Sleep clinic notes in the patient's chart.     He does have frontal headaches that sometimes affect the top of his head as well. He does not report any associated symptoms.    He is not currently working.     There is positive family history of migraines.      Past Medical History:   Diagnosis Date     Depression, major      Esophageal mass      History of orchiectomy 1982     History of vasectomy 1991     Low back pain      Other chronic pain     Chronic low back pain.     Restless leg      Sleep apnea     had surgery done to resolve sleep apnea     Past Surgical History:   Procedure Laterality Date     BACK SURGERY  2/6/2013    lumbar surgery     BACK SURGERY      spinal stimulator     COLONOSCOPY       COMBINED ESOPHAGOSCOPY, GASTROSCOPY, DUODENOSCOPY (EGD) WITH CO2 INSUFFLATION N/A 11/14/2018    Procedure: COMBINED ESOPHAGOSCOPY, GASTROSCOPY, DUODENOSCOPY (EGD) WITH CO2 INSUFFLATION;  Surgeon: Rosendo Guy MD;  Location:  OR     ESOPHAGOSCOPY, GASTROSCOPY, DUODENOSCOPY (EGD), " COMBINED N/A 11/14/2018    Procedure: COMBINED ESOPHAGOSCOPY, GASTROSCOPY, DUODENOSCOPY (EGD), BIOPSY SINGLE OR MULTIPLE;  Surgeon: Rosendo Guy MD;  Location: [facility]     FUSION SACRAL ILIAC Right 10/19/2017    Procedure: FUSION SACRAL ILIAC;  Right  side sacroiliac joint fusion with 2 implants ;  Surgeon: Sebastián Szymanski MD;  Location: [facility] OR     FUSION SACRAL ILIAC Left 1/11/2018    Procedure: FUSION SACRAL ILIAC;   left  side sacroiliac joint fusion with 2 implants.(Zyga)   extraction and use of illac bone graft ;  Surgeon: Sebastián Szymanski MD;  Location: [facility] OR     HC UGI ENDOSCOPY W EUS N/A 7/31/2014    Procedure: COMBINED ENDOSCOPIC ULTRASOUND, ESOPHAGOSCOPY, GASTROSCOPY, DUODENOSCOPY (EGD);  Surgeon: Shorty Stoll MD;  [facility]     ORCHIECTOMY INGUINAL      right radical orchiectomy     REMOVE STIMULATOR VAGUS NERVE  12/23/2011    Procedure:REMOVE STIMULATOR VAGUS NERVE; Vagus Nerve Stimulator Removal; Surgeon:MARTÍN BECERRA; Location:[facility]     REMOVE STIMULATOR VAGUS NERVE  11/8/2013    Procedure: REMOVE STIMULATOR VAGUS NERVE;  Removal Of Vagus Nerve Stimulator Lead In Left Neck ;  Surgeon: Martín Becerra MD;  [facility]: [facility]     SURGICAL HISTORY OF -       vagal nerve implant; removed     SURGICAL HISTORY OF -       UPPP     VASECTOMY         cyclobenzaprine (FLEXERIL) 10 MG tablet, TAKE 1 TABLET BY MOUTH THREE TIMES DAILY AS NEEDED FOR MUSCLE SPASMS  EPINEPHrine (EPIPEN) 0.3 MG/0.3ML injection, Inject 0.3 mLs (0.3 mg) into the muscle once as needed for anaphylaxis  escitalopram (LEXAPRO) 20 MG tablet, Take 1 tablet (20 mg) by mouth daily  gabapentin (NEURONTIN) 300 MG capsule, Take 1 capsule (300 mg) by mouth At Bedtime  HYDROmorphone (DILAUDID) 4 MG tablet, Three times a day take 8 mg (2 tabs) and one time a day take 6 mg (1.5 tabs) for pain  hydrOXYzine (VISTARIL) 50 MG capsule, Take 1 capsule (50 mg) by mouth every 6 hours as needed for anxiety  morphine (MS CONTIN) 15 MG CR  "tablet, Take 1 tablet (15 mg) by mouth 2 times daily  naloxone (NARCAN) nasal spray, Spray 1 spray (4 mg) into one nostril alternating nostrils as needed for opioid reversal every 2-3 minutes until assistance arrives  omeprazole (PRILOSEC) 40 MG DR capsule, TAKE ONE CAPSULE BY MOUTH EVERY DAY 30 TO 60 MINUTES BEFORE A MEAL  ondansetron (ZOFRAN) 4 MG tablet, Take 1 tablet (4 mg) by mouth every 8 hours as needed for nausea  tamsulosin (FLOMAX) 0.4 MG capsule, Take 1 capsule (0.4 mg) by mouth At Bedtime  traZODone (DESYREL) 50 MG tablet, Take 1 tablet (50 mg) by mouth nightly as needed for sleep  zolpidem (AMBIEN) 5 MG tablet, Take 1 tablet (5 mg) by mouth nightly as needed for sleep    triamcinolone (KENALOG-40) injection 40 mg  triamcinolone (KENALOG-40) injection 40 mg  triamcinolone (KENALOG-40) injection 40 mg      Allergies   Allergen Reactions     Ciprofloxacin Anaphylaxis     Doxycycline Anaphylaxis     Lyrica [Pregabalin]      Felt \"out of his mind\" and confused     Septra [Bactrim] Anaphylaxis     Amoxicillin      itching     Nuts      [place] nuts        Problem (# of Occurrences) Relation (Name,Age of Onset)    C.A.D. (3) Mother, Maternal Grandmother, Maternal Grandfather    Cancer (1) Maternal Grandfather    Diabetes (2) Mother, Maternal Grandmother    Hypertension (1) Maternal Grandmother        Social History     Tobacco Use     Smoking status: Former Smoker     Packs/day: 0.00     Years: 40.00     Pack years: 0.00     Types: Cigarettes     Smokeless tobacco: Never Used     Tobacco comment: Last cigarette was October 17, 2018   Substance Use Topics     Alcohol use: Yes     Comment: 4 drinks a year     Drug use: No     Types: Marijuana     Comment: morphine, dilaudid       REVIEW OF SYSTEMS:                                                      10-point review of systems is negative except as mentioned above in HPI.     EXAM:                                                      Physical Exam:   Vitals: BP " 117/78 (BP Location: Right arm, Patient Position: Sitting, Cuff Size: Adult Large)   Pulse 88   Temp 98.6  F (37  C) (Tympanic)   Resp 20   Wt 118.1 kg (260 lb 6.4 oz)   BMI 35.07 kg/m     BMI= Body mass index is 35.07 kg/m .  GENERAL: NAD.   HEENT: NC/AT.  CV: RRR. S1, S2.   NECK: No bruits.  Neurologic:  MENTAL STATUS: Alert, attentive. Speech is fluent. Normal comprehension. Fair concentration. Vague fund of knowledge.   CRANIAL NERVES: Discs technically difficult to visualize. Visual fields intact to confrontation. Pupils equally, round and reactive to light. Facial sensation and movement normal. EOM full. Hearing intact to conversation. Trapezius strength intact. Palate moves symmetrically. Tongue midline.  MOTOR: 5/5 in proximal and distal muscle groups of upper and lower extremities. Tone and bulk normal.   DTRs: Intact and symmetric. Ankle jerks present. Babinski down-going bilaterally.   SENSATION: Normal light touch and pinprick. Intact proprioception. Vibration: Normal at both ankles.   COORDINATION: Finger tapping normal. Knee heel shin normal.  STATION AND GAIT: Romberg negative. Casual gait and tandem normal.  Left hand-dominant.    ASSESSMENT and PLAN:                                                      Assessment and Plan:     ICD-10-CM    1. RLS (restless legs syndrome) G25.81 rOPINIRole (REQUIP) 1 MG tablet     SLEEP EVALUATION & MANAGEMENT REFERRAL - [facility]  [phone] (Age 2 and up)   2. Pre-syncope R55 MR Brain w/o & w Contrast   3. Primary insomnia F51.01 SLEEP EVALUATION & MANAGEMENT REFERRAL - [facility]  [phone] (Age 2 and up)   4. Iron deficiency E61.1 Ferritin    Rule Out        Mr. Ford is a pleasant 60 yo with a complex psychiatric history and chronic pain problems here for evaluation regarding restless legs syndrome. In talking with the patient, I gather that his sleep problems extend well beyond just the  restless legs syndrome symptoms. I do think he would benefit from adding another low dose of Requip earlier in the evening but overall feel that the sleep issues would be best served by a Sleep specialist, so I will refer him on. Will check a ferritin today, as I do not see one on record. I would like to get a brain MRI to start the assessment of the presyncopal spells. Further work-up from a cardiac standpoint might also be helpful, but I will defer this to the discretion of his primary care provider. I would like to see Oleg back in a few months. He understands and agrees with the plan.     Patient Instructions:  Add an early evening dose of Requip for the restless legs. Take 1mg at 5-6pm and the 3mg around 8-9pm, as you are.  Also, take the Gabapentin 2-3 hours prior to bedtime, with the Requip.   Referral to Sleep specialist for the insomnia/poor sleep.   Ferritin level today.  Brain MRI.   We will notify you of the test results.   Return to Neurology in 3 months    Total Time: 45 minutes were spent with the patient and in chart review. More than 50% of the time spent on counseling (as described above in Assessment and Plan/Instructions) /coordinating the care.    Charlene Alcala MD  Neurology  CC: Cat Shaw MD      Again, thank you for allowing me to participate in the care of your patient.        Sincerely,        Charlene Alcala MD

## 2019-11-19 NOTE — RESULT ENCOUNTER NOTE
Please advise Oleg Ford, ARTHUR 1960, that his ferritin level is normal.  [phone] (home) [phone] (work)  Charlene Alcala MD

## 2019-11-19 NOTE — TELEPHONE ENCOUNTER
1 year's worth of medication was sent on 10/15/19.  Called patient and advised him to contact his pharmacy.  Patient will contact them.    Santana Grubbs RN....11/19/2019 4:12 PM

## 2019-11-19 NOTE — TELEPHONE ENCOUNTER
"Requested Prescriptions   Pending Prescriptions Disp Refills     ondansetron (ZOFRAN) 4 MG tablet  Last Written Prescription Date:  9/16/19  Last Fill Quantity: 30 tablet,  # refills: 1   Last office visit: 10/14/2019 with prescribing provider:  Dr. Shaw   Future Office Visit:     30 tablet 1     Sig: Take 1 tablet (4 mg) by mouth every 8 hours as needed for nausea        Antivertigo/Antiemetic Agents Passed - 11/19/2019 12:37 PM        Passed - Recent (12 mo) or future (30 days) visit within the authorizing provider's specialty     Patient has had an office visit with the authorizing provider or a provider within the authorizing providers department within the previous 12 mos or has a future within next 30 days. See \"Patient Info\" tab in inbasket, or \"Choose Columns\" in Meds & Orders section of the refill encounter.              Passed - Medication is active on med list        Passed - Patient is 18 years of age or older              zolpidem (AMBIEN) 5 MG tablet      Last Written Prescription Date:  9/16/19  Last Fill Quantity: 30 tablet,   # refills: 2  Last Office Visit: Dr. Shaw 10/14/19  Future Office visit:       Routing refill request to provider for review/approval because:  Drug not on the [facility], [facility] or [facility] refill protocol or controlled substance   30 tablet 2     Sig: Take 1 tablet (5 mg) by mouth nightly as needed for sleep       There is no refill protocol information for this order          "

## 2019-11-19 NOTE — PROGRESS NOTES
"INITIAL NEUROLOGY CONSULTATION    DATE OF VISIT: 11/19/2019  MRN: [id number]  PATIENT NAME: Oleg Ford  YOB: 1960    REFERRING PROVIDER: No ref. provider found    Chief Complaint   Patient presents with     New Patient     Headaches, near syncope, RLS.  Referral by Cat Shaw MD       SUBJECTIVE:                                                      HPI:   Oleg Ford is a 59 year old male whom I have been asked by Dr. Shaw to see in consultation for DDD of the lumbar region, however the pre-visit note indicates patient wants to discuss \"headaches, dizziness, near syncope and RLS.\" Per  Chart review, the headaches date back to at least 2010. The restless legs syndrome dates back to 2014 when he reported that neither Mirapex nor ropinirole were helpful. It appears that very recently the ropinirole dose was increased by his primary care provider. Head CT report from 8.2019 for headache was reported as normal.      The patient has additional history of severe depression. The psychiatric history, upon brief review, appears to be complex. He has also been seen in Pain clinic.    The patient tells my medical assistant that the main issue is the restless legs syndrome. He says that he has a lot of trouble sleeping. He says that he generally has trouble with other things that keep him up at night, besides the restless legs syndrome symptoms. Over the past year, he has been having a lot of trouble with lightheadedness. He has spells of lightheadedness and dizziness without blacking out, but he does experience tunnel vision. Position does not matter. He says this happens about 3 times per week. No triggers. Not necessarily after a bad night of sleep. No history of seizures.     He says that he has a lot of problems with suicidal ideation. He says he is doing ok now. He does have a mental health provider, but admits he has not seen his Psychiatrist in a while. He does have an appointment with his " "therapist later this week.     He says he just started the increased Requip, and he is not sure if this helpful yet or not. He was previously on a dose of 1mg in the evenings. He does get symptoms earlier in the evening now he notices too. It appears that in 2014, the gabapentin was added to Mirapex for the restless legs syndrome, but the patient is unaware that gabapentin is also used to treat restless legs syndrome.    He takes Ambien and trazodone and sometimes a narcotic just to get to sleep and this only \"sometimes\" works. He says he has had \"so many sleep studies,\" but no longer has a sleep specialist. He says he no longer has sleep apnea but was previously on CPAP. I cannot find any Sleep clinic notes in the patient's chart.     He does have frontal headaches that sometimes affect the top of his head as well. He does not report any associated symptoms.    He is not currently working.     There is positive family history of migraines.      Past Medical History:   Diagnosis Date     Depression, major      Esophageal mass      History of orchiectomy 1982     History of vasectomy 1991     Low back pain      Other chronic pain     Chronic low back pain.     Restless leg      Sleep apnea     had surgery done to resolve sleep apnea     Past Surgical History:   Procedure Laterality Date     BACK SURGERY  2/6/2013    lumbar surgery     BACK SURGERY      spinal stimulator     COLONOSCOPY       COMBINED ESOPHAGOSCOPY, GASTROSCOPY, DUODENOSCOPY (EGD) WITH CO2 INSUFFLATION N/A 11/14/2018    Procedure: COMBINED ESOPHAGOSCOPY, GASTROSCOPY, DUODENOSCOPY (EGD) WITH CO2 INSUFFLATION;  Surgeon: Rosendo Guy MD;  Location: [facility]     ESOPHAGOSCOPY, GASTROSCOPY, DUODENOSCOPY (EGD), COMBINED N/A 11/14/2018    Procedure: COMBINED ESOPHAGOSCOPY, GASTROSCOPY, DUODENOSCOPY (EGD), BIOPSY SINGLE OR MULTIPLE;  Surgeon: Rosendo Guy MD;  Location: [facility]     FUSION SACRAL ILIAC Right 10/19/2017    Procedure: " FUSION SACRAL ILIAC;  Right  side sacroiliac joint fusion with 2 implants ;  Surgeon: Sebastián Szymanski MD;  Location: [facility] OR     FUSION SACRAL ILIAC Left 1/11/2018    Procedure: FUSION SACRAL ILIAC;   left  side sacroiliac joint fusion with 2 implants.(Zyga)   extraction and use of illac bone graft ;  Surgeon: Sebastián Szymanski MD;  Location: [facility] OR     HC UGI ENDOSCOPY W EUS N/A 7/31/2014    Procedure: COMBINED ENDOSCOPIC ULTRASOUND, ESOPHAGOSCOPY, GASTROSCOPY, DUODENOSCOPY (EGD);  Surgeon: Shorty Stoll MD;  [facility]     ORCHIECTOMY INGUINAL      right radical orchiectomy     REMOVE STIMULATOR VAGUS NERVE  12/23/2011    Procedure:REMOVE STIMULATOR VAGUS NERVE; Vagus Nerve Stimulator Removal; Surgeon:ALEXIS BECERRA; [facility]     REMOVE STIMULATOR VAGUS NERVE  11/8/2013    Procedure: REMOVE STIMULATOR VAGUS NERVE;  Removal Of Vagus Nerve Stimulator Lead In Left Neck ;  Surgeon: Alexis Becerra MD;  [facility]     SURGICAL HISTORY OF -       vagal nerve implant; removed     SURGICAL HISTORY OF -       UPPP     VASECTOMY         cyclobenzaprine (FLEXERIL) 10 MG tablet, TAKE 1 TABLET BY MOUTH THREE TIMES DAILY AS NEEDED FOR MUSCLE SPASMS  EPINEPHrine (EPIPEN) 0.3 MG/0.3ML injection, Inject 0.3 mLs (0.3 mg) into the muscle once as needed for anaphylaxis  escitalopram (LEXAPRO) 20 MG tablet, Take 1 tablet (20 mg) by mouth daily  gabapentin (NEURONTIN) 300 MG capsule, Take 1 capsule (300 mg) by mouth At Bedtime  HYDROmorphone (DILAUDID) 4 MG tablet, Three times a day take 8 mg (2 tabs) and one time a day take 6 mg (1.5 tabs) for pain  hydrOXYzine (VISTARIL) 50 MG capsule, Take 1 capsule (50 mg) by mouth every 6 hours as needed for anxiety  morphine (MS CONTIN) 15 MG CR tablet, Take 1 tablet (15 mg) by mouth 2 times daily  naloxone (NARCAN) nasal spray, Spray 1 spray (4 mg) into one nostril alternating nostrils as needed for opioid reversal every 2-3 minutes until assistance arrives  omeprazole  "(PRILOSEC) 40 MG DR capsule, TAKE ONE CAPSULE BY MOUTH EVERY DAY 30 TO 60 MINUTES BEFORE A MEAL  ondansetron (ZOFRAN) 4 MG tablet, Take 1 tablet (4 mg) by mouth every 8 hours as needed for nausea  tamsulosin (FLOMAX) 0.4 MG capsule, Take 1 capsule (0.4 mg) by mouth At Bedtime  traZODone (DESYREL) 50 MG tablet, Take 1 tablet (50 mg) by mouth nightly as needed for sleep  zolpidem (AMBIEN) 5 MG tablet, Take 1 tablet (5 mg) by mouth nightly as needed for sleep    triamcinolone (KENALOG-40) injection 40 mg  triamcinolone (KENALOG-40) injection 40 mg  triamcinolone (KENALOG-40) injection 40 mg      Allergies   Allergen Reactions     Ciprofloxacin Anaphylaxis     Doxycycline Anaphylaxis     Lyrica [Pregabalin]      Felt \"out of his mind\" and confused     Septra [Bactrim] Anaphylaxis     Amoxicillin      itching     Nuts      [place] nuts        Problem (# of Occurrences) Relation (Name,Age of Onset)    C.A.D. (3) Mother, Maternal Grandmother, Maternal Grandfather    Cancer (1) Maternal Grandfather    Diabetes (2) Mother, Maternal Grandmother    Hypertension (1) Maternal Grandmother        Social History     Tobacco Use     Smoking status: Former Smoker     Packs/day: 0.00     Years: 40.00     Pack years: 0.00     Types: Cigarettes     Smokeless tobacco: Never Used     Tobacco comment: Last cigarette was October 17, 2018   Substance Use Topics     Alcohol use: Yes     Comment: 4 drinks a year     Drug use: No     Types: Marijuana     Comment: morphine, dilaudid       REVIEW OF SYSTEMS:                                                      10-point review of systems is negative except as mentioned above in HPI.     EXAM:                                                      Physical Exam:   Vitals: /78 (BP Location: Right arm, Patient Position: Sitting, Cuff Size: Adult Large)   Pulse 88   Temp 98.6  F (37  C) (Tympanic)   Resp 20   Wt 118.1 kg (260 lb 6.4 oz)   BMI 35.07 kg/m    BMI= Body mass index is 35.07 " kg/m .  GENERAL: NAD.   HEENT: NC/AT.  CV: RRR. S1, S2.   NECK: No bruits.  Neurologic:  MENTAL STATUS: Alert, attentive. Speech is fluent. Normal comprehension. Fair concentration. Vague fund of knowledge.   CRANIAL NERVES: Discs technically difficult to visualize. Visual fields intact to confrontation. Pupils equally, round and reactive to light. Facial sensation and movement normal. EOM full. Hearing intact to conversation. Trapezius strength intact. Palate moves symmetrically. Tongue midline.  MOTOR: 5/5 in proximal and distal muscle groups of upper and lower extremities. Tone and bulk normal.   DTRs: Intact and symmetric. Ankle jerks present. Babinski down-going bilaterally.   SENSATION: Normal light touch and pinprick. Intact proprioception. Vibration: Normal at both ankles.   COORDINATION: Finger tapping normal. Knee heel shin normal.  STATION AND GAIT: Romberg negative. Casual gait and tandem normal.  Left hand-dominant.    ASSESSMENT and PLAN:                                                      Assessment and Plan:     ICD-10-CM    1. RLS (restless legs syndrome) G25.81 rOPINIRole (REQUIP) 1 MG tablet     SLEEP EVALUATION & MANAGEMENT REFERRAL - [facility]  [phone] (Age 2 and up)   2. Pre-syncope R55 MR Brain w/o & w Contrast   3. Primary insomnia F51.01 SLEEP EVALUATION & MANAGEMENT REFERRAL - [facility]  [phone] (Age 2 and up)   4. Iron deficiency E61.1 Ferritin    Rule Out        Mr. Ford is a pleasant 58 yo with a complex psychiatric history and chronic pain problems here for evaluation regarding restless legs syndrome. In talking with the patient, I gather that his sleep problems extend well beyond just the restless legs syndrome symptoms. I do think he would benefit from adding another low dose of Requip earlier in the evening but overall feel that the sleep issues would be best served by a Sleep specialist, so I will refer him on.  Will check a ferritin today, as I do not see one on record. I would like to get a brain MRI to start the assessment of the presyncopal spells. Further work-up from a cardiac standpoint might also be helpful, but I will defer this to the discretion of his primary care provider. I would like to see Oleg back in a few months. He understands and agrees with the plan.     Patient Instructions:  Add an early evening dose of Requip for the restless legs. Take 1mg at 5-6pm and the 3mg around 8-9pm, as you are.  Also, take the Gabapentin 2-3 hours prior to bedtime, with the Requip.   Referral to Sleep specialist for the insomnia/poor sleep.   Ferritin level today.  Brain MRI.   We will notify you of the test results.   Return to Neurology in 3 months    Total Time: 45 minutes were spent with the patient and in chart review. More than 50% of the time spent on counseling (as described above in Assessment and Plan/Instructions) /coordinating the care.    Charlene Alcala MD  Neurology    CC: Cat Shaw MD

## 2019-11-19 NOTE — TELEPHONE ENCOUNTER
Reason for Call:  Other prescription    Detailed comments: Pt calling for he would like to put in a medication request for Viagra.     Phone Number Patient can be reached at: Home number on file [phone] (home)    Best Time: anytime    Can we leave a detailed message on this number? YES    Call taken on 11/19/2019 at 12:58 PM by Juan Cardona

## 2019-11-19 NOTE — TELEPHONE ENCOUNTER
Spoke with pt, informed him that OV, phone or evisit would be needed, since this medication would be a new medication.  Pt said it will have to wait til later.        Sally ALFARO, Patient Care

## 2019-11-19 NOTE — PATIENT INSTRUCTIONS
Plan:    Add an early evening dose of Requip for the restless legs. Take 1mg at 5-6pm and the 3mg around 8-9pm, as you are.  Also, take the Gabapentin 2-3 hours prior to bedtime, with the Requip.   Referral to Sleep specialist for the insomnia/poor sleep.   Ferritin level today.  Brain MRI.   We will notify you of the test results.   Return to Neurology in 3 months.

## 2019-11-21 ENCOUNTER — HOSPITAL ENCOUNTER (OUTPATIENT)
Dept: MRI IMAGING | Facility: CLINIC | Age: 59
Discharge: HOME | End: 2019-11-21
Attending: PSYCHIATRY & NEUROLOGY | Admitting: PSYCHIATRY & NEUROLOGY
Payer: COMMERCIAL

## 2019-11-21 DIAGNOSIS — R55 PRE-SYNCOPE: ICD-10-CM

## 2019-11-21 PROCEDURE — 70553 MRI BRAIN STEM W/O & W/DYE: CPT

## 2019-11-21 PROCEDURE — A9585 GADOBUTROL INJECTION: HCPCS | Performed by: PSYCHIATRY & NEUROLOGY

## 2019-11-21 PROCEDURE — 25500064 ZZH RX 255 OP 636: Performed by: PSYCHIATRY & NEUROLOGY

## 2019-11-21 RX ORDER — ZOLPIDEM TARTRATE 5 MG/1
5 TABLET ORAL
Qty: 30 TABLET | Refills: 2 | Status: SHIPPED | OUTPATIENT
Start: 2019-11-21 | End: 2019-12-13

## 2019-11-21 RX ORDER — ONDANSETRON 4 MG/1
4 TABLET, FILM COATED ORAL EVERY 8 HOURS PRN
Qty: 30 TABLET | Refills: 1 | Status: SHIPPED | OUTPATIENT
Start: 2019-11-21 | End: 2020-07-20

## 2019-11-21 RX ORDER — GADOBUTROL 604.72 MG/ML
11 INJECTION INTRAVENOUS ONCE
Status: COMPLETED | OUTPATIENT
Start: 2019-11-21 | End: 2019-11-21

## 2019-11-21 RX ADMIN — GADOBUTROL 11 ML: 604.72 INJECTION INTRAVENOUS at 19:19

## 2019-11-21 NOTE — TELEPHONE ENCOUNTER
Routing refill request to provider for review/approval because:  Drug not on the [facility] refill protocol: tanner Cerna RN, [facility] Triage    Ondansetron filled per protocol.

## 2019-11-22 NOTE — RESULT ENCOUNTER NOTE
Please advise Oleg Ford,  1960, that his brain MRI was normal.  [phone] (home) [phone] (work)  Charlene Alcala MD

## 2019-12-12 ENCOUNTER — TELEPHONE (OUTPATIENT)
Dept: FAMILY MEDICINE | Facility: CLINIC | Age: 59
End: 2019-12-12

## 2019-12-12 NOTE — TELEPHONE ENCOUNTER
Reason for call:  Other   Patient called regarding (reason for call): Pt said his son  a couple days ago, he cannot sleep; no appts avail until 1/3, please advise  Additional comments:     Phone number to reach patient:  Home number on file [phone] (home)    Best Time:  any    Can we leave a detailed message on this number?  YES

## 2019-12-13 ENCOUNTER — VIRTUAL VISIT (OUTPATIENT)
Dept: FAMILY MEDICINE | Facility: CLINIC | Age: 59
End: 2019-12-13
Payer: COMMERCIAL

## 2019-12-13 DIAGNOSIS — M51.369 DDD (DEGENERATIVE DISC DISEASE), LUMBAR: ICD-10-CM

## 2019-12-13 DIAGNOSIS — M53.3 SI (SACROILIAC) JOINT DYSFUNCTION: ICD-10-CM

## 2019-12-13 DIAGNOSIS — F43.21 GRIEF REACTION: Primary | ICD-10-CM

## 2019-12-13 DIAGNOSIS — G47.00 INSOMNIA, UNSPECIFIED TYPE: ICD-10-CM

## 2019-12-13 DIAGNOSIS — G89.4 CHRONIC PAIN SYNDROME: ICD-10-CM

## 2019-12-13 PROCEDURE — 99441 ZZC PHYSICIAN TELEPHONE EVALUATION 5-10 MIN: CPT | Performed by: FAMILY MEDICINE

## 2019-12-13 RX ORDER — HYDROMORPHONE HYDROCHLORIDE 4 MG/1
6 TABLET ORAL 3 TIMES DAILY
Qty: 135 TABLET | Refills: 0 | Status: SHIPPED | OUTPATIENT
Start: 2019-12-13 | End: 2020-02-11

## 2019-12-13 RX ORDER — ZOLPIDEM TARTRATE 10 MG/1
10 TABLET ORAL
Qty: 30 TABLET | Refills: 2 | Status: SHIPPED | OUTPATIENT
Start: 2019-12-13 | End: 2020-03-11

## 2019-12-13 RX ORDER — OLANZAPINE 5 MG/1
5 TABLET ORAL AT BEDTIME
COMMUNITY

## 2019-12-13 RX ORDER — MORPHINE SULFATE 15 MG/1
15 TABLET, FILM COATED, EXTENDED RELEASE ORAL 2 TIMES DAILY
Qty: 60 TABLET | Refills: 0 | Status: SHIPPED | OUTPATIENT
Start: 2019-12-13 | End: 2020-02-11

## 2019-12-13 RX ORDER — DIAZEPAM 5 MG
5 TABLET ORAL 3 TIMES DAILY PRN
Qty: 30 TABLET | Refills: 0 | Status: SHIPPED | OUTPATIENT
Start: 2019-12-13 | End: 2020-01-03

## 2019-12-13 NOTE — PROGRESS NOTES
Patient opted to conduct today's return visit via telephone vs an in person visit to the clinic.    I spoke with: Oleg Ford     The reason for the telephone visit was: Depression, anxiety and sleep problems, son passed last week.     Pertinent history and review of systems:   Spoke with patient on the phone today regarding grief over her son's recent death.  Son was a previous patient of mine had not seen him in a couple of years.  Autopsy pending and there are some thoughts that this may be an inborn heart issue, but there is a history of drug use as well.  Patient is doing okay given the circumstances.  Lots of family support.  Had the  yesterday and it was well attended by many friends and family.  His plan now is to go up to family's house in [place] for a month or so.  Back pain is a well-known issue for me and we have been working to wean him down to a safer level.  Actually doing okay with it.  A lot more anxiety with the grief and he is seeing an outside psychiatrist.  Is off trazodone now and they will be starting Zyprexa.  Sleep is an issue with the grief we discussed increasing his Ambien to a full 10 mg at bedtime.  Watch for sedation side effects.  In the past he used to be on Valium for muscle spasms and asked about that for situational anxiety.  We discussed the potential risks of oversedation when combined with narcotics.    Assessment:   ASSESSMENT / PLAN:  (F43.21) Grief reaction  (primary encounter diagnosis)  Comment: Short-term use, not to be combined with higher dose Ambien or pain medication at bedtime.  Has tolerated in the past and this will not be a long-term issue  Plan: diazepam (VALIUM) 5 MG tablet        Discussed mechanism of action of the proposed medication, as well as potential effects, both good and bad.  Patient expressed understanding and agreed with treatment.     (G47.00) Insomnia, unspecified type  Comment: Okay to increase to 10 mg daily.  Watch for sedation side  effects  Plan: zolpidem (AMBIEN) 10 MG tablet           (G89.4) Chronic pain syndrome  Comment:   Plan: morphine (MS CONTIN) 15 MG CR tablet,         HYDROmorphone (DILAUDID) 4 MG tablet            (M51.36) DDD (degenerative disc disease), lumbar  Comment:   Plan: morphine (MS CONTIN) 15 MG CR tablet,         HYDROmorphone (DILAUDID) 4 MG tablet            (M53.3) SI (sacroiliac) joint dysfunction  Comment:   Plan: morphine (MS CONTIN) 15 MG CR tablet,         HYDROmorphone (DILAUDID) 4 MG tablet               Advice/instructions given to patient/guardian including prescriptions, follow up appointment or orders for diagnostic testing:   Patient will contact me in a couple of weeks with progress  SANTIAGO Shaw M.D.     Phone call contact time    Call Started at: [phone]    Call Ended at: [phone]

## 2020-01-03 ENCOUNTER — MYC REFILL (OUTPATIENT)
Dept: FAMILY MEDICINE | Facility: CLINIC | Age: 60
End: 2020-01-03

## 2020-01-03 DIAGNOSIS — F43.21 GRIEF REACTION: ICD-10-CM

## 2020-01-03 DIAGNOSIS — K21.00 GASTROESOPHAGEAL REFLUX DISEASE WITH ESOPHAGITIS: ICD-10-CM

## 2020-01-03 RX ORDER — OMEPRAZOLE 40 MG/1
CAPSULE, DELAYED RELEASE ORAL
Qty: 90 CAPSULE | Refills: 1 | Status: SHIPPED | OUTPATIENT
Start: 2020-01-03 | End: 2020-09-21

## 2020-01-03 NOTE — TELEPHONE ENCOUNTER
"Requested Prescriptions   Pending Prescriptions Disp Refills     omeprazole (PRILOSEC) 40 MG DR capsule  Last Written Prescription Date:  12/13/18  Last Fill Quantity: 30 capsule,  # refills: 9   Last office visit: 12/13/2019 with prescribing provider:  Dr. Shaw   Future Office Visit:   Next 5 appointments (look out 90 days)    Feb 19, 2020  9:30 AM CST  Return Visit with Charlene Alcala MD  [facility] [address] [address]  [phone]          30 capsule 9     Sig: TAKE ONE CAPSULE BY MOUTH EVERY DAY 30 TO 60 MINUTES BEFORE A MEAL       PPI Protocol Passed - 1/3/2020  4:43 PM        Passed - Not on Clopidogrel (unless Pantoprazole ordered)        Passed - No diagnosis of osteoporosis on record        Passed - Recent (12 mo) or future (30 days) visit within the authorizing provider's specialty     Patient has had an office visit with the authorizing provider or a provider within the authorizing providers department within the previous 12 mos or has a future within next 30 days. See \"Patient Info\" tab in inbasket, or \"Choose Columns\" in Meds & Orders section of the refill encounter.              Passed - Medication is active on med list        Passed - Patient is age 18 or older          "

## 2020-01-03 NOTE — TELEPHONE ENCOUNTER
Requested Prescriptions   Pending Prescriptions Disp Refills     diazepam (VALIUM) 5 MG tablet  Last Written Prescription Date:  12/13/19  Last Fill Quantity: 30 tablet,  # refills: 0   Last office visit: 12/13/2019 with prescribing provider:  Dr. Shaw   Future Office Visit:   Next 5 appointments (look out 90 days)    Feb 19, 2020  9:30 AM CST  [facility] with [facility]  [facility] [address] [address]  [phone]             Routing refill request to provider for review/approval because:  Drug not on the [facility], [facility] or [facility] refill protocol or controlled substance   30 tablet 0     Sig: Take 1 tablet (5 mg) by mouth 3 times daily as needed for anxiety       There is no refill protocol information for this order

## 2020-01-04 NOTE — TELEPHONE ENCOUNTER
Prescription approved per [facility] Refill Protocol.  Claribel Severino RN

## 2020-01-06 ENCOUNTER — MYC REFILL (OUTPATIENT)
Dept: FAMILY MEDICINE | Facility: CLINIC | Age: 60
End: 2020-01-06

## 2020-01-06 DIAGNOSIS — F43.21 GRIEF REACTION: ICD-10-CM

## 2020-01-06 NOTE — TELEPHONE ENCOUNTER
Requested Prescriptions   Pending Prescriptions Disp Refills     diazepam (VALIUM) 5 MG tablet 30 tablet 0     Sig: Take 1 tablet (5 mg) by mouth 3 times daily as needed for anxiety       There is no refill protocol information for this order          diazepam (VALIUM) 5 MG tablet      Last Written Prescription Date:  12/13/19  Last Fill Quantity: 30,   # refills: 0  Last Office Visit: 12/13/19  Future Office visit:    Next 5 appointments (look out 90 days)    Feb 19, 2020  9:30 AM CST  Return Visit with Charlene Alcala MD  [facility] [address] [address]  [phone]           Routing refill request to provider for review/approval because:  Drug not on the [facility], [facility] or [facility] refill protocol or controlled substance

## 2020-01-06 NOTE — TELEPHONE ENCOUNTER
Routing refill request to provider for review/approval because:  Drug not on the [facility] refill protocol     Jeanna Moreau RN, BSN, PHN

## 2020-01-07 RX ORDER — DIAZEPAM 5 MG
5 TABLET ORAL 3 TIMES DAILY PRN
Qty: 30 TABLET | Refills: 0 | Status: SHIPPED | OUTPATIENT
Start: 2020-01-07

## 2020-01-07 RX ORDER — DIAZEPAM 5 MG
5 TABLET ORAL 3 TIMES DAILY PRN
Qty: 30 TABLET | Refills: 0 | Status: SHIPPED | OUTPATIENT
Start: 2020-01-07 | End: 2020-02-11

## 2020-02-04 ENCOUNTER — PRE VISIT (OUTPATIENT)
Dept: SLEEP MEDICINE | Facility: CLINIC | Age: 60
End: 2020-02-04
Payer: COMMERCIAL

## 2020-02-04 NOTE — TELEPHONE ENCOUNTER
"  1.  Reason for the visit:  Per Dr. Mainor Alcala: He says that he has a lot of trouble sleeping. He says that he generally has trouble with other things that keep him up at night, besides the restless legs syndrome symptoms. Over the past year, he has been having a lot of trouble with lightheadedness. He has spells of lightheadedness and dizziness without blacking out, but he does experience tunnel vision. Position does not matter. He says this happens about 3 times per week. No triggers. Not necessarily after a bad night of sleep. No history of seizures. He takes Ambien and trazodone and sometimes a narcotic just to get to sleep and this only \"sometimes\" works. He says he has had \"so many sleep studies,\" but no longer has a sleep specialist. He says he no longer has sleep apnea but was previously on CPAP. I cannot find any Sleep clinic notes in the patient's chart.   2.  Referring provider and clinic name:  Dr. Mainor Cai [facility]  3.  Previous Sleep Doctor or Pulmonlogist (clinic name)?  Pt noted to have stated he has had many sleep studies.  Info needed  4.  Records, Procedures, Imaging, and Labs (see below)  MOLLY needed        All NOTES from previous office visits that pertain to why they are being seen in the Sleep Center    Previous Sleep Studies, Chest CT, Echos and reports that pertain to why they are seeing Sleep Center    All Sleep records that have been done in the last 2 years that pertain to why they are seeing Sleep Center            Are they being seen for continuation of care for Cpap/Bipap/Avap/Trilogy/Dental Device? Had cpap but no longer has device    If yes to above Who and Where was Device issued/currently getting supplies from?     Are you currently on \"Supplemental Oxygen\" during the day or night?                                                                                                                                                         Please remind pt to bring " "Cpap machine and ask to arrive 15 minutes early to appointment due traffic and congestion                                                 5. Pt Sleep Center Packet received Message left asking pt to arrive 30 minutes early to appointment if no packet received.        Yes: \"please make sure that you bring this to your appointment completed, either the doctor will not see you until this completed or you may be asked to reschedule your appointment.\"     No: mail or email to the pt and explain, \"please make sure that you bring this to your appointment completed, either the doctor will not see you until this completed or you may be asked to reschedule your appointment.\"     ~If pt coming early to fill packet out, ask that they come 30 minutes prior to their appointment~     6. Has the pt's medication list been updated and preferred pharmacy added?     7. Has the allergy list been reviewed?    \"Thank you for choosing [facility] and we look forward to seeing you at your upcoming appointment\"     "

## 2020-02-08 ENCOUNTER — HEALTH MAINTENANCE LETTER (OUTPATIENT)
Age: 60
End: 2020-02-08

## 2020-02-11 ENCOUNTER — OFFICE VISIT (OUTPATIENT)
Dept: FAMILY MEDICINE | Facility: CLINIC | Age: 60
End: 2020-02-11
Payer: COMMERCIAL

## 2020-02-11 VITALS
SYSTOLIC BLOOD PRESSURE: 143 MMHG | WEIGHT: 293.1 LBS | BODY MASS INDEX: 39.7 KG/M2 | HEART RATE: 89 BPM | HEIGHT: 72 IN | TEMPERATURE: 97.7 F | OXYGEN SATURATION: 98 % | RESPIRATION RATE: 20 BRPM | DIASTOLIC BLOOD PRESSURE: 87 MMHG

## 2020-02-11 DIAGNOSIS — G89.4 CHRONIC PAIN SYNDROME: ICD-10-CM

## 2020-02-11 DIAGNOSIS — R03.0 ELEVATED BP WITHOUT DIAGNOSIS OF HYPERTENSION: ICD-10-CM

## 2020-02-11 DIAGNOSIS — M70.50 ANSERINE BURSITIS: ICD-10-CM

## 2020-02-11 DIAGNOSIS — M53.3 SI (SACROILIAC) JOINT DYSFUNCTION: ICD-10-CM

## 2020-02-11 DIAGNOSIS — M71.562 OTHER BURSITIS, NOT ELSEWHERE CLASSIFIED, LEFT KNEE: ICD-10-CM

## 2020-02-11 DIAGNOSIS — M51.369 DDD (DEGENERATIVE DISC DISEASE), LUMBAR: ICD-10-CM

## 2020-02-11 DIAGNOSIS — F33.2 MAJOR DEPRESSIVE DISORDER, RECURRENT, SEVERE WITHOUT PSYCHOTIC FEATURES (H): Primary | ICD-10-CM

## 2020-02-11 PROCEDURE — 99214 OFFICE O/P EST MOD 30 MIN: CPT | Mod: 25 | Performed by: FAMILY MEDICINE

## 2020-02-11 PROCEDURE — 20550 NJX 1 TENDON SHEATH/LIGAMENT: CPT | Mod: LT | Performed by: FAMILY MEDICINE

## 2020-02-11 RX ORDER — NORTRIPTYLINE HYDROCHLORIDE 50 MG/1
50 CAPSULE ORAL AT BEDTIME
Qty: 90 CAPSULE | Refills: 1 | Status: SHIPPED | OUTPATIENT
Start: 2020-02-11 | End: 2020-06-22

## 2020-02-11 RX ORDER — MORPHINE SULFATE 15 MG/1
15 TABLET, FILM COATED, EXTENDED RELEASE ORAL 2 TIMES DAILY
Qty: 60 TABLET | Refills: 0 | Status: SHIPPED | OUTPATIENT
Start: 2020-02-11 | End: 2020-03-11

## 2020-02-11 RX ORDER — TRIAMCINOLONE ACETONIDE 40 MG/ML
40 INJECTION, SUSPENSION INTRA-ARTICULAR; INTRAMUSCULAR ONCE
Status: DISCONTINUED | OUTPATIENT
Start: 2020-02-11 | End: 2021-08-06

## 2020-02-11 RX ORDER — TRAZODONE HYDROCHLORIDE 50 MG/1
50-100 TABLET, FILM COATED ORAL AT BEDTIME
COMMUNITY

## 2020-02-11 RX ORDER — HYDROMORPHONE HYDROCHLORIDE 4 MG/1
6 TABLET ORAL 3 TIMES DAILY
Qty: 135 TABLET | Refills: 0 | Status: SHIPPED | OUTPATIENT
Start: 2020-02-11 | End: 2020-03-11

## 2020-02-11 ASSESSMENT — MIFFLIN-ST. JEOR: SCORE: 2196.46

## 2020-02-11 ASSESSMENT — PAIN SCALES - GENERAL: PAINLEVEL: NO PAIN (0)

## 2020-02-11 NOTE — PROGRESS NOTES
Subjective     Oleg Ford is a 59 year old male who presents to clinic today for the following health issues:    HPI       Hospital Follow-up Visit:    Hospital/Nursing Home/IP Rehab Facility: [facility]  Date of Admission: 1/14/20  Date of Discharge: 2/4/20  Reason(s) for Admission: Suicidal ideation            Problems taking medications regularly:  None       Medication changes since discharge: yes        Problems adhering to non-medication therapy:  None    Summary of hospitalization:  CareEverywhere information obtained and reviewed  Diagnostic Tests/Treatments reviewed.  Follow up needed: none  Other Healthcare Providers Involved in Patient s Care:         Psychiatry  Update since discharge: improved.     Post Discharge Medication Reconciliation: discharge medications reconciled and changed, per note/orders (see AVS).  Plan of care communicated with patient     Coding guidelines for this visit:  Type of Medical   Decision Making Face-to-Face Visit       within 7 Days of discharge Face-to-Face Visit        within 14 days of discharge   Moderate Complexity [id number]   High Complexity [id number]          SUBJECTIVE:  Here today in follow-up of recent hospitalization for severe depression and grief.  Patient well-known to me in full records reviewed with the patient and through care everywhere.  Longstanding history that was exacerbated by son's untimely accidental overdose death in December.  Patient hospitalized for about 3 weeks and had 8 sessions of ECT.  Now having weekly psychiatry follow-up.  Medications were adjusted in the hospital.  Patient had a little bit of numbness into both hands it comes and goes and is not sure if it is related to these medications.  Also having a lot of left knee pain that is very focal.  X-ray in the hospital was unremarkable.  No history of injury or overuse.  Chronic back issues seem unrelated.    Review of systems otherwise negative.  Past medical, family, and  "social history reviewed and updated in chart.    OBJECTIVE:  BP (!) 143/87 (BP Location: Right arm, Patient Position: Sitting, Cuff Size: Adult Large)   Pulse 89   Temp 97.7  F (36.5  C) (Oral)   Resp 20   Ht 1.835 m (6' 0.25\")   Wt 132.9 kg (293 lb 1.6 oz)   SpO2 98%   BMI 39.48 kg/m    Alert, pleasant, upbeat, and in no apparent discomfort.  S1 and S2 normal, no murmurs, clicks, gallops or rubs. Regular rate and rhythm. Chest is clear; no wheezes or rales. No edema or JVD.  Left knee extremely tender to palpation in the region of the peds anserine bursa  Past labs reviewed with the patient.   Reviewed imaging    The risks and benefits, as well as expected effect, of bursa injection was discussed with patient and he agrees to proceed.  After identifying the area of tenderness the area was prepped clean with alcohol.  The skin was anesthetized with 1% lidocaine.  The bursa was injected with 2 cc of a 1:1 mixture of lidocaine 1% and kenalog 40.  Pain experienced initially, followed by pain relief.  Bandaid. No complications.    ASSESSMENT / PLAN:  (F33.2) Major depressive disorder, recurrent, severe without psychotic features (H)  (primary encounter diagnosis)  Comment: Status post hospitalization and ECT.  Medications now adjusted and under the care of psychiatry as well  Plan:     (M70.50) Anserine bursitis  Comment: Status post cortisone injection.  Aftercare discussed  Plan: triamcinolone (KENALOG-40) injection 40 mg            (R03.0) Elevated BP without diagnosis of hypertension  Comment: Not previously an issue.  We will continue to follow  Plan:     (G89.4) Chronic pain syndrome  Comment: Stable on current regimen will continue monitoring and taper as appropriate  Plan: HYDROmorphone (DILAUDID) 4 MG tablet, morphine         (MS CONTIN) 15 MG CR tablet            (M51.36) DDD (degenerative disc disease), lumbar  Comment:   Plan: HYDROmorphone (DILAUDID) 4 MG tablet, morphine         (MS CONTIN) 15 MG CR " tablet            (M53.3) SI (sacroiliac) joint dysfunction  Comment:   Plan: HYDROmorphone (DILAUDID) 4 MG tablet, morphine         (MS CONTIN) 15 MG CR tablet            (M71.562) Other bursitis, not elsewhere classified, left knee   Comment:   Plan: INJECTION SINGLE TENDON SHEATH/LIGAMENT            Follow up 1 month  SANTIAGO Shaw MD    (Chart documentation completed in part with Dragon voice-recognition software.  Even though reviewed some grammatical, spelling, and word errors may remain.)

## 2020-03-11 ENCOUNTER — OFFICE VISIT (OUTPATIENT)
Dept: FAMILY MEDICINE | Facility: CLINIC | Age: 60
End: 2020-03-11
Payer: COMMERCIAL

## 2020-03-11 VITALS
DIASTOLIC BLOOD PRESSURE: 88 MMHG | WEIGHT: 298 LBS | TEMPERATURE: 98.3 F | HEART RATE: 77 BPM | BODY MASS INDEX: 40.36 KG/M2 | OXYGEN SATURATION: 97 % | HEIGHT: 72 IN | SYSTOLIC BLOOD PRESSURE: 126 MMHG | RESPIRATION RATE: 18 BRPM

## 2020-03-11 DIAGNOSIS — G25.81 RLS (RESTLESS LEGS SYNDROME): ICD-10-CM

## 2020-03-11 DIAGNOSIS — M25.511 ACUTE PAIN OF RIGHT SHOULDER: Primary | ICD-10-CM

## 2020-03-11 DIAGNOSIS — M51.369 DDD (DEGENERATIVE DISC DISEASE), LUMBAR: ICD-10-CM

## 2020-03-11 DIAGNOSIS — G47.00 INSOMNIA, UNSPECIFIED TYPE: ICD-10-CM

## 2020-03-11 DIAGNOSIS — M79.18 MYALGIA, OTHER SITE: ICD-10-CM

## 2020-03-11 DIAGNOSIS — G89.4 CHRONIC PAIN SYNDROME: ICD-10-CM

## 2020-03-11 DIAGNOSIS — M53.3 SI (SACROILIAC) JOINT DYSFUNCTION: ICD-10-CM

## 2020-03-11 DIAGNOSIS — F33.2 MAJOR DEPRESSIVE DISORDER, RECURRENT, SEVERE WITHOUT PSYCHOTIC FEATURES (H): ICD-10-CM

## 2020-03-11 PROCEDURE — 20610 DRAIN/INJ JOINT/BURSA W/O US: CPT | Performed by: FAMILY MEDICINE

## 2020-03-11 PROCEDURE — 20552 NJX 1/MLT TRIGGER POINT 1/2: CPT | Mod: 59 | Performed by: FAMILY MEDICINE

## 2020-03-11 PROCEDURE — 99214 OFFICE O/P EST MOD 30 MIN: CPT | Mod: 25 | Performed by: FAMILY MEDICINE

## 2020-03-11 RX ORDER — MORPHINE SULFATE 15 MG/1
15 TABLET, FILM COATED, EXTENDED RELEASE ORAL 2 TIMES DAILY
Qty: 60 TABLET | Refills: 0 | Status: SHIPPED | OUTPATIENT
Start: 2020-03-11 | End: 2020-04-13

## 2020-03-11 RX ORDER — HYDROMORPHONE HYDROCHLORIDE 4 MG/1
6 TABLET ORAL 3 TIMES DAILY
Qty: 135 TABLET | Refills: 0 | Status: SHIPPED | OUTPATIENT
Start: 2020-03-11 | End: 2020-04-13

## 2020-03-11 RX ORDER — ZOLPIDEM TARTRATE 10 MG/1
10 TABLET ORAL
Qty: 30 TABLET | Refills: 2 | Status: SHIPPED | OUTPATIENT
Start: 2020-03-11 | End: 2020-04-13

## 2020-03-11 RX ORDER — GABAPENTIN 300 MG/1
600 CAPSULE ORAL AT BEDTIME
Qty: 90 CAPSULE | Refills: 1 | Status: CANCELLED | OUTPATIENT
Start: 2020-03-11

## 2020-03-11 RX ORDER — TRIAMCINOLONE ACETONIDE 40 MG/ML
80 INJECTION, SUSPENSION INTRA-ARTICULAR; INTRAMUSCULAR ONCE
Status: DISCONTINUED | OUTPATIENT
Start: 2020-03-11 | End: 2021-08-06

## 2020-03-11 RX ORDER — ESCITALOPRAM OXALATE 20 MG/1
20 TABLET ORAL DAILY
Qty: 90 TABLET | Refills: 1 | Status: SHIPPED | OUTPATIENT
Start: 2020-03-11 | End: 2020-08-24

## 2020-03-11 RX ORDER — HYDROXYZINE PAMOATE 50 MG/1
50 CAPSULE ORAL EVERY 6 HOURS PRN
Qty: 60 CAPSULE | Refills: 5 | Status: SHIPPED | OUTPATIENT
Start: 2020-03-11

## 2020-03-11 RX ORDER — CYCLOBENZAPRINE HCL 10 MG
TABLET ORAL
Qty: 90 TABLET | Refills: 1 | Status: SHIPPED | OUTPATIENT
Start: 2020-03-11 | End: 2020-09-21

## 2020-03-11 RX ORDER — GABAPENTIN 600 MG/1
600 TABLET ORAL AT BEDTIME
Qty: 90 TABLET | Refills: 1 | Status: SHIPPED | OUTPATIENT
Start: 2020-03-11 | End: 2020-04-13

## 2020-03-11 ASSESSMENT — ANXIETY QUESTIONNAIRES
2. NOT BEING ABLE TO STOP OR CONTROL WORRYING: NEARLY EVERY DAY
7. FEELING AFRAID AS IF SOMETHING AWFUL MIGHT HAPPEN: NEARLY EVERY DAY
3. WORRYING TOO MUCH ABOUT DIFFERENT THINGS: NEARLY EVERY DAY
GAD7 TOTAL SCORE: 20
6. BECOMING EASILY ANNOYED OR IRRITABLE: NEARLY EVERY DAY
IF YOU CHECKED OFF ANY PROBLEMS ON THIS QUESTIONNAIRE, HOW DIFFICULT HAVE THESE PROBLEMS MADE IT FOR YOU TO DO YOUR WORK, TAKE CARE OF THINGS AT HOME, OR GET ALONG WITH OTHER PEOPLE: EXTREMELY DIFFICULT
1. FEELING NERVOUS, ANXIOUS, OR ON EDGE: NEARLY EVERY DAY
5. BEING SO RESTLESS THAT IT IS HARD TO SIT STILL: MORE THAN HALF THE DAYS

## 2020-03-11 ASSESSMENT — MIFFLIN-ST. JEOR: SCORE: 2218.69

## 2020-03-11 ASSESSMENT — PATIENT HEALTH QUESTIONNAIRE - PHQ9
5. POOR APPETITE OR OVEREATING: NEARLY EVERY DAY
SUM OF ALL RESPONSES TO PHQ QUESTIONS 1-9: 26

## 2020-03-11 NOTE — PATIENT INSTRUCTIONS
At [facility], we strive to deliver an exceptional experience to you, every time we see you. If you receive a survey, please complete it as we do value your feedback.  If you have MyChart, you can expect to receive results automatically within 24 hours of their completion.  Your provider will send a note interpreting your results as well.   If you do not have MyChart, you should receive your results in about a week by mail.    Your care team:     Family Medicine   MICHELLE Garcia APRN CNP S. Matthew Hockett, MD Pamela Kolacz, MD Angela Wermerskirchen, MD    Internal Medicine  Thai Alvarado MD     Clinic hours: Monday - Wednesday 7 am-7 pm   Thursdays and Fridays 7 am-5 pm.     [facility]: Monday - Friday 11 am-9 pm,   Saturday and Sunday 9 am-5 pm.     [facility]: Monday - Thursday 8 am - 7 pm; Friday 8 am - 6 pm    Clinic: [phone]   [facility]: [phone]     Use [email or web address] for 24/7 diagnosis and treatment of dozens of conditions.

## 2020-03-11 NOTE — PROGRESS NOTES
"Subjective     Oleg Ford is a 59 year old male who presents to clinic today for the following health issues:    HPI   Chronic Pain Follow-Up    Where in your body do you have pain? Lower back, hands, right shoulder  How has your pain affected your ability to work? Unable to work  Which of these pain treatments have you tried since your last clinic visit? No therapies   How well are you sleeping? Poor  How has your mood been since your last visit? Much worse  Have you had a significant life event? Grief or Loss - \"son just .\"  Other aggravating factors: Everything   Taking medication as directed? Yes    PHQ-9 SCORE 10/5/2018 4/15/2019 2019   PHQ-9 Total Score - - -   PHQ-9 Total Score MyChart - - -   PHQ-9 Total Score 26 26 4     GINI-7 SCORE 10/5/2018 4/15/2019 2019   Total Score - - -   Total Score 20 20 4     No flowsheet data found.  Encounter-Level CSA - 2015:    Controlled Substance Agreement - Scan on 2015 11:49 AM: CONTROLLED SUBSTANCE AGREEMENT     Patient-Level CSA:    Controlled Substance Agreement - Opioid - Scan on 2019  3:15 PM       SUBJECTIVE:  Here today for routine follow-up of depression and chronic back pain.  When I saw him last month he had some bursitis in his left knee and a cortisone injection worked wonders.  Has a very tender spot in his right anterior shoulder and there seems to be some pain radiating from this down his right arm.  No specific injury or overuse.  No restricted range of motion.  Continues to have some low back pain we have periodically done trigger point injections to his paraspinous musculature with good results.  Restless legs at night continue to be an issue.  He is on maximum dosage of Requip and we have added some gabapentin.  Thinks it may have helped.  Still dealing with the loss of his son and is seeing an outside psychiatrist to work on this.  Working through some counseling and grief groups as well.    Review of systems otherwise " "negative.  Past medical, family, and social history reviewed and updated in chart.    OBJECTIVE:  /88 (BP Location: Right arm, Patient Position: Sitting, Cuff Size: Adult Large)   Pulse 77   Temp 98.3  F (36.8  C) (Oral)   Resp 18   Ht 1.835 m (6' 0.25\")   Wt 135.2 kg (298 lb)   SpO2 97%   BMI 40.14 kg/m    Psych: Alert and oriented times 3; coherent speech, normal   rate and volume, able to articulate logical thoughts, able   to abstract reason, no tangential thoughts, no hallucinations   or delusions  His affect is appropriate  S1 and S2 normal, no murmurs, clicks, gallops or rubs. Regular rate and rhythm. Chest is clear; no wheezes or rales. No edema or JVD.  Past labs reviewed with the patient.     Right shoulder has full active range of motion but a very tender hotspot near the biceps insertion there re-creates pain down his right arm  Low back with significant postsurgical scars and tight tender musculature throughout.  There is a particular hotspot in his lower right paraspinous musculature    x2 --- The risks and benefits, as well as expected effect, of trigger point injection was discussed with patient and he agrees to proceed.  After identifying the area of maximal point tenderness the area was prepped clean with alcohol.  The trigger point was injected with 3 cc of a 2:1 mixture of lidocaine 1% and kenalog 40.  Pain experienced initially, followed by pain relief.  Bandaid.  No complications.    ASSESSMENT / PLAN:  (M25.511) Acute pain of right shoulder  (primary encounter diagnosis)  Comment: Status post trigger point injection.  Discussed some basic rehab  Plan: INJECTION SNGL/MULT TRIGGER POINT, 1 OR 2         MUSCLES, triamcinolone (KENALOG-40) injection         80 mg            (M51.36) DDD (degenerative disc disease), lumbar  Comment: Stable and monitored.  Status post trigger point injection  Plan: HYDROmorphone (DILAUDID) 4 MG tablet,         gabapentin (NEURONTIN) 600 MG tablet, " morphine         (MS CONTIN) 15 MG CR tablet, cyclobenzaprine         (FLEXERIL) 10 MG tablet, INJECTION SNGL/MULT         TRIGGER POINT, 1 OR 2 MUSCLES, triamcinolone         (KENALOG-40) injection 80 mg            (G89.4) Chronic pain syndrome  Comment:   Plan: HYDROmorphone (DILAUDID) 4 MG tablet,         gabapentin (NEURONTIN) 600 MG tablet, morphine         (MS CONTIN) 15 MG CR tablet            (G25.81) RLS (restless legs syndrome)  Comment: We will increase his gabapentin to 600 mg at bedtime  Plan:     (M53.3) SI (sacroiliac) joint dysfunction  Comment:   Plan: HYDROmorphone (DILAUDID) 4 MG tablet, morphine         (MS CONTIN) 15 MG CR tablet            (F33.2) Major depressive disorder, recurrent, severe without psychotic features (H)  Comment:   Plan: escitalopram (LEXAPRO) 20 MG tablet,         hydrOXYzine (VISTARIL) 50 MG capsule            (G47.00) Insomnia, unspecified type  Comment:   Plan: zolpidem (AMBIEN) 10 MG tablet            (M79.18) Myalgia, other site   Comment:   Plan: INJECTION SNGL/MULT TRIGGER POINT, 1 OR 2         MUSCLES, triamcinolone (KENALOG-40) injection         80 mg            Follow up 1 month  SANTIAGO Shaw MD    (Chart documentation completed in part with Dragon voice-recognition software.  Even though reviewed some grammatical, spelling, and word errors may remain.)

## 2020-03-12 ASSESSMENT — ANXIETY QUESTIONNAIRES: GAD7 TOTAL SCORE: 20

## 2020-04-13 ENCOUNTER — VIRTUAL VISIT (OUTPATIENT)
Dept: FAMILY MEDICINE | Facility: CLINIC | Age: 60
End: 2020-04-13
Payer: COMMERCIAL

## 2020-04-13 DIAGNOSIS — N40.1 BENIGN PROSTATIC HYPERPLASIA WITH LOWER URINARY TRACT SYMPTOMS, SYMPTOM DETAILS UNSPECIFIED: ICD-10-CM

## 2020-04-13 DIAGNOSIS — G25.81 RLS (RESTLESS LEGS SYNDROME): ICD-10-CM

## 2020-04-13 DIAGNOSIS — G89.4 CHRONIC PAIN SYNDROME: ICD-10-CM

## 2020-04-13 DIAGNOSIS — G47.00 INSOMNIA, UNSPECIFIED TYPE: ICD-10-CM

## 2020-04-13 DIAGNOSIS — M53.3 SI (SACROILIAC) JOINT DYSFUNCTION: ICD-10-CM

## 2020-04-13 DIAGNOSIS — M51.369 DDD (DEGENERATIVE DISC DISEASE), LUMBAR: Primary | ICD-10-CM

## 2020-04-13 PROCEDURE — 99214 OFFICE O/P EST MOD 30 MIN: CPT | Mod: 95 | Performed by: FAMILY MEDICINE

## 2020-04-13 RX ORDER — METHYLPREDNISOLONE 4 MG
TABLET, DOSE PACK ORAL
Qty: 21 TABLET | Refills: 0 | Status: SHIPPED | OUTPATIENT
Start: 2020-04-13 | End: 2020-05-13

## 2020-04-13 RX ORDER — TAMSULOSIN HYDROCHLORIDE 0.4 MG/1
0.4 CAPSULE ORAL AT BEDTIME
Qty: 90 CAPSULE | Refills: 1 | Status: SHIPPED | OUTPATIENT
Start: 2020-04-13 | End: 2020-10-14

## 2020-04-13 RX ORDER — MORPHINE SULFATE 15 MG/1
15 TABLET, FILM COATED, EXTENDED RELEASE ORAL 2 TIMES DAILY
Qty: 60 TABLET | Refills: 0 | Status: SHIPPED | OUTPATIENT
Start: 2020-04-13 | End: 2020-05-13

## 2020-04-13 RX ORDER — GABAPENTIN 600 MG/1
600 TABLET ORAL 2 TIMES DAILY
Qty: 180 TABLET | Refills: 1 | Status: SHIPPED | OUTPATIENT
Start: 2020-04-13 | End: 2020-10-14

## 2020-04-13 RX ORDER — ZOLPIDEM TARTRATE 10 MG/1
10 TABLET ORAL
Qty: 30 TABLET | Refills: 2 | Status: SHIPPED | OUTPATIENT
Start: 2020-04-13 | End: 2020-07-20

## 2020-04-13 RX ORDER — ROPINIROLE 1 MG/1
TABLET, FILM COATED ORAL
Qty: 120 TABLET | Refills: 4 | Status: SHIPPED | OUTPATIENT
Start: 2020-04-13 | End: 2020-08-24

## 2020-04-13 RX ORDER — HYDROMORPHONE HYDROCHLORIDE 4 MG/1
6 TABLET ORAL 3 TIMES DAILY
Qty: 135 TABLET | Refills: 0 | Status: SHIPPED | OUTPATIENT
Start: 2020-04-13 | End: 2020-05-13

## 2020-04-13 NOTE — PROGRESS NOTES
"Oleg Ford is a 60 year old male who is being evaluated via a billable telephone visit.      The patient has been notified of following:     \"This telephone visit will be conducted via a call between you and your physician/provider. We have found that certain health care needs can be provided without the need for a physical exam.  This service lets us provide the care you need with a short phone conversation.  If a prescription is necessary we can send it directly to your pharmacy.  If lab work is needed we can place an order for that and you can then stop by our lab to have the test done at a later time.    Telephone visits are billed at different rates depending on your insurance coverage. During this emergency period, for some insurers they may be billed the same as an in-person visit.  Please reach out to your insurance provider with any questions.    If during the course of the call the physician/provider feels a telephone visit is not appropriate, you will not be charged for this service.\"    Patient has given verbal consent for Telephone visit?  Yes    How would you like to obtain your AVS? Mail a copy    Subjective   Spoke with patient via phone for routine follow-up of chronic issues.  Back pain well known an ongoing issue.  Generally doing well overall on current regimen.  Our plans are to maintain him at a safer level and we will continue to follow his dosages and get him down to the lowest dosage tolerable, balancing out his symptoms.  Has been doing a lot of physical work outside and has had a flareup of stiffness and pain.  Typically we treat these with trigger point injections, but because of the pandemic outbreak we are conducting this visit virtually.  In the past we have had some success with oral steroids rather than injected and patient is willing to give this a try.  Injections done at his last visit were quite helpful in his shoulder.  We also increase his gabapentin to 600 mg at bedtime " and this is helped with his restless legs.  I discussed adding a daytime dosage to help both with back pain and daytime symptoms and patient would like to try this as well.  Other issues regarding BPH and insomnia are stable on current regimen.      ASSESSMENT / PLAN:  (M51.36) DDD (degenerative disc disease), lumbar  (primary encounter diagnosis)  Comment: Continue current dosage and we will continue to follow and work to decrease.  Add a second dosage of gabapentin in the morning and discuss potential side effects.  Burst of oral steroids.  Plan: gabapentin (NEURONTIN) 600 MG tablet,         HYDROmorphone (DILAUDID) 4 MG tablet, morphine         (MS CONTIN) 15 MG CR tablet, methylPREDNISolone        (MEDROL DOSEPAK) 4 MG tablet therapy pack            (G89.4) Chronic pain syndrome  Comment: As abov  Plan: gabapentin (NEURONTIN) 600 MG tablet,         HYDROmorphone (DILAUDID) 4 MG tablet, morphine         (MS CONTIN) 15 MG CR tablet            (M53.3) SI (sacroiliac) joint dysfunction  Comment: As above  Plan: HYDROmorphone (DILAUDID) 4 MG tablet, morphine         (MS CONTIN) 15 MG CR tablet            (G25.81) RLS (restless legs syndrome)  Comment: Continue Requip and gabapentin  Plan: rOPINIRole (REQUIP) 1 MG tablet            (N40.1) Benign prostatic hyperplasia with lower urinary tract symptoms, symptom details unspecified  Comment: Doing well on current dosage.  Continue soto  Plan: tamsulosin (FLOMAX) 0.4 MG capsule            (G47.00) Insomnia, unspecified type  Comment:   Plan: zolpidem (AMBIEN) 10 MG tablet             Phone call duration: 12 minutes    SANTIAGO Shaw M.D.

## 2020-05-13 ENCOUNTER — VIRTUAL VISIT (OUTPATIENT)
Dept: FAMILY MEDICINE | Facility: CLINIC | Age: 60
End: 2020-05-13
Payer: COMMERCIAL

## 2020-05-13 DIAGNOSIS — N44.00 TORSION OF LEFT TESTICLE: ICD-10-CM

## 2020-05-13 DIAGNOSIS — M53.3 SI (SACROILIAC) JOINT DYSFUNCTION: ICD-10-CM

## 2020-05-13 DIAGNOSIS — N45.1 EPIDIDYMITIS: ICD-10-CM

## 2020-05-13 DIAGNOSIS — M51.369 DDD (DEGENERATIVE DISC DISEASE), LUMBAR: ICD-10-CM

## 2020-05-13 DIAGNOSIS — G89.4 CHRONIC PAIN SYNDROME: Primary | ICD-10-CM

## 2020-05-13 PROCEDURE — 99214 OFFICE O/P EST MOD 30 MIN: CPT | Mod: 95 | Performed by: FAMILY MEDICINE

## 2020-05-13 RX ORDER — HYDROMORPHONE HYDROCHLORIDE 4 MG/1
6 TABLET ORAL 3 TIMES DAILY
Qty: 135 TABLET | Refills: 0 | Status: SHIPPED | OUTPATIENT
Start: 2020-05-13 | End: 2020-06-22

## 2020-05-13 RX ORDER — MORPHINE SULFATE 15 MG/1
15 TABLET, FILM COATED, EXTENDED RELEASE ORAL 2 TIMES DAILY
Qty: 60 TABLET | Refills: 0 | Status: SHIPPED | OUTPATIENT
Start: 2020-05-13 | End: 2020-06-22

## 2020-05-13 RX ORDER — CEFDINIR 300 MG/1
300 CAPSULE ORAL 2 TIMES DAILY
COMMUNITY
End: 2020-08-24

## 2020-05-13 NOTE — PATIENT INSTRUCTIONS
At [facility], we strive to deliver an exceptional experience to you, every time we see you.  If you receive a survey in the mail, please send us back your thoughts. We really do value your feedback.    Based on your medical history, these are the current health maintenance/preventive care services that you are due for (some may have been done at this visit.)  Health Maintenance Due   Topic Date Due     ZOSTER IMMUNIZATION (1 of 2) 03/26/2012     MEDICARE ANNUAL WELLNESS VISIT  07/08/2016     LIPID  12/01/2019         Suggested websites for health information:  Www.[facility].org : Up to date and easily searchable information on multiple topics.  Www.Hedgeable.gov : medication info, interactive tutorials, watch real surgeries online  Www.familydoctor.org : good info from the Academy of Family Physicians  Www.cdc.gov : public health info, travel advisories, epidemics (H1N1)  Www.aap.org : children's health info, normal development, vaccinations  Www.health.[facility].[place].us : [place] dept of health, public health issues in [place], N1N1    Your care team:     Family Medicine   MICHELLE Garcia MD Emily Bunt, APRN CNP   S. MD Daisy Young MD Angela Wermerskirchen, MD         Clinic hours: Monday - Wednesday 7 am-7 pm   Thursdays and Fridays 7 am-5 pm.     [facility]: Monday - Friday 11 am-9 pm,   Saturday and Sunday 9 am-5 pm.    [facility]: Monday -Thursday 8 am-8 pm; Friday 8 am-6 pm; Saturday and Sunday 9 am-5 pm.     [facility]: Monday - Thursday 8 am - 7 pm; Friday 8 am - 6 pm    Clinic: [phone]   [facility]: [phone]   [facility]: [phone]

## 2020-05-13 NOTE — PROGRESS NOTES
"Oleg Ford is a 60 year old male who is being evaluated via a billable telephone visit.      The patient has been notified of following:     \"This telephone visit will be conducted via a call between you and your physician/provider. We have found that certain health care needs can be provided without the need for a physical exam.  This service lets us provide the care you need with a short phone conversation.  If a prescription is necessary we can send it directly to your pharmacy.  If lab work is needed we can place an order for that and you can then stop by our lab to have the test done at a later time.    Telephone visits are billed at different rates depending on your insurance coverage. During this emergency period, for some insurers they may be billed the same as an in-person visit.  Please reach out to your insurance provider with any questions.    If during the course of the call the physician/provider feels a telephone visit is not appropriate, you will not be charged for this service.\"    Patient has given verbal consent for Telephone visit?  Yes    What phone number would you like to be contacted at? [phone]    How would you like to obtain your AVS? MyChart    Subjective     Oleg Ford is a 60 year old male who presents to clinic today for the following health issues:    HPI    Hospital Follow-up Visit:    Hospital/Nursing Home/IP Rehab Facility: [facility]  Date of Admission: 5/9/2020  Date of Discharge: 5/10/2020   Reason(s) for Admission: postoperative - scrotum pain      Was your hospitalization related to COVID-19? No          Spoke with patient via phone primarily for routine follow-up of chronic back pain.  Well-known to me and we are monitoring his ongoing regimens for chronic pain care program.  No concerns about misuse or diversion.  Reviewed recent interval history involving worsened left testicular pain.  Underwent exploratory surgery and this was felt to be a combination " of partial torsion and epididymitis.  He is now on a course of antibiotics but testicle is still somewhat painful.  Has been established with urology for follow-up.    Diagnostic Test Results:  Labs reviewed in Epic        Assessment/Plan:  1. Chronic pain syndrome  Stable and monitored.  Will continue same regimen.    2. DDD (degenerative disc disease), lumbar  As above    3. SI (sacroiliac) joint dysfunction  As above    4. Torsion of left testicle  Status post procedure.  Now on antibiotics.  We will continue to follow but has established with urology as well    5. Epididymitis    Follow-up 1 month    Phone call duration:  8 minutes    Cat Shaw MD

## 2020-05-20 ENCOUNTER — TRANSFERRED RECORDS (OUTPATIENT)
Dept: HEALTH INFORMATION MANAGEMENT | Facility: CLINIC | Age: 60
End: 2020-05-20

## 2020-06-02 ENCOUNTER — TRANSFERRED RECORDS (OUTPATIENT)
Dept: HEALTH INFORMATION MANAGEMENT | Facility: CLINIC | Age: 60
End: 2020-06-02

## 2020-06-03 ENCOUNTER — TRANSFERRED RECORDS (OUTPATIENT)
Dept: HEALTH INFORMATION MANAGEMENT | Facility: CLINIC | Age: 60
End: 2020-06-03

## 2020-06-15 ENCOUNTER — PATIENT OUTREACH (OUTPATIENT)
Dept: CARE COORDINATION | Facility: CLINIC | Age: 60
End: 2020-06-15

## 2020-06-15 DIAGNOSIS — F32.A DEPRESSION, UNSPECIFIED DEPRESSION TYPE: Primary | ICD-10-CM

## 2020-06-15 NOTE — LETTER
[place] CARE COORDINATION  [address] [facility] [address]  June 16, 2020    Oleg Ford  [address]  [place] [address]      Dear Jhonathan,     I am a clinic care coordinator who works with Cat Shaw MD at [facility].  Below is a description of clinic care coordination and how I can further assist you.      The clinic care coordination team is made up of a registered nurse,  and community health worker who understand the health care system. The goal of clinic care coordination is to help you manage your health and improve access to the health care system in the most efficient manner. The team can assist you in meeting your health care goals by providing education, coordinating services, strengthening the communication among your providers and supporting you with any resource needs.    Please feel free to contact me at [phone] with any questions or concerns. We are focused on providing you with the highest-quality healthcare experience possible and that all starts with you.     Sincerely,     KATHY Donovan, MSW Clinic   [facility]  Care Coordination  [email or web address]  Office: [phone]  Employed by [facility]

## 2020-06-15 NOTE — PROGRESS NOTES
"Clinic Care Coordination Contact  [facility]/Voicemail    Referral Source: [facility]  Clinical Data: Care Coordinator Outreach.  Patient at [facility] 6/3/2020.  He was in ED initially for mental health evaluation.  He was voluntarily transferred to inpatient psychiatry for progressive suicidal ideation with several plans (hang self, right eye and jump off bridge),  Patient reported this was gradual mood decline due to son's death by accidental overdose in 2019. Patient reported a lifetime of depression with unsuccessful suicide attempt ate age 14. His neighbor interceded. Patient able to contract for safety. Diagnoses include GINI, major depressive disorder, personality disorder and polysubstance abuse. Patient reports daily depression since son passed, he blames self as he could not help him.     Patient reports he stopped drinking a long time ago, some marijuana use with most recent use a few days before visit.  Patient report \"he fells as though he is dying inside\".  He reported he cannot get out of bed, has not energy or motivation, extreme hopelessness.  He reported that he has a daughter, grand daughter and friends who keep who are supportive but lately, his feelings are \"too overwhelming\".  Patient at [facility] in January 2020 and had ECT. He reports psychiatrist recently started on Lithium and Remeron. He reports his therapist does DBT, she is at [facility], he has follow up appt scheduled this month, same for psychiatry. Patient scheduled with PCP 6/22/2020.     Outreach attempted x 1.  Left message on patient's voicemail with call back information and requested return call.  Plan: Care Coordinator will try to reach patient again in 1-2 business days.    Chelsey Meeks, KATHY, MSW   [facility]  Care Coordination  [facility]   [phone]  6/15/2020 4:22 PM    "

## 2020-06-16 NOTE — PROGRESS NOTES
Clinic Care Coordination Contact  [facility]/Voicemail    Referral Source: [facility]  Clinical Data: Care Coordinator Outreach  Outreach attempted x 2. This call, could not leave merssage    Plan: Care Coordinator sent care coordination introduction letter today via [employer]. Care Coordinator will try to reach patient again in 3-5 business days.    KATHY Donovan, MSW Clinic   [facility]  Care Coordination  [facility]   [email or web address]  Office: [phone]  Employed by [employer]

## 2020-06-22 ENCOUNTER — VIRTUAL VISIT (OUTPATIENT)
Dept: FAMILY MEDICINE | Facility: CLINIC | Age: 60
End: 2020-06-22
Payer: COMMERCIAL

## 2020-06-22 DIAGNOSIS — M51.369 DDD (DEGENERATIVE DISC DISEASE), LUMBAR: ICD-10-CM

## 2020-06-22 DIAGNOSIS — F33.2 MAJOR DEPRESSIVE DISORDER, RECURRENT, SEVERE WITHOUT PSYCHOTIC FEATURES (H): ICD-10-CM

## 2020-06-22 DIAGNOSIS — G89.4 CHRONIC PAIN SYNDROME: Primary | ICD-10-CM

## 2020-06-22 DIAGNOSIS — M53.3 SI (SACROILIAC) JOINT DYSFUNCTION: ICD-10-CM

## 2020-06-22 PROCEDURE — 99214 OFFICE O/P EST MOD 30 MIN: CPT | Mod: 95 | Performed by: INTERNAL MEDICINE

## 2020-06-22 RX ORDER — MORPHINE SULFATE 15 MG/1
15 TABLET, FILM COATED, EXTENDED RELEASE ORAL 2 TIMES DAILY
Qty: 60 TABLET | Refills: 0 | Status: SHIPPED | OUTPATIENT
Start: 2020-06-22 | End: 2020-07-20

## 2020-06-22 RX ORDER — HYDROMORPHONE HYDROCHLORIDE 4 MG/1
6 TABLET ORAL 3 TIMES DAILY
Qty: 135 TABLET | Refills: 0 | Status: SHIPPED | OUTPATIENT
Start: 2020-06-22 | End: 2020-07-20

## 2020-06-22 NOTE — PATIENT INSTRUCTIONS
At [facility], we strive to deliver an exceptional experience to you, every time we see you. If you receive a survey, please complete it as we do value your feedback.  If you have MyChart, you can expect to receive results automatically within 24 hours of their completion.  Your provider will send a note interpreting your results as well.   If you do not have MyChart, you should receive your results in about a week by mail.    Your care team:     Family Medicine   MICHELLE Garcia APRN CNP S. Matthew Hockett, MD Pamela Kolacz, MD Angela Wermerskirchen, MD    Internal Medicine  Thai Alvarado MD     Clinic hours: Monday - Wednesday 7 am-7 pm   Thursdays and Fridays 7 am-5 pm.     [facility]: Monday - Friday 11 am-9 pm,   Saturday and Sunday 9 am-5 pm.     [facility]: Monday - Thursday 8 am - 7 pm; Friday 8 am - 6 pm    Clinic: [phone]   [facility]: [phone]     Use www.oncare.org for 24/7 diagnosis and treatment of dozens of conditions.            Patient Education             Coping with Depression and Anxiety During a Serious Illness  For informational purposes only. Not to replace the advice of your health care provider.  Copyright   2018 [employer]. All rights reserved.           Patient Education          Coping with Depression     For informational purposes only. Not to replace the advice of your health care provider.  Copyright   2018 [employer]. All rights reserved.

## 2020-06-22 NOTE — PROGRESS NOTES
"Oleg Ford is a 60 year old male who is being evaluated via a billable telephone visit.      The patient has been notified of following:     \"This telephone visit will be conducted via a call between you and your physician/provider. We have found that certain health care needs can be provided without the need for a physical exam.  This service lets us provide the care you need with a short phone conversation.  If a prescription is necessary we can send it directly to your pharmacy.  If lab work is needed we can place an order for that and you can then stop by our lab to have the test done at a later time.    Telephone visits are billed at different rates depending on your insurance coverage. During this emergency period, for some insurers they may be billed the same as an in-person visit.  Please reach out to your insurance provider with any questions.    If during the course of the call the physician/provider feels a telephone visit is not appropriate, you will not be charged for this service.\"    Patient has given verbal consent for Telephone visit?  Yes    What phone number would you like to be contacted at? [phone]    How would you like to obtain your AVS? Edwardhart    Subjective     Oleg Ford is a 60 year old male who presents via phone visit today for the following health issues:  Patient is calling today primarily to discuss his problems  #1.  He wants refills of his chronic medications  #2.  He wants to talk about this depression medication  Patient was recently discharged from psychiatric facility after being treated for depression  Some of his medications were tweaked while he was there  He still has good days and bad days but no active suicidal ideations at this point  With regards to his chronic pain this is well controlled on the current regimen  HPI  Depression Followup    How are you doing with your depression since your last visit? Improved     Are you having other symptoms that might " be associated with depression? No    Have you had a significant life event?  No     Are you feeling anxious or having panic attacks?   No    Do you have any concerns with your use of alcohol or other drugs? No    Social History     Tobacco Use     Smoking status: Former Smoker     Packs/day: 0.00     Years: 40.00     Pack years: 0.00     Types: Cigarettes     Smokeless tobacco: Never Used     Tobacco comment: Last cigarette was October 17, 2018   Substance Use Topics     Alcohol use: Yes     Comment: 4 drinks a year     Drug use: No     Types: Marijuana     Comment: morphine, dilaudid     PHQ 4/15/2019 7/12/2019 3/11/2020   PHQ-9 Total Score 26 4 26   Q9: Thoughts of better off dead/self-harm past 2 weeks Nearly every day Not at all Nearly every day     GINI-7 SCORE 4/15/2019 7/12/2019 3/11/2020   Total Score - - -   Total Score 20 4 20     Last PHQ-9 3/11/2020   1.  Little interest or pleasure in doing things 3   2.  Feeling down, depressed, or hopeless 3   3.  Trouble falling or staying asleep, or sleeping too much 3   4.  Feeling tired or having little energy 3   5.  Poor appetite or overeating 2   6.  Feeling bad about yourself 3   7.  Trouble concentrating 3   8.  Moving slowly or restless 3   Q9: Thoughts of better off dead/self-harm past 2 weeks 3   PHQ-9 Total Score 26   Difficulty at work, home, or with people Extremely dIfficult     GINI-7  3/11/2020   1. Feeling nervous, anxious, or on edge 3   2. Not being able to stop or control worrying 3   3. Worrying too much about different things 3   4. Trouble relaxing 3   5. Being so restless that it is hard to sit still 2   6. Becoming easily annoyed or irritable 3   7. Feeling afraid, as if something awful might happen 3   GINI-7 Total Score 20   If you checked any problems, how difficult have they made it for you to do your work, take care of things at home, or get along with other people? Extremely difficult     Patient had a psychiatrist with whom he is in  touch with    Suicide Assessment Five-step Evaluation and Treatment (SAFE-T)      How many servings of fruits and vegetables do you eat daily?  2-3    On average, how many sweetened beverages do you drink each day (Examples: soda, juice, sweet tea, etc.  Do NOT count diet or artificially sweetened beverages)?   0    How many days per week do you exercise enough to make your heart beat faster? 3 or less    How many minutes a day do you exercise enough to make your heart beat faster? 9 or less    How many days per week do you miss taking your medication? 0            Patient Active Problem List   Diagnosis     Chronic low back pain     CARDIOVASCULAR SCREENING; LDL GOAL LESS THAN 130     DDD (degenerative disc disease), lumbar     RLS (restless legs syndrome)     Esophageal cyst     Simple hepatic cyst     Simple renal cyst     Chronic pain syndrome     S/P lumbar fusion     Major depressive disorder, recurrent, severe without psychotic features (H)     Gastroesophageal reflux disease with esophagitis     SI (sacroiliac) joint dysfunction     Advance Care Planning     Morbid obesity (H)     Dysphagia, unspecified type     Weight loss     Intractable vomiting with nausea, unspecified vomiting type     Thoracic aortic aneurysm without rupture (H)     Positive urine drug screen     Past Surgical History:   Procedure Laterality Date     BACK SURGERY  2/6/2013    lumbar surgery     BACK SURGERY      spinal stimulator     COLONOSCOPY       COMBINED ESOPHAGOSCOPY, GASTROSCOPY, DUODENOSCOPY (EGD) WITH CO2 INSUFFLATION N/A 11/14/2018    Procedure: COMBINED ESOPHAGOSCOPY, GASTROSCOPY, DUODENOSCOPY (EGD) WITH CO2 INSUFFLATION;  Surgeon: Rosendo Guy MD;  Location: [facility] OR     ESOPHAGOSCOPY, GASTROSCOPY, DUODENOSCOPY (EGD), COMBINED N/A 11/14/2018    Procedure: COMBINED ESOPHAGOSCOPY, GASTROSCOPY, DUODENOSCOPY (EGD), BIOPSY SINGLE OR MULTIPLE;  Surgeon: Rosendo Guy MD;  Location: [facility] OR     FUSION  SACRAL ILIAC Right 10/19/2017    Procedure: FUSION SACRAL ILIAC;  Right  side sacroiliac joint fusion with 2 implants ;  Surgeon: Sebastián Szymanski MD;  Location: [facility] OR     FUSION SACRAL ILIAC Left 1/11/2018    Procedure: FUSION SACRAL ILIAC;   left  side sacroiliac joint fusion with 2 implants.(Zyga)   extraction and use of illac bone graft ;  Surgeon: Sebastián Szymanski MD;  Location: [facility] OR     [facility] UGI ENDOSCOPY W EUS N/A 7/31/2014    Procedure: COMBINED ENDOSCOPIC ULTRASOUND, ESOPHAGOSCOPY, GASTROSCOPY, DUODENOSCOPY (EGD);  Surgeon: Shorty Stoll MD;  [facility]     ORCHIECTOMY INGUINAL      right radical orchiectomy     REMOVE STIMULATOR VAGUS NERVE  12/23/2011    Procedure:REMOVE STIMULATOR VAGUS NERVE; Vagus Nerve Stimulator Removal; Surgeon:ALEXIS BECERRA; Location:[facility] OR     REMOVE STIMULATOR VAGUS NERVE  11/8/2013    Procedure: REMOVE STIMULATOR VAGUS NERVE;  Removal Of Vagus Nerve Stimulator Lead In Left Neck ;  Surgeon: Alexis Becerra MD;  Location: [facility] OR     SURGICAL HISTORY OF -       vagal nerve implant; removed     SURGICAL HISTORY OF -       UPPP     VASECTOMY         Social History     Tobacco Use     Smoking status: Former Smoker     Packs/day: 0.00     Years: 40.00     Pack years: 0.00     Types: Cigarettes     Smokeless tobacco: Never Used     Tobacco comment: Last cigarette was October 17, 2018   Substance Use Topics     Alcohol use: Yes     Comment: 4 drinks a year     Family History   Problem Relation Age of Onset     Diabetes Mother      C.A.D. Mother      C.A.D. Maternal Grandmother      Diabetes Maternal Grandmother      Hypertension Maternal Grandmother      Cancer Maternal Grandfather      C.A.D. Maternal Grandfather            Reviewed and updated as needed this visit by Provider         Review of Systems   Constitutional, HEENT, cardiovascular, pulmonary, gi and gu systems are negative, except as otherwise noted.       Objective   Reported vitals:  There were no vitals taken for  this visit.   healthy, alert and no distress  PSYCH: Alert and oriented times 3; coherent speech, normal   rate and volume, able to articulate logical thoughts, able   to abstract reason, no tangential thoughts, no hallucinations   or delusions  His affect is normal  RESP: No cough, no audible wheezing, able to talk in full sentences  Remainder of exam unable to be completed due to telephone visits    Diagnostic Test Results:  Labs reviewed in Epic  none         Assessment/Plan:  1. Chronic pain syndrome  Currently well controlled on the regimen below  He may need updated chronic pain agreement  He will come in 1 month to get this done  - HYDROmorphone (DILAUDID) 4 MG tablet; Take 1.5 tablets (6 mg) by mouth 3 times daily  Dispense: 135 tablet; Refill: 0  - morphine (MS CONTIN) 15 MG CR tablet; Take 1 tablet (15 mg) by mouth 2 times daily  Dispense: 60 tablet; Refill: 0    2. Major depressive disorder, recurrent, severe without psychotic features (H)  He had no admissions to the hospital  During that admission his nortriptyline was stopped  He is taking olanzapine and trazodone no only as an as-needed basis  He is also on mirtazapine and he gets this refilled from his psychiatrist  He is also on lithium and the dose has just been increased to 300 mg twice daily  His last PHQ 9 in the hospital was 26 done earlier this month  3. DDD (degenerative disc disease), lumbar    - HYDROmorphone (DILAUDID) 4 MG tablet; Take 1.5 tablets (6 mg) by mouth 3 times daily  Dispense: 135 tablet; Refill: 0  - morphine (MS CONTIN) 15 MG CR tablet; Take 1 tablet (15 mg) by mouth 2 times daily  Dispense: 60 tablet; Refill: 0    4. SI (sacroiliac) joint dysfunction    - HYDROmorphone (DILAUDID) 4 MG tablet; Take 1.5 tablets (6 mg) by mouth 3 times daily  Dispense: 135 tablet; Refill: 0  - morphine (MS CONTIN) 15 MG CR tablet; Take 1 tablet (15 mg) by mouth 2 times daily  Dispense: 60 tablet; Refill: 0    No follow-ups on file.      Phone  call duration:  10  minutes    Thai Alvarado MD

## 2020-06-23 ENCOUNTER — PATIENT OUTREACH (OUTPATIENT)
Dept: CARE COORDINATION | Facility: CLINIC | Age: 60
End: 2020-06-23

## 2020-06-23 NOTE — PROGRESS NOTES
Clinic Care Coordination Contact  [facility]/Voicemail    Referral Source: [facility]  Clinical Data: Care Coordinator Outreach  Outreach attempted x 3.  VM cuts out x2 when leaving message     Plan: Care Coordinator sent care coordination introduction letter sent already via [employer]. Care Coordinator will do no further outreaches at this time.    KATHY Donovan, MSW   [facility]  Care Coordination  [facility]   [phone]  6/23/2020 4:06 PM

## 2020-07-01 ENCOUNTER — TRANSFERRED RECORDS (OUTPATIENT)
Dept: HEALTH INFORMATION MANAGEMENT | Facility: CLINIC | Age: 60
End: 2020-07-01

## 2020-07-20 ENCOUNTER — OFFICE VISIT (OUTPATIENT)
Dept: FAMILY MEDICINE | Facility: CLINIC | Age: 60
End: 2020-07-20
Payer: COMMERCIAL

## 2020-07-20 ENCOUNTER — MYC MEDICAL ADVICE (OUTPATIENT)
Dept: FAMILY MEDICINE | Facility: CLINIC | Age: 60
End: 2020-07-20

## 2020-07-20 VITALS
DIASTOLIC BLOOD PRESSURE: 85 MMHG | WEIGHT: 308 LBS | HEIGHT: 72 IN | BODY MASS INDEX: 41.72 KG/M2 | SYSTOLIC BLOOD PRESSURE: 127 MMHG | HEART RATE: 101 BPM | OXYGEN SATURATION: 98 % | RESPIRATION RATE: 20 BRPM | TEMPERATURE: 96.9 F

## 2020-07-20 DIAGNOSIS — G89.4 CHRONIC PAIN SYNDROME: ICD-10-CM

## 2020-07-20 DIAGNOSIS — R11.0 NAUSEA: ICD-10-CM

## 2020-07-20 DIAGNOSIS — M79.18 MYOFASCIAL PAIN: ICD-10-CM

## 2020-07-20 DIAGNOSIS — M53.3 SI (SACROILIAC) JOINT DYSFUNCTION: ICD-10-CM

## 2020-07-20 DIAGNOSIS — M51.369 DDD (DEGENERATIVE DISC DISEASE), LUMBAR: ICD-10-CM

## 2020-07-20 DIAGNOSIS — F33.2 MAJOR DEPRESSIVE DISORDER, RECURRENT, SEVERE WITHOUT PSYCHOTIC FEATURES (H): Primary | ICD-10-CM

## 2020-07-20 DIAGNOSIS — G47.00 INSOMNIA, UNSPECIFIED TYPE: ICD-10-CM

## 2020-07-20 PROCEDURE — 99214 OFFICE O/P EST MOD 30 MIN: CPT | Mod: 25 | Performed by: FAMILY MEDICINE

## 2020-07-20 PROCEDURE — 20552 NJX 1/MLT TRIGGER POINT 1/2: CPT | Performed by: FAMILY MEDICINE

## 2020-07-20 PROCEDURE — 96127 BRIEF EMOTIONAL/BEHAV ASSMT: CPT | Performed by: FAMILY MEDICINE

## 2020-07-20 RX ORDER — LITHIUM CARBONATE 300 MG
300 TABLET ORAL 2 TIMES DAILY
COMMUNITY

## 2020-07-20 RX ORDER — HYDROMORPHONE HYDROCHLORIDE 4 MG/1
6 TABLET ORAL 3 TIMES DAILY
Qty: 135 TABLET | Refills: 0 | Status: SHIPPED | OUTPATIENT
Start: 2020-07-20 | End: 2020-08-24

## 2020-07-20 RX ORDER — MORPHINE SULFATE 15 MG/1
15 TABLET, FILM COATED, EXTENDED RELEASE ORAL 2 TIMES DAILY
Qty: 60 TABLET | Refills: 0 | Status: SHIPPED | OUTPATIENT
Start: 2020-07-20 | End: 2020-08-24

## 2020-07-20 RX ORDER — ZOLPIDEM TARTRATE 10 MG/1
10 TABLET ORAL
Qty: 30 TABLET | Refills: 2 | Status: SHIPPED | OUTPATIENT
Start: 2020-07-20 | End: 2020-11-13

## 2020-07-20 RX ORDER — TRIAMCINOLONE ACETONIDE 40 MG/ML
40 INJECTION, SUSPENSION INTRA-ARTICULAR; INTRAMUSCULAR ONCE
Status: DISCONTINUED | OUTPATIENT
Start: 2020-07-20 | End: 2021-08-06

## 2020-07-20 RX ORDER — ONDANSETRON 4 MG/1
4 TABLET, FILM COATED ORAL EVERY 8 HOURS PRN
Qty: 30 TABLET | Refills: 1 | Status: SHIPPED | OUTPATIENT
Start: 2020-07-20 | End: 2021-01-11

## 2020-07-20 ASSESSMENT — PATIENT HEALTH QUESTIONNAIRE - PHQ9
5. POOR APPETITE OR OVEREATING: NEARLY EVERY DAY
SUM OF ALL RESPONSES TO PHQ QUESTIONS 1-9: 24

## 2020-07-20 ASSESSMENT — ANXIETY QUESTIONNAIRES
IF YOU CHECKED OFF ANY PROBLEMS ON THIS QUESTIONNAIRE, HOW DIFFICULT HAVE THESE PROBLEMS MADE IT FOR YOU TO DO YOUR WORK, TAKE CARE OF THINGS AT HOME, OR GET ALONG WITH OTHER PEOPLE: EXTREMELY DIFFICULT
1. FEELING NERVOUS, ANXIOUS, OR ON EDGE: NEARLY EVERY DAY
3. WORRYING TOO MUCH ABOUT DIFFERENT THINGS: NEARLY EVERY DAY
5. BEING SO RESTLESS THAT IT IS HARD TO SIT STILL: MORE THAN HALF THE DAYS
2. NOT BEING ABLE TO STOP OR CONTROL WORRYING: NEARLY EVERY DAY
7. FEELING AFRAID AS IF SOMETHING AWFUL MIGHT HAPPEN: NEARLY EVERY DAY
GAD7 TOTAL SCORE: 19
6. BECOMING EASILY ANNOYED OR IRRITABLE: MORE THAN HALF THE DAYS

## 2020-07-20 ASSESSMENT — PAIN SCALES - GENERAL: PAINLEVEL: MODERATE PAIN (5)

## 2020-07-20 ASSESSMENT — MIFFLIN-ST. JEOR: SCORE: 2259.05

## 2020-07-20 NOTE — PROGRESS NOTES
Subjective     Oleg Ford is a 60 year old male who presents to clinic today for the following health issues:    HPI       Chronic Pain Follow-Up    Where in your body do you have pain? Low back  How has your pain affected your ability to work? Unable to work  Which of these pain treatments have you tried since your last clinic visit? Rest  How well are you sleeping? Fair  How has your mood been since your last visit? Better  Have you had a significant life event? No  Other aggravating factors: movements  Taking medication as directed? Yes    PHQ-9 SCORE 7/12/2019 3/11/2020 7/20/2020   PHQ-9 Total Score - - -   PHQ-9 Total Score MyChart - - -   PHQ-9 Total Score 4 26 24     GINI-7 SCORE 7/12/2019 3/11/2020 7/20/2020   Total Score - - -   Total Score 4 20 19     No flowsheet data found.  Encounter-Level CSA - 12/01/2015:    Controlled Substance Agreement - Scan on 12/14/2015 11:49 AM: CONTROLLED SUBSTANCE AGREEMENT     Patient-Level CSA:    Controlled Substance Agreement - Opioid - Scan on 5/6/2019  3:15 PM         How many servings of fruits and vegetables do you eat daily?  0-1    On average, how many sweetened beverages do you drink each day (Examples: soda, juice, sweet tea, etc.  Do NOT count diet or artificially sweetened beverages)?   2--3    How many days per week do you exercise enough to make your heart beat faster? 3 or less    How many minutes a day do you exercise enough to make your heart beat faster? 9 or less    How many days per week do you miss taking your medication? 0    SUBJECTIVE:  Here today in follow-up of depression and back pain.  Patient well-known to me and we reviewed interval history including hospitalization for severe depression and suicidal ideation.  Has been established with psychiatry following this.  Lithium has been added to his regimen and there are a few side effects but he does think it is helping.  Nausea is 1 of the things that comes from his lithium but Zofran does seem  "to help.  Back continues to be an issue Ms. pain regimen has been stable.  Will be taking a trip out of town and is wondering about a trigger point injection that we have done to some of his paraspinous muscles in the past.  This is really worked well Ambien continues to help for sleep.    Review of systems otherwise negative.  Past medical, family, and social history reviewed and updated in chart.    OBJECTIVE:  /85 (BP Location: Left arm, Patient Position: Chair, Cuff Size: Adult Large)   Pulse 101   Temp 96.9  F (36.1  C) (Tympanic)   Resp 20   Ht 1.835 m (6' 0.25\")   Wt 139.7 kg (308 lb)   SpO2 98%   BMI 41.48 kg/m    Alert, pleasant, upbeat, and in no apparent discomfort.  S1 and S2 normal, no murmurs, clicks, gallops or rubs. Regular rate and rhythm. Chest is clear; no wheezes or rales. No edema or JVD.  Back very stiff and tender in the right sided lumbar paraspinous musculature  Past labs reviewed with the patient.     The risks and benefits, as well as expected effect, of trigger point injection was discussed with patient and he agrees to proceed.  After identifying the area of maximal point tenderness the area was prepped clean with alcohol.  The trigger point was injected with 5 cc of a 4:1 mixture of lidocaine 1% and kenalog 40.  Pain experienced initially, followed by pain relief.  Bandaid.  No complications.    ASSESSMENT / PLAN:  (F33.2) Major depressive disorder, recurrent, severe without psychotic features (H)  (primary encounter diagnosis)  Comment: Stabilized now on a new regimen and continuing to follow with psychiatry.  Plan:     (G89.4) Chronic pain syndrome  Comment: Stable and monitored.  We will send a new CSA and update urine drug screen when able  Plan: HYDROmorphone (DILAUDID) 4 MG tablet, morphine         (MS CONTIN) 15 MG CR tablet            (M51.36) DDD (degenerative disc disease), lumbar  Comment: Status post trigger point injection.  Aftercare discussed  Plan: " HYDROmorphone (DILAUDID) 4 MG tablet, morphine         (MS CONTIN) 15 MG CR tablet            (M53.3) SI (sacroiliac) joint dysfunction  Comment:   Plan: HYDROmorphone (DILAUDID) 4 MG tablet, morphine         (MS CONTIN) 15 MG CR tablet            (R11.0) Nausea  Comment: Stable on current regimen  Plan: ondansetron (ZOFRAN) 4 MG tablet            (G47.00) Insomnia, unspecified type  Comment:   Plan: zolpidem (AMBIEN) 10 MG tablet        Doing well on current regimen.  Continue same    Follow up 3 months  S. Tenzin Shaw MD    (Chart documentation completed in part with Dragon voice-recognition software.  Even though reviewed some grammatical, spelling, and word errors may remain.)

## 2020-07-20 NOTE — LETTER
[facility]  07/20/20    Patient: Oleg Ford  YOB: 1960  Medical Record Number: [id number]                                                                  Opioid / Opioid Plus Controlled Substance Agreement    I understand that my care provider has prescribed an opioid (narcotic) controlled substance to help manage my condition(s). I am taking this medicine to help me function or work. I know this is strong medicine, and that it can cause serious side effects. Opioid medicine can be sedating, addicting and may cause a dependency on the drug. They can affect my ability to drive or think, and cause depression. They need to be taken exactly as prescribed. Combining opioids with certain medicines or chemicals (such as cocaine, sedatives and tranquilizers, sleeping pills, meth) can be dangerous or even fatal. Also, if I stop opioids suddenly, I may have severe withdrawal symptoms. Last, I understand that opioids do not work for all types of pain nor for all patients. If not helpful, I may be asked to stop them.      The risks, benefits, and side effects of these medicine(s) were explained to me. I agree that:    1. I will take part in other treatments as advised by my care team. This may be psychiatry or counseling, physical therapy, behavioral therapy, group treatment or a referral to a pain clinic. I will reduce or stop my medicine when my care team tells me to do so.  2. I will take my medicines as prescribed. I will not change the dose or schedule unless my care team tells me to. There will be no refills if I  run out early.   I may be contactedwithout warning and asked to complete a urine drug test or pill count at any time.   3. I will keep all my appointments, and understand this is part of the monitoring of opioids. My care team may require an office visit for EVERY opioid/controlled substance refill. If I miss appointments or don t follow instructions, my care team may stop  my medicine.  4. I will not ask other providers to prescribe controlled substances, and I will not accept controlled substances from other people. If I need another prescribed controlled substance for a new reason, I will tell my care team within 1 business day.  5. I will use one pharmacy to fill all of my controlled substance prescriptions, and it is up to me to make sure that I do not run out of my medicines on weekends or holidays. If my care team is willing to refill my opioid prescription without a visit, I must request refills only during office hours, refills may take up to 3 days to process, and it may take up to 5 to 7 days for my medicine to be mailed and ready at my pharmacy. Prescriptions will not be mailed anywhere except my pharmacy.        [id number]  Rev 12/18         Registration to scan to EHR                             Page 1 of 2               Controlled Substance Agreement Opioid        [facility]  07/20/20  Patient: Oleg Ford  YOB: 1960  Medical Record Number: [id number]                                                                  6. I am responsible for my prescriptions. If the medicine/prescription is lost or stolen, it will not be replaced. I also agree not to share controlled substance medicines with anyone.  7. I agree to not use ANY illegal or recreational drugs. This includes marijuana, cocaine, bath salts or other drugs. I agree not to use alcohol unless my care team says I may.          I agree to give urine samples whenever asked. If I don t give a urine sample, the care team may stop my medicine.    8. If I enroll in the [place] Medical Marijuana program, I will tell my care team. I will also sign an agreement to share my medical records with my care team.   9. I will bring in my list of medicines (or my medicine bottles) each time I come to the clinic.   10. I will tell my care team right away if I become pregnant or have a new medical  problem treated outside of my regular clinic.  11. I understand that this medicine can affect my thinking and judgment. It may be unsafe for me to drive, use machinery and do dangerous tasks. I will not do any of these things until I know how the medicine affects me. If my dose changes, I will wait to see how it affects me. I will contact my care team if I have concerns about medicine side effects.    I understand that if I do not follow any of the conditions above, my prescriptions or treatment may be stopped.      I agree that my provider, clinic care team, and pharmacy may work with any city, state or federal law enforcement agency that investigates the misuse, sale, or other diversion of my controlled medicine. I will allow my provider to discuss my care with or share a copy of this agreement with any other treating provider, pharmacy or emergency room where I receive care. I agree to give up (waive) any right of privacy or confidentiality with respect to these consents.     I have read this agreement and have asked questions about anything I did not understand.      ________________________________________________________________________  Patient signature - Date/Time -  Oleg Ford                                      ________________________________________________________________________  Witness signature                                                            ________________________________________________________________________  Provider signature - Cat Shaw MD      [id number]  Rev 12/18         Registration to scan to EHR                         Page 2 of 2                   Controlled Substance Agreement Opioid           Page 1 of 2  Opioid Pain Medicines (also known as Narcotics)  What You Need to Know    What are opioids?   Opioids are pain medicines that must be prescribed by a doctor.  They are also known as narcotics.    Examples are:     morphine (MS Contin, Irina)    oxycodone  (Oxycontin)    oxycodone and acetaminophen (Percocet)    hydrocodone and acetaminophen (Vicodin, Norco)     fentanyl patch (Duragesic)     hydromorphone (Dilaudid)     methadone     What do opioids do well?   Opioids are best for short-term pain after a surgery or injury. They also work well for cancer pain. Unlike other pain medicines, they do not cause liver or kidney failure or ulcers. They may help some people with long-lasting (chronic) pain.     What do opioids NOT do well?   Opioids never get rid of pain entirely, and they do not work well for most patients with chronic pain. Opioids do not reduce swelling, one of the causes of pain. They also don t work well for nerve pain.                           For informational purposes only.  Not to replace the advice of your care provider.  Copyright [facility]. All right reserved. [employer] 505754-Lns 02/18.      Page 2 of 2    Risks and side effects   Talk to your doctor before you start or decide to keep taking one of these medicines. Side effects include:    Lowering your breathing rate enough to cause death    Overdose, including death, especially if taking higher than prescribed doses    Long-term opioid use    Worse depression symptoms; less pleasure in things you usually enjoy    Feeling tired or sluggish    Slower thoughts or cloudy thinking    Being more sensitive to pain over time; pain is harder to control    Trouble sleeping or restless sleep    Changes in hormone levels (for example, less testosterone)    Changes in sex drive or ability to have sex    Constipation    Unsafe driving    Itching and sweating    Feeling dizzy    Nausea, vomiting and dry mouth    What else should I know about opioids?  When someone takes opioids for too long or too often, they become dependent. This means that if you stop or reduce the medicine too quickly, you will have withdrawal symptoms.    Dependence is not the same as addiction. Addiction is when  people keep using a substance that harms their body, their mind or their relations with others. If you have a history of drug or alcohol abuse, taking opioids can cause a relapse.    Over time, opioids don t work as well. Most people will need higher and higher doses. The higher the dose, the more serious the side effects. We don t know the long-term effects of opioids.      Prescribed opioids aren't the best way to manage chronic pain    Other ways to manage pain include:      Ibuprofen or acetaminophen.  You should always try this first.      Treat health problems that may be causing pain.      acupuncture or massage, deep breathing, meditation, visual imagery, aromatherapy.      Use heat or ice at the pain site      Physical therapy and exercise      Stop smoking      See a counselor or therapist                                                  People who have used opioids for a long time may have a lower quality of life, worse depression, higher levels of pain and more visits to doctors.    Never share your opioids with others. Be sure to store opioids in a secure place, locked if possible.Young children can easily swallow them and overdose.     You can overdose on opioids.  Signs of overdose include decrease or loss of consciousness, slowed breathing, trouble waking and blue lips.  If someone is worried about overdose, they should call 911.    If you are at risk for overdose, you may get naloxone (Narcan, a medicine that reverses the effects of opioids.  If you overdose, a friend or family member can give you Narcan while waiting for the ambulance.  They need to know the signs of overdose and how to give Narcan.    While you're taking opioids:    Don't use alcohol or street drugs. Taking them together can cause death.    Don't take any of these medicines unless your doctor says its okay.  Taking these with opioids can cause death.    Benzodiazepines (such as lorazepam         or diazepam)    Muscle relaxers  (such as cyclobenzaprine)    sleeping pills    other opioids    Safe disposal of opioids  Find your area drug take-back program, your pharmacy mail-back program, buy a special disposal bag (such as Deterra) from your pharmacy or flush them down the toilet.  Use the guidelines at:  www.fda.gov/drugs/resourcesforyou

## 2020-07-20 NOTE — PATIENT INSTRUCTIONS
At [facility], we strive to deliver an exceptional experience to you, every time we see you. If you receive a survey, please complete it as we do value your feedback.  If you have MyChart, you can expect to receive results automatically within 24 hours of their completion.  Your provider will send a note interpreting your results as well.   If you do not have MyChart, you should receive your results in about a week by mail.    Your care team:                            Family Medicine Internal Medicine   MD Thomas Rai MD Shantel Branch-Fleming, MD Srinivasa Vaka, MD Katya Georgiev PA-C Megan Hill, APRN CNP    Navarro Lara, MD Pediatrics   Jose Cruz, PAJingC  Bonnie Love, CNP MD Shweta Ballesteros APRN CNP   MD Pam Delaney MD Deborah Mielke, MD Mary Coley, APRN CNP  Erin Carter, PAJingC  Terri Duvall, CNP  MD Daisy Young MD Angela Wermerskirchen, MD      Clinic hours: Monday - Thursday 7 am-7 pm; Fridays 7 am-5 pm.   Urgent care: Monday - Friday 11 am-9 pm; Saturday and Sunday 9 am-5 pm.    Clinic: [phone]       [facility]: Monday - Thursday 8 am - 7 pm; Friday 8 am - 6 pm  [facility]: [phone]     Use www.oncare.org for 24/7 diagnosis and treatment of dozens of conditions.

## 2020-07-21 ASSESSMENT — ANXIETY QUESTIONNAIRES: GAD7 TOTAL SCORE: 19

## 2020-07-21 NOTE — TELEPHONE ENCOUNTER
Mailed provider signed Opioid/Opioid Plus Controlled Substance Agreement to patient's home address.  Rosario Greenberg MA  [facility]  2nd Floor  Primary Care

## 2020-07-29 ENCOUNTER — TRANSFERRED RECORDS (OUTPATIENT)
Dept: HEALTH INFORMATION MANAGEMENT | Facility: CLINIC | Age: 60
End: 2020-07-29

## 2020-08-12 ENCOUNTER — VIRTUAL VISIT (OUTPATIENT)
Dept: FAMILY MEDICINE | Facility: CLINIC | Age: 60
End: 2020-08-12
Payer: COMMERCIAL

## 2020-08-12 ENCOUNTER — TELEPHONE (OUTPATIENT)
Dept: FAMILY MEDICINE | Facility: CLINIC | Age: 60
End: 2020-08-12

## 2020-08-12 DIAGNOSIS — R51.9 BENIGN HEADACHE: ICD-10-CM

## 2020-08-12 DIAGNOSIS — R19.7 DIARRHEA OF PRESUMED INFECTIOUS ORIGIN: Primary | ICD-10-CM

## 2020-08-12 PROCEDURE — 99213 OFFICE O/P EST LOW 20 MIN: CPT | Mod: 95 | Performed by: FAMILY MEDICINE

## 2020-08-12 RX ORDER — METRONIDAZOLE 500 MG/1
500 TABLET ORAL 2 TIMES DAILY
Qty: 14 TABLET | Refills: 0 | Status: SHIPPED | OUTPATIENT
Start: 2020-08-12 | End: 2020-08-19

## 2020-08-12 ASSESSMENT — PAIN SCALES - GENERAL: PAINLEVEL: SEVERE PAIN (7)

## 2020-08-12 NOTE — PATIENT INSTRUCTIONS
At [facility], we strive to deliver an exceptional experience to you, every time we see you. If you receive a survey, please complete it as we do value your feedback.  If you have MyChart, you can expect to receive results automatically within 24 hours of their completion.  Your provider will send a note interpreting your results as well.   If you do not have MyChart, you should receive your results in about a week by mail.    Your care team:                            Family Medicine Internal Medicine   MD Thomas Rai MD Shantel Branch-Fleming, MD Srinivasa Vaka, MD Katya Georgiev PA-C Megan Hill, APRN CNP    Navarro Lara, MD Pediatrics   Jose Cruz, PAJingC  Bonnie Love, CNP MD Shweta Ballesteros APRN CNP   MD Pam Delaney MD Deborah Mielke, MD Mary Coley, APRN CNP  Erin Carter PAHETAL Duvall, CNP  MD Daisy Young MD Angela Wermerskirchen, MD      Clinic hours: Monday - Thursday 7 am-7 pm; Fridays 7 am-5 pm.   Urgent care: Monday - Friday 11 am-9 pm; Saturday and Sunday 9 am-5 pm.    Clinic: [phone]       [facility]: Monday - Thursday 8 am - 7 pm; Friday 8 am - 6 pm  [facility]: [phone]     Use www.oncare.org for 24/7 diagnosis and treatment of dozens of conditions.    Patient Education   Coping with Diarrhea  What is diarrhea?  If you have loose, watery bowel movements at least three times a day, you have diarrhea. You may also have gas and stomach cramps.  Emotional upset, infection and bad reactions to food may make diarrhea worse.  How is it treated?  Severe diarrhea can be treated with medicine, such as Imodium and Lomotil. These slow the digestive tract and reduce the amount of fluid lost in bowel movements. Your care team can tell you if medicine is right for you.  What else can I do to treat or prevent  diarrhea?    Avoid:  ? Fried, fatty, greasy, spicy or very sweet foods  ? Caffeine and alcohol  ? High-fiber foods such as whole grains, raw vegetables, unpeeled fresh fruits, dried fruits, dried beans and peas, nuts, seeds and popcorn  ? Chewing gum and sugar-free candies (these often contain a sugar alcohol that may increase diarrhea)  ? Gas-forming foods such as broccoli, cauliflower, brussels sprouts and carbonated (fizzy) drinks.    Eat smaller amounts of food, but eat more often. Serve foods cold or at room temperature.    Drink six to eight 8-ounce glasses of fluid each day, such as:  ? Fruit juice, watered-down (half water)  ? Broth or gelatin  ? Popsicles  ? Sports drinks or flat decaf soda pop (leave it open for at least 10 minutes before drinking).    Limit milk products to two low-fat servings daily.    Increase your potassium with watered-down orange juice, sports drinks, potatoes without skin, bananas or tomato products.    Increase your sodium with soups and broths, sports drinks, crackers and pretzels.    Rest and avoid stress.    Weigh yourself daily. Keep a record of your weight and how often you pass loose stools.    Take warm baths to keep yourself clean. Tell your doctor if your rectum is red, painful or swollen.    Think about buying a skin barrier (such as Aquaphor) at the drug store. This can help protect the skin around your rectum from irritation and skin breakdown. Use it after each bowel movement.    If diarrhea is severe, have only clear liquids (liquids that you can see through) until it stops. Once it stops, slowly return to your normal diet.  When should I call my care team?  Call your care team if:    You follow the steps listed here, but your diarrhea does not improve within 24 hours.    You have more than six loose stools in one day.    You have sudden, severe belly pain.    You have been unable to eat for over two days.    You have fever or dizziness along with diarrhea.    You  notice blood in your stool or you have black, tarry stools.  Food group Recommended foods Foods to avoid   Meats, eggs and cheese Broiled or baked lean meat, fish, chicken or turkey (no skin). Eggs, well-cooked. Beans. Chicken or turkey with the skin.   Breads and starches Breads, rolls and pastas made from white flour. Instant white rice, refined cereals (Cream of Wheat, Cream of Rice, Cornflakes, Rice Krispies), pancakes, waffles, muffins, cornbread, trish crackers. Whole grain breads and cereals, bran, Shredded Wheat, wild rice, granola.   Fruits and vegetables Canned, frozen or peeled fresh fruits such as bananas or applesauce. Tomato paste, tomato sauce, tomato puree, cooked vegetables (acorn squash, asparagus, beets, carrots, celery, green beans, mushrooms, baked potato without skin, peeled zucchini). Unpeeled fruits, melons, grapefruit juice, all vegetables not listed on the left.   Milk products Skim or 1% milk, low-fat yogurt, low-fat cheese. Whole or 2% milk, high-fat ice cream, cream.   Drinks Sports drinks, watered-down fruit juices. (No caffeine.) Prune juice, caffeine.   Desserts Cookies, cake, gelatin, sherbet, fruit pies (avoid pies made with unpeeled fruit). Nuts, coconut, chocolate, licorice.   Other Broth, butter, margarine, mayonnaise, salad dressing, vegetable oil. Hot sauce, pepper, chili powder, taco seasoning.   Comments:  __________________________________________  __________________________________________  __________________________________________  __________________________________________  __________________________________________  __________________________________________  __________________________________________  __________________________________________  __________________________________________  __________________________________________  For informational purposes only. Not to replace the advice of your health care provider. Copyright   2006 [facility]. All  rights reserved. Clinically reviewed by [facility]. [employer] [id number] - REV 04/19.

## 2020-08-12 NOTE — TELEPHONE ENCOUNTER
.Reason for call:  Other   Patient called regarding (reason for call): call back  Additional comments: pt is experiencing headache, body ache, and stomach pain. He was seen at the ER yesterday and is still experiencing these symptoms. They did a CT scan and a covid test    Phone number to reach patient:  Home number on file [phone] (home)    Best Time:  anytime    Can we leave a detailed message on this number?  YES    Travel screening: Negative

## 2020-08-12 NOTE — TELEPHONE ENCOUNTER
Patient had already been triaged by Blair SHIPLEY. Blair SHIPLEY calling with the patient because his symptoms are not better. She was calling to see if there is a way for patient to be seen sooner then 8/24/2020.    Patient seen in ER this weekend and assessed. He is drinking Pepto for his abdominal pain. He states that it is not any worse then when he was assessed this weekend. Notes no changes for the better or worse.     Patient is schedule 8/24/2020 for Dr Shaw. Informed the patient that she should be seen. Offered appointment today for hospital follow. Warm transferred back t scheduling.    Heidy Cerna RN, [facility] Triage

## 2020-08-12 NOTE — PROGRESS NOTES
"Oleg Ford is a 60 year old male who is being evaluated via a billable telephone visit.      The patient has been notified of following:     \"This telephone visit will be conducted via a call between you and your physician/provider. We have found that certain health care needs can be provided without the need for a physical exam.  This service lets us provide the care you need with a short phone conversation.  If a prescription is necessary we can send it directly to your pharmacy.  If lab work is needed we can place an order for that and you can then stop by our lab to have the test done at a later time.    Telephone visits are billed at different rates depending on your insurance coverage. During this emergency period, for some insurers they may be billed the same as an in-person visit.  Please reach out to your insurance provider with any questions.    If during the course of the call the physician/provider feels a telephone visit is not appropriate, you will not be charged for this service.\"    Patient has given verbal consent for Telephone visit?  Yes    What phone number would you like to be contacted at? [phone]    How would you like to obtain your AVS? [place]    Subjective     Oleg Ford is a 60 year old male who presents via phone visit today for the following health issues:    HPI    Acute Illness   Acute illness concerns: follow up   Onset: 10 days     Fever: no    Chills/Sweats: YES    Headache (location?): YES- been going on longer then 10 days    Sinus Pressure:no    Conjunctivitis:  no    Ear Pain: YES: both    Rhinorrhea: YES    Congestion: no    Sore Throat: no     Cough: no    Wheeze: no    Decreased Appetite: YES    Nausea: YES    Vomiting: no    Diarrhea:  YES    Dysuria/Freq.: no    Fatigue/Achiness: YES    Sick/Strep Exposure: no     Therapies Tried and outcome: tyleonl, pepto, imdoum, nyquil     Imodium and pepto bismul is helping.  Seen in ED and covid negative.  Has headache " "(frontal throbbing).  Continue abdominal cramping.  Was in [place] 2 weeks ago and symptoms started the day after.  Didn't do much while there.  Having 4 - 5 stools per day.  Drinking water for hydration.     Reviewed and updated as needed this visit by Provider         Review of Systems   Constitutional, HEENT, cardiovascular, pulmonary, gi and gu systems are negative, except as otherwise noted.       Objective          Vitals:  No vitals were obtained today due to virtual visit.    healthy, alert and no distress  PSYCH: Alert and oriented times 3; coherent speech, normal   rate and volume, able to articulate logical thoughts, able   to abstract reason, no tangential thoughts, no hallucinations   or delusions  His affect is normal  RESP: No cough, no audible wheezing, able to talk in full sentences  Remainder of exam unable to be completed due to telephone visits    Diagnostic Test Results:  Labs reviewed in Epic        Assessment/Plan:    Assessment & Plan     1. Diarrhea of presumed infectious origin  Uncertain etiology - trial of metronidazole and bland diet  - metroNIDAZOLE (FLAGYL) 500 MG tablet; Take 1 tablet (500 mg) by mouth 2 times daily for 7 days  Dispense: 14 tablet; Refill: 0    2. Benign headache  Suspect related to dehydration.  Try electrolyte or not lactose protein shakes daily for increased hydration.       BMI:   Estimated body mass index is 41.48 kg/m  as calculated from the following:    Height as of 7/20/20: 1.835 m (6' 0.25\").    Weight as of 7/20/20: 139.7 kg (308 lb).     The uses and side effects, including black box warnings as appropriate, were discussed in detail.  All patient questions were answered.  The patient was instructed to call immediately if any side effects developed.     Return in about 2 days (around 8/14/2020).    Caro Tejeda MD  [facility]    Phone call duration:  14 minutes                "

## 2020-08-24 ENCOUNTER — VIRTUAL VISIT (OUTPATIENT)
Dept: FAMILY MEDICINE | Facility: CLINIC | Age: 60
End: 2020-08-24
Payer: COMMERCIAL

## 2020-08-24 DIAGNOSIS — G25.81 RLS (RESTLESS LEGS SYNDROME): ICD-10-CM

## 2020-08-24 DIAGNOSIS — G89.4 CHRONIC PAIN SYNDROME: ICD-10-CM

## 2020-08-24 DIAGNOSIS — M53.3 SI (SACROILIAC) JOINT DYSFUNCTION: ICD-10-CM

## 2020-08-24 DIAGNOSIS — F33.2 MAJOR DEPRESSIVE DISORDER, RECURRENT, SEVERE WITHOUT PSYCHOTIC FEATURES (H): ICD-10-CM

## 2020-08-24 DIAGNOSIS — G47.00 INSOMNIA, UNSPECIFIED TYPE: ICD-10-CM

## 2020-08-24 DIAGNOSIS — M51.369 DDD (DEGENERATIVE DISC DISEASE), LUMBAR: Primary | ICD-10-CM

## 2020-08-24 PROCEDURE — 99214 OFFICE O/P EST MOD 30 MIN: CPT | Mod: 95 | Performed by: FAMILY MEDICINE

## 2020-08-24 RX ORDER — ROPINIROLE 1 MG/1
TABLET, FILM COATED ORAL
Qty: 360 TABLET | Refills: 1 | Status: SHIPPED | OUTPATIENT
Start: 2020-08-24 | End: 2021-05-05

## 2020-08-24 RX ORDER — ESCITALOPRAM OXALATE 20 MG/1
20 TABLET ORAL DAILY
Qty: 90 TABLET | Refills: 1 | Status: SHIPPED | OUTPATIENT
Start: 2020-08-24 | End: 2021-06-02

## 2020-08-24 RX ORDER — HYDROMORPHONE HYDROCHLORIDE 4 MG/1
6 TABLET ORAL 3 TIMES DAILY
Qty: 135 TABLET | Refills: 0 | Status: SHIPPED | OUTPATIENT
Start: 2020-08-24 | End: 2020-09-21

## 2020-08-24 RX ORDER — MORPHINE SULFATE 15 MG/1
15 TABLET, FILM COATED, EXTENDED RELEASE ORAL 2 TIMES DAILY
Qty: 60 TABLET | Refills: 0 | Status: SHIPPED | OUTPATIENT
Start: 2020-08-24 | End: 2020-09-21

## 2020-08-24 NOTE — PROGRESS NOTES
"Oleg Ford is a 60 year old male who is being evaluated via a billable telephone visit.      The patient has been notified of following:     \"This telephone visit will be conducted via a call between you and your physician/provider. We have found that certain health care needs can be provided without the need for a physical exam.  This service lets us provide the care you need with a short phone conversation.  If a prescription is necessary we can send it directly to your pharmacy.  If lab work is needed we can place an order for that and you can then stop by our lab to have the test done at a later time.    Telephone visits are billed at different rates depending on your insurance coverage. During this emergency period, for some insurers they may be billed the same as an in-person visit.  Please reach out to your insurance provider with any questions.    If during the course of the call the physician/provider feels a telephone visit is not appropriate, you will not be charged for this service.\"    Patient has given verbal consent for Telephone visit?  Yes    What phone number would you like to be contacted at? [phone]    How would you like to obtain your AVS? MyChart    Subjective     Oleg Ford is a 60 year old male who presents via phone visit today for the following health issues:    HPI    Chronic Pain Follow-Up    Where in your body do you have pain? Back  How has your pain affected your ability to work? Unable to work  Which of these pain treatments have you tried since your last clinic visit?   How well are you sleeping? Poor  How has your mood been since your last visit? About the same  Have you had a significant life event? No  Other aggravating factors: prolonged sitting  Taking medication as directed? Yes    PHQ-9 SCORE 7/12/2019 3/11/2020 7/20/2020   PHQ-9 Total Score - - -   PHQ-9 Total Score MyChart - - -   PHQ-9 Total Score 4 26 24     GINI-7 SCORE 7/12/2019 3/11/2020 7/20/2020   Total " Score - - -   Total Score 4 20 19     No flowsheet data found.  Encounter-Level CSA - 12/01/2015:    Controlled Substance Agreement - Scan on 12/14/2015 11:49 AM: CONTROLLED SUBSTANCE AGREEMENT     Patient-Level CSA:    Controlled Substance Agreement - Opioid - Scan on 5/6/2019  3:15 PM       Spoke with patient via phone in follow-up of chronic back pain.  Things have flared up and in the past we have done trigger point injections.  So we talked about setting this up.  Seems to be recovering from his recent diarrhea that is related to his trip down to [place].  Dr. Ashley Tejeda placed him on metronidazole and he thinks that must be helping.  Still having lots of difficulty with sleep but is not sure if it is just because of pain or anxiety or combination of all things.  Currently using trazodone and Ambien as well as Requip for his restless legs.  But this does not feel like a restless legs issue.Constitutional, HEENT, cardiovascular, pulmonary, gi and gu systems are negative, except as otherwise noted.    Review of Systems   Constitutional, HEENT, cardiovascular, pulmonary, gi and gu systems are negative, except as otherwise noted.       Objective          Vitals:  No vitals were obtained today due to virtual visit.    healthy, alert and no distress  PSYCH: Alert and oriented times 3; coherent speech, normal   rate and volume, able to articulate logical thoughts, able   to abstract reason, no tangential thoughts, no hallucinations   or delusions  His affect is normal  RESP: No cough, no audible wheezing, able to talk in full sentences  Remainder of exam unable to be completed due to telephone visits            Assessment/Plan:    Assessment & Plan     DDD (degenerative disc disease), lumbar  Stable on current regimen.  Will continue same.  Will update controlled substance agreement at his upcoming visit  - HYDROmorphone (DILAUDID) 4 MG tablet; Take 1.5 tablets (6 mg) by mouth 3 times daily  - morphine (MS  "CONTIN) 15 MG CR tablet; Take 1 tablet (15 mg) by mouth 2 times daily    SI (sacroiliac) joint dysfunction  As above  - HYDROmorphone (DILAUDID) 4 MG tablet; Take 1.5 tablets (6 mg) by mouth 3 times daily  - morphine (MS CONTIN) 15 MG CR tablet; Take 1 tablet (15 mg) by mouth 2 times daily    Chronic pain syndrome  As above  - HYDROmorphone (DILAUDID) 4 MG tablet; Take 1.5 tablets (6 mg) by mouth 3 times daily  - morphine (MS CONTIN) 15 MG CR tablet; Take 1 tablet (15 mg) by mouth 2 times daily    Major depressive disorder, recurrent, severe without psychotic features (H)  Stable on current regimen.  Continue same  - escitalopram (LEXAPRO) 20 MG tablet; Take 1 tablet (20 mg) by mouth daily    RLS (restless legs syndrome)  As above  - rOPINIRole (REQUIP) 1 MG tablet; 1mg at 5pm and 3mg at 8-9pm    Insomnia, unspecified type  Advised him that it is okay to increase to 150 mg or even 200 mg of trazodone.  We will be setting up a follow-up in a few days and can check on this then       BMI:   Estimated body mass index is 41.48 kg/m  as calculated from the following:    Height as of 7/20/20: 1.835 m (6' 0.25\").    Weight as of 7/20/20: 139.7 kg (308 lb).   Weight management plan: Patient was referred to their PCP to discuss a diet and exercise plan.        See Patient Instructions    Return in about 3 days (around 8/27/2020) for As scheduled for procedure.    Cat Shaw MD  [facility]    Phone call duration:  14 minutes              "

## 2020-08-25 NOTE — PROGRESS NOTES
This writer attempted to contact pt on 08/25/20      Reason for call schedule appt with Dr. Shaw Friday August 28., OK to USE SAME DAY SPOT. and left message.      If patient calls back:   Schedule Office Visit appointment within 2 business days( on 8/28/2020) with PCP, document that pt called and close encounter         Sally Marshall MA

## 2020-08-28 ENCOUNTER — OFFICE VISIT (OUTPATIENT)
Dept: FAMILY MEDICINE | Facility: CLINIC | Age: 60
End: 2020-08-28
Payer: COMMERCIAL

## 2020-08-28 VITALS
SYSTOLIC BLOOD PRESSURE: 131 MMHG | WEIGHT: 305 LBS | HEART RATE: 77 BPM | BODY MASS INDEX: 41.31 KG/M2 | TEMPERATURE: 97.2 F | DIASTOLIC BLOOD PRESSURE: 87 MMHG | HEIGHT: 72 IN | RESPIRATION RATE: 18 BRPM | OXYGEN SATURATION: 94 %

## 2020-08-28 DIAGNOSIS — M54.41 CHRONIC BILATERAL LOW BACK PAIN WITH BILATERAL SCIATICA: Primary | ICD-10-CM

## 2020-08-28 DIAGNOSIS — G89.29 CHRONIC BILATERAL LOW BACK PAIN WITH BILATERAL SCIATICA: Primary | ICD-10-CM

## 2020-08-28 DIAGNOSIS — M79.18 MYOFASCIAL PAIN SYNDROME: ICD-10-CM

## 2020-08-28 DIAGNOSIS — M54.42 CHRONIC BILATERAL LOW BACK PAIN WITH BILATERAL SCIATICA: Primary | ICD-10-CM

## 2020-08-28 DIAGNOSIS — R51.9 BAD HEADACHE: ICD-10-CM

## 2020-08-28 PROCEDURE — 20553 NJX 1/MLT TRIGGER POINTS 3/>: CPT | Performed by: FAMILY MEDICINE

## 2020-08-28 PROCEDURE — 99213 OFFICE O/P EST LOW 20 MIN: CPT | Mod: 25 | Performed by: FAMILY MEDICINE

## 2020-08-28 RX ORDER — NAPROXEN SODIUM 550 MG/1
550 TABLET ORAL 2 TIMES DAILY PRN
Qty: 30 TABLET | Refills: 1 | Status: SHIPPED | OUTPATIENT
Start: 2020-08-28 | End: 2021-04-15

## 2020-08-28 RX ORDER — TRIAMCINOLONE ACETONIDE 40 MG/ML
40 INJECTION, SUSPENSION INTRA-ARTICULAR; INTRAMUSCULAR ONCE
Status: DISCONTINUED | OUTPATIENT
Start: 2020-08-28 | End: 2021-08-06

## 2020-08-28 ASSESSMENT — MIFFLIN-ST. JEOR: SCORE: 2245.44

## 2020-08-28 ASSESSMENT — PAIN SCALES - GENERAL: PAINLEVEL: SEVERE PAIN (7)

## 2020-08-28 NOTE — PATIENT INSTRUCTIONS
At [facility], we strive to deliver an exceptional experience to you, every time we see you. If you receive a survey, please complete it as we do value your feedback.  If you have MyChart, you can expect to receive results automatically within 24 hours of their completion.  Your provider will send a note interpreting your results as well.   If you do not have MyChart, you should receive your results in about a week by mail.    Your care team:     Family Medicine   MICHELLE Garcia APRN CNP S. Matthew Hockett, MD Pamela Kolacz, MD Angela Wermerskirchen, MD    Internal Medicine  Thai Alvarado MD     Clinic hours: Monday - Wednesday 7 am-7 pm   Thursdays and Fridays 7 am-5 pm.     [facility]: Monday - Friday 11 am-9 pm,   Saturday and Sunday 9 am-5 pm.     [facility]: Monday - Thursday 8 am - 7 pm; Friday 8 am - 6 pm    Clinic: [phone]   [facility]: [phone]     Use [email or web address] for 24/7 diagnosis and treatment of dozens of conditions.

## 2020-08-28 NOTE — PROGRESS NOTES
Subjective     Oleg Ford is a 60 year old male who presents to clinic today for the following health issues:    HPI       Chronic Pain Follow-Up    Where in your body do you have pain? Back   How has your pain affected your ability to work? Unable to work  Which of these pain treatments have you tried since your last clinic visit? Other: none  How well are you sleeping? Poor  How has your mood been since your last visit? About the same  Have you had a significant life event? No  Other aggravating factors: prolonged sitting, prolonged standing and moving   Taking medication as directed? Yes    -patient would like to discuss sleep, hasn't been able to get a lot of sleep even with the new dosage change  -due to lack of sleep, started having migraine again  -left leg starts to feel numb, and feel icy cold   -SOB has worsened, especially with a mask     PHQ-9 SCORE 7/12/2019 3/11/2020 7/20/2020   PHQ-9 Total Score - - -   PHQ-9 Total Score MyChart - - -   PHQ-9 Total Score 4 26 24     GINI-7 SCORE 7/12/2019 3/11/2020 7/20/2020   Total Score - - -   Total Score 4 20 19     No flowsheet data found.  Encounter-Level CSA - 12/01/2015:    Controlled Substance Agreement - Scan on 12/14/2015 11:49 AM: CONTROLLED SUBSTANCE AGREEMENT     Patient-Level CSA:    Controlled Substance Agreement - Opioid - Scan on 5/6/2019  3:15 PM         How many days per week do you miss taking your medication? 0    SUBJECTIVE:  Here today for trigger point injections in his low back.  Well-known to me with a longstanding back history.  Multiple surgeries.  But he does suffer from trigger point myofascial pain that may be related to previous surgical procedures.  It definitely seems more surface then down to the bone and has no specific neurological radiation.  It is been a few months since we have done this but it definitely provides benefit.  Also has been having more headaches recently without any particular reason.  Does not suffer from  "allergies currently.  Not sick at all.  Taking a lot of Tylenol.    Review of systems otherwise negative.  Past medical, family, and social history reviewed and updated in chart.    OBJECTIVE:  /87 (BP Location: Left arm, Patient Position: Chair, Cuff Size: Adult Large)   Pulse 77   Temp 97.2  F (36.2  C) (Tympanic)   Resp 18   Ht 1.835 m (6' 0.25\")   Wt 138.3 kg (305 lb)   SpO2 94%   BMI 41.08 kg/m    Alert, pleasant, upbeat, and in no apparent discomfort.  S1 and S2 normal, no murmurs, clicks, gallops or rubs. Regular rate and rhythm. Chest is clear; no wheezes or rales. No edema or JVD.   Back -large lumbar scar throughout but he is most tender at various points toward the lumbar musculature.  There is a significant trigger point on the right and 3 on the left.  Past labs reviewed with the patient.     x4 ---The risks and benefits, as well as expected effect, of trigger point injection was discussed with patient and he agrees to proceed.  After identifying the area of maximal point tenderness the area was prepped clean with alcohol.  The trigger point was injected with 2.5 cc of a 9:1 mixture of lidocaine 1% and kenalog 40.  Pain experienced initially, followed by pain relief.  Bandaid.  No complications.    ASSESSMENT / PLAN:  (M54.42,  M54.41,  G89.29) Chronic bilateral low back pain with bilateral sciatica  (primary encounter diagnosis)  Comment: Status post trigger point injection.  Aftercare discussed  Plan: INJECTION SNGL/MULT TRIGGER POINT, >3 MUSCLES,         triamcinolone (KENALOG-40) injection 40 mg,         TRIAMCINOLONE ACET INJ NOS            (M79.18) Myofascial pain syndrome  Comment: As above  Plan: INJECTION SNGL/MULT TRIGGER POINT, >3 MUSCLES,         triamcinolone (KENALOG-40) injection 40 mg,         TRIAMCINOLONE ACET INJ NOS            (R51) Bad headache  Comment: Discussed mechanism of action of the proposed medication, as well as potential effects, both good and bad.  Patient " expressed understanding and agreed with treatment.   Plan: naproxen sodium (ANAPROX) 550 MG tablet            Follow up 1 month  SANTIAGO Shaw MD    (Chart documentation completed in part with Dragon voice-recognition software.  Even though reviewed some grammatical, spelling, and word errors may remain.)

## 2020-09-14 ENCOUNTER — TRANSFERRED RECORDS (OUTPATIENT)
Dept: HEALTH INFORMATION MANAGEMENT | Facility: CLINIC | Age: 60
End: 2020-09-14

## 2020-09-14 LAB — PHQ9 SCORE: 14

## 2020-09-21 ENCOUNTER — VIRTUAL VISIT (OUTPATIENT)
Dept: FAMILY MEDICINE | Facility: CLINIC | Age: 60
End: 2020-09-21
Payer: COMMERCIAL

## 2020-09-21 DIAGNOSIS — K21.00 GASTROESOPHAGEAL REFLUX DISEASE WITH ESOPHAGITIS: ICD-10-CM

## 2020-09-21 DIAGNOSIS — M53.3 SI (SACROILIAC) JOINT DYSFUNCTION: ICD-10-CM

## 2020-09-21 DIAGNOSIS — G89.4 CHRONIC PAIN SYNDROME: ICD-10-CM

## 2020-09-21 DIAGNOSIS — M51.369 DDD (DEGENERATIVE DISC DISEASE), LUMBAR: ICD-10-CM

## 2020-09-21 PROCEDURE — 99214 OFFICE O/P EST MOD 30 MIN: CPT | Mod: 95 | Performed by: FAMILY MEDICINE

## 2020-09-21 RX ORDER — CYCLOBENZAPRINE HCL 10 MG
TABLET ORAL
Qty: 90 TABLET | Refills: 1 | Status: SHIPPED | OUTPATIENT
Start: 2020-09-21 | End: 2021-02-10

## 2020-09-21 RX ORDER — MORPHINE SULFATE 15 MG/1
15 TABLET, FILM COATED, EXTENDED RELEASE ORAL 2 TIMES DAILY
Qty: 60 TABLET | Refills: 0 | Status: SHIPPED | OUTPATIENT
Start: 2020-09-21 | End: 2020-10-14

## 2020-09-21 RX ORDER — OMEPRAZOLE 40 MG/1
CAPSULE, DELAYED RELEASE ORAL
Qty: 90 CAPSULE | Refills: 1 | Status: SHIPPED | OUTPATIENT
Start: 2020-09-21 | End: 2021-06-02

## 2020-09-21 RX ORDER — HYDROMORPHONE HYDROCHLORIDE 4 MG/1
6 TABLET ORAL 3 TIMES DAILY
Qty: 135 TABLET | Refills: 0 | Status: SHIPPED | OUTPATIENT
Start: 2020-09-21 | End: 2020-10-14

## 2020-09-21 NOTE — PROGRESS NOTES
This writer attempted to contact pt on 09/21/20      Reason for call schedule OV with Dr. Shaw and left message.      If patient calls back:   Please set him up with me (Dr. Sahw) next week in office for back injection.  Same day is just fine.         Sally Marshall MA

## 2020-09-21 NOTE — PROGRESS NOTES
"Oleg Ford is a 60 year old male who is being evaluated via a billable telephone visit.      The patient has been notified of following:     \"This telephone visit will be conducted via a call between you and your physician/provider. We have found that certain health care needs can be provided without the need for a physical exam.  This service lets us provide the care you need with a short phone conversation.  If a prescription is necessary we can send it directly to your pharmacy.  If lab work is needed we can place an order for that and you can then stop by our lab to have the test done at a later time.    Telephone visits are billed at different rates depending on your insurance coverage. During this emergency period, for some insurers they may be billed the same as an in-person visit.  Please reach out to your insurance provider with any questions.    If during the course of the call the physician/provider feels a telephone visit is not appropriate, you will not be charged for this service.\"    Patient has given verbal consent for Telephone visit?  Yes    What phone number would you like to be contacted at? [phone]    How would you like to obtain your AVS? MyChart    Subjective     Oleg Ford is a 60 year old male who presents via phone visit today for the following health issues:    HPI    Chronic Pain Follow-Up    Where in your body do you have pain? Lower back  How has your pain affected your ability to work? Unable to work  Which of these pain treatments have you tried since your last clinic visit? Other: None  How well are you sleeping? Fair  How has your mood been since your last visit? About the same  Have you had a significant life event? No  Other aggravating factors: repetitive activities - Moving, standing, sitting and walking  Taking medication as directed? Yes    PHQ-9 SCORE 7/12/2019 3/11/2020 7/20/2020   PHQ-9 Total Score - - -   PHQ-9 Total Score MyChart - - -   PHQ-9 Total Score 4 " 26 24     GINI-7 SCORE 2019 3/11/2020 2020   Total Score - - -   Total Score 4 20 19     No flowsheet data found.  Encounter-Level CSA - 2015:    Controlled Substance Agreement - Scan on 2015 11:49 AM: CONTROLLED SUBSTANCE AGREEMENT     Patient-Level CSA:    Controlled Substance Agreement - Opioid - Scan on 2020  7:51 PM  Controlled Substance Agreement - Opioid - Scan on 2019  3:15 PM         How many servings of fruits and vegetables do you eat daily?  0-1    On average, how many sweetened beverages do you drink each day (Examples: soda, juice, sweet tea, etc.  Do NOT count diet or artificially sweetened beverages)?   5    How many days per week do you exercise enough to make your heart beat faster? 3 or less    How many minutes a day do you exercise enough to make your heart beat faster? 9 or less    How many days per week do you miss taking your medication? 0    Spoke with patient via phone today in follow-up of his ongoing significant back pain.  Had a service for his son who  earlier this year last weekend and things went great.  The lots of physical activity now he like to set up a time for some trigger point injections.  Has started having a bit more nocturnal reflux and would like to restart omeprazole.    Review of Systems   Constitutional, HEENT, cardiovascular, pulmonary, gi and gu systems are negative, except as otherwise noted.       Objective          Vitals:  No vitals were obtained today due to virtual visit.    healthy, alert and no distress  PSYCH: Alert and oriented times 3; coherent speech, normal   rate and volume, able to articulate logical thoughts, able   to abstract reason, no tangential thoughts, no hallucinations   or delusions  His affect is normal  RESP: No cough, no audible wheezing, able to talk in full sentences  Remainder of exam unable to be completed due to telephone visits    Past labs reviewed with the patient.          Assessment/Plan:    Assessment & Plan     Chronic pain syndrome  Doing well on current dosage.  Will continue same and I will set him up to see me next week for trigger point injections.  - HYDROmorphone (DILAUDID) 4 MG tablet; Take 1.5 tablets (6 mg) by mouth 3 times daily  - morphine (MS CONTIN) 15 MG CR tablet; Take 1 tablet (15 mg) by mouth 2 times daily    DDD (degenerative disc disease), lumbar  As above  - HYDROmorphone (DILAUDID) 4 MG tablet; Take 1.5 tablets (6 mg) by mouth 3 times daily  - morphine (MS CONTIN) 15 MG CR tablet; Take 1 tablet (15 mg) by mouth 2 times daily  - cyclobenzaprine (FLEXERIL) 10 MG tablet; TAKE 1 TABLET BY MOUTH THREE TIMES DAILY AS NEEDED FOR MUSCLE SPASMS    SI (sacroiliac) joint dysfunction  As above  - HYDROmorphone (DILAUDID) 4 MG tablet; Take 1.5 tablets (6 mg) by mouth 3 times daily  - morphine (MS CONTIN) 15 MG CR tablet; Take 1 tablet (15 mg) by mouth 2 times daily    Gastroesophageal reflux disease with esophagitis  Had not done well on this in the past so we will restart it  - omeprazole (PRILOSEC) 40 MG DR capsule; TAKE ONE CAPSULE BY MOUTH EVERY DAY 30 TO 60 MINUTES BEFORE A MEAL       See Patient Instructions    Return in about 1 week (around 9/28/2020).    Cat Shaw MD  [facility]    Phone call duration:  6 minutes

## 2020-10-14 ENCOUNTER — VIRTUAL VISIT (OUTPATIENT)
Dept: FAMILY MEDICINE | Facility: CLINIC | Age: 60
End: 2020-10-14
Payer: COMMERCIAL

## 2020-10-14 DIAGNOSIS — M51.369 DDD (DEGENERATIVE DISC DISEASE), LUMBAR: Primary | ICD-10-CM

## 2020-10-14 DIAGNOSIS — N40.1 BENIGN PROSTATIC HYPERPLASIA WITH LOWER URINARY TRACT SYMPTOMS, SYMPTOM DETAILS UNSPECIFIED: ICD-10-CM

## 2020-10-14 DIAGNOSIS — M53.3 SI (SACROILIAC) JOINT DYSFUNCTION: ICD-10-CM

## 2020-10-14 DIAGNOSIS — G89.4 CHRONIC PAIN SYNDROME: ICD-10-CM

## 2020-10-14 PROCEDURE — 99214 OFFICE O/P EST MOD 30 MIN: CPT | Mod: 95 | Performed by: FAMILY MEDICINE

## 2020-10-14 RX ORDER — GABAPENTIN 600 MG/1
600 TABLET ORAL 2 TIMES DAILY
Qty: 180 TABLET | Refills: 1 | Status: SHIPPED | OUTPATIENT
Start: 2020-10-14 | End: 2021-06-02

## 2020-10-14 RX ORDER — HYDROMORPHONE HYDROCHLORIDE 4 MG/1
6 TABLET ORAL 3 TIMES DAILY
Qty: 135 TABLET | Refills: 0 | Status: SHIPPED | OUTPATIENT
Start: 2020-10-14 | End: 2020-11-13

## 2020-10-14 RX ORDER — MORPHINE SULFATE 15 MG/1
15 TABLET, FILM COATED, EXTENDED RELEASE ORAL 2 TIMES DAILY
Qty: 60 TABLET | Refills: 0 | Status: SHIPPED | OUTPATIENT
Start: 2020-10-14 | End: 2020-11-13

## 2020-10-14 RX ORDER — TAMSULOSIN HYDROCHLORIDE 0.4 MG/1
0.4 CAPSULE ORAL AT BEDTIME
Qty: 90 CAPSULE | Refills: 1 | Status: SHIPPED | OUTPATIENT
Start: 2020-10-14 | End: 2021-03-24

## 2020-10-14 NOTE — PROGRESS NOTES
"Oleg Ford is a 60 year old male who is being evaluated via a billable telephone visit.      The patient has been notified of following:     \"This telephone visit will be conducted via a call between you and your physician/provider. We have found that certain health care needs can be provided without the need for a physical exam.  This service lets us provide the care you need with a short phone conversation.  If a prescription is necessary we can send it directly to your pharmacy.  If lab work is needed we can place an order for that and you can then stop by our lab to have the test done at a later time.    Telephone visits are billed at different rates depending on your insurance coverage. During this emergency period, for some insurers they may be billed the same as an in-person visit.  Please reach out to your insurance provider with any questions.    If during the course of the call the physician/provider feels a telephone visit is not appropriate, you will not be charged for this service.\"    Patient has given verbal consent for Telephone visit?  Yes    What phone number would you like to be contacted at? cell    How would you like to obtain your AVS? MyChart    Subjective     Oleg Ford is a 60 year old male who presents via phone visit today for the following health issues:    HPI     Phone visit with patient today in routine follow-up of chronic back pain.  Stable on his current dosage and routinely monitored through our chronic pain system.  Would also like a refill of his Flomax.  Doing well.  Reports no interval health concerns.  Patient reports no side effects from medications, and desires no change in therapy.     Review of Systems   Constitutional, HEENT, cardiovascular, pulmonary, gi and gu systems are negative, except as otherwise noted.       Objective          Vitals:  No vitals were obtained today due to virtual visit.    healthy, alert and no distress  PSYCH: Alert and oriented times " 3; coherent speech, normal   rate and volume, able to articulate logical thoughts, able   to abstract reason, no tangential thoughts, no hallucinations   or delusions  His affect is normal  RESP: No cough, no audible wheezing, able to talk in full sentences  Remainder of exam unable to be completed due to telephone visits    Past labs reviewed with the patient.         Assessment/Plan:    Assessment & Plan     DDD (degenerative disc disease), lumbar  Stable and monitored through our routine chronic pain program.  Up-to-date.  We will likely set up in person follow-up for next visit.  - gabapentin (NEURONTIN) 600 MG tablet; Take 1 tablet (600 mg) by mouth 2 times daily  - HYDROmorphone (DILAUDID) 4 MG tablet; Take 1.5 tablets (6 mg) by mouth 3 times daily  - morphine (MS CONTIN) 15 MG CR tablet; Take 1 tablet (15 mg) by mouth 2 times daily    Chronic pain syndrome  As above  - gabapentin (NEURONTIN) 600 MG tablet; Take 1 tablet (600 mg) by mouth 2 times daily  - HYDROmorphone (DILAUDID) 4 MG tablet; Take 1.5 tablets (6 mg) by mouth 3 times daily  - morphine (MS CONTIN) 15 MG CR tablet; Take 1 tablet (15 mg) by mouth 2 times daily    SI (sacroiliac) joint dysfunction  As above  - HYDROmorphone (DILAUDID) 4 MG tablet; Take 1.5 tablets (6 mg) by mouth 3 times daily  - morphine (MS CONTIN) 15 MG CR tablet; Take 1 tablet (15 mg) by mouth 2 times daily    Benign prostatic hyperplasia with lower urinary tract symptoms, symptom details unspecified  Stable on current regimen.  Continue same plan and routine follow-up.   - tamsulosin (FLOMAX) 0.4 MG capsule; Take 1 capsule (0.4 mg) by mouth At Bedtime        See Patient Instructions    Return in about 1 month (around 11/14/2020) for Routine Follow-up of chronic issues.    Cat Shaw MD  [facility]    Phone call duration:  8 minutes

## 2020-10-20 NOTE — TELEPHONE ENCOUNTER
10/20/2020 1043 CM note: NO COVID TESTING. Per QFR, YULISSA was positive for vegetation and plan is to transfer to tertiary care center. Both [facility] and [facility] are in network facilities.  Mia ALVAREZ Refilled

## 2020-10-26 ENCOUNTER — OFFICE VISIT (OUTPATIENT)
Dept: FAMILY MEDICINE | Facility: CLINIC | Age: 60
End: 2020-10-26
Payer: COMMERCIAL

## 2020-10-26 VITALS
OXYGEN SATURATION: 95 % | WEIGHT: 303 LBS | SYSTOLIC BLOOD PRESSURE: 121 MMHG | BODY MASS INDEX: 40.81 KG/M2 | TEMPERATURE: 99.3 F | DIASTOLIC BLOOD PRESSURE: 81 MMHG | HEART RATE: 91 BPM

## 2020-10-26 DIAGNOSIS — J44.9 CHRONIC OBSTRUCTIVE PULMONARY DISEASE, UNSPECIFIED COPD TYPE (H): Primary | ICD-10-CM

## 2020-10-26 DIAGNOSIS — M79.18 MYOFASCIAL PAIN DYSFUNCTION SYNDROME: ICD-10-CM

## 2020-10-26 PROCEDURE — 99214 OFFICE O/P EST MOD 30 MIN: CPT | Mod: 25 | Performed by: FAMILY MEDICINE

## 2020-10-26 PROCEDURE — 20553 NJX 1/MLT TRIGGER POINTS 3/>: CPT | Performed by: FAMILY MEDICINE

## 2020-10-26 RX ORDER — MORPHINE SULFATE 15 MG/1
15 TABLET, FILM COATED, EXTENDED RELEASE ORAL 2 TIMES DAILY
Qty: 60 TABLET | Refills: 0 | Status: CANCELLED | OUTPATIENT
Start: 2020-11-11

## 2020-10-26 RX ORDER — HYDROMORPHONE HYDROCHLORIDE 4 MG/1
6 TABLET ORAL 3 TIMES DAILY
Qty: 135 TABLET | Refills: 0 | Status: CANCELLED | OUTPATIENT
Start: 2020-11-11

## 2020-10-26 RX ORDER — TRIAMCINOLONE ACETONIDE 40 MG/ML
40 INJECTION, SUSPENSION INTRA-ARTICULAR; INTRAMUSCULAR ONCE
Status: DISCONTINUED | OUTPATIENT
Start: 2020-10-26 | End: 2021-08-06

## 2020-10-26 NOTE — PROGRESS NOTES
Subjective     Oleg Ford is a 60 year old male who presents to clinic today for the following health issues:    HPI         Chronic/Recurring Back Pain Follow Up      Where is your back pain located? (Select all that apply) low back bilateral    How would you describe your back pain?  Burning and stabbing    Where does your back pain spread? the right  Thigh/leg    Since your last clinic visit for back pain, how has your pain changed? gradually worsening n constant     Does your back pain interfere with your job? YES    Since your last visit, have you tried any new treatment? No      How many servings of fruits and vegetables do you eat daily?  0-1    On average, how many sweetened beverages do you drink each day (Examples: soda, juice, sweet tea, etc.  Do NOT count diet or artificially sweetened beverages)?   6    How many days per week do you exercise enough to make your heart beat faster? 3 or less    How many minutes a day do you exercise enough to make your heart beat faster? 9 or less    How many days per week do you miss taking your medication? 0    SUBJECTIVE:  Here today for some trigger point injections in his low back.  Patient well-known to me with significant myofascial pain and has done very well with trigger point injections periodically.  He also has a diagnosis of COPD and was previously seen through [facility] but has not been back there in some time.  Has run out of his Trelegy which is really kept his breathing doing okay.  Worsening now that it is cold.    Review of systems otherwise negative.  Past medical, family, and social history reviewed and updated in chart.    OBJECTIVE:  /81   Pulse 91   Temp 99.3  F (37.4  C) (Tympanic)   Wt 137.4 kg (303 lb)   SpO2 95%   BMI 40.81 kg/m    Alert, pleasant, upbeat, and in no apparent discomfort.  S1 and S2 normal, no murmurs, clicks, gallops or rubs. Regular rate and rhythm. Chest is clear; no wheezes or rales. No edema or  JVD.  Low back - 3 tender trigger points in the paraspinous region  Past labs reviewed with the patient.     x3 --- The risks and benefits, as well as expected effect, of trigger point injection was discussed with patient and he agrees to proceed.  After identifying the area of maximal point tenderness the area was prepped clean with alcohol.  The trigger point was injected with 3 cc of a 10:1 mixture of lidocaine 1% and kenalog 40.  Pain experienced initially, followed by pain relief.  Bandaid.  No complications.    ASSESSMENT / PLAN:  (J44.9) Chronic obstructive pulmonary disease, unspecified COPD type (H)  (primary encounter diagnosis)  Comment: Stable on current regimen.  Continue same plan and routine follow-up.   Plan: Fluticasone-Umeclidin-Vilanterol (TRELEGY         ELLIPTA) 100-62.5-25 MCG/INH oral inhaler            (M79.18) Myofascial pain dysfunction syndrome  Comment: Status post trigger point injection x3.  Aftercare discussed  Plan: triamcinolone (KENALOG-40) injection 40 mg,         TRIAMCINOLONE ACET INJ NOS, INJECTION SNGL/MULT        TRIGGER POINT, >3 MUSCLES            Follow up 3 months  SANTIAGO Shaw MD    (Chart documentation completed in part with Dragon voice-recognition software.  Even though reviewed some grammatical, spelling, and word errors may remain.)

## 2020-10-29 ENCOUNTER — VIRTUAL VISIT (OUTPATIENT)
Dept: FAMILY MEDICINE | Facility: CLINIC | Age: 60
End: 2020-10-29
Payer: COMMERCIAL

## 2020-10-29 DIAGNOSIS — N39.44 BED WETTING: Primary | ICD-10-CM

## 2020-10-29 PROCEDURE — 99213 OFFICE O/P EST LOW 20 MIN: CPT | Mod: 95 | Performed by: FAMILY MEDICINE

## 2020-10-29 NOTE — PROGRESS NOTES
"Oleg Ford is a 60 year old male who is being evaluated via a billable telephone visit.      The patient has been notified of following:     \"This telephone visit will be conducted via a call between you and your physician/provider. We have found that certain health care needs can be provided without the need for a physical exam.  This service lets us provide the care you need with a short phone conversation.  If a prescription is necessary we can send it directly to your pharmacy.  If lab work is needed we can place an order for that and you can then stop by our lab to have the test done at a later time.    Telephone visits are billed at different rates depending on your insurance coverage. During this emergency period, for some insurers they may be billed the same as an in-person visit.  Please reach out to your insurance provider with any questions.    If during the course of the call the physician/provider feels a telephone visit is not appropriate, you will not be charged for this service.\"    Patient has given verbal consent for Telephone visit?  Yes    What phone number would you like to be contacted at? mobile    How would you like to obtain your AVS? Mail a copy    Subjective     Oleg Ford is a 60 year old male who presents via phone visit today for the following health issues:    HPI     Patient stated he has been bed wetting for the last 3 days at night when he is sound asleep. No problems during the daytime. Urinary stream strong. No recent back injuries. No constipation. No bowel incontinence. No recent changes to oral medications. Patient has been drinking coffee. No dysuria. No urethral discharges.    Review of Systems   Constitutional, HEENT, cardiovascular, pulmonary, gi and gu systems are negative, except as otherwise noted.       Objective          Vitals:  No vitals were obtained today due to virtual visit.    healthy, alert and no distress  PSYCH: Alert and oriented times 3; coherent " speech, normal   rate and volume, able to articulate logical thoughts, able   to abstract reason, no tangential thoughts, no hallucinations   or delusions  His affect is normal  RESP: No cough, no audible wheezing, able to talk in full sentences  Remainder of exam unable to be completed due to telephone visits          Assessment/Plan:    Assessment & Plan     Bed wetting  Unclear etiology. Sounds like during REM sleep? Doubt cauda equina syndrome since okay during the day. No s/s infectious process.  Discussed limiting caffeine and fluids at night. Urinate before bedtime. If continues, patient will see Urology. Patient is currently in the [facility] and will seek visit there.          See Patient Instructions        Navarro Lara MD, MD  [facility]    Phone call duration:  11 minutes

## 2020-11-04 ENCOUNTER — TRANSFERRED RECORDS (OUTPATIENT)
Dept: HEALTH INFORMATION MANAGEMENT | Facility: CLINIC | Age: 60
End: 2020-11-04

## 2020-11-13 ENCOUNTER — VIRTUAL VISIT (OUTPATIENT)
Dept: FAMILY MEDICINE | Facility: CLINIC | Age: 60
End: 2020-11-13
Payer: COMMERCIAL

## 2020-11-13 DIAGNOSIS — M53.3 SI (SACROILIAC) JOINT DYSFUNCTION: ICD-10-CM

## 2020-11-13 DIAGNOSIS — G89.4 CHRONIC PAIN SYNDROME: Primary | ICD-10-CM

## 2020-11-13 DIAGNOSIS — G47.00 INSOMNIA, UNSPECIFIED TYPE: ICD-10-CM

## 2020-11-13 DIAGNOSIS — M51.369 DDD (DEGENERATIVE DISC DISEASE), LUMBAR: ICD-10-CM

## 2020-11-13 PROCEDURE — 99213 OFFICE O/P EST LOW 20 MIN: CPT | Mod: 95 | Performed by: FAMILY MEDICINE

## 2020-11-13 RX ORDER — MORPHINE SULFATE 15 MG/1
15 TABLET, FILM COATED, EXTENDED RELEASE ORAL 2 TIMES DAILY
Qty: 60 TABLET | Refills: 0 | Status: SHIPPED | OUTPATIENT
Start: 2020-11-13 | End: 2020-12-14

## 2020-11-13 RX ORDER — HYDROMORPHONE HYDROCHLORIDE 4 MG/1
6 TABLET ORAL 3 TIMES DAILY
Qty: 135 TABLET | Refills: 0 | Status: SHIPPED | OUTPATIENT
Start: 2020-11-13 | End: 2020-12-14

## 2020-11-13 RX ORDER — ZOLPIDEM TARTRATE 10 MG/1
10 TABLET ORAL
Qty: 30 TABLET | Refills: 2 | Status: SHIPPED | OUTPATIENT
Start: 2020-11-13 | End: 2021-01-11

## 2020-11-13 NOTE — PROGRESS NOTES
".Oleg Ford is a 60 year old male who is being evaluated via a billable telephone visit.      The patient has been notified of following:     \"This telephone visit will be conducted via a call between you and your physician/provider. We have found that certain health care needs can be provided without the need for a physical exam.  This service lets us provide the care you need with a short phone conversation.  If a prescription is necessary we can send it directly to your pharmacy.  If lab work is needed we can place an order for that and you can then stop by our lab to have the test done at a later time.    Telephone visits are billed at different rates depending on your insurance coverage. During this emergency period, for some insurers they may be billed the same as an in-person visit.  Please reach out to your insurance provider with any questions.    If during the course of the call the physician/provider feels a telephone visit is not appropriate, you will not be charged for this service.\"    Patient has given verbal consent for Telephone visit?  Yes    What phone number would you like to be contacted at? cell    How would you like to obtain your AVS? MyChart    Subjective     Oleg Ford is a 60 year old male who presents via phone visit today for the following health issues:    HPI     Telephone visit with patient today in follow-up of chronic back pain and insomnia.  Spending time with his mother up in [place] generally doing okay.  Doing well.  Reports no interval health concerns.    Patient reports no side effects from medications, and desires no change in therapy.     Review of Systems   Constitutional, HEENT, cardiovascular, pulmonary, gi and gu systems are negative, except as otherwise noted.       Objective          Vitals:  No vitals were obtained today due to virtual visit.    healthy, alert and no distress  PSYCH: Alert and oriented times 3; coherent speech, normal   rate and volume, " able to articulate logical thoughts, able   to abstract reason, no tangential thoughts, no hallucinations   or delusions  His affect is normal  RESP: No cough, no audible wheezing, able to talk in full sentences  Remainder of exam unable to be completed due to telephone visits    Past labs reviewed with the patient.         Assessment/Plan:    Assessment & Plan     Chronic pain syndrome  On a stable and monitored program.  Up-to-date on controlled substance agreement and  database monitoring.  We will continue follow-ups every 1 to 2 months  - HYDROmorphone (DILAUDID) 4 MG tablet; Take 1.5 tablets (6 mg) by mouth 3 times daily  - morphine (MS CONTIN) 15 MG CR tablet; Take 1 tablet (15 mg) by mouth 2 times daily    DDD (degenerative disc disease), lumbar  As above  - HYDROmorphone (DILAUDID) 4 MG tablet; Take 1.5 tablets (6 mg) by mouth 3 times daily  - morphine (MS CONTIN) 15 MG CR tablet; Take 1 tablet (15 mg) by mouth 2 times daily    SI (sacroiliac) joint dysfunction  As above  - HYDROmorphone (DILAUDID) 4 MG tablet; Take 1.5 tablets (6 mg) by mouth 3 times daily  - morphine (MS CONTIN) 15 MG CR tablet; Take 1 tablet (15 mg) by mouth 2 times daily    Insomnia, unspecified type  Stable on current regimen.  Continue same plan and routine follow-up.   - zolpidem (AMBIEN) 10 MG tablet; Take 1 tablet (10 mg) by mouth nightly as needed for sleep        See Patient Instructions    Return in about 1 month (around 12/13/2020) for Follow up of Chronic Issues, phone visit.    Cat Shaw MD  [facility]    Phone call duration:  8 minutes

## 2020-12-14 ENCOUNTER — VIRTUAL VISIT (OUTPATIENT)
Dept: FAMILY MEDICINE | Facility: CLINIC | Age: 60
End: 2020-12-14
Payer: COMMERCIAL

## 2020-12-14 DIAGNOSIS — G89.4 CHRONIC PAIN SYNDROME: Primary | ICD-10-CM

## 2020-12-14 DIAGNOSIS — M51.369 DDD (DEGENERATIVE DISC DISEASE), LUMBAR: ICD-10-CM

## 2020-12-14 DIAGNOSIS — J44.9 CHRONIC OBSTRUCTIVE PULMONARY DISEASE, UNSPECIFIED COPD TYPE (H): ICD-10-CM

## 2020-12-14 DIAGNOSIS — M53.3 SI (SACROILIAC) JOINT DYSFUNCTION: ICD-10-CM

## 2020-12-14 PROCEDURE — 99214 OFFICE O/P EST MOD 30 MIN: CPT | Mod: 95 | Performed by: FAMILY MEDICINE

## 2020-12-14 RX ORDER — ALBUTEROL SULFATE 90 UG/1
2 AEROSOL, METERED RESPIRATORY (INHALATION) EVERY 6 HOURS
Qty: 1 INHALER | Refills: 0 | Status: SHIPPED | OUTPATIENT
Start: 2020-12-14 | End: 2021-05-05

## 2020-12-14 RX ORDER — MORPHINE SULFATE 15 MG/1
15 TABLET, FILM COATED, EXTENDED RELEASE ORAL 2 TIMES DAILY
Qty: 60 TABLET | Refills: 0 | Status: SHIPPED | OUTPATIENT
Start: 2020-12-14 | End: 2021-01-11

## 2020-12-14 RX ORDER — ALBUTEROL SULFATE 0.83 MG/ML
2.5 SOLUTION RESPIRATORY (INHALATION) EVERY 4 HOURS PRN
Qty: 1 BOX | Refills: 5 | Status: SHIPPED | OUTPATIENT
Start: 2020-12-14 | End: 2021-01-11

## 2020-12-14 RX ORDER — HYDROMORPHONE HYDROCHLORIDE 4 MG/1
6 TABLET ORAL 3 TIMES DAILY
Qty: 135 TABLET | Refills: 0 | Status: SHIPPED | OUTPATIENT
Start: 2020-12-14 | End: 2021-01-11

## 2020-12-14 NOTE — PROGRESS NOTES
"Oleg Ford is a 60 year old male who is being evaluated via a billable telephone visit.      The patient has been notified of following:     \"This telephone visit will be conducted via a call between you and your physician/provider. We have found that certain health care needs can be provided without the need for a physical exam.  This service lets us provide the care you need with a short phone conversation.  If a prescription is necessary we can send it directly to your pharmacy.  If lab work is needed we can place an order for that and you can then stop by our lab to have the test done at a later time.    Telephone visits are billed at different rates depending on your insurance coverage. During this emergency period, for some insurers they may be billed the same as an in-person visit.  Please reach out to your insurance provider with any questions.    If during the course of the call the physician/provider feels a telephone visit is not appropriate, you will not be charged for this service.\"    Patient has given verbal consent for Telephone visit?  Yes    What phone number would you like to be contacted at?  Cell    How would you like to obtain your AVS? Edwardhart    Subjective     Oleg Ford is a 60 year old male who presents via phone visit today for the following health issues:    HPI     Spoke with patient via phone today for routine follow-up of chronic back pain very well-known to me and up-to-date on routine monitoring.  No concerns about misuse or diversion.  Still struggling with quite a bit of depression.  Approximately 1 year anniversary of his son's death.  Has a visit with his therapist today.  Still up in [place] with his mother but will be coming back in a couple of weeks.  Has been having more issues with shortness of breath and was seen through an ER up there.  Tested negative for Covid.  He is on Trelegy for his COPD, but still notes dyspnea on exertion.    Review of Systems "   Constitutional, HEENT, cardiovascular, pulmonary, gi and gu systems are negative, except as otherwise noted.       Objective          Vitals:  No vitals were obtained today due to virtual visit.    healthy, alert and no distress  PSYCH: Alert and oriented times 3; coherent speech, normal   rate and volume, able to articulate logical thoughts, able   to abstract reason, no tangential thoughts, no hallucinations   or delusions  His affect is normal  RESP: No cough, no audible wheezing, able to talk in full sentences  Remainder of exam unable to be completed due to telephone visits    Past labs reviewed with the patient.         Assessment/Plan:    Assessment & Plan     Chronic pain syndrome  Longstanding stable issue.  Monitored.  We will continue with current plan and continue routine monitoring  - HYDROmorphone (DILAUDID) 4 MG tablet; Take 1.5 tablets (6 mg) by mouth 3 times daily  - morphine (MS CONTIN) 15 MG CR tablet; Take 1 tablet (15 mg) by mouth 2 times daily    DDD (degenerative disc disease), lumbar  As above  - HYDROmorphone (DILAUDID) 4 MG tablet; Take 1.5 tablets (6 mg) by mouth 3 times daily  - morphine (MS CONTIN) 15 MG CR tablet; Take 1 tablet (15 mg) by mouth 2 times daily    SI (sacroiliac) joint dysfunction  As above  - HYDROmorphone (DILAUDID) 4 MG tablet; Take 1.5 tablets (6 mg) by mouth 3 times daily  - morphine (MS CONTIN) 15 MG CR tablet; Take 1 tablet (15 mg) by mouth 2 times daily    Chronic obstructive pulmonary disease, unspecified COPD type (H)  I will get him an inhaler to have while on the road and refill his nebulizer prescription for at home  - albuterol (PROAIR HFA/PROVENTIL HFA/VENTOLIN HFA) 108 (90 Base) MCG/ACT inhaler; Inhale 2 puffs into the lungs every 6 hours        See Patient Instructions    Return in about 1 month (around 1/14/2021) for Follow up on Pain, in office.    Cat Shaw MD  [facility]    Phone call duration:  8  minutes

## 2020-12-17 ENCOUNTER — TELEPHONE (OUTPATIENT)
Dept: FAMILY MEDICINE | Facility: CLINIC | Age: 60
End: 2020-12-17

## 2020-12-17 NOTE — TELEPHONE ENCOUNTER
He is on a lot of medications that can cause dry mouth.  Is there one in specific he is wondering about, or just wondering what to do?      If that is the case, it is tough.  Certainly sour candies, gum, etc. Can help.  Or we could consider a mouthwash that might help.

## 2020-12-17 NOTE — TELEPHONE ENCOUNTER
Reason for Call:  Other appointment    Detailed comments:Jhonathan is out of state in [place].  call [phone] dry mouth from medication  med check    Phone Number Patient can be reached at: Home number on file [phone] (home)    Best Time: any    Can we leave a detailed message on this number? YES    Call taken on 12/17/2020 at 10:34 AM by Teri Saenz

## 2020-12-18 NOTE — TELEPHONE ENCOUNTER
This writer attempted to contact patient on 12/18/20      Reason for call provider's message and left message.      If patient calls back:   Registered Nurse called. Follow Triage Call workflow        Kiera Hebert RN

## 2020-12-21 NOTE — TELEPHONE ENCOUNTER
Patient contacted and informed of the below per provider documentation. Patient verbalizes understanding. He will try sour candies and gum for now. He will call with any further questions.     He would also like provider to know he is hospitalized right now in [place], [place]. They found a spot on his right lung and he is waiting to have a CT scan to find out what it is. Patient plans to return to [place] beginning of January, if possible.     FYI sent to provider per patient request.     Shu Jaimes RN  [facility]

## 2020-12-29 ENCOUNTER — TRANSFERRED RECORDS (OUTPATIENT)
Dept: HEALTH INFORMATION MANAGEMENT | Facility: CLINIC | Age: 60
End: 2020-12-29

## 2021-01-11 ENCOUNTER — OFFICE VISIT (OUTPATIENT)
Dept: FAMILY MEDICINE | Facility: CLINIC | Age: 61
End: 2021-01-11
Payer: COMMERCIAL

## 2021-01-11 VITALS
WEIGHT: 315 LBS | HEIGHT: 72 IN | RESPIRATION RATE: 20 BRPM | TEMPERATURE: 98.4 F | OXYGEN SATURATION: 97 % | DIASTOLIC BLOOD PRESSURE: 88 MMHG | HEART RATE: 75 BPM | BODY MASS INDEX: 42.66 KG/M2 | SYSTOLIC BLOOD PRESSURE: 128 MMHG

## 2021-01-11 DIAGNOSIS — J44.9 CHRONIC OBSTRUCTIVE PULMONARY DISEASE, UNSPECIFIED COPD TYPE (H): ICD-10-CM

## 2021-01-11 DIAGNOSIS — R11.0 NAUSEA: ICD-10-CM

## 2021-01-11 DIAGNOSIS — J18.9 PNEUMONIA OF RIGHT LOWER LOBE DUE TO INFECTIOUS ORGANISM: Primary | ICD-10-CM

## 2021-01-11 DIAGNOSIS — G47.00 INSOMNIA, UNSPECIFIED TYPE: ICD-10-CM

## 2021-01-11 DIAGNOSIS — M53.3 SI (SACROILIAC) JOINT DYSFUNCTION: ICD-10-CM

## 2021-01-11 DIAGNOSIS — G89.4 CHRONIC PAIN SYNDROME: ICD-10-CM

## 2021-01-11 DIAGNOSIS — M51.369 DDD (DEGENERATIVE DISC DISEASE), LUMBAR: ICD-10-CM

## 2021-01-11 DIAGNOSIS — M79.18 MYOFASCIAL PAIN DYSFUNCTION SYNDROME: ICD-10-CM

## 2021-01-11 PROCEDURE — 20552 NJX 1/MLT TRIGGER POINT 1/2: CPT | Performed by: FAMILY MEDICINE

## 2021-01-11 PROCEDURE — 99214 OFFICE O/P EST MOD 30 MIN: CPT | Mod: 25 | Performed by: FAMILY MEDICINE

## 2021-01-11 RX ORDER — ZOLPIDEM TARTRATE 10 MG/1
10 TABLET ORAL
Qty: 30 TABLET | Refills: 2 | Status: SHIPPED | OUTPATIENT
Start: 2021-01-11 | End: 2021-04-23

## 2021-01-11 RX ORDER — HYDROMORPHONE HYDROCHLORIDE 4 MG/1
6 TABLET ORAL 3 TIMES DAILY
Qty: 135 TABLET | Refills: 0 | Status: SHIPPED | OUTPATIENT
Start: 2021-01-11 | End: 2021-02-10

## 2021-01-11 RX ORDER — IPRATROPIUM BROMIDE AND ALBUTEROL SULFATE 2.5; .5 MG/3ML; MG/3ML
1 SOLUTION RESPIRATORY (INHALATION) EVERY 6 HOURS PRN
Qty: 50 VIAL | Refills: 5 | Status: SHIPPED | OUTPATIENT
Start: 2021-01-11

## 2021-01-11 RX ORDER — TRIAMCINOLONE ACETONIDE 40 MG/ML
40 INJECTION, SUSPENSION INTRA-ARTICULAR; INTRAMUSCULAR ONCE
Qty: 1 ML | Refills: 0
Start: 2021-01-11 | End: 2021-01-11

## 2021-01-11 RX ORDER — MORPHINE SULFATE 15 MG/1
15 TABLET, FILM COATED, EXTENDED RELEASE ORAL 2 TIMES DAILY
Qty: 60 TABLET | Refills: 0 | Status: SHIPPED | OUTPATIENT
Start: 2021-01-11 | End: 2021-02-10

## 2021-01-11 RX ORDER — ONDANSETRON 4 MG/1
4 TABLET, FILM COATED ORAL EVERY 8 HOURS PRN
Qty: 30 TABLET | Refills: 1 | Status: SHIPPED | OUTPATIENT
Start: 2021-01-11 | End: 2021-09-10

## 2021-01-11 ASSESSMENT — ANXIETY QUESTIONNAIRES
GAD7 TOTAL SCORE: 18
IF YOU CHECKED OFF ANY PROBLEMS ON THIS QUESTIONNAIRE, HOW DIFFICULT HAVE THESE PROBLEMS MADE IT FOR YOU TO DO YOUR WORK, TAKE CARE OF THINGS AT HOME, OR GET ALONG WITH OTHER PEOPLE: EXTREMELY DIFFICULT
7. FEELING AFRAID AS IF SOMETHING AWFUL MIGHT HAPPEN: NEARLY EVERY DAY
5. BEING SO RESTLESS THAT IT IS HARD TO SIT STILL: MORE THAN HALF THE DAYS
2. NOT BEING ABLE TO STOP OR CONTROL WORRYING: NEARLY EVERY DAY
6. BECOMING EASILY ANNOYED OR IRRITABLE: MORE THAN HALF THE DAYS
3. WORRYING TOO MUCH ABOUT DIFFERENT THINGS: NEARLY EVERY DAY
1. FEELING NERVOUS, ANXIOUS, OR ON EDGE: NEARLY EVERY DAY

## 2021-01-11 ASSESSMENT — PATIENT HEALTH QUESTIONNAIRE - PHQ9
SUM OF ALL RESPONSES TO PHQ QUESTIONS 1-9: 22
5. POOR APPETITE OR OVEREATING: MORE THAN HALF THE DAYS

## 2021-01-11 ASSESSMENT — PAIN SCALES - GENERAL: PAINLEVEL: SEVERE PAIN (7)

## 2021-01-11 ASSESSMENT — MIFFLIN-ST. JEOR: SCORE: 2299.87

## 2021-01-11 NOTE — PROGRESS NOTES
Assessment & Plan     Pneumonia of right lower lobe due to infectious organism  Clinically recovered nicely from his right-sided pneumonia.  But CT scan did show some bulky lymphadenopathy in the perihilar region.  Recommendation is follow-up CT scan in a couple of months.    Chronic obstructive pulmonary disease, unspecified COPD type (H)  Patient currently off Trelegy and I think it is okay to stay off this for now knowing that we can add it back.  Wants to stick with just as needed therapy and I think duo nebs would be a good solution for him.  - ipratropium - albuterol 0.5 mg/2.5 mg/3 mL (DUONEB) 0.5-2.5 (3) MG/3ML neb solution; Take 1 vial (3 mLs) by nebulization every 6 hours as needed for shortness of breath / dyspnea or wheezing    Chronic pain syndrome  Stable on current regimen.  Continue same plan and routine follow-up.   - HYDROmorphone (DILAUDID) 4 MG tablet; Take 1.5 tablets (6 mg) by mouth 3 times daily  - morphine (MS CONTIN) 15 MG CR tablet; Take 1 tablet (15 mg) by mouth 2 times daily    DDD (degenerative disc disease), lumbar  As above  - HYDROmorphone (DILAUDID) 4 MG tablet; Take 1.5 tablets (6 mg) by mouth 3 times daily  - morphine (MS CONTIN) 15 MG CR tablet; Take 1 tablet (15 mg) by mouth 2 times daily    SI (sacroiliac) joint dysfunction  As above  - HYDROmorphone (DILAUDID) 4 MG tablet; Take 1.5 tablets (6 mg) by mouth 3 times daily  - morphine (MS CONTIN) 15 MG CR tablet; Take 1 tablet (15 mg) by mouth 2 times daily    Myofascial pain dysfunction syndrome  Status post trigger point injection x2.  Aftercare discussed  - INJECTION SNGL/MULT TRIGGER POINT, 1 OR 2 MUSCLES  - triamcinolone (KENALOG) 40 MG/ML injection; Inject 1 mL (40 mg) into the muscle once for 1 dose  - TRIAMCINOLONE ACET INJ NOS    Insomnia, unspecified type  Stable on current regimen.  Continue same plan and routine follow-up.   - zolpidem (AMBIEN) 10 MG tablet; Take 1 tablet (10 mg) by mouth nightly as needed for  sleep    Nausea    - ondansetron (ZOFRAN) 4 MG tablet; Take 1 tablet (4 mg) by mouth every 8 hours as needed for nausea       See Patient Instructions    Return in about 1 month (around 2/11/2021) for Follow up on Pain, In Office or Video.    Cat Shaw MD  [facility] STEPHEN Ring is a 60 year old who presents to clinic today for the following health issues  accompanied by his Self:    [facility]         Hospital Follow-up Visit:    Hospital/Nursing Home/ Rehab Facility: [facility]  Date of Admission: 12/20/2020  Date of Discharge: 1/25/2020  Reason(s) for Admission: Pneumona      Was your hospitalization related to COVID-19? No   Problems taking medications regularly:  None  Medication changes since discharge: updated  Problems adhering to non-medication therapy:  None    Summary of hospitalization:  CareEverywhere information obtained and reviewed  Diagnostic Tests/Treatments reviewed.  Follow up needed: none  Other Healthcare Providers Involved in Patient s Care:         None  Update since discharge: improved.       Post Discharge Medication Reconciliation: discharge medications reconciled, continue medications without change.  Plan of care communicated with patient              Met with patient today in routine follow-up of his chronic back pain but also follow-up of his recent hospitalization for right-sided pneumonia.  Full records reviewed with patient and through care everywhere.  Doing much better.  We reviewed CT findings.  Would like a injection in his back for his painful trigger points.  Currently not using Trelegy -had nebs in the hospital and really thought these worked well.    Review of Systems   Constitutional, HEENT, cardiovascular, pulmonary, gi and gu systems are negative, except as otherwise noted.      Objective    There were no vitals taken for this visit.  There is no height or weight on file to calculate BMI.  Physical Exam   GENERAL:  healthy, alert and no distress  RESP: lungs clear to auscultation - no rales, rhonchi or wheezes  CV: regular rate and rhythm, normal S1 S2, no S3 or S4, no murmur, click or rub, no peripheral edema and peripheral pulses strong  MS: Very tender in the lower paraspinous musculature in the lumbar region.    x2 --- The risks and benefits, as well as expected effect, of trigger point injection was discussed with patient and he agrees to proceed.  After identifying the area of maximal point tenderness the area was prepped clean with alcohol.  The trigger point was injected with 5 cc of a 9:1 mixture of lidocaine 1% and kenalog 40.  Pain experienced initially, followed by pain relief.  Bandaid.  No complications.    Reviewed outside imaging and labs

## 2021-01-12 ASSESSMENT — ANXIETY QUESTIONNAIRES: GAD7 TOTAL SCORE: 18

## 2021-02-10 ENCOUNTER — VIRTUAL VISIT (OUTPATIENT)
Dept: FAMILY MEDICINE | Facility: CLINIC | Age: 61
End: 2021-02-10
Payer: COMMERCIAL

## 2021-02-10 DIAGNOSIS — M51.369 DDD (DEGENERATIVE DISC DISEASE), LUMBAR: Primary | ICD-10-CM

## 2021-02-10 DIAGNOSIS — F33.2 MAJOR DEPRESSIVE DISORDER, RECURRENT, SEVERE WITHOUT PSYCHOTIC FEATURES (H): ICD-10-CM

## 2021-02-10 DIAGNOSIS — G89.4 CHRONIC PAIN SYNDROME: ICD-10-CM

## 2021-02-10 DIAGNOSIS — M53.3 SI (SACROILIAC) JOINT DYSFUNCTION: ICD-10-CM

## 2021-02-10 PROCEDURE — 99213 OFFICE O/P EST LOW 20 MIN: CPT | Mod: 95 | Performed by: FAMILY MEDICINE

## 2021-02-10 RX ORDER — HYDROMORPHONE HYDROCHLORIDE 4 MG/1
6 TABLET ORAL 3 TIMES DAILY
Qty: 135 TABLET | Refills: 0 | Status: SHIPPED | OUTPATIENT
Start: 2021-02-10 | End: 2021-03-16

## 2021-02-10 RX ORDER — CYCLOBENZAPRINE HCL 10 MG
TABLET ORAL
Qty: 90 TABLET | Refills: 1 | Status: SHIPPED | OUTPATIENT
Start: 2021-02-10 | End: 2021-05-05

## 2021-02-10 RX ORDER — MORPHINE SULFATE 15 MG/1
15 TABLET, FILM COATED, EXTENDED RELEASE ORAL 2 TIMES DAILY
Qty: 60 TABLET | Refills: 0 | Status: SHIPPED | OUTPATIENT
Start: 2021-02-10 | End: 2021-03-16

## 2021-02-10 NOTE — PROGRESS NOTES
Jhonathan is a 60 year old who is being evaluated via a billable telephone visit.      What phone number would you like to be contacted at? [phone]  How would you like to obtain your AVS? [facility]    Assessment & Plan     DDD (degenerative disc disease), lumbar  Stable on current regimen.  Continue same plan and routine follow-up.   - cyclobenzaprine (FLEXERIL) 10 MG tablet; TAKE 1 TABLET BY MOUTH THREE TIMES DAILY AS NEEDED FOR MUSCLE SPASMS  - HYDROmorphone (DILAUDID) 4 MG tablet; Take 1.5 tablets (6 mg) by mouth 3 times daily  - morphine (MS CONTIN) 15 MG CR tablet; Take 1 tablet (15 mg) by mouth 2 times daily    Chronic pain syndrome  Stable on current regimen.  Continue same plan and routine follow-up.   - HYDROmorphone (DILAUDID) 4 MG tablet; Take 1.5 tablets (6 mg) by mouth 3 times daily  - morphine (MS CONTIN) 15 MG CR tablet; Take 1 tablet (15 mg) by mouth 2 times daily    SI (sacroiliac) joint dysfunction  Stable on current regimen.  Continue same plan and routine follow-up.   - HYDROmorphone (DILAUDID) 4 MG tablet; Take 1.5 tablets (6 mg) by mouth 3 times daily  - morphine (MS CONTIN) 15 MG CR tablet; Take 1 tablet (15 mg) by mouth 2 times daily    Major depressive disorder, recurrent, severe without psychotic features (H)  Stable on current regimen.  Continue same plan and routine follow-up.        See Patient Instructions    Return in about 3 months (around 5/10/2021) for Follow up on Pain, In Office or Video, can call for refills.    Cat Shaw MD  [facility]       Chronic/Recurring Back Pain Follow Up      Where is your back pain located? (Select all that apply) low back bilateral    How would you describe your back pain?  burning and stabbing    Where does your back pain spread? the right buttock/leg    Since your last clinic visit for back pain, how has your pain changed? gradually worsening    Does your back pain interfere with your job? Not  applicable    Since your last visit, have you tried any new treatment? No      Spoke with patient today in follow-up of chronic back pain and depression.  Mother has been ill for quite some time and she passed away last week.  Patient was able to be with her and hospice was involved from home.  So overall he felt it was a pretty good situation.    Review of Systems   Constitutional, HEENT, cardiovascular, pulmonary, gi and gu systems are negative, except as otherwise noted.      Objective           Vitals:  No vitals were obtained today due to virtual visit.    Physical Exam   healthy, alert and no distress  PSYCH: Alert and oriented times 3; coherent speech, normal   rate and volume, able to articulate logical thoughts, able   to abstract reason, no tangential thoughts, no hallucinations   or delusions  His affect is normal  RESP: No cough, no audible wheezing, able to talk in full sentences  Remainder of exam unable to be completed due to telephone visits    Past labs reviewed with the patient.             Phone call duration: 8 minutes

## 2021-02-11 ENCOUNTER — TRANSFERRED RECORDS (OUTPATIENT)
Dept: HEALTH INFORMATION MANAGEMENT | Facility: CLINIC | Age: 61
End: 2021-02-11

## 2021-02-24 ENCOUNTER — TRANSFERRED RECORDS (OUTPATIENT)
Dept: HEALTH INFORMATION MANAGEMENT | Facility: CLINIC | Age: 61
End: 2021-02-24

## 2021-03-16 ENCOUNTER — OFFICE VISIT (OUTPATIENT)
Dept: FAMILY MEDICINE | Facility: CLINIC | Age: 61
End: 2021-03-16
Payer: COMMERCIAL

## 2021-03-16 ENCOUNTER — TRANSFERRED RECORDS (OUTPATIENT)
Dept: HEALTH INFORMATION MANAGEMENT | Facility: CLINIC | Age: 61
End: 2021-03-16

## 2021-03-16 VITALS
DIASTOLIC BLOOD PRESSURE: 82 MMHG | WEIGHT: 311.2 LBS | BODY MASS INDEX: 41.91 KG/M2 | RESPIRATION RATE: 16 BRPM | HEART RATE: 80 BPM | SYSTOLIC BLOOD PRESSURE: 132 MMHG | OXYGEN SATURATION: 95 %

## 2021-03-16 DIAGNOSIS — G89.4 CHRONIC PAIN SYNDROME: Primary | ICD-10-CM

## 2021-03-16 DIAGNOSIS — J44.9 CHRONIC OBSTRUCTIVE PULMONARY DISEASE, UNSPECIFIED COPD TYPE (H): ICD-10-CM

## 2021-03-16 DIAGNOSIS — M51.369 DDD (DEGENERATIVE DISC DISEASE), LUMBAR: ICD-10-CM

## 2021-03-16 DIAGNOSIS — M53.3 SI (SACROILIAC) JOINT DYSFUNCTION: ICD-10-CM

## 2021-03-16 DIAGNOSIS — F33.2 MAJOR DEPRESSIVE DISORDER, RECURRENT, SEVERE WITHOUT PSYCHOTIC FEATURES (H): ICD-10-CM

## 2021-03-16 PROCEDURE — 99214 OFFICE O/P EST MOD 30 MIN: CPT | Performed by: FAMILY MEDICINE

## 2021-03-16 RX ORDER — OXYCODONE HCL 20 MG/1
20 TABLET, FILM COATED, EXTENDED RELEASE ORAL EVERY 12 HOURS
Qty: 60 TABLET | Refills: 0 | Status: SHIPPED | OUTPATIENT
Start: 2021-03-16 | End: 2021-03-17

## 2021-03-16 RX ORDER — OXYCODONE HYDROCHLORIDE 10 MG/1
10 TABLET ORAL 4 TIMES DAILY
Qty: 120 TABLET | Refills: 0 | Status: SHIPPED | OUTPATIENT
Start: 2021-03-16 | End: 2021-04-13

## 2021-03-16 NOTE — PROGRESS NOTES
Assessment & Plan     Chronic pain syndrome  Significantly worsening pain and he is under evaluation for his mid back.  MRI next week.  Might need a procedure.  In any case the regimen we have been using for quite some time does not seem to be controlling his pain.  I am not concerned about misuse or diversion and though we are still above the typical 90 MME cut off I feel comfortable in adjusting the prescription to a slightly stronger dosage.  If we unify this to oxycodone both short and long acting we then have an easier mechanism for adjustments once things have settled down.  - oxyCODONE IR (ROXICODONE) 10 MG tablet; Take 1 tablet (10 mg) by mouth 4 times daily  - oxyCODONE (OXYCONTIN) 20 MG 12 hr tablet; Take 1 tablet (20 mg) by mouth every 12 hours maximum 2 tablet(s) per day    DDD (degenerative disc disease), lumbar  As above  - oxyCODONE IR (ROXICODONE) 10 MG tablet; Take 1 tablet (10 mg) by mouth 4 times daily  - oxyCODONE (OXYCONTIN) 20 MG 12 hr tablet; Take 1 tablet (20 mg) by mouth every 12 hours maximum 2 tablet(s) per day    SI (sacroiliac) joint dysfunction  As above  - oxyCODONE IR (ROXICODONE) 10 MG tablet; Take 1 tablet (10 mg) by mouth 4 times daily  - oxyCODONE (OXYCONTIN) 20 MG 12 hr tablet; Take 1 tablet (20 mg) by mouth every 12 hours maximum 2 tablet(s) per day    Major depressive disorder, recurrent, severe without psychotic features (H)  Much more depressed.  Working with Nati on this but pain and loss of his son and mother are taking its toll.  We discussed the Covid vaccination today and patient says he does not care if he gets Covid because I think he is at least passively suicidal.  So we discussed a verbal contract for safety.    Chronic obstructive pulmonary disease, unspecified COPD type (H)  Suggested setting up Covid vaccination when he is feeling a bit better emotionally.         BMI:   Estimated body mass index is 41.91 kg/m  as calculated from the following:    Height  "as of 1/11/21: 1.835 m (6' 0.25\").    Weight as of this encounter: 141.2 kg (311 lb 3.2 oz).       See Patient Instructions    Return in about 2 weeks (around 3/30/2021) for Contact me with progress, MyChart message.    Cat Shaw MD  [facility]    Subjective     HPI       Pain is worsening, meds are not helping with pain  Patient just visited surgeon again and will possibly need another surgery     Here today in follow-up of chronic back pain.  Is working with his surgical team and has another MRI next week.  Might need a mid back procedure.  Just not doing well overall.  Feeling more depressed.  Does not think his pain medication is cutting.    Review of Systems   Constitutional, HEENT, cardiovascular, pulmonary, gi and gu systems are negative, except as otherwise noted.      Objective    /82   Pulse 80   Resp 16   Wt 141.2 kg (311 lb 3.2 oz)   SpO2 95%   BMI 41.91 kg/m    Body mass index is 41.91 kg/m .  Physical Exam   Alert and pleasant but appears downtrodden and much more psychomotor slowing than when I last saw him.  S1 and S2 normal, no murmurs, clicks, gallops or rubs. Regular rate and rhythm. Chest is clear; no wheezes or rales. No edema or JVD.  Past labs reviewed with the patient.                 "

## 2021-03-17 ENCOUNTER — TELEPHONE (OUTPATIENT)
Dept: FAMILY MEDICINE | Facility: CLINIC | Age: 61
End: 2021-03-17

## 2021-03-17 DIAGNOSIS — M51.369 DDD (DEGENERATIVE DISC DISEASE), LUMBAR: ICD-10-CM

## 2021-03-17 DIAGNOSIS — G89.4 CHRONIC PAIN SYNDROME: ICD-10-CM

## 2021-03-17 DIAGNOSIS — M53.3 SI (SACROILIAC) JOINT DYSFUNCTION: ICD-10-CM

## 2021-03-17 RX ORDER — OXYCODONE 9 MG/1
9 CAPSULE, EXTENDED RELEASE ORAL EVERY 12 HOURS
Qty: 60 CAPSULE | Refills: 0 | Status: SHIPPED | OUTPATIENT
Start: 2021-03-17 | End: 2021-04-13

## 2021-03-17 NOTE — TELEPHONE ENCOUNTER
Received the following from insurance. Please answer the following question and I will send back to insurance ASAP. Response is needed by 3/18/21 6:11AM.

## 2021-03-17 NOTE — TELEPHONE ENCOUNTER
Patient calling and stated he has increasing pain and wanted to to know is there any alternative for patient to take until the PA gets back to the care team? Please call patient to discuss and advise.  Lennox Haley,  For [facility]

## 2021-03-17 NOTE — TELEPHONE ENCOUNTER
"PA for oxyCODONE (OXYCONTIN) 20 MG 12 hr tablet    Login to [employer]/login and click \"Enter a Key\"    Key:  [id number]    Enter patients last name and     Complete the PA and click \"Send to Plan\" for approval    PA or alternative    Mell Peguero    "

## 2021-03-17 NOTE — TELEPHONE ENCOUNTER
PA Initiation    Medication: oxyCODONE (OXYCONTIN) 20 MG 12 hr tablet  Insurance Company: [employer] - Phone [phone] Fax [phone]  Pharmacy Filling the Rx: [id number] - [place], [place] - [address] [place] [address]  Filling Pharmacy Phone: [phone]  Filling Pharmacy Fax: [phone]  Start Date: 3/17/2021

## 2021-03-17 NOTE — TELEPHONE ENCOUNTER
I have run into this with other patients.  I can certainly change the prescription to Xtampza ER - I will send in a new one right away.  But the problem is most pharmacies do not carry it and do not seem to be able to get it.

## 2021-03-17 NOTE — TELEPHONE ENCOUNTER
I think the prior authorization was for the long-acting oxycodone, not the short acting.  So was he able to pick that up and start using it?  And is he out of his other medications that we used to use?

## 2021-03-18 ENCOUNTER — TRANSFERRED RECORDS (OUTPATIENT)
Dept: HEALTH INFORMATION MANAGEMENT | Facility: CLINIC | Age: 61
End: 2021-03-18

## 2021-03-18 NOTE — TELEPHONE ENCOUNTER
PRIOR AUTHORIZATION DENIED    Medication: oxyCODONE (OXYCONTIN) 20 MG 12 hr tablet    Denial Date: 3/17/2021    Denial Rationale: Patient has to first try/fail: Xtampza ER capsule sprinkle        Appeal Information: If provider would like to appeal this decision we will need a detailed letter of medical necessity to start the process. Then re-route this request back to the PA pool.

## 2021-03-21 ENCOUNTER — HEALTH MAINTENANCE LETTER (OUTPATIENT)
Age: 61
End: 2021-03-21

## 2021-03-22 DIAGNOSIS — N40.1 BENIGN PROSTATIC HYPERPLASIA WITH LOWER URINARY TRACT SYMPTOMS, SYMPTOM DETAILS UNSPECIFIED: ICD-10-CM

## 2021-03-24 RX ORDER — TAMSULOSIN HYDROCHLORIDE 0.4 MG/1
CAPSULE ORAL
Qty: 90 CAPSULE | Refills: 1 | Status: SHIPPED | OUTPATIENT
Start: 2021-03-24 | End: 2021-07-14

## 2021-03-24 NOTE — TELEPHONE ENCOUNTER
tamsulosin refill request  Last OV: 3/16/21 with PCP  To MD for refill need; warnings flag; patient with allergy to bactrim.

## 2021-04-02 ENCOUNTER — TRANSFERRED RECORDS (OUTPATIENT)
Dept: HEALTH INFORMATION MANAGEMENT | Facility: CLINIC | Age: 61
End: 2021-04-02

## 2021-04-13 ENCOUNTER — VIRTUAL VISIT (OUTPATIENT)
Dept: FAMILY MEDICINE | Facility: CLINIC | Age: 61
End: 2021-04-13
Payer: COMMERCIAL

## 2021-04-13 VITALS — BODY MASS INDEX: 42.66 KG/M2 | WEIGHT: 315 LBS | HEIGHT: 72 IN

## 2021-04-13 DIAGNOSIS — G89.4 CHRONIC PAIN SYNDROME: Primary | ICD-10-CM

## 2021-04-13 DIAGNOSIS — M53.3 SI (SACROILIAC) JOINT DYSFUNCTION: ICD-10-CM

## 2021-04-13 DIAGNOSIS — M51.369 DDD (DEGENERATIVE DISC DISEASE), LUMBAR: ICD-10-CM

## 2021-04-13 PROCEDURE — 96127 BRIEF EMOTIONAL/BEHAV ASSMT: CPT | Mod: 95 | Performed by: FAMILY MEDICINE

## 2021-04-13 PROCEDURE — 99213 OFFICE O/P EST LOW 20 MIN: CPT | Mod: 95 | Performed by: FAMILY MEDICINE

## 2021-04-13 RX ORDER — OXYCODONE 9 MG/1
9 CAPSULE, EXTENDED RELEASE ORAL EVERY 12 HOURS
Qty: 60 CAPSULE | Refills: 0 | Status: SHIPPED | OUTPATIENT
Start: 2021-04-13 | End: 2021-05-05

## 2021-04-13 RX ORDER — OXYCODONE HYDROCHLORIDE 10 MG/1
10 TABLET ORAL 4 TIMES DAILY
Qty: 120 TABLET | Refills: 0 | Status: SHIPPED | OUTPATIENT
Start: 2021-04-13 | End: 2021-05-05

## 2021-04-13 ASSESSMENT — ANXIETY QUESTIONNAIRES
7. FEELING AFRAID AS IF SOMETHING AWFUL MIGHT HAPPEN: NEARLY EVERY DAY
2. NOT BEING ABLE TO STOP OR CONTROL WORRYING: NEARLY EVERY DAY
5. BEING SO RESTLESS THAT IT IS HARD TO SIT STILL: SEVERAL DAYS
IF YOU CHECKED OFF ANY PROBLEMS ON THIS QUESTIONNAIRE, HOW DIFFICULT HAVE THESE PROBLEMS MADE IT FOR YOU TO DO YOUR WORK, TAKE CARE OF THINGS AT HOME, OR GET ALONG WITH OTHER PEOPLE: EXTREMELY DIFFICULT
6. BECOMING EASILY ANNOYED OR IRRITABLE: MORE THAN HALF THE DAYS
3. WORRYING TOO MUCH ABOUT DIFFERENT THINGS: NEARLY EVERY DAY
GAD7 TOTAL SCORE: 17
1. FEELING NERVOUS, ANXIOUS, OR ON EDGE: NEARLY EVERY DAY

## 2021-04-13 ASSESSMENT — MIFFLIN-ST. JEOR: SCORE: 2281.83

## 2021-04-13 ASSESSMENT — PATIENT HEALTH QUESTIONNAIRE - PHQ9
SUM OF ALL RESPONSES TO PHQ QUESTIONS 1-9: 26
5. POOR APPETITE OR OVEREATING: MORE THAN HALF THE DAYS

## 2021-04-13 NOTE — ADDENDUM NOTE
Addended by: NORY LINARES on: 12/18/2018 04:29 PM     Modules accepted: Orders    
14-Apr-2021 16:53

## 2021-04-13 NOTE — PROGRESS NOTES
Jhonathan is a 61 year old who is being evaluated via a billable telephone visit.      What phone number would you like to be contacted at? [phone]  How would you like to obtain your AVS? Mail a copy    Assessment & Plan     Chronic pain syndrome  Stable on current regimen.  Continue same plan and routine follow-up.   - oxyCODONE (XTAMPZA ER) 9 MG 12 hr tablet; Take 1 tablet (9 mg) by mouth every 12 hours  - oxyCODONE IR (ROXICODONE) 10 MG tablet; Take 1 tablet (10 mg) by mouth 4 times daily    DDD (degenerative disc disease), lumbar  As above   - oxyCODONE (XTAMPZA ER) 9 MG 12 hr tablet; Take 1 tablet (9 mg) by mouth every 12 hours  - oxyCODONE IR (ROXICODONE) 10 MG tablet; Take 1 tablet (10 mg) by mouth 4 times daily    SI (sacroiliac) joint dysfunction  As above   - oxyCODONE (XTAMPZA ER) 9 MG 12 hr tablet; Take 1 tablet (9 mg) by mouth every 12 hours  - oxyCODONE IR (ROXICODONE) 10 MG tablet; Take 1 tablet (10 mg) by mouth 4 times daily       Depression Screening Follow Up    PHQ 4/13/2021   PHQ-9 Total Score 26   Q9: Thoughts of better off dead/self-harm past 2 weeks Nearly every day   PHQ-9 External Data -         Return in about 1 month (around 5/13/2021) for Follow up on Pain, In Office or Video.    Cat Shaw MD  [facility]   Jhonathan is a 61 year old who presents for the following health issues     HPI     Medication Followup of Pain medications    Taking Medication as prescribed: yes    Side Effects:  Feeling tired, and nausea    Medication Helping Symptoms:  yes     Spoke with patient today in follow-up of chronic back issues.  Now it sounds like he might need to have 2 separate procedures upcoming.  Has his official follow-up in a couple of weeks.    Review of Systems   Constitutional, HEENT, cardiovascular, pulmonary, gi and gu systems are negative, except as otherwise noted.      Objective           Vitals:  No vitals were obtained today due to virtual  visit.    Physical Exam   healthy, alert and no distress  PSYCH: Alert and oriented times 3; coherent speech, normal   rate and volume, able to articulate logical thoughts, able   to abstract reason, no tangential thoughts, no hallucinations   or delusions  His affect is normal  RESP: No cough, no audible wheezing, able to talk in full sentences  Remainder of exam unable to be completed due to telephone visits    Past labs reviewed with the patient.             Phone call duration: 9 minutes

## 2021-04-14 ASSESSMENT — ANXIETY QUESTIONNAIRES: GAD7 TOTAL SCORE: 17

## 2021-04-15 DIAGNOSIS — R51.9 BAD HEADACHE: ICD-10-CM

## 2021-04-15 RX ORDER — NAPROXEN SODIUM 550 MG/1
550 TABLET ORAL 2 TIMES DAILY PRN
Qty: 60 TABLET | Refills: 2 | Status: SHIPPED | OUTPATIENT
Start: 2021-04-15 | End: 2021-11-03

## 2021-04-15 NOTE — TELEPHONE ENCOUNTER
Routing refill request to provider for review/approval because:  Drug not on the [facility] refill protocol   Nidhi Cochran BSN, RN

## 2021-04-16 ENCOUNTER — TRANSFERRED RECORDS (OUTPATIENT)
Dept: HEALTH INFORMATION MANAGEMENT | Facility: CLINIC | Age: 61
End: 2021-04-16

## 2021-04-22 ENCOUNTER — TRANSFERRED RECORDS (OUTPATIENT)
Dept: HEALTH INFORMATION MANAGEMENT | Facility: CLINIC | Age: 61
End: 2021-04-22

## 2021-04-23 DIAGNOSIS — G47.00 INSOMNIA, UNSPECIFIED TYPE: ICD-10-CM

## 2021-04-23 RX ORDER — ZOLPIDEM TARTRATE 10 MG/1
10 TABLET ORAL
Qty: 30 TABLET | Refills: 2 | Status: SHIPPED | OUTPATIENT
Start: 2021-04-23 | End: 2021-07-14

## 2021-04-23 NOTE — TELEPHONE ENCOUNTER
Routing refill request to provider for review/approval because:  Drug not on the [facility] refill protocol     Heidy ESPINALN, RN

## 2021-05-05 ENCOUNTER — VIRTUAL VISIT (OUTPATIENT)
Dept: FAMILY MEDICINE | Facility: CLINIC | Age: 61
End: 2021-05-05
Payer: COMMERCIAL

## 2021-05-05 DIAGNOSIS — G25.81 RLS (RESTLESS LEGS SYNDROME): ICD-10-CM

## 2021-05-05 DIAGNOSIS — M51.369 DDD (DEGENERATIVE DISC DISEASE), LUMBAR: ICD-10-CM

## 2021-05-05 DIAGNOSIS — G89.4 CHRONIC PAIN SYNDROME: Primary | ICD-10-CM

## 2021-05-05 DIAGNOSIS — M53.3 SI (SACROILIAC) JOINT DYSFUNCTION: ICD-10-CM

## 2021-05-05 PROCEDURE — 99214 OFFICE O/P EST MOD 30 MIN: CPT | Mod: 95 | Performed by: FAMILY MEDICINE

## 2021-05-05 RX ORDER — CYCLOBENZAPRINE HCL 10 MG
TABLET ORAL
Qty: 90 TABLET | Refills: 1 | Status: SHIPPED | OUTPATIENT
Start: 2021-05-05 | End: 2021-08-06

## 2021-05-05 RX ORDER — OXYCODONE HYDROCHLORIDE 10 MG/1
10 TABLET ORAL 4 TIMES DAILY
Qty: 120 TABLET | Refills: 0 | Status: SHIPPED | OUTPATIENT
Start: 2021-05-05 | End: 2021-06-02

## 2021-05-05 RX ORDER — ROPINIROLE 1 MG/1
TABLET, FILM COATED ORAL
Qty: 360 TABLET | Refills: 1 | Status: SHIPPED | OUTPATIENT
Start: 2021-05-05 | End: 2021-09-10

## 2021-05-05 RX ORDER — OXYCODONE 9 MG/1
9 CAPSULE, EXTENDED RELEASE ORAL EVERY 12 HOURS
Qty: 60 CAPSULE | Refills: 0 | Status: SHIPPED | OUTPATIENT
Start: 2021-05-05 | End: 2021-06-02

## 2021-05-05 NOTE — PROGRESS NOTES
Jhonathan is a 61 year old who is being evaluated via a billable telephone visit.      What phone number would you like to be contacted at? cell  How would you like to obtain your AVS? MyChart    Assessment & Plan     Chronic pain syndrome  Routine follow-up of ongoing chronic back pain.  Reviewed interval history with both his psychiatrist and his neurosurgeon.  Just had a second steroid injection into his lumbosacral area.  They are trying to figure out specific nerves that are affected.  And there is certainly possibility that he has some of the hardware loose.  Has had multiple surgeries and hoping to avoid another one.  But for now things are stable and he is resting.  We will continue same including  database monitoring and routine upkeep.  - oxyCODONE (XTAMPZA ER) 9 MG 12 hr tablet; Take 1 tablet (9 mg) by mouth every 12 hours  - oxyCODONE IR (ROXICODONE) 10 MG tablet; Take 1 tablet (10 mg) by mouth 4 times daily    DDD (degenerative disc disease), lumbar  As above  - oxyCODONE (XTAMPZA ER) 9 MG 12 hr tablet; Take 1 tablet (9 mg) by mouth every 12 hours  - oxyCODONE IR (ROXICODONE) 10 MG tablet; Take 1 tablet (10 mg) by mouth 4 times daily  - cyclobenzaprine (FLEXERIL) 10 MG tablet; TAKE 1 TABLET BY MOUTH THREE TIMES DAILY AS NEEDED FOR MUSCLE SPASMS    SI (sacroiliac) joint dysfunction  As above  - oxyCODONE (XTAMPZA ER) 9 MG 12 hr tablet; Take 1 tablet (9 mg) by mouth every 12 hours  - oxyCODONE IR (ROXICODONE) 10 MG tablet; Take 1 tablet (10 mg) by mouth 4 times daily    RLS (restless legs syndrome)  Stable on current regimen.  Continue same plan and routine follow-up.   - rOPINIRole (REQUIP) 1 MG tablet; 1mg at 5pm and 3mg at 8-9pm    See Patient Instructions    Return in about 1 month (around 6/5/2021) for Follow up of Chronic Issues, virtual visit.    Cat Shaw MD  [facility]   Jhonathan is a 61 year old who presents for the following health issues     HPI      Spoke with patient in routine follow-up of ongoing back issues, depression, etc.   for mother is next weekend.  Doing okay as best he can.  Just had another epidural steroid injection.    Review of Systems   Constitutional, HEENT, cardiovascular, pulmonary, gi and gu systems are negative, except as otherwise noted.      Objective           Vitals:  No vitals were obtained today due to virtual visit.    Physical Exam   healthy, alert and no distress  PSYCH: Alert and oriented times 3; coherent speech, normal   rate and volume, able to articulate logical thoughts, able   to abstract reason, no tangential thoughts, no hallucinations   or delusions  His affect is normal  RESP: No cough, no audible wheezing, able to talk in full sentences  Remainder of exam unable to be completed due to telephone visits    Past labs reviewed with the patient.             Phone call duration: 12 minutes

## 2021-05-12 ENCOUNTER — TRANSFERRED RECORDS (OUTPATIENT)
Dept: HEALTH INFORMATION MANAGEMENT | Facility: CLINIC | Age: 61
End: 2021-05-12

## 2021-05-17 ENCOUNTER — TELEPHONE (OUTPATIENT)
Dept: FAMILY MEDICINE | Facility: CLINIC | Age: 61
End: 2021-05-17

## 2021-05-17 ENCOUNTER — MEDICAL CORRESPONDENCE (OUTPATIENT)
Dept: HEALTH INFORMATION MANAGEMENT | Facility: CLINIC | Age: 61
End: 2021-05-17

## 2021-05-17 NOTE — TELEPHONE ENCOUNTER
Left vm for pt requesting a call back and provided clinic contact info.     Next Steps: Pt needs to mora pre op appt with provider as provider is not in the clinic in the afternoon on that day. Provider has morning availability that day or pt will need to mora for a diff day or a diff provider.    Dg 5/17/2021 @ 10:14am

## 2021-06-01 ENCOUNTER — TELEPHONE (OUTPATIENT)
Dept: FAMILY MEDICINE | Facility: CLINIC | Age: 61
End: 2021-06-01

## 2021-06-01 NOTE — PROGRESS NOTES
[address] [address]  Phone: [phone]  Primary Provider: Nory Shaw  Pre-op Performing Provider: NORY SHAW      PREOPERATIVE EVALUATION:  Today's date: 6/2/2021    Oleg Ford is a 61 year old male who presents for a preoperative evaluation.    Surgical Information:  Surgery/Procedure: Spinal Surgery  Surgery Location: [facility]  Surgeon: Dr. Joseph  Surgery Date: 06/07/20210  Time of Surgery: TBD  Where patient plans to recover: At home with family  Fax number for surgical facility:   Type of Anesthesia Anticipated: to be determined    Assessment & Plan     The proposed surgical procedure is considered INTERMEDIATE risk.    Preop general physical exam  - UA with Microscopic reflex to Culture  - CBC with platelets    DDD (degenerative disc disease), lumbar    Chronic obstructive pulmonary disease, unspecified COPD type (H)    Major depressive disorder, recurrent, severe without psychotic features (H)    Morbid obesity (H)    Chronic, continuous use of opioids    Chronic pain syndrome  - Drug Abuse Screen Panel 13, Urine (Care Map)    Nutritional anemia, unspecified   - INR    Risks and Recommendations:  The patient has the following additional risks and recommendations for perioperative complications:   - Morbid obesity (BMI >40)  Pulmonary:    - Incentive spirometry post-op  Social and Substance:    - Patient is taking medications for chronic pain    Medication Instructions:  Patient is to take all scheduled medications on the day of surgery    RECOMMENDATION:  APPROVAL GIVEN to proceed with proposed procedure, without further diagnostic evaluation.        Subjective     HPI related to upcoming procedure:   Longstanding degenerative lumbar and sacroiliac disease.  Status post multiple procedures with continued pain and instability.      Preop Questions 6/2/2021   1. Have you ever had a heart attack  or stroke? No   2. Have you ever had surgery on your heart or blood vessels, such as a stent placement, a coronary artery bypass, or surgery on an artery in your head, neck, heart, or legs? No   3. Do you have chest pain with activity? YES -occasionally   4. Do you have a history of  heart failure? No   5. Do you currently have a cold, bronchitis or symptoms of other infection? No   6. Do you have a cough, shortness of breath, or wheezing? YES -shortness of breath with activity   7. Do you or anyone in your family have previous history of blood clots? No   8. Do you or does anyone in your family have a serious bleeding problem such as prolonged bleeding following surgeries or cuts? No   9. Have you ever had problems with anemia or been told to take iron pills? No   10. Have you had any abnormal blood loss such as black, tarry or bloody stools? No   11. Have you ever had a blood transfusion? No   12. Are you willing to have a blood transfusion if it is medically needed before, during, or after your surgery? Yes   13. Have you or any of your relatives ever had problems with anesthesia? YES -sedation   14. Do you have sleep apnea, excessive snoring or daytime drowsiness? No   15. Do you have any artifical heart valves or other implanted medical devices like a pacemaker, defibrillator, or continuous glucose monitor? No   16. Do you have artificial joints? No   17. Are you allergic to latex? No     Health Care Directive:  Patient does not have a Health Care Directive or Living Will:     Preoperative Review of :   reviewed - controlled substances reflected in medication list.      Status of Chronic Conditions:  See problem list for active medical problems.  Problems all longstanding and stable, except as noted/documented.  See ROS for pertinent symptoms related to these conditions.    COPD - Patient has a longstanding history of mild to moderate emphysema. Patient has been doing well overall noting SOB and continues  on medication regimen consisting of inhalers without adverse reactions or side effects.    DEPRESSION - Patient has a long history of Depression of moderate severity requiring medication for control with recent symptoms being stable..Current symptoms of depression include depressed mood, hopelessness.       Review of Systems  CONSTITUTIONAL: NEGATIVE for fever, chills, change in weight  INTEGUMENTARY/SKIN: NEGATIVE for worrisome rashes, moles or lesions  EYES: NEGATIVE for vision changes or irritation  ENT/MOUTH: NEGATIVE for ear, mouth and throat problems  RESP: NEGATIVE for significant cough or SOB  BREAST: NEGATIVE for masses, tenderness or discharge  CV: NEGATIVE for chest pain, palpitations or peripheral edema  GI: NEGATIVE for nausea, abdominal pain, heartburn, or change in bowel habits  : NEGATIVE for frequency, dysuria, or hematuria  MUSCULOSKELETAL: NEGATIVE for significant arthralgias or myalgia  NEURO: NEGATIVE for weakness, dizziness or paresthesias  ENDOCRINE: NEGATIVE for temperature intolerance, skin/hair changes  HEME: NEGATIVE for bleeding problems  PSYCHIATRIC: NEGATIVE for changes in mood or affect    Patient Active Problem List    Diagnosis Date Noted     Chronic pain syndrome 06/02/2021     Priority: Medium     Chronic obstructive pulmonary disease, unspecified COPD type (H) 10/26/2020     Priority: Medium     Myofascial pain dysfunction syndrome 08/28/2020     Priority: Medium     Thoracic aortic aneurysm without rupture (H) 02/18/2019     Priority: Medium     4.3 cm  Found incidentally during chest CT for unrelated reasons.  1) control cardiovascular risks with consideration for ARB and statin therapy  2) plan to recheck with echocardiogram in August 2019.  If stable can follow annually.       Dysphagia, unspecified type 10/25/2018     Priority: Medium     Weight loss 10/25/2018     Priority: Medium     Intractable vomiting with nausea, unspecified vomiting type 10/25/2018     Priority:  Medium     Morbid obesity (H) 06/06/2018     Priority: Medium     Advance Care Planning 01/24/2018     Priority: Medium     SI (sacroiliac) joint dysfunction 10/19/2017     Priority: Medium     Gastroesophageal reflux disease with esophagitis 01/25/2017     Priority: Medium     Major depressive disorder, recurrent, severe without psychotic features (H) 02/22/2016     Priority: Medium     S/P lumbar fusion 01/12/2016     Priority: Medium     Chronic, continuous use of opioids 12/01/2015     Priority: Medium     Patient is followed by NORY LINARES for ongoing prescription of pain medication.  All refills should be approved by this provider, or covering partner.    Medication(s): MS Contin 15 2x / day = 30 MME  dilaudid 6 mg tid = 72 MME  102 MME total    Tapering per plan      Plan per Pain Team:  1. Medication Management:   1. Continue Flexeril 10mg TID PRN as managed by Primary Care Provider  2. Continue Dilaudid 4-8mg QID PRN as managed by PCP. I would recommend tapering down to half the current dose. This can be done by reducing patient's dosage by 2mg/day every 2-4 weeks  3. Continue MSContin 30mg TID as managed by PCP. Discussed with the patient that he must take this medication consistently as he has been using it intermittently. I would recommend tapering down to BID. This can be done by having patient take 30mg twice daily and 15mg once daily for 2-4 weeks, then down to 30mg Q 12 hours.   4. In general, usually long-acting is reduced first, then short-acting medication.   5. IF patient is planning on having repeat surgery, I would work on reducing patient doses every 2 weeks vs Q 4 weeks. Being on high dose opiates can make post-op pain management markedly more challenging.  6. Additionally, patient should continue to have regular urine drug screening and sign controlled substance agreements with his prescriber of controlled substances  7. I agree with patient having a script for naloxone nasal  spray for opiate reversal given high dose opiate use.   8. Could consider patient for medical cannabis certification, this can be helpful for pain and help patient's be able to taper their opiate doses easier    Narcan prescribed   Clinic visit frequency required: Q 3 months     Controlled substance agreement on file: Yes       Date(s): 12/1/15, 4/24/19    Pain Clinic evaluation in the past: Yes    DIRE Total Score(s):    12/1/2015   Total Score 18       Last [facility] website verification: 11/12/19     [email or web address]        Simple hepatic cyst 03/16/2015     Priority: Medium     Simple renal cyst 03/16/2015     Priority: Medium     Esophageal cyst 09/05/2014     Priority: Medium     RLS (restless legs syndrome) 01/29/2013     Priority: Medium     DDD (degenerative disc disease), lumbar 10/18/2012     Priority: Medium     CARDIOVASCULAR SCREENING; LDL GOAL LESS THAN 130 10/31/2010     Priority: Medium     Chronic low back pain 07/19/2010     Priority: Medium      Past Medical History:   Diagnosis Date     Depression, major      Esophageal mass      History of orchiectomy 1982     History of vasectomy 1991     Low back pain      Other chronic pain     Chronic low back pain.     Restless leg      Sleep apnea     had surgery done to resolve sleep apnea     Past Surgical History:   Procedure Laterality Date     BACK SURGERY  2/6/2013    lumbar surgery     BACK SURGERY      spinal stimulator     COLONOSCOPY       COMBINED ESOPHAGOSCOPY, GASTROSCOPY, DUODENOSCOPY (EGD) WITH CO2 INSUFFLATION N/A 11/14/2018    Procedure: COMBINED ESOPHAGOSCOPY, GASTROSCOPY, DUODENOSCOPY (EGD) WITH CO2 INSUFFLATION;  Surgeon: Rosendo Guy MD;  Location:  OR     ESOPHAGOSCOPY, GASTROSCOPY, DUODENOSCOPY (EGD), COMBINED N/A 11/14/2018    Procedure: COMBINED ESOPHAGOSCOPY, GASTROSCOPY, DUODENOSCOPY (EGD), BIOPSY SINGLE OR MULTIPLE;  Surgeon: Rosendo Guy MD;  Location: [facility] OR     FUSION SACRAL ILIAC Right  10/19/2017    Procedure: FUSION SACRAL ILIAC;  Right  side sacroiliac joint fusion with 2 implants ;  Surgeon: Sebastián Szymanski MD;  Location: [facility] OR     FUSION SACRAL ILIAC Left 1/11/2018    Procedure: FUSION SACRAL ILIAC;   left  side sacroiliac joint fusion with 2 implants.(Zyga)   extraction and use of illac bone graft ;  Surgeon: Sebastián Szymanski MD;  Location: [facility] OR     [facility] UGI ENDOSCOPY W EUS N/A 7/31/2014    Procedure: COMBINED ENDOSCOPIC ULTRASOUND, ESOPHAGOSCOPY, GASTROSCOPY, DUODENOSCOPY (EGD);  Surgeon: Shorty Stoll MD;  Location: [facility]     ORCHIECTOMY INGUINAL      right radical orchiectomy     REMOVE STIMULATOR VAGUS NERVE  12/23/2011    Procedure:REMOVE STIMULATOR VAGUS NERVE; Vagus Nerve Stimulator Removal; Surgeon:ALEXIS BECERRA; Location:[facility] OR     REMOVE STIMULATOR VAGUS NERVE  11/8/2013    Procedure: REMOVE STIMULATOR VAGUS NERVE;  Removal Of Vagus Nerve Stimulator Lead In Left Neck ;  Surgeon: Alexis Becerra MD;  Location: [facility] OR     SURGICAL HISTORY OF -       vagal nerve implant; removed     SURGICAL HISTORY OF -       UPPP     VASECTOMY       Current Outpatient Medications   Medication Sig Dispense Refill     cyclobenzaprine (FLEXERIL) 10 MG tablet TAKE 1 TABLET BY MOUTH THREE TIMES DAILY AS NEEDED FOR MUSCLE SPASMS 90 tablet 1     diazepam (VALIUM) 5 MG tablet Take 1 tablet (5 mg) by mouth 3 times daily as needed for anxiety 30 tablet 0     EPINEPHrine (EPIPEN) 0.3 MG/0.3ML injection Inject 0.3 mLs (0.3 mg) into the muscle once as needed for anaphylaxis 1 each 2     escitalopram (LEXAPRO) 20 MG tablet Take 1 tablet (20 mg) by mouth daily 90 tablet 1     gabapentin (NEURONTIN) 600 MG tablet Take 1 tablet (600 mg) by mouth 2 times daily 180 tablet 1     hydrOXYzine (VISTARIL) 50 MG capsule Take 1 capsule (50 mg) by mouth every 6 hours as needed for anxiety 60 capsule 5     ipratropium - albuterol 0.5 mg/2.5 mg/3 mL (DUONEB) 0.5-2.5 (3) MG/3ML neb solution Take 1 vial (3 mLs) by nebulization  "every 6 hours as needed for shortness of breath / dyspnea or wheezing 50 vial 5     lithium (ESKALITH) 300 MG tablet Take 300 mg by mouth 2 times daily        naloxone (NARCAN) nasal spray Spray 1 spray (4 mg) into one nostril alternating nostrils as needed for opioid reversal every 2-3 minutes until assistance arrives 0.2 mL 0     naproxen sodium (ANAPROX) 550 MG tablet Take 1 tablet (550 mg) by mouth 2 times daily as needed (headache) 60 tablet 2     OLANZapine (ZYPREXA) 5 MG tablet Take 5 mg by mouth At Bedtime       omeprazole (PRILOSEC) 40 MG DR capsule TAKE ONE CAPSULE BY MOUTH EVERY DAY 30 TO 60 MINUTES BEFORE A MEAL 90 capsule 1     ondansetron (ZOFRAN) 4 MG tablet Take 1 tablet (4 mg) by mouth every 8 hours as needed for nausea 30 tablet 1     oxyCODONE (XTAMPZA ER) 9 MG 12 hr tablet Take 1 tablet (9 mg) by mouth every 12 hours 60 capsule 0     oxyCODONE IR (ROXICODONE) 10 MG tablet Take 1 tablet (10 mg) by mouth 4 times daily 120 tablet 0     rOPINIRole (REQUIP) 1 MG tablet 1mg at 5pm and 3mg at 8-9pm 360 tablet 1     tamsulosin (FLOMAX) 0.4 MG capsule TAKE 1 CAPSULE BY MOUTH DAILY AT BEDTIME 90 capsule 1     traZODone (DESYREL) 50 MG tablet Take  mg by mouth At Bedtime       zolpidem (AMBIEN) 10 MG tablet TAKE 1 TABLET (10 MG) BY MOUTH NIGHTLY AS NEEDED FOR SLEEP 30 tablet 2     Fluticasone-Umeclidin-Vilanterol (TRELEGY ELLIPTA) 100-62.5-25 MCG/INH oral inhaler Inhale 1 puff into the lungs daily (Patient not taking: Reported on 6/2/2021) 1 Inhaler 5       Allergies   Allergen Reactions     Ciprofloxacin Anaphylaxis     Doxycycline Anaphylaxis     Lyrica [Pregabalin]      Felt \"out of his mind\" and confused     Septra [Bactrim] Anaphylaxis     Amoxicillin      itching     Nuts      Brazil nuts        Social History     Tobacco Use     Smoking status: Former Smoker     Packs/day: 0.00     Years: 40.00     Pack years: 0.00     Types: Cigarettes     Smokeless tobacco: Never Used     Tobacco comment: " Last cigarette was October 17, 2018   Substance Use Topics     Alcohol use: Yes     Comment: 4 drinks a year     Family History   Problem Relation Age of Onset     Diabetes Mother      C.A.D. Mother      C.A.D. Maternal Grandmother      Diabetes Maternal Grandmother      Hypertension Maternal Grandmother      Cancer Maternal Grandfather      C.A.D. Maternal Grandfather      History   Drug Use No     Comment: morphine, dilaudid         Objective     /87   Pulse 93   Temp 98.1  F (36.7  C) (Oral)   Resp 18   Wt 140.6 kg (310 lb)   SpO2 96%   BMI 42.04 kg/m      Physical Exam    GENERAL APPEARANCE: healthy, alert and no distress     EYES: EOMI,  PERRL     HENT: ear canals and TM's normal and nose and mouth without ulcers or lesions     NECK: no adenopathy, no asymmetry, masses, or scars and thyroid normal to palpation     RESP: lungs clear to auscultation - no rales, rhonchi or wheezes     CV: regular rates and rhythm, normal S1 S2, no S3 or S4 and no murmur, click or rub     ABDOMEN:  soft, nontender, no HSM or masses and bowel sounds normal     MS: extremities normal- no gross deformities noted, no evidence of inflammation in joints, FROM in all extremities.     SKIN: no suspicious lesions or rashes     NEURO: Normal strength and tone, sensory exam grossly normal, mentation intact and speech normal     PSYCH: mentation appears normal. and affect normal/bright     LYMPHATICS: No cervical adenopathy    No results for input(s): HGB, PLT, INR, NA, POTASSIUM, CR, A1C in the last 22998 hours.     Diagnostics:  CBC, INR, UA pending  No EKG needed    Revised Cardiac Risk Index (RCRI):  The patient has the following serious cardiovascular risks for perioperative complications:   - No serious cardiac risks = 0 points     RCRI Interpretation: 0 points: Class I (very low risk - 0.4% complication rate)           Signed Electronically by: Cat Shaw MD  Copy of this evaluation report is provided to  requesting physician.

## 2021-06-01 NOTE — TELEPHONE ENCOUNTER
Patient has a pre-op 6/2/2021. Once completed, fax pre-op,any labs and EKG if done to [facility] Attn: Claribel with Pre-anesthesia Screening # [phone].  Beth Collazo

## 2021-06-01 NOTE — PATIENT INSTRUCTIONS

## 2021-06-02 ENCOUNTER — OFFICE VISIT (OUTPATIENT)
Dept: FAMILY MEDICINE | Facility: CLINIC | Age: 61
End: 2021-06-02
Payer: COMMERCIAL

## 2021-06-02 VITALS
OXYGEN SATURATION: 96 % | SYSTOLIC BLOOD PRESSURE: 122 MMHG | BODY MASS INDEX: 42.04 KG/M2 | HEART RATE: 93 BPM | WEIGHT: 310 LBS | RESPIRATION RATE: 18 BRPM | DIASTOLIC BLOOD PRESSURE: 87 MMHG | TEMPERATURE: 98.1 F

## 2021-06-02 DIAGNOSIS — M53.3 SI (SACROILIAC) JOINT DYSFUNCTION: ICD-10-CM

## 2021-06-02 DIAGNOSIS — J44.9 CHRONIC OBSTRUCTIVE PULMONARY DISEASE, UNSPECIFIED COPD TYPE (H): ICD-10-CM

## 2021-06-02 DIAGNOSIS — F33.2 MAJOR DEPRESSIVE DISORDER, RECURRENT, SEVERE WITHOUT PSYCHOTIC FEATURES (H): ICD-10-CM

## 2021-06-02 DIAGNOSIS — D53.9 NUTRITIONAL ANEMIA, UNSPECIFIED: ICD-10-CM

## 2021-06-02 DIAGNOSIS — E66.01 MORBID OBESITY (H): ICD-10-CM

## 2021-06-02 DIAGNOSIS — M51.369 DDD (DEGENERATIVE DISC DISEASE), LUMBAR: ICD-10-CM

## 2021-06-02 DIAGNOSIS — F11.90 CHRONIC, CONTINUOUS USE OF OPIOIDS: ICD-10-CM

## 2021-06-02 DIAGNOSIS — Z01.818 PREOP GENERAL PHYSICAL EXAM: Primary | ICD-10-CM

## 2021-06-02 DIAGNOSIS — Z91.030 BEE STING ALLERGY: ICD-10-CM

## 2021-06-02 DIAGNOSIS — G89.4 CHRONIC PAIN SYNDROME: ICD-10-CM

## 2021-06-02 DIAGNOSIS — K21.00 GASTROESOPHAGEAL REFLUX DISEASE WITH ESOPHAGITIS WITHOUT HEMORRHAGE: ICD-10-CM

## 2021-06-02 PROBLEM — R82.5 POSITIVE URINE DRUG SCREEN: Status: RESOLVED | Noted: 2019-07-22 | Resolved: 2021-06-02

## 2021-06-02 LAB
ALBUMIN UR-MCNC: NEGATIVE MG/DL
AMPHETAMINES UR QL: NOT DETECTED NG/ML
APPEARANCE UR: CLEAR
BACTERIA #/AREA URNS HPF: ABNORMAL /HPF
BARBITURATES UR QL SCN: NOT DETECTED NG/ML
BENZODIAZ UR QL SCN: NOT DETECTED NG/ML
BILIRUB UR QL STRIP: NEGATIVE
BUPRENORPHINE UR QL: NOT DETECTED NG/ML
CANNABINOIDS UR QL: NOT DETECTED NG/ML
COCAINE UR QL SCN: NOT DETECTED NG/ML
COLOR UR AUTO: YELLOW
D-METHAMPHET UR QL: NOT DETECTED NG/ML
ERYTHROCYTE [DISTWIDTH] IN BLOOD BY AUTOMATED COUNT: 15 % (ref 10–15)
GLUCOSE UR STRIP-MCNC: NEGATIVE MG/DL
HCT VFR BLD AUTO: 47.8 % (ref 40–53)
HGB BLD-MCNC: 15.7 G/DL (ref 13.3–17.7)
HGB UR QL STRIP: NEGATIVE
INR PPP: 1.03 (ref 0.86–1.14)
KETONES UR STRIP-MCNC: NEGATIVE MG/DL
LEUKOCYTE ESTERASE UR QL STRIP: NEGATIVE
MCH RBC QN AUTO: 28.5 PG (ref 26.5–33)
MCHC RBC AUTO-ENTMCNC: 32.8 G/DL (ref 31.5–36.5)
MCV RBC AUTO: 87 FL (ref 78–100)
METHADONE UR QL SCN: NOT DETECTED NG/ML
MUCOUS THREADS #/AREA URNS LPF: PRESENT /LPF
NITRATE UR QL: NEGATIVE
NON-SQ EPI CELLS #/AREA URNS LPF: ABNORMAL /LPF
OPIATES UR QL SCN: NOT DETECTED NG/ML
OXYCODONE UR QL SCN: ABNORMAL NG/ML
PCP UR QL SCN: NOT DETECTED NG/ML
PH UR STRIP: 5.5 PH (ref 5–7)
PLATELET # BLD AUTO: 315 10E9/L (ref 150–450)
PROPOXYPH UR QL: NOT DETECTED NG/ML
RBC # BLD AUTO: 5.51 10E12/L (ref 4.4–5.9)
RBC #/AREA URNS AUTO: ABNORMAL /HPF
SOURCE: ABNORMAL
SP GR UR STRIP: >1.03 (ref 1–1.03)
TRICYCLICS UR QL SCN: NOT DETECTED NG/ML
UROBILINOGEN UR STRIP-ACNC: 0.2 EU/DL (ref 0.2–1)
WBC # BLD AUTO: 8.4 10E9/L (ref 4–11)
WBC #/AREA URNS AUTO: ABNORMAL /HPF

## 2021-06-02 PROCEDURE — 85610 PROTHROMBIN TIME: CPT | Performed by: FAMILY MEDICINE

## 2021-06-02 PROCEDURE — 85027 COMPLETE CBC AUTOMATED: CPT | Performed by: FAMILY MEDICINE

## 2021-06-02 PROCEDURE — 36415 COLL VENOUS BLD VENIPUNCTURE: CPT | Performed by: FAMILY MEDICINE

## 2021-06-02 PROCEDURE — 80306 DRUG TEST PRSMV INSTRMNT: CPT | Performed by: FAMILY MEDICINE

## 2021-06-02 PROCEDURE — 99214 OFFICE O/P EST MOD 30 MIN: CPT | Performed by: FAMILY MEDICINE

## 2021-06-02 PROCEDURE — 81001 URINALYSIS AUTO W/SCOPE: CPT | Mod: 59 | Performed by: FAMILY MEDICINE

## 2021-06-02 RX ORDER — OXYCODONE HYDROCHLORIDE 10 MG/1
10 TABLET ORAL 4 TIMES DAILY
Qty: 120 TABLET | Refills: 0 | Status: SHIPPED | OUTPATIENT
Start: 2021-06-02 | End: 2021-07-06

## 2021-06-02 RX ORDER — GABAPENTIN 600 MG/1
600 TABLET ORAL 2 TIMES DAILY
Qty: 180 TABLET | Refills: 1 | Status: SHIPPED | OUTPATIENT
Start: 2021-06-02 | End: 2021-09-10

## 2021-06-02 RX ORDER — OXYCODONE 9 MG/1
9 CAPSULE, EXTENDED RELEASE ORAL EVERY 12 HOURS
Qty: 60 CAPSULE | Refills: 0 | Status: SHIPPED | OUTPATIENT
Start: 2021-06-02 | End: 2021-07-06

## 2021-06-02 RX ORDER — ESCITALOPRAM OXALATE 20 MG/1
20 TABLET ORAL DAILY
Qty: 90 TABLET | Refills: 1 | Status: SHIPPED | OUTPATIENT
Start: 2021-06-02 | End: 2021-11-10

## 2021-06-02 RX ORDER — EPINEPHRINE 0.3 MG/.3ML
0.3 INJECTION SUBCUTANEOUS
Qty: 1 EACH | Refills: 2 | Status: SHIPPED | OUTPATIENT
Start: 2021-06-02

## 2021-06-02 RX ORDER — OMEPRAZOLE 40 MG/1
CAPSULE, DELAYED RELEASE ORAL
Qty: 90 CAPSULE | Refills: 1 | Status: SHIPPED | OUTPATIENT
Start: 2021-06-02 | End: 2021-09-10

## 2021-06-02 NOTE — LETTER
Opioid / Opioid Plus Controlled Substance Agreement    This is an agreement between you and your provider about the safe and appropriate use of controlled substance/opioids prescribed by your care team. Controlled substances are medicines that can cause physical and mental dependence (abuse).    There are strict laws about having and using these medicines. We here at [facility] are committing to working with you in your efforts to get better. To support you in this work, we ll help you schedule regular office appointments for medicine refills. If we must cancel or change your appointment for any reason, we ll make sure you have enough medicine to last until your next appointment.     As a Provider, I will:    Listen carefully to your concerns and treat you with respect.     Recommend a treatment plan that I believe is in your best interest. This plan may involve therapies other than opioid pain medication.     Talk with you often about the possible benefits, and the risk of harm of any medicine that we prescribe for you.     Provide a plan on how to taper (discontinue or go off) using this medicine if the decision is made to stop its use.    As a Patient, I understand that opioid(s):     Are a controlled substance prescribed by my care team to help me function or work and manage my condition(s).     Are strong medicines and can cause serious side effects such as:    Drowsiness, which can seriously affect my driving ability    A lower breathing rate, enough to cause death    Harm to my thinking ability     Depression     Abuse of and addiction to this medicine    Need to be taken exactly as prescribed. Combining opioids with certain medicines or chemicals (such as illegal drugs, sedatives, sleeping pills, and benzodiazepines) can be dangerous or even fatal. If I stop opioids suddenly, I may have severe withdrawal symptoms.    Do not work for all types of pain nor for all patients. If they re not helpful, I may  be asked to stop them.      The risks, benefits and side effects of these medicine(s) were explained to me. I agree that:  1. I will take part in other treatments as advised by my care team. This may be psychiatry or counseling, physical therapy, behavioral therapy, group treatment or a referral to a specialist.     2. I will keep all my appointments. I understand that this is part of the monitoring of opioids. My care team may require an office visit for EVERY opioid/controlled substance refill. If I miss appointments or don t follow instructions, my care team may stop my medicine.    3. I will take my medicines as prescribed. I will not change the dose or schedule unless my care team tells me to. There will be no refills if I run out early.     4. I may be asked to come to the clinic and complete a urine drug test or complete a pill count at any time. If I don t give a urine sample or participate in a pill count, the care team may stop my medicine.    5. I will only receive prescriptions from this clinic for chronic pain. If I am treated by another provider for acute pain issues, I will tell them that I am taking opioid pain medication for chronic pain and that I have a treatment agreement with this provider. I will inform my [facility] care team within one business day if I am given a prescription for any pain medication by another healthcare provider. My [facility] care team can contact other providers and pharmacists about my use of any medicines.    6. It is up to me to make sure that I don t run out of my medicines on weekends or holidays. If my care team is willing to refill my opioid prescription without a visit, I must request refills only during office hours. Refills may take up to 3 business days to process. I will use one pharmacy to fill all my opioid and other controlled substance prescriptions. I will notify the clinic about any changes to my insurance or medication  availability.    7. I am responsible for my prescriptions. If the medicine/prescription is lost, stolen or destroyed, it will not be replaced. I also agree not to share controlled substance medicines with anyone.    8. I am aware I should not use any illegal or recreational drugs. I agree not to drink alcohol unless my care team says I can.       9. If I enroll in the [place] Medical Cannabis program, I will tell my care team prior to my next refill.     10. I will tell my care team right away if I become pregnant, have a new medical problem treated outside of my regular clinic, or have a change in my medications.    11. I understand that this medicine can affect my thinking, judgment and reaction time. Alcohol and drugs affect the brain and body, which can affect the safety of my driving. Being under the influence of alcohol or drugs can affect my decision-making, behaviors, personal safety, and the safety of others. Driving while impaired (DWI) can occur if a person is driving, operating, or in physical control of a car, motorcycle, boat, snowmobile, ATV, motorbike, off-road vehicle, or any other motor vehicle ([place] Statute 169A.20). I understand the risk if I choose to drive or operate any vehicle or machinery.    I understand that if I do not follow any of the conditions above, my prescriptions or treatment may be stopped or changed.          Opioids  What You Need to Know    What are opioids?   Opioids are pain medicines that must be prescribed by a doctor. They are also known as narcotics.     Examples are:   1. morphine (MS Contin, Irina)  2. oxycodone (Oxycontin)  3. oxycodone and acetaminophen (Percocet)  4. hydrocodone and acetaminophen (Vicodin, Norco)   5. fentanyl patch (Duragesic)   6. hydromorphone (Dilaudid)   7. methadone  8. codeine (Tylenol #3)     What do opioids do well?   Opioids are best for severe short-term pain such as after a surgery or injury. They may work well for cancer pain. They may  help some people with long-lasting (chronic) pain.     What do opioids NOT do well?   Opioids never get rid of pain entirely, and they don t work well for most patients with chronic pain. Opioids don t reduce swelling, one of the causes of pain.                                    Other ways to manage chronic pain and improve function include:       Treat the health problem that may be causing pain    Anti-inflammation medicines, which reduce swelling and tenderness, such as ibuprofen (Advil, Motrin) or naproxen (Aleve)    Acetaminophen (Tylenol)    Antidepressants and anti-seizure medicines, especially for nerve pain    Topical treatments such as patches or creams    Injections or nerve blocks    Chiropractic or osteopathic treatment    Acupuncture, massage, deep breathing, meditation, visual imagery, aromatherapy    Use heat or ice at the pain site    Physical therapy     Exercise    Stop smoking    Take part in therapy       Risks and side effects     Talk to your doctor before you start or decide to keep taking opioids. Possible side effects include:      Lowering your breathing rate enough to cause death    Overdose, including death, especially if taking higher than prescribed doses    Worse depression symptoms; less pleasure in things you usually enjoy    Feeling tired or sluggish    Slower thoughts or cloudy thinking    Being more sensitive to pain over time; pain is harder to control    Trouble sleeping or restless sleep    Changes in hormone levels (for example, less testosterone)    Changes in sex drive or ability to have sex    Constipation    Unsafe driving    Itching and sweating    Dizziness    Nausea, throwing up and dry mouth    What else should I know about opioids?    Opioids may lead to dependence, tolerance, or addiction.      Dependence means that if you stop or reduce the medicine too quickly, you will have withdrawal symptoms. These include loose poop (diarrhea), jitters, flu-like symptoms,  nervousness and tremors. Dependence is not the same as addiction.                       Tolerance means needing higher doses over time to get the same effect. This may increase the chance of serious side effects.      Addiction is when people improperly use a substance that harms their body, their mind or their relations with others. Use of opiates can cause a relapse of addiction if you have a history of drug or alcohol abuse.      People who have used opioids for a long time may have a lower quality of life, worse depression, higher levels of pain and more visits to doctors.    You can overdose on opioids. Take these steps to lower your risk of overdose:    1. Recognize the signs:  Signs of overdose include decrease or loss of consciousness (blackout), slowed breathing, trouble waking up and blue lips. If someone is worried about overdose, they should call 911.    2. Talk to your doctor about Narcan (naloxone).   If you are at risk for overdose, you may be given a prescription for Narcan. This medicine very quickly reverses the effects of opioids.   If you overdose, a friend or family member can give you Narcan while waiting for the ambulance. They need to know the signs of overdose and how to give Narcan.     3. Don't use alcohol or street drugs.   Taking them with opioids can cause death.    4. Do not take any of these medicines unless your doctor says it s OK. Taking these with opioids can cause death:    Benzodiazepines, such as lorazepam (Ativan), alprazolam (Xanax) or diazepam (Valium)    Muscle relaxers, such as cyclobenzaprine (Flexeril)    Sleeping pills like zolpidem (Ambien)     Other opioids      How to keep you and other people safe while taking opioids:    1. Never share your opioids with others.  Opioid medicines are regulated by the Drug Enforcement Agency ([place]). Selling or sharing medications is a criminal act.    2. Be sure to store opioids in a secure place, locked up if possible. Young children  can easily swallow them and overdose.    3. When you are traveling with your medicines, keep them in the original bottles. If you use a pill box, be sure you also carry a copy of your medicine list from your clinic or pharmacy.    4. Safe disposal of opioids    Most pharmacies have places to get rid of medicine, called disposal kiosks. Medicine disposal options are also available in every [facility]. Search your county and  medication disposal  to find more options. You can find more details at:  [email or web address]-[email or web address]-[email or web address]     I agree that my provider, clinic care team, and pharmacy may work with any city, state or federal law enforcement agency that investigates the misuse, sale, or other diversion of my controlled medicine. I will allow my provider to discuss my care with, or share a copy of, this agreement with any other treating provider, pharmacy or emergency room where I receive care.    I have read this agreement and have asked questions about anything I did not understand.    _______________________________________________________  Patient Signature - Oleg Ford _____________________                   Date     _______________________________________________________  Provider Signature - Cat Shaw MD   _____________________                   Date     _______________________________________________________  Witness Signature (required if provider not present while patient signing)   _____________________                   Date

## 2021-06-06 NOTE — RESULT ENCOUNTER NOTE
Jhonathan,  Lab work looks good - best of luck on the upcoming back surgery - heal up quickly.  SANTIAGO Shaw M.D.

## 2021-07-06 ENCOUNTER — VIRTUAL VISIT (OUTPATIENT)
Dept: FAMILY MEDICINE | Facility: CLINIC | Age: 61
End: 2021-07-06
Payer: COMMERCIAL

## 2021-07-06 DIAGNOSIS — N40.1 BENIGN PROSTATIC HYPERPLASIA WITH LOWER URINARY TRACT SYMPTOMS, SYMPTOM DETAILS UNSPECIFIED: ICD-10-CM

## 2021-07-06 DIAGNOSIS — M51.369 DDD (DEGENERATIVE DISC DISEASE), LUMBAR: ICD-10-CM

## 2021-07-06 DIAGNOSIS — F11.90 CHRONIC, CONTINUOUS USE OF OPIOIDS: Primary | ICD-10-CM

## 2021-07-06 DIAGNOSIS — M53.3 SI (SACROILIAC) JOINT DYSFUNCTION: ICD-10-CM

## 2021-07-06 PROCEDURE — 99213 OFFICE O/P EST LOW 20 MIN: CPT | Mod: 95 | Performed by: FAMILY MEDICINE

## 2021-07-06 RX ORDER — OXYCODONE HYDROCHLORIDE 10 MG/1
10 TABLET ORAL 4 TIMES DAILY
Qty: 120 TABLET | Refills: 0 | Status: SHIPPED | OUTPATIENT
Start: 2021-07-06 | End: 2021-08-06

## 2021-07-06 RX ORDER — OXYCODONE 9 MG/1
9 CAPSULE, EXTENDED RELEASE ORAL EVERY 12 HOURS
Qty: 60 CAPSULE | Refills: 0 | Status: SHIPPED | OUTPATIENT
Start: 2021-07-06 | End: 2021-08-06

## 2021-07-06 NOTE — PROGRESS NOTES
Jhonathan is a 61 year old who is being evaluated via a billable telephone visit.      What phone number would you like to be contacted at? cell  How would you like to obtain your AVS? MyChart    Assessment & Plan     Chronic, continuous use of opioids  Stable on current regimen.  Reviewed  database.  Up-to-date including short postoperative prescription.  Patient is recovering from surgery and thinks he is doing well overall.  Has a follow-up with the surgeon in 2 weeks.  Continue same regimen and routine monitoring.  - oxyCODONE (XTAMPZA ER) 9 MG 12 hr tablet; Take 1 tablet (9 mg) by mouth every 12 hours  - oxyCODONE IR (ROXICODONE) 10 MG tablet; Take 1 tablet (10 mg) by mouth 4 times daily    DDD (degenerative disc disease), lumbar  As above  - oxyCODONE (XTAMPZA ER) 9 MG 12 hr tablet; Take 1 tablet (9 mg) by mouth every 12 hours  - oxyCODONE IR (ROXICODONE) 10 MG tablet; Take 1 tablet (10 mg) by mouth 4 times daily    SI (sacroiliac) joint dysfunction  As above  - oxyCODONE (XTAMPZA ER) 9 MG 12 hr tablet; Take 1 tablet (9 mg) by mouth every 12 hours  - oxyCODONE IR (ROXICODONE) 10 MG tablet; Take 1 tablet (10 mg) by mouth 4 times daily    Benign prostatic hyperplasia with lower urinary tract symptoms, symptom details unspecified  Nocturia and hesitancy has worsened in the past couple weeks.  Discussed that this could be postoperative related.  But certainly okay to double up on his Flomax to 2 tablets daily and see how this goes.       See Patient Instructions    Return in about 1 month (around 8/6/2021) for Follow up on Pain, In Office or Video.    Cat Shaw MD  [facility]   Jhonathan is a 61 year old who presents for the following health issues     HPI     Visit with patient today in follow-up of chronic back pain.  Is about a month status post L2-L3 left-sided foraminotomy.  Recovering from this and has surgical follow-up in a couple of weeks.  Some worsening  hesitancy and nocturia.    Review of Systems   Constitutional, HEENT, cardiovascular, pulmonary, gi and gu systems are negative, except as otherwise noted.      Objective           Vitals:  No vitals were obtained today due to virtual visit.    Physical Exam   healthy, alert and no distress  PSYCH: Alert and oriented times 3; coherent speech, normal   rate and volume, able to articulate logical thoughts, able   to abstract reason, no tangential thoughts, no hallucinations   or delusions  His affect is normal  RESP: No cough, no audible wheezing, able to talk in full sentences  Remainder of exam unable to be completed due to telephone visits    Past labs reviewed with the patient.             Phone call duration: 8 minutes

## 2021-07-13 DIAGNOSIS — N40.1 BENIGN PROSTATIC HYPERPLASIA WITH LOWER URINARY TRACT SYMPTOMS, SYMPTOM DETAILS UNSPECIFIED: ICD-10-CM

## 2021-07-13 DIAGNOSIS — G47.00 INSOMNIA, UNSPECIFIED TYPE: ICD-10-CM

## 2021-07-14 RX ORDER — ZOLPIDEM TARTRATE 10 MG/1
TABLET ORAL
Qty: 30 TABLET | Refills: 2 | Status: SHIPPED | OUTPATIENT
Start: 2021-07-14 | End: 2021-08-06

## 2021-07-14 RX ORDER — TAMSULOSIN HYDROCHLORIDE 0.4 MG/1
CAPSULE ORAL
Qty: 90 CAPSULE | Refills: 3 | Status: SHIPPED | OUTPATIENT
Start: 2021-07-14 | End: 2021-08-31

## 2021-07-14 NOTE — TELEPHONE ENCOUNTER
Routing refill request to provider for review/approval because:  Drug not on the [facility] refill protocol   Drug interaction warning  High warning provider to address.    Heidy Cerna RN, [facility] Triage

## 2021-07-20 ENCOUNTER — TRANSFERRED RECORDS (OUTPATIENT)
Dept: HEALTH INFORMATION MANAGEMENT | Facility: CLINIC | Age: 61
End: 2021-07-20

## 2021-08-06 ENCOUNTER — VIRTUAL VISIT (OUTPATIENT)
Dept: FAMILY MEDICINE | Facility: CLINIC | Age: 61
End: 2021-08-06
Payer: COMMERCIAL

## 2021-08-06 DIAGNOSIS — G47.00 INSOMNIA, UNSPECIFIED TYPE: ICD-10-CM

## 2021-08-06 DIAGNOSIS — M51.369 DDD (DEGENERATIVE DISC DISEASE), LUMBAR: ICD-10-CM

## 2021-08-06 DIAGNOSIS — F11.90 CHRONIC, CONTINUOUS USE OF OPIOIDS: ICD-10-CM

## 2021-08-06 DIAGNOSIS — E03.2 HYPOTHYROIDISM DUE TO MEDICATION: Primary | ICD-10-CM

## 2021-08-06 DIAGNOSIS — M53.3 SI (SACROILIAC) JOINT DYSFUNCTION: ICD-10-CM

## 2021-08-06 PROCEDURE — 99214 OFFICE O/P EST MOD 30 MIN: CPT | Mod: 95 | Performed by: FAMILY MEDICINE

## 2021-08-06 RX ORDER — OXYCODONE 18 MG/1
18 CAPSULE, EXTENDED RELEASE ORAL EVERY 12 HOURS
Qty: 60 CAPSULE | Refills: 0 | Status: SHIPPED | OUTPATIENT
Start: 2021-08-06 | End: 2021-08-31

## 2021-08-06 RX ORDER — LEVOTHYROXINE SODIUM 25 UG/1
25 TABLET ORAL DAILY
Qty: 30 TABLET | Refills: 0 | Status: SHIPPED | OUTPATIENT
Start: 2021-08-06 | End: 2021-08-31

## 2021-08-06 RX ORDER — OXYCODONE HYDROCHLORIDE 10 MG/1
10 TABLET ORAL 4 TIMES DAILY
Qty: 120 TABLET | Refills: 0 | Status: SHIPPED | OUTPATIENT
Start: 2021-08-06 | End: 2021-08-31

## 2021-08-06 RX ORDER — ZOLPIDEM TARTRATE 10 MG/1
20 TABLET ORAL AT BEDTIME
Qty: 60 TABLET | Refills: 2 | Status: SHIPPED | OUTPATIENT
Start: 2021-08-06 | End: 2021-09-10

## 2021-08-06 RX ORDER — CYCLOBENZAPRINE HCL 10 MG
TABLET ORAL
Qty: 90 TABLET | Refills: 2 | Status: SHIPPED | OUTPATIENT
Start: 2021-08-06 | End: 2021-09-10

## 2021-08-06 NOTE — PROGRESS NOTES
Jhonathan is a 61 year old who is being evaluated via a billable video visit.      How would you like to obtain your AVS? MyChart  If the video visit is dropped, the invitation should be resent by: Send to e-mail at: [email or web address]  Will anyone else be joining your video visit? No      Video Start Time: 0916    Assessment & Plan     Hypothyroidism due to medication  His thyroid is monitored through his psychiatrist due to his lithium.  And now evidently T4 levels are running low and his psychiatrist suggested we get him started on a low-dose supplement which we will do.  And also like to see copies of that lab work and his psychiatrist has been pretty good about sending me those notes.  So we will keep an eye on this.  Monitored routinely.  - levothyroxine (SYNTHROID/LEVOTHROID) 25 MCG tablet; Take 1 tablet (25 mcg) by mouth daily    Insomnia, unspecified type  I am certainly okay with him using 2 tablets nightly but insurance oftentimes has difficulty in covering this.  So I will send the prescription and fill out authorization if need be  - zolpidem (AMBIEN) 10 MG tablet; Take 2 tablets (20 mg) by mouth At Bedtime    DDD (degenerative disc disease), lumbar  Still struggling with plenty of back issues and working with his specialty team.  Very well may need some more upcoming surgeries.  We have been making some adjustments in his pain control regimen and unfortunately we have to increase the dosage as it is not controlling his pain.  I have absolutely 0 concerns about misuse or diversion.  We had successfully tapered him back from even higher levels so we will bounces back up again by increasing his long-acting oxycodone and see how that does.  Updated problem list with current dosage.  Plan follow-up in 1 month.  - cyclobenzaprine (FLEXERIL) 10 MG tablet; TAKE 1 TABLET BY MOUTH THREE TIMES DAILY AS NEEDED FOR MUSCLE SPASMS  - oxyCODONE (XTAMPZA ER) 18 MG 12 hr tablet; Take 1 tablet (18 mg) by mouth every 12  hours  - oxyCODONE IR (ROXICODONE) 10 MG tablet; Take 1 tablet (10 mg) by mouth 4 times daily    Chronic, continuous use of opioids  As above  - oxyCODONE (XTAMPZA ER) 18 MG 12 hr tablet; Take 1 tablet (18 mg) by mouth every 12 hours  - oxyCODONE IR (ROXICODONE) 10 MG tablet; Take 1 tablet (10 mg) by mouth 4 times daily    SI (sacroiliac) joint dysfunction  As above  - oxyCODONE (XTAMPZA ER) 18 MG 12 hr tablet; Take 1 tablet (18 mg) by mouth every 12 hours  - oxyCODONE IR (ROXICODONE) 10 MG tablet; Take 1 tablet (10 mg) by mouth 4 times daily       See Patient Instructions    Return in about 1 month (around 9/6/2021) for Follow up of Chronic Issues, In Office or Video.    Cat Shaw MD  [facility] STEPHEN Ring is a 61 year old who presents for the following health issues     HPI     Video visit with patient today in follow-up of chronic back issues.  Discussed interval history.  Still struggling with pain control.  But his psychiatrist suggested we get him started on some thyroid supplementation.  Evidently running low related to his lithium.    Review of Systems   Constitutional, HEENT, cardiovascular, pulmonary, gi and gu systems are negative, except as otherwise noted.      Objective           Vitals:  No vitals were obtained today due to virtual visit.    Physical Exam   GENERAL: Healthy, alert and no distress  EYES: Eyes grossly normal to inspection.  No discharge or erythema, or obvious scleral/conjunctival abnormalities.  RESP: No audible wheeze, cough, or visible cyanosis.  No visible retractions or increased work of breathing.    SKIN: Visible skin clear. No significant rash, abnormal pigmentation or lesions.  NEURO: Cranial nerves grossly intact.  Mentation and speech appropriate for age.  PSYCH: Mentation appears normal, affect normal/bright, judgement and insight intact, normal speech and appearance well-groomed.    Past labs reviewed with the patient.              Video-Visit Details    Type of service:  Video Visit    Video End Time:0928    Originating Location (pt. Location): Home    Distant Location (provider location):  [facility]     Platform used for Video Visit: [employer]

## 2021-08-09 ENCOUNTER — TELEPHONE (OUTPATIENT)
Dept: FAMILY MEDICINE | Facility: CLINIC | Age: 61
End: 2021-08-09

## 2021-08-09 NOTE — TELEPHONE ENCOUNTER
Central Prior Authorization Team   Phone: [phone]    PA Initiation    Medication: zolpidem (AMBIEN) 10 MG tablet  Insurance Company: [employer] - Phone [phone] Fax [phone]  Pharmacy Filling the Rx: [id number] - [place], [place] - [address]  Filling Pharmacy Phone: [phone]  Filling Pharmacy Fax:    Start Date: 8/9/2021

## 2021-08-09 NOTE — TELEPHONE ENCOUNTER
Prior Authorization Approval    Authorization Effective Date: 8/9/2021  Authorization Expiration Date: 12/31/2021  Medication: zolpidem (AMBIEN) 10 MG tablet  Approved Dose/Quantity:    Reference #:     Insurance Company: [employer] - Phone [phone] Fax [phone]  Expected CoPay:       CoPay Card Available:      Foundation Assistance Needed:    Which Pharmacy is filling the prescription (Not needed for infusion/clinic administered): [id number] - [facility] [address] [place] [address]  Pharmacy Notified: Yes  Patient Notified: Yes  **Instructed pharmacy to notify patient when script is ready to /ship.**

## 2021-08-09 NOTE — TELEPHONE ENCOUNTER
"PA for zolpidem (AMBIEN) 10 MG tablet    Login to go.[employer] it/login and click \"Enter a Key\"    Key:  [id number]    Enter patients last name and     Complete the PA and click \"Send to Plan\" for approval.    PA or alternative    Mell Peguero  "

## 2021-08-31 ENCOUNTER — VIRTUAL VISIT (OUTPATIENT)
Dept: FAMILY MEDICINE | Facility: CLINIC | Age: 61
End: 2021-08-31
Payer: COMMERCIAL

## 2021-08-31 ENCOUNTER — TELEPHONE (OUTPATIENT)
Dept: FAMILY MEDICINE | Facility: CLINIC | Age: 61
End: 2021-08-31

## 2021-08-31 DIAGNOSIS — M51.369 DDD (DEGENERATIVE DISC DISEASE), LUMBAR: ICD-10-CM

## 2021-08-31 DIAGNOSIS — F11.90 CHRONIC, CONTINUOUS USE OF OPIOIDS: ICD-10-CM

## 2021-08-31 DIAGNOSIS — E03.2 HYPOTHYROIDISM DUE TO MEDICATION: ICD-10-CM

## 2021-08-31 DIAGNOSIS — M53.3 SI (SACROILIAC) JOINT DYSFUNCTION: ICD-10-CM

## 2021-08-31 DIAGNOSIS — N40.1 BENIGN PROSTATIC HYPERPLASIA WITH LOWER URINARY TRACT SYMPTOMS, SYMPTOM DETAILS UNSPECIFIED: ICD-10-CM

## 2021-08-31 DIAGNOSIS — G25.81 RLS (RESTLESS LEGS SYNDROME): ICD-10-CM

## 2021-08-31 DIAGNOSIS — G89.4 CHRONIC PAIN SYNDROME: ICD-10-CM

## 2021-08-31 DIAGNOSIS — F33.2 MAJOR DEPRESSIVE DISORDER, RECURRENT, SEVERE WITHOUT PSYCHOTIC FEATURES (H): ICD-10-CM

## 2021-08-31 PROCEDURE — 99214 OFFICE O/P EST MOD 30 MIN: CPT | Mod: 95 | Performed by: FAMILY MEDICINE

## 2021-08-31 RX ORDER — LEVOTHYROXINE SODIUM 25 UG/1
25 TABLET ORAL DAILY
Qty: 30 TABLET | Refills: 0 | Status: SHIPPED | OUTPATIENT
Start: 2021-08-31 | End: 2021-09-10

## 2021-08-31 RX ORDER — OXYCODONE HYDROCHLORIDE 10 MG/1
10 TABLET ORAL 4 TIMES DAILY
Qty: 120 TABLET | Refills: 0 | Status: SHIPPED | OUTPATIENT
Start: 2021-08-31 | End: 2021-08-31

## 2021-08-31 RX ORDER — OXYCODONE HYDROCHLORIDE 20 MG/1
10 TABLET ORAL 4 TIMES DAILY
Qty: 60 TABLET | Refills: 0 | Status: SHIPPED | OUTPATIENT
Start: 2021-08-31 | End: 2021-10-01

## 2021-08-31 RX ORDER — TAMSULOSIN HYDROCHLORIDE 0.4 MG/1
CAPSULE ORAL
Qty: 90 CAPSULE | Refills: 3 | Status: SHIPPED | OUTPATIENT
Start: 2021-08-31 | End: 2021-09-03

## 2021-08-31 RX ORDER — OXYCODONE 18 MG/1
18 CAPSULE, EXTENDED RELEASE ORAL EVERY 12 HOURS
Qty: 60 CAPSULE | Refills: 0 | Status: SHIPPED | OUTPATIENT
Start: 2021-08-31 | End: 2021-10-01

## 2021-08-31 NOTE — PROGRESS NOTES
Jhonathan is a 61 year old who is being evaluated via a billable video visit.      How would you like to obtain your AVS? MyChart  If the video visit is dropped, the invitation should be resent by: Text to cell phone: 1  Will anyone else be joining your video visit? No      Video Start Time: 1302    Assessment & Plan     SI (sacroiliac) joint dysfunction  Stable on current regimen doing a bit better with an increased dosage of baseline medication.  We will continue to follow-up.  He needs to see Dr. Dariel Bernal for his sacroiliac dysfunction and we will help set up that referral.  - oxyCODONE IR (ROXICODONE) 10 MG tablet; Take 1 tablet (10 mg) by mouth 4 times daily  - oxyCODONE (XTAMPZA ER) 18 MG 12 hr tablet; Take 1 tablet (18 mg) by mouth every 12 hours  - Spine Referral; Future    Hypothyroidism due to medication  External lab work showed his thyroid running low so we started him on 25 mcg and he says he can tell he starting to feel a bit better.  I like to recheck lab work in 1 to 2 months and make adjustments.  - levothyroxine (SYNTHROID/LEVOTHROID) 25 MCG tablet; Take 1 tablet (25 mcg) by mouth daily    Chronic, continuous use of opioids  Stable and up-to-date on routine monitoring  - oxyCODONE IR (ROXICODONE) 10 MG tablet; Take 1 tablet (10 mg) by mouth 4 times daily  - oxyCODONE (XTAMPZA ER) 18 MG 12 hr tablet; Take 1 tablet (18 mg) by mouth every 12 hours    DDD (degenerative disc disease), lumbar  Stable on current regimen.  Continue same plan and routine follow-up.   - oxyCODONE IR (ROXICODONE) 10 MG tablet; Take 1 tablet (10 mg) by mouth 4 times daily  - oxyCODONE (XTAMPZA ER) 18 MG 12 hr tablet; Take 1 tablet (18 mg) by mouth every 12 hours    Benign prostatic hyperplasia with lower urinary tract symptoms, symptom details unspecified  Stable on current regimen.  Continue same plan and routine follow-up.   - tamsulosin (FLOMAX) 0.4 MG capsule; TAKE 1 CAPSULE BY MOUTH DAILY AT BEDTIME         See Patient  Instructions    Return in about 1 month (around 9/30/2021) for Follow up of Chronic Issues, In Office or Video.    Cat Shaw MD  [facility]    Amparo Ring is a 61 year old who presents for the following health issues     HPI     Video visit with patient today in follow-up of chronic back issues.  Well-known to me.  Needs a referral to see Dr. Bernal about his SI joint.  Doing better with the thyroid medication.  Feeling more energetic.    Review of Systems   Constitutional, HEENT, cardiovascular, pulmonary, gi and gu systems are negative, except as otherwise noted.      Objective           Vitals:  No vitals were obtained today due to virtual visit.    Physical Exam   GENERAL: Healthy, alert and no distress  EYES: Eyes grossly normal to inspection.  No discharge or erythema, or obvious scleral/conjunctival abnormalities.  RESP: No audible wheeze, cough, or visible cyanosis.  No visible retractions or increased work of breathing.    SKIN: Visible skin clear. No significant rash, abnormal pigmentation or lesions.  NEURO: Cranial nerves grossly intact.  Mentation and speech appropriate for age.  PSYCH: Mentation appears normal, affect normal/bright, judgement and insight intact, normal speech and appearance well-groomed.    Past labs reviewed with the patient.             Video-Visit Details    Type of service:  Video Visit    Video End Time:1308    Originating Location (pt. Location): Home    Distant Location (provider location):  [facility]     Platform used for Video Visit: [employer]

## 2021-08-31 NOTE — TELEPHONE ENCOUNTER
[facility] pharmacy message:    There is a national shortage on oxyCODONE IR (ROXICODONE) 10 MG tablets. We have been unable to set them for over a month with no ETA.  Please send in a new strength/tablet size.  Thank you.

## 2021-09-01 ENCOUNTER — TELEPHONE (OUTPATIENT)
Dept: FAMILY MEDICINE | Facility: CLINIC | Age: 61
End: 2021-09-01

## 2021-09-01 RX ORDER — GABAPENTIN 600 MG/1
TABLET ORAL
Qty: 180 TABLET | Refills: 0 | OUTPATIENT
Start: 2021-09-01

## 2021-09-01 RX ORDER — ESCITALOPRAM OXALATE 20 MG/1
TABLET ORAL
Qty: 90 TABLET | Refills: 0 | OUTPATIENT
Start: 2021-09-01

## 2021-09-01 RX ORDER — ROPINIROLE 1 MG/1
TABLET, FILM COATED ORAL
Qty: 360 TABLET | Refills: 0 | OUTPATIENT
Start: 2021-09-01

## 2021-09-01 NOTE — TELEPHONE ENCOUNTER
[facility] calling about the oxycodone prescription that was sent to them.   Looks like this was already taken care of yesterday at 4:15 pm with a new Rx of 20 mg instead of 10 mg.    China ESPINALN, RN

## 2021-09-03 ENCOUNTER — TELEPHONE (OUTPATIENT)
Dept: FAMILY MEDICINE | Facility: CLINIC | Age: 61
End: 2021-09-03

## 2021-09-03 DIAGNOSIS — N40.1 BENIGN PROSTATIC HYPERPLASIA WITH LOWER URINARY TRACT SYMPTOMS, SYMPTOM DETAILS UNSPECIFIED: ICD-10-CM

## 2021-09-03 NOTE — TELEPHONE ENCOUNTER
Pt calling stating that he was unable to fill his Flomax order because directions at the pharmacy do not match his current dosing.    Pt states he was previously instructed to increase Flomax to 2 capsules by mouth at bedtime.    Pt requests new order be sent to pharmacy reflecting the current increased dose. See pending order.    Kailey Victorai, KYLIEN, RN

## 2021-09-05 ENCOUNTER — HEALTH MAINTENANCE LETTER (OUTPATIENT)
Age: 61
End: 2021-09-05

## 2021-09-05 RX ORDER — TAMSULOSIN HYDROCHLORIDE 0.4 MG/1
0.8 CAPSULE ORAL AT BEDTIME
Qty: 180 CAPSULE | Refills: 3 | Status: SHIPPED | OUTPATIENT
Start: 2021-09-05

## 2021-09-07 NOTE — TELEPHONE ENCOUNTER
Patient calling asking if you can please re-send this prescription to the [facility]'s [address] [place] [address] Rd. [place], [place] # [phone]. Please let him know when this has been done.  Beth Collazo

## 2021-09-07 NOTE — TELEPHONE ENCOUNTER
Called and verified with pharmacy in [place] that patient has not received it yet. Pharmacy told RN they are in the process of filling it, RN called patient to double check he wants this sent to [facility] in [place] and he did. Patient will call [facility] and tell them to call his [facility] to get the prescription sent over.     Pooja Bansal RN

## 2021-09-08 DIAGNOSIS — M51.369 DDD (DEGENERATIVE DISC DISEASE), LUMBAR: ICD-10-CM

## 2021-09-08 DIAGNOSIS — G89.4 CHRONIC PAIN SYNDROME: ICD-10-CM

## 2021-09-08 DIAGNOSIS — G47.00 INSOMNIA, UNSPECIFIED TYPE: ICD-10-CM

## 2021-09-08 DIAGNOSIS — E03.2 HYPOTHYROIDISM DUE TO MEDICATION: ICD-10-CM

## 2021-09-08 DIAGNOSIS — G25.81 RLS (RESTLESS LEGS SYNDROME): ICD-10-CM

## 2021-09-08 DIAGNOSIS — K21.00 GASTROESOPHAGEAL REFLUX DISEASE WITH ESOPHAGITIS WITHOUT HEMORRHAGE: ICD-10-CM

## 2021-09-08 DIAGNOSIS — R11.0 NAUSEA: ICD-10-CM

## 2021-09-10 RX ORDER — CYCLOBENZAPRINE HCL 10 MG
TABLET ORAL
Qty: 90 TABLET | Refills: 2 | Status: SHIPPED | OUTPATIENT
Start: 2021-09-10 | End: 2022-09-02

## 2021-09-10 RX ORDER — ONDANSETRON 4 MG/1
4 TABLET, FILM COATED ORAL EVERY 8 HOURS PRN
Qty: 30 TABLET | Refills: 1 | Status: SHIPPED | OUTPATIENT
Start: 2021-09-10 | End: 2021-10-06

## 2021-09-10 RX ORDER — GABAPENTIN 600 MG/1
600 TABLET ORAL 2 TIMES DAILY
Qty: 180 TABLET | Refills: 1 | Status: SHIPPED | OUTPATIENT
Start: 2021-09-10

## 2021-09-10 RX ORDER — LEVOTHYROXINE SODIUM 25 UG/1
25 TABLET ORAL DAILY
Qty: 90 TABLET | Refills: 0 | Status: SHIPPED | OUTPATIENT
Start: 2021-09-10 | End: 2021-09-21

## 2021-09-10 RX ORDER — ZOLPIDEM TARTRATE 10 MG/1
20 TABLET ORAL AT BEDTIME
Qty: 60 TABLET | Refills: 5 | Status: SHIPPED | OUTPATIENT
Start: 2021-09-10 | End: 2021-09-17

## 2021-09-10 RX ORDER — ROPINIROLE 1 MG/1
TABLET, FILM COATED ORAL
Qty: 360 TABLET | Refills: 1 | Status: SHIPPED | OUTPATIENT
Start: 2021-09-10

## 2021-09-10 RX ORDER — OMEPRAZOLE 40 MG/1
CAPSULE, DELAYED RELEASE ORAL
Qty: 90 CAPSULE | Refills: 1 | Status: SHIPPED | OUTPATIENT
Start: 2021-09-10

## 2021-09-13 DIAGNOSIS — G47.00 INSOMNIA, UNSPECIFIED TYPE: ICD-10-CM

## 2021-09-13 RX ORDER — ZOLPIDEM TARTRATE 10 MG/1
20 TABLET ORAL AT BEDTIME
Qty: 60 TABLET | Refills: 5 | OUTPATIENT
Start: 2021-09-13

## 2021-09-13 NOTE — TELEPHONE ENCOUNTER
Rx for Zolpidem 10mg exceeds the max recommended dosage of 10 mg/day. Please clarify the dosing directions    Thank you

## 2021-09-14 ENCOUNTER — TELEPHONE (OUTPATIENT)
Dept: FAMILY MEDICINE | Facility: CLINIC | Age: 61
End: 2021-09-14

## 2021-09-14 NOTE — TELEPHONE ENCOUNTER
Pt called to get spine referral faxed to [facility]. Pt provider phone number [phone]. Called [facility] and got a fax number: [phone]. Referral was faxed.

## 2021-09-14 NOTE — TELEPHONE ENCOUNTER
Rebekah is calling on behalf of Dr. Shayne Fragoso.  They are stating that Oleg is reporting Amnesia and is really not taking the Ambien 20mg regularly.  They are wondering if Dr. Shaw is open to trying something different like Lunesta.  Please call to advise.  Thank you

## 2021-09-15 NOTE — TELEPHONE ENCOUNTER
Spoke with nursing line, the nurse is going to double check, if the provider over there would like Dr. Shaw to prescribe, they will call back.

## 2021-09-17 ENCOUNTER — TELEPHONE (OUTPATIENT)
Dept: FAMILY MEDICINE | Facility: CLINIC | Age: 61
End: 2021-09-17

## 2021-09-17 DIAGNOSIS — G47.00 INSOMNIA, UNSPECIFIED TYPE: ICD-10-CM

## 2021-09-17 DIAGNOSIS — M53.3 SI (SACROILIAC) JOINT DYSFUNCTION: Primary | ICD-10-CM

## 2021-09-17 RX ORDER — ESZOPICLONE 3 MG/1
3 TABLET, FILM COATED ORAL AT BEDTIME
Qty: 30 TABLET | Refills: 0 | Status: SHIPPED | OUTPATIENT
Start: 2021-09-17 | End: 2021-12-06

## 2021-09-17 NOTE — TELEPHONE ENCOUNTER
Jaylon from [facility] calling with an FYI for provider.     Patient saw psychiatrist, Shayne Fragoso 9/1/21 and wanted to let Dr Shaw know that he is ok with patient taking Lunesta.  He will discuss alternatives with patient at his next appointment 10/6/21. Shayne said that he does not feel that ambien is reasonable.  Shayne is ok with Dr Shaw prescribing Lunesta if he'd like or Shayne can if he'd prefer.     No need to call [facility] back unless needed.    China Ford BSN, RN

## 2021-09-17 NOTE — TELEPHONE ENCOUNTER
Left message on pt vm that Dr. Shaw got a call from [email or web address] and has sent an prescription to the pharmacy to last until he is seen there.  Nidhi ESPINALN, RN

## 2021-09-17 NOTE — TELEPHONE ENCOUNTER
New referral placed.  I think they contact him to set this up but I suppose we can print this and fax it as well

## 2021-09-17 NOTE — TELEPHONE ENCOUNTER
Back surgeon patient was referred to is out of network. He is requesting a referral to see Dr. Bernal at [facility] in [place]. Please fax referral to [facility] and inform patient. Kristy RAYMUNDO -

## 2021-09-19 DIAGNOSIS — E03.2 HYPOTHYROIDISM DUE TO MEDICATION: ICD-10-CM

## 2021-09-20 NOTE — TELEPHONE ENCOUNTER
Routing refill request to provider for review/approval because:  Labs not current:  TSH    China Ford BSN, RN

## 2021-09-21 RX ORDER — LEVOTHYROXINE SODIUM 25 UG/1
TABLET ORAL
Qty: 90 TABLET | Refills: 0 | Status: SHIPPED | OUTPATIENT
Start: 2021-09-21 | End: 2022-01-26

## 2021-09-22 ENCOUNTER — TELEPHONE (OUTPATIENT)
Dept: FAMILY MEDICINE | Facility: CLINIC | Age: 61
End: 2021-09-22

## 2021-09-22 NOTE — TELEPHONE ENCOUNTER
PA required.   Obtain PA or change medications for eszopiclone (LUNESTA) 3 MG tablet ?    To complete the PA :  1.  Go to [email or web address].[email or web address] and click 'Enter a Key'    2.  Enter the patient's last name and  and the key.       Key: [id number]    3.  Complete the form and click 'Send to Plan'

## 2021-10-01 ENCOUNTER — VIRTUAL VISIT (OUTPATIENT)
Dept: FAMILY MEDICINE | Facility: CLINIC | Age: 61
End: 2021-10-01
Payer: COMMERCIAL

## 2021-10-01 ENCOUNTER — TELEPHONE (OUTPATIENT)
Dept: FAMILY MEDICINE | Facility: CLINIC | Age: 61
End: 2021-10-01

## 2021-10-01 DIAGNOSIS — M53.3 SI (SACROILIAC) JOINT DYSFUNCTION: ICD-10-CM

## 2021-10-01 DIAGNOSIS — E03.2 HYPOTHYROIDISM DUE TO MEDICATION: ICD-10-CM

## 2021-10-01 DIAGNOSIS — F11.90 CHRONIC, CONTINUOUS USE OF OPIOIDS: ICD-10-CM

## 2021-10-01 DIAGNOSIS — M51.369 DDD (DEGENERATIVE DISC DISEASE), LUMBAR: ICD-10-CM

## 2021-10-01 DIAGNOSIS — M51.369 DDD (DEGENERATIVE DISC DISEASE), LUMBAR: Primary | ICD-10-CM

## 2021-10-01 PROCEDURE — 99213 OFFICE O/P EST LOW 20 MIN: CPT | Mod: 95 | Performed by: FAMILY MEDICINE

## 2021-10-01 RX ORDER — OXYCODONE 18 MG/1
18 CAPSULE, EXTENDED RELEASE ORAL EVERY 12 HOURS
Qty: 60 CAPSULE | Refills: 0 | Status: SHIPPED | OUTPATIENT
Start: 2021-10-01 | End: 2021-10-04

## 2021-10-01 RX ORDER — OXYCODONE HYDROCHLORIDE 20 MG/1
10 TABLET ORAL 4 TIMES DAILY
Qty: 60 TABLET | Refills: 0 | Status: SHIPPED | OUTPATIENT
Start: 2021-10-01 | End: 2021-11-03

## 2021-10-01 ASSESSMENT — PATIENT HEALTH QUESTIONNAIRE - PHQ9
SUM OF ALL RESPONSES TO PHQ QUESTIONS 1-9: 26
10. IF YOU CHECKED OFF ANY PROBLEMS, HOW DIFFICULT HAVE THESE PROBLEMS MADE IT FOR YOU TO DO YOUR WORK, TAKE CARE OF THINGS AT HOME, OR GET ALONG WITH OTHER PEOPLE: EXTREMELY DIFFICULT
SUM OF ALL RESPONSES TO PHQ QUESTIONS 1-9: 26

## 2021-10-01 NOTE — TELEPHONE ENCOUNTER
Patient called, he had his medication check with Dr. Shaw today.    He states his Xtampza ER 18 mg is now $167 per month.    He has enough to get through the weekend, but asking if there is an alternative to this medication.    Asked patient to call another Pharmacy to see if there is any difference, he will call [facility] near by.    Routing to Dr. Shaw to review if there is an alternative.    Kailey Chung RN, [facility]

## 2021-10-01 NOTE — PROGRESS NOTES
Jhonathan is a 61 year old who is being evaluated via a billable video visit.      How would you like to obtain your AVS? MyChart  If the video visit is dropped, the invitation should be resent by: Text to cell phone: 1  Will anyone else be joining your video visit? No      Video Start Time: 0916    Assessment & Plan      Well-known severe depression.  Chronic issue.  We discussed recent PHQ-9 including some vague suicidal thoughts but patient admits that he is not going to act on this.  Certainly not since the death of his son a couple of years ago.    DDD (degenerative disc disease), lumbar  Stable on current regimen.  Continue same plan and routine follow-up.   - oxyCODONE HCl (ROXICODONE) 20 MG TABS immediate release tablet; Take 10 mg by mouth 4 times daily  - oxyCODONE (XTAMPZA ER) 18 MG 12 hr tablet; Take 1 tablet (18 mg) by mouth every 12 hours    SI (sacroiliac) joint dysfunction  As above  - oxyCODONE HCl (ROXICODONE) 20 MG TABS immediate release tablet; Take 10 mg by mouth 4 times daily  - oxyCODONE (XTAMPZA ER) 18 MG 12 hr tablet; Take 1 tablet (18 mg) by mouth every 12 hours    Chronic, continuous use of opioids  As above  - oxyCODONE HCl (ROXICODONE) 20 MG TABS immediate release tablet; Take 10 mg by mouth 4 times daily  - oxyCODONE (XTAMPZA ER) 18 MG 12 hr tablet; Take 1 tablet (18 mg) by mouth every 12 hours    Hypothyroidism due to medication  Has improved on 25 mcg but we should recheck this next month and make adjustments accordingly.  - TSH with free T4 reflex; Future       BMI:   Estimated body mass index is 42.04 kg/m  as calculated from the following:    Height as of 4/13/21: 1.829 m (6').    Weight as of 6/2/21: 140.6 kg (310 lb).   Weight management plan: Discussed healthy diet and exercise guidelines    Depression Screening Follow Up    PHQ 10/1/2021   PHQ-9 Total Score 26   Q9: Thoughts of better off dead/self-harm past 2 weeks Nearly every day   F/U: Thoughts of suicide or self-harm Yes   F/U:  Self harm-plan No   F/U: Self-harm action No   F/U: Safety concerns Yes   PHQ-9 External Data -       See Patient Instructions    Return in about 1 month (around 11/1/2021) for in office, Follow up of Chronic Issues.    Cat Shaw MD  [facility]    Amparo Ring is a 61 year old who presents for the following health issues     HPI   Video visit with patient today in follow-up of chronic back pain.  We also talked about improvement symptomatically with the thyroid supplement.  We talked about his depression and it comes and goes.  Actively treated with his psychiatrist.    Review of Systems   Constitutional, HEENT, cardiovascular, pulmonary, gi and gu systems are negative, except as otherwise noted.      Objective           Vitals:  No vitals were obtained today due to virtual visit.    Physical Exam   GENERAL: Healthy, alert and no distress  EYES: Eyes grossly normal to inspection.  No discharge or erythema, or obvious scleral/conjunctival abnormalities.  RESP: No audible wheeze, cough, or visible cyanosis.  No visible retractions or increased work of breathing.    SKIN: Visible skin clear. No significant rash, abnormal pigmentation or lesions.  NEURO: Cranial nerves grossly intact.  Mentation and speech appropriate for age.  PSYCH: Mentation appears normal, affect normal/bright, judgement and insight intact, normal speech and appearance well-groomed.    Past labs reviewed with the patient.             Video-Visit Details    Type of service:  Video Visit    Video End Time:0929    Originating Location (pt. Location): Home    Distant Location (provider location):  [facility]     Platform used for Video Visit: Doximity   Answers for HPI/ROS submitted by the patient on 10/1/2021  If you checked off any problems, how difficult have these problems made it for you to do your work, take care of things at home, or get along with other people?: Extremely  difficult  PHQ9 TOTAL SCORE: 26  Your back pain is: chronic  Where is your back pain located? : right lower back, left lower back, right buttock, left buttock, right hip, left hip, left side of waist  How would you describe your back pain? : burning, sharp, shooting, stabbing  Where does your back pain spread? : right buttocks, left buttocks  Since you noticed your back pain, how has it changed? : gradually worsening  Does your back pain interfere with your job?: Not applicable  How many servings of fruits and vegetables do you eat daily?: 0-1  On average, how many sweetened beverages do you drink each day (Examples: soda, juice, sweet tea, etc.  Do NOT count diet or artificially sweetened beverages)?: 4  How many minutes a day do you exercise enough to make your heart beat faster?: 9 or less  How many days a week do you exercise enough to make your heart beat faster?: 3 or less  How many days per week do you miss taking your medication?: 1  What makes it hard for you to take your medication every day?: remembering to take

## 2021-10-02 ASSESSMENT — PATIENT HEALTH QUESTIONNAIRE - PHQ9: SUM OF ALL RESPONSES TO PHQ QUESTIONS 1-9: 26

## 2021-10-03 NOTE — TELEPHONE ENCOUNTER
Call patient - here are some options:    1) I could try changing to oxycontin - but not sure any cheaper.    2) change back to MS contin    3) just use short acting - no long acting

## 2021-10-04 RX ORDER — MORPHINE SULFATE 15 MG/1
15 TABLET, FILM COATED, EXTENDED RELEASE ORAL EVERY 12 HOURS
Qty: 60 TABLET | Refills: 0 | Status: SHIPPED | OUTPATIENT
Start: 2021-10-04 | End: 2021-11-03

## 2021-10-04 NOTE — TELEPHONE ENCOUNTER
Spoke to patient.  He would like option #2. Go back to MS contin.    Routing to provider with pharmacy teed up    Kailey Chung RN, [facility]

## 2021-10-05 DIAGNOSIS — R11.0 NAUSEA: ICD-10-CM

## 2021-10-06 RX ORDER — ONDANSETRON 4 MG/1
TABLET, FILM COATED ORAL
Qty: 30 TABLET | Refills: 1 | Status: SHIPPED | OUTPATIENT
Start: 2021-10-06 | End: 2021-10-25

## 2021-10-31 NOTE — PROGRESS NOTES
Assessment & Plan     Hypothyroidism due to medication  Here today primarily in follow-up of his thyroid.  Was found to be hypothyroid likely related to his psychiatric medications we started supplementation a few months ago.  Felt better initially but now it seems to have worn down and we discussed that it may be time for adjustment.  So will recheck on thyroid level and more than likely increase.  - TSH with free T4 reflex    SI (sacroiliac) joint dysfunction  Continues to suffer from back and SI joint pain.  Working with multiple specialists and we will follow along  - morphine (MS CONTIN) 15 MG CR tablet; Take 1 tablet (15 mg) by mouth every 12 hours maximum 2 tablet(s) per day  - oxyCODONE HCl (ROXICODONE) 20 MG TABS immediate release tablet; Take 10 mg by mouth 4 times daily    Chronic, continuous use of opioids  Up-to-date on routine monitoring  - morphine (MS CONTIN) 15 MG CR tablet; Take 1 tablet (15 mg) by mouth every 12 hours maximum 2 tablet(s) per day  - oxyCODONE HCl (ROXICODONE) 20 MG TABS immediate release tablet; Take 10 mg by mouth 4 times daily    DDD (degenerative disc disease), lumbar  As above  - morphine (MS CONTIN) 15 MG CR tablet; Take 1 tablet (15 mg) by mouth every 12 hours maximum 2 tablet(s) per day  - oxyCODONE HCl (ROXICODONE) 20 MG TABS immediate release tablet; Take 10 mg by mouth 4 times daily    Screening for hyperlipidemia    - Lipid panel reflex to direct LDL Fasting; Future  - Lipid panel reflex to direct LDL Fasting    Trochanteric bursitis of left hip  Has had some significant pain around his left hip in the area he points to is consistent with a trochanteric bursa.  Discussed various stressors on it.  He asked about a cortisone injection and one was provided.  Aftercare discussed.  - INJECTION SINGLE TENDON ORIGIN/INSERTION  - triamcinolone (KENALOG-40) injection 40 mg         Depression Screening Follow Up    PHQ 11/3/2021   PHQ-9 Total Score 26   Q9: Thoughts of better  off dead/self-harm past 2 weeks Nearly every day   F/U: Thoughts of suicide or self-harm Yes   F/U: Self harm-plan No   F/U: Self-harm action No   F/U: Safety concerns No   PHQ-9 External Data -     Longstanding chronic issues with depression well-known to me.  Patient also has an outside psychiatrist.  Verbal contract for safety.    See Patient Instructions    Return in about 1 month (around 12/3/2021) for Follow up of Chronic Issues, In Office or Video.    Cat Shaw MD  [facility] STEPHEN Ring is a 61 year old who presents for the following health issues     History of Present Illness       Back Pain:  He presents for follow up of back pain. Patient's back pain is a chronic problem.  Location of back pain:  Right lower back, left lower back, left buttock and left hip  Description of back pain: burning, sharp and stabbing  Back pain spreads: right buttocks, left buttocks and left thigh    Since patient first noticed back pain, pain is: gradually worsening  Does back pain interfere with his job:  Not applicable      COPD:  He presents for follow up of COPD.  Overall, COPD symptoms are slightly worse since last visit. He has more than usual fatigue or shortness of breath with exertion and same as usual shortness of breath at rest.  He sometimes coughs and does not have change in sputum. No recent fever. He can walk less than 1 block without stopping to rest. He can walk 2 flights of stairs without resting.The patient has had no ED, urgent care, or hospital admissions because of COPD since the last visit.     Hypothyroidism:     Since last visit, patient describes the following symptoms::  Anxiety, Constipation, Depression, Fatigue and Tremors    Migraines:   Since the patient's last clinic visit, headaches are: worsened  The patient is getting headaches:  About 2 times a week  He is not able to do normal daily activities when he has a migraine.  The patient is taking the  "following rescue/relief medications:  Naproxyn (Aleve)   Patient states \"I get some relief\" from the rescue/relief medications.   The patient is taking the following medications to prevent migraines:  No medications to prevent migraines  In the past 4 weeks, the patient has gone to an Urgent Care or Emergency Room 0 times times due to headaches.    He eats 0-1 servings of fruits and vegetables daily.He consumes 4 sweetened beverage(s) daily.He exercises with enough effort to increase his heart rate 9 or less minutes per day.  He exercises with enough effort to increase his heart rate 3 or less days per week.   He is taking medications regularly.       Here today in follow-up of thyroid.  Time to recheck and make adjustments accordingly.  Still dealing with lots of back issues and now some left hip pain and is hoping for cortisone injection.    Review of Systems   Constitutional, HEENT, cardiovascular, pulmonary, gi and gu systems are negative, except as otherwise noted.      Objective    /79 (BP Location: Right arm, Patient Position: Sitting, Cuff Size: Adult Large)   Pulse 96   Temp 98.5  F (36.9  C) (Oral)   Resp 18   Ht 1.829 m (6')   Wt 140.8 kg (310 lb 4.8 oz)   SpO2 95%   BMI 42.08 kg/m    Body mass index is 42.08 kg/m .  Physical Exam   Alert, pleasant, upbeat, and in no apparent discomfort.  S1 and S2 normal, no murmurs, clicks, gallops or rubs. Regular rate and rhythm. Chest is clear; no wheezes or rales. No edema or JVD.   ++ tenderness to palpation directly over the left trochanteric bursal region  Past labs reviewed with the patient.     The risks and benefits, as well as expected effect, of tendon injection was discussed with patient and he agrees to proceed.  After identifying the area of tenderness the area was prepped clean with alcohol.  The skin was anesthetized with 1% lidocaine.  The tendon sheath was injected with 10 cc of a 9:1 mixture of lidocaine 1% and kenalog 40.  Pain " experienced initially, followed by pain relief.  Bandaid. No complications.             Answers for HPI/ROS submitted by the patient on 11/3/2021  If you checked off any problems, how difficult have these problems made it for you to do your work, take care of things at home, or get along with other people?: Extremely difficult  PHQ9 TOTAL SCORE: 26

## 2021-11-03 ENCOUNTER — OFFICE VISIT (OUTPATIENT)
Dept: FAMILY MEDICINE | Facility: CLINIC | Age: 61
End: 2021-11-03
Payer: COMMERCIAL

## 2021-11-03 VITALS
HEART RATE: 96 BPM | TEMPERATURE: 98.5 F | RESPIRATION RATE: 18 BRPM | HEIGHT: 72 IN | OXYGEN SATURATION: 95 % | WEIGHT: 310.3 LBS | DIASTOLIC BLOOD PRESSURE: 79 MMHG | SYSTOLIC BLOOD PRESSURE: 135 MMHG | BODY MASS INDEX: 42.03 KG/M2

## 2021-11-03 DIAGNOSIS — E03.2 HYPOTHYROIDISM DUE TO MEDICATION: Primary | ICD-10-CM

## 2021-11-03 DIAGNOSIS — M51.369 DDD (DEGENERATIVE DISC DISEASE), LUMBAR: ICD-10-CM

## 2021-11-03 DIAGNOSIS — M70.62 TROCHANTERIC BURSITIS OF LEFT HIP: ICD-10-CM

## 2021-11-03 DIAGNOSIS — F11.90 CHRONIC, CONTINUOUS USE OF OPIOIDS: ICD-10-CM

## 2021-11-03 DIAGNOSIS — M53.3 SI (SACROILIAC) JOINT DYSFUNCTION: ICD-10-CM

## 2021-11-03 DIAGNOSIS — R51.9 BAD HEADACHE: ICD-10-CM

## 2021-11-03 DIAGNOSIS — Z13.220 SCREENING FOR HYPERLIPIDEMIA: ICD-10-CM

## 2021-11-03 LAB
CHOLEST SERPL-MCNC: 140 MG/DL
FASTING STATUS PATIENT QL REPORTED: YES
HDLC SERPL-MCNC: 25 MG/DL
LDLC SERPL CALC-MCNC: 39 MG/DL
NONHDLC SERPL-MCNC: 115 MG/DL
TRIGL SERPL-MCNC: 379 MG/DL
TSH SERPL DL<=0.005 MIU/L-ACNC: 3.3 MU/L (ref 0.4–4)

## 2021-11-03 PROCEDURE — 20610 DRAIN/INJ JOINT/BURSA W/O US: CPT | Performed by: FAMILY MEDICINE

## 2021-11-03 PROCEDURE — 99214 OFFICE O/P EST MOD 30 MIN: CPT | Mod: 25 | Performed by: FAMILY MEDICINE

## 2021-11-03 PROCEDURE — 36415 COLL VENOUS BLD VENIPUNCTURE: CPT | Performed by: FAMILY MEDICINE

## 2021-11-03 PROCEDURE — 80061 LIPID PANEL: CPT | Performed by: FAMILY MEDICINE

## 2021-11-03 PROCEDURE — 84443 ASSAY THYROID STIM HORMONE: CPT | Performed by: FAMILY MEDICINE

## 2021-11-03 RX ORDER — MORPHINE SULFATE 15 MG/1
15 TABLET, FILM COATED, EXTENDED RELEASE ORAL EVERY 12 HOURS
Qty: 60 TABLET | Refills: 0 | Status: SHIPPED | OUTPATIENT
Start: 2021-11-03 | End: 2021-11-29

## 2021-11-03 RX ORDER — NAPROXEN SODIUM 550 MG/1
550 TABLET ORAL 2 TIMES DAILY PRN
Qty: 60 TABLET | Refills: 2 | Status: SHIPPED | OUTPATIENT
Start: 2021-11-03

## 2021-11-03 RX ORDER — TRIAMCINOLONE ACETONIDE 40 MG/ML
40 INJECTION, SUSPENSION INTRA-ARTICULAR; INTRAMUSCULAR ONCE
Status: COMPLETED | OUTPATIENT
Start: 2021-11-03 | End: 2021-11-03

## 2021-11-03 RX ORDER — OXYCODONE HYDROCHLORIDE 20 MG/1
10 TABLET ORAL 4 TIMES DAILY
Qty: 60 TABLET | Refills: 0 | Status: SHIPPED | OUTPATIENT
Start: 2021-11-03 | End: 2021-11-29

## 2021-11-03 RX ADMIN — TRIAMCINOLONE ACETONIDE 40 MG: 40 INJECTION, SUSPENSION INTRA-ARTICULAR; INTRAMUSCULAR at 11:53

## 2021-11-03 ASSESSMENT — PATIENT HEALTH QUESTIONNAIRE - PHQ9
10. IF YOU CHECKED OFF ANY PROBLEMS, HOW DIFFICULT HAVE THESE PROBLEMS MADE IT FOR YOU TO DO YOUR WORK, TAKE CARE OF THINGS AT HOME, OR GET ALONG WITH OTHER PEOPLE: EXTREMELY DIFFICULT
SUM OF ALL RESPONSES TO PHQ QUESTIONS 1-9: 26
SUM OF ALL RESPONSES TO PHQ QUESTIONS 1-9: 26

## 2021-11-03 ASSESSMENT — MIFFLIN-ST. JEOR: SCORE: 2260.51

## 2021-11-03 ASSESSMENT — PAIN SCALES - GENERAL: PAINLEVEL: EXTREME PAIN (8)

## 2021-11-03 NOTE — RESULT ENCOUNTER NOTE
Oleg,  Your thyroid test was normal.    Your cholesterol panel is normal except for an elevated triglyceride level and low HDL (good cholesterol) level. Aerobic exercise, weight loss and moderating your carbohydrate intake (especially sugars and alcohol) will all help to lower the triglyceride level. Aerobic exercise is the best way to increase the HDL (good cholesterol).    You may also benefit from starting a cholesterol medication to reduce your cardiovascular risks. Please follow-up with Dr. Shaw to address this further.   Please MyChart or call if you have any concerns or questions.   Sincerely,  Brina Adan MD  (for Dr. Shaw who is out of the office today)

## 2021-11-04 ASSESSMENT — PATIENT HEALTH QUESTIONNAIRE - PHQ9: SUM OF ALL RESPONSES TO PHQ QUESTIONS 1-9: 26

## 2021-11-08 DIAGNOSIS — F33.2 MAJOR DEPRESSIVE DISORDER, RECURRENT, SEVERE WITHOUT PSYCHOTIC FEATURES (H): ICD-10-CM

## 2021-11-10 RX ORDER — ESCITALOPRAM OXALATE 20 MG/1
TABLET ORAL
Qty: 90 TABLET | Refills: 0 | Status: SHIPPED | OUTPATIENT
Start: 2021-11-10 | End: 2022-01-26

## 2021-11-10 NOTE — TELEPHONE ENCOUNTER
"Requested Prescriptions   Pending Prescriptions Disp Refills    escitalopram (LEXAPRO) 20 MG tablet [Pharmacy Med Name: ESCITALOPRAM OXALATE 20 MG Tablet] 90 tablet 1     Sig: TAKE 1 TABLET EVERY DAY        SSRIs Protocol Failed - 11/8/2021  7:22 PM        Failed - PHQ-9 score less than 5 in past 6 months     Please review last PHQ-9 score.           Passed - Medication is active on med list        Passed - Patient is age 18 or older        Passed - Recent (6 mo) or future (30 days) visit within the authorizing provider's specialty     Patient had office visit in the last 6 months or has a visit in the next 30 days with authorizing provider or within the authorizing provider's specialty.  See \"Patient Info\" tab in inbasket, or \"Choose Columns\" in Meds & Orders section of the refill encounter.                  "

## 2021-11-29 ENCOUNTER — VIRTUAL VISIT (OUTPATIENT)
Dept: FAMILY MEDICINE | Facility: CLINIC | Age: 61
End: 2021-11-29
Payer: COMMERCIAL

## 2021-11-29 DIAGNOSIS — F33.2 MAJOR DEPRESSIVE DISORDER, RECURRENT, SEVERE WITHOUT PSYCHOTIC FEATURES (H): ICD-10-CM

## 2021-11-29 DIAGNOSIS — F11.90 CHRONIC, CONTINUOUS USE OF OPIOIDS: ICD-10-CM

## 2021-11-29 DIAGNOSIS — M51.369 DDD (DEGENERATIVE DISC DISEASE), LUMBAR: Primary | ICD-10-CM

## 2021-11-29 DIAGNOSIS — M53.3 SI (SACROILIAC) JOINT DYSFUNCTION: ICD-10-CM

## 2021-11-29 PROCEDURE — 99213 OFFICE O/P EST LOW 20 MIN: CPT | Mod: 95 | Performed by: FAMILY MEDICINE

## 2021-11-29 RX ORDER — OXYCODONE HYDROCHLORIDE 20 MG/1
10 TABLET ORAL 4 TIMES DAILY
Qty: 60 TABLET | Refills: 0 | Status: SHIPPED | OUTPATIENT
Start: 2021-11-29 | End: 2021-12-27

## 2021-11-29 RX ORDER — MORPHINE SULFATE 15 MG/1
15 TABLET, FILM COATED, EXTENDED RELEASE ORAL EVERY 12 HOURS
Qty: 60 TABLET | Refills: 0 | Status: SHIPPED | OUTPATIENT
Start: 2021-11-29 | End: 2021-12-27

## 2021-11-29 ASSESSMENT — PATIENT HEALTH QUESTIONNAIRE - PHQ9
SUM OF ALL RESPONSES TO PHQ QUESTIONS 1-9: 22
10. IF YOU CHECKED OFF ANY PROBLEMS, HOW DIFFICULT HAVE THESE PROBLEMS MADE IT FOR YOU TO DO YOUR WORK, TAKE CARE OF THINGS AT HOME, OR GET ALONG WITH OTHER PEOPLE: VERY DIFFICULT
SUM OF ALL RESPONSES TO PHQ QUESTIONS 1-9: 22

## 2021-11-29 NOTE — PROGRESS NOTES
Jhonathan is a 61 year old who is being evaluated via a billable video visit.      How would you like to obtain your AVS? MyChart  If the video visit is dropped, the invitation should be resent by: Text to cell phone: 1  Will anyone else be joining your video visit? No      Video Start Time: 1050    Assessment & Plan     DDD (degenerative disc disease), lumbar  Stable and continuing to follow.  Symptoms are worsening and he has upcoming follow-ups with his surgeon on call treatments.  Hoping that another surgery is not needed but time will tell.  - oxyCODONE HCl (ROXICODONE) 20 MG TABS immediate release tablet; Take 10 mg by mouth 4 times daily  - morphine (MS CONTIN) 15 MG CR tablet; Take 1 tablet (15 mg) by mouth every 12 hours maximum 2 tablet(s) per day    SI (sacroiliac) joint dysfunction  As above  - oxyCODONE HCl (ROXICODONE) 20 MG TABS immediate release tablet; Take 10 mg by mouth 4 times daily  - morphine (MS CONTIN) 15 MG CR tablet; Take 1 tablet (15 mg) by mouth every 12 hours maximum 2 tablet(s) per day    Chronic, continuous use of opioids  Stable and up-to-date on routine monitoring  - oxyCODONE HCl (ROXICODONE) 20 MG TABS immediate release tablet; Take 10 mg by mouth 4 times daily  - morphine (MS CONTIN) 15 MG CR tablet; Take 1 tablet (15 mg) by mouth every 12 hours maximum 2 tablet(s) per day    Major depressive disorder, recurrent, severe without psychotic features (H)  Longstanding severe depression well-known to me.  Patient has chronically had passive suicidal ideation we have discussed.  Seeing an outside psychiatrist as well.         Depression Screening Follow Up    PHQ 11/29/2021   PHQ-9 Total Score 22   Q9: Thoughts of better off dead/self-harm past 2 weeks Nearly every day   F/U: Thoughts of suicide or self-harm No   F/U: Self harm-plan -   F/U: Self-harm action -   F/U: Safety concerns No   PHQ-9 External Data -       See Patient Instructions    Return in about 1 month (around 12/29/2021) for  Follow up of Chronic Issues, In Office or Video.    Cat Shaw MD  [facility]    Amparo Ring is a 61 year old who presents for the following health issues     HPI     Visit with patient today in follow-up of chronic back pain.  Things are about the same.  No better and perhaps a little bit worse.  Upcoming specialty appointments.    Review of Systems   Constitutional, HEENT, cardiovascular, pulmonary, gi and gu systems are negative, except as otherwise noted.      Objective           Vitals:  No vitals were obtained today due to virtual visit.    Physical Exam   GENERAL: Healthy, alert and no distress  EYES: Eyes grossly normal to inspection.  No discharge or erythema, or obvious scleral/conjunctival abnormalities.  RESP: No audible wheeze, cough, or visible cyanosis.  No visible retractions or increased work of breathing.    SKIN: Visible skin clear. No significant rash, abnormal pigmentation or lesions.  NEURO: Cranial nerves grossly intact.  Mentation and speech appropriate for age.  PSYCH: Mentation appears normal, affect normal/bright, judgement and insight intact, normal speech and appearance well-groomed.    Past labs reviewed with the patient.             Video-Visit Details    Type of service:  Video Visit    Video End Time:1056    Originating Location (pt. Location): Home    Distant Location (provider location):  [facility]     Platform used for Video Visit: Doximity   Answers for HPI/ROS submitted by the patient on 11/29/2021  If you checked off any problems, how difficult have these problems made it for you to do your work, take care of things at home, or get along with other people?: Very difficult  PHQ9 TOTAL SCORE: 22  Your back pain is: chronic  Where is your back pain located? : right lower back, left lower back, right buttock, left buttock, right hip, left hip  How would you describe your back pain? : burning, sharp, stabbing  Where  does your back pain spread? : right buttocks, left buttocks, right thigh, left thigh  Since you noticed your back pain, how has it changed? : rapidly worsening  Does your back pain interfere with your job?: Not applicable  How many servings of fruits and vegetables do you eat daily?: 0-1  On average, how many sweetened beverages do you drink each day (Examples: soda, juice, sweet tea, etc.  Do NOT count diet or artificially sweetened beverages)?: 4  How many minutes a day do you exercise enough to make your heart beat faster?: 9 or less  How many days a week do you exercise enough to make your heart beat faster?: 3 or less  How many days per week do you miss taking your medication?: 1  What makes it hard for you to take your medication every day?: remembering to take

## 2021-11-30 ASSESSMENT — PATIENT HEALTH QUESTIONNAIRE - PHQ9: SUM OF ALL RESPONSES TO PHQ QUESTIONS 1-9: 22

## 2021-12-01 ENCOUNTER — TRANSFERRED RECORDS (OUTPATIENT)
Dept: HEALTH INFORMATION MANAGEMENT | Facility: CLINIC | Age: 61
End: 2021-12-01
Payer: COMMERCIAL

## 2021-12-06 ENCOUNTER — TELEPHONE (OUTPATIENT)
Dept: FAMILY MEDICINE | Facility: CLINIC | Age: 61
End: 2021-12-06
Payer: COMMERCIAL

## 2021-12-06 DIAGNOSIS — G47.00 INSOMNIA, UNSPECIFIED TYPE: ICD-10-CM

## 2021-12-06 RX ORDER — ZOLPIDEM TARTRATE 10 MG/1
10 TABLET ORAL AT BEDTIME
Qty: 30 TABLET | Refills: 2 | Status: SHIPPED | OUTPATIENT
Start: 2021-12-06 | End: 2021-12-27

## 2021-12-06 NOTE — TELEPHONE ENCOUNTER
zolpidem (AMBIEN) 10 MG tablet (Discontinued) 60 tablet 5 9/10/2021 9/17/2021     Pharmacy is requesting new Rx for patient. This medication was discontinued. Please advise.

## 2021-12-07 DIAGNOSIS — N40.1 BENIGN PROSTATIC HYPERPLASIA WITH LOWER URINARY TRACT SYMPTOMS, SYMPTOM DETAILS UNSPECIFIED: ICD-10-CM

## 2021-12-07 RX ORDER — TAMSULOSIN HYDROCHLORIDE 0.4 MG/1
0.8 CAPSULE ORAL AT BEDTIME
Qty: 180 CAPSULE | Refills: 3 | Status: CANCELLED | OUTPATIENT
Start: 2021-12-07

## 2021-12-07 NOTE — TELEPHONE ENCOUNTER
Patient called stating he would like the prescription sent to [facility] in [place], he will run out of medication before he gets it from [facility]

## 2021-12-08 NOTE — TELEPHONE ENCOUNTER
Left message on pt vm that pharmacy already has refills available for the prescription he requested and he should contact them.  Nidhi ESPINALN, RN

## 2021-12-27 DIAGNOSIS — G47.00 INSOMNIA, UNSPECIFIED TYPE: ICD-10-CM

## 2021-12-27 DIAGNOSIS — M51.369 DDD (DEGENERATIVE DISC DISEASE), LUMBAR: ICD-10-CM

## 2021-12-27 DIAGNOSIS — F11.90 CHRONIC, CONTINUOUS USE OF OPIOIDS: ICD-10-CM

## 2021-12-27 DIAGNOSIS — M53.3 SI (SACROILIAC) JOINT DYSFUNCTION: ICD-10-CM

## 2021-12-27 RX ORDER — MORPHINE SULFATE 15 MG/1
15 TABLET, FILM COATED, EXTENDED RELEASE ORAL EVERY 12 HOURS
Qty: 60 TABLET | Refills: 0 | Status: SHIPPED | OUTPATIENT
Start: 2021-12-27

## 2021-12-27 RX ORDER — ZOLPIDEM TARTRATE 10 MG/1
10 TABLET ORAL AT BEDTIME
Qty: 30 TABLET | Refills: 2 | Status: SHIPPED | OUTPATIENT
Start: 2021-12-27

## 2021-12-27 RX ORDER — OXYCODONE HYDROCHLORIDE 20 MG/1
10 TABLET ORAL 4 TIMES DAILY
Qty: 60 TABLET | Refills: 0 | Status: SHIPPED | OUTPATIENT
Start: 2021-12-27

## 2022-01-13 ENCOUNTER — TRANSFERRED RECORDS (OUTPATIENT)
Dept: HEALTH INFORMATION MANAGEMENT | Facility: CLINIC | Age: 62
End: 2022-01-13
Payer: COMMERCIAL

## 2022-01-18 ENCOUNTER — TRANSFERRED RECORDS (OUTPATIENT)
Dept: HEALTH INFORMATION MANAGEMENT | Facility: CLINIC | Age: 62
End: 2022-01-18
Payer: COMMERCIAL

## 2022-01-25 DIAGNOSIS — F33.2 MAJOR DEPRESSIVE DISORDER, RECURRENT, SEVERE WITHOUT PSYCHOTIC FEATURES (H): ICD-10-CM

## 2022-01-26 RX ORDER — ESCITALOPRAM OXALATE 20 MG/1
TABLET ORAL
Qty: 90 TABLET | Refills: 0 | Status: SHIPPED | OUTPATIENT
Start: 2022-01-26

## 2022-01-26 NOTE — TELEPHONE ENCOUNTER
"Requested Prescriptions   Pending Prescriptions Disp Refills    escitalopram (LEXAPRO) 20 MG tablet [Pharmacy Med Name: ESCITALOPRAM OXALATE 20 MG Tablet] 90 tablet 0     Sig: TAKE 1 TABLET EVERY DAY        SSRIs Protocol Failed - 1/25/2022  8:40 PM        Failed - PHQ-9 score less than 5 in past 6 months     Please review last PHQ-9 score.           Passed - Medication is active on med list        Passed - Patient is age 18 or older        Passed - Recent (6 mo) or future (30 days) visit within the authorizing provider's specialty     Patient had office visit in the last 6 months or has a visit in the next 30 days with authorizing provider or within the authorizing provider's specialty.  See \"Patient Info\" tab in inbasket, or \"Choose Columns\" in Meds & Orders section of the refill encounter.                  "

## 2022-02-09 ENCOUNTER — TRANSFERRED RECORDS (OUTPATIENT)
Dept: HEALTH INFORMATION MANAGEMENT | Facility: CLINIC | Age: 62
End: 2022-02-09
Payer: COMMERCIAL

## 2022-02-18 DIAGNOSIS — R11.0 NAUSEA: ICD-10-CM

## 2022-02-21 RX ORDER — ONDANSETRON 4 MG/1
TABLET, FILM COATED ORAL
Qty: 30 TABLET | Refills: 1 | Status: SHIPPED | OUTPATIENT
Start: 2022-02-21

## 2022-02-21 NOTE — TELEPHONE ENCOUNTER
Prescription approved per [facility] Refill Protocol.  Sulema Desai RN, BSN   [facility] [place]

## 2022-02-25 ENCOUNTER — TRANSFERRED RECORDS (OUTPATIENT)
Dept: HEALTH INFORMATION MANAGEMENT | Facility: CLINIC | Age: 62
End: 2022-02-25
Payer: COMMERCIAL

## 2022-03-02 ENCOUNTER — TRANSFERRED RECORDS (OUTPATIENT)
Dept: HEALTH INFORMATION MANAGEMENT | Facility: CLINIC | Age: 62
End: 2022-03-02
Payer: COMMERCIAL

## 2022-03-23 ENCOUNTER — TRANSFERRED RECORDS (OUTPATIENT)
Dept: HEALTH INFORMATION MANAGEMENT | Facility: CLINIC | Age: 62
End: 2022-03-23
Payer: COMMERCIAL

## 2022-03-29 ENCOUNTER — TRANSFERRED RECORDS (OUTPATIENT)
Dept: HEALTH INFORMATION MANAGEMENT | Facility: CLINIC | Age: 62
End: 2022-03-29
Payer: COMMERCIAL

## 2022-04-05 ENCOUNTER — TRANSFERRED RECORDS (OUTPATIENT)
Dept: HEALTH INFORMATION MANAGEMENT | Facility: CLINIC | Age: 62
End: 2022-04-05
Payer: COMMERCIAL

## 2022-04-17 ENCOUNTER — HEALTH MAINTENANCE LETTER (OUTPATIENT)
Age: 62
End: 2022-04-17

## 2022-04-26 ENCOUNTER — TRANSFERRED RECORDS (OUTPATIENT)
Dept: HEALTH INFORMATION MANAGEMENT | Facility: CLINIC | Age: 62
End: 2022-04-26
Payer: COMMERCIAL

## 2022-05-12 ENCOUNTER — TRANSFERRED RECORDS (OUTPATIENT)
Dept: HEALTH INFORMATION MANAGEMENT | Facility: CLINIC | Age: 62
End: 2022-05-12
Payer: COMMERCIAL

## 2022-05-23 ENCOUNTER — TRANSFERRED RECORDS (OUTPATIENT)
Dept: HEALTH INFORMATION MANAGEMENT | Facility: CLINIC | Age: 62
End: 2022-05-23
Payer: COMMERCIAL

## 2022-05-31 ENCOUNTER — TRANSFERRED RECORDS (OUTPATIENT)
Dept: HEALTH INFORMATION MANAGEMENT | Facility: CLINIC | Age: 62
End: 2022-05-31
Payer: COMMERCIAL

## 2022-06-15 ENCOUNTER — TRANSFERRED RECORDS (OUTPATIENT)
Dept: HEALTH INFORMATION MANAGEMENT | Facility: CLINIC | Age: 62
End: 2022-06-15

## 2022-07-06 ENCOUNTER — TRANSFERRED RECORDS (OUTPATIENT)
Dept: HEALTH INFORMATION MANAGEMENT | Facility: CLINIC | Age: 62
End: 2022-07-06

## 2022-08-02 ENCOUNTER — TRANSFERRED RECORDS (OUTPATIENT)
Dept: HEALTH INFORMATION MANAGEMENT | Facility: CLINIC | Age: 62
End: 2022-08-02

## 2022-08-16 ENCOUNTER — TRANSFERRED RECORDS (OUTPATIENT)
Dept: HEALTH INFORMATION MANAGEMENT | Facility: CLINIC | Age: 62
End: 2022-08-16

## 2022-08-31 ENCOUNTER — TRANSFERRED RECORDS (OUTPATIENT)
Dept: HEALTH INFORMATION MANAGEMENT | Facility: CLINIC | Age: 62
End: 2022-08-31

## 2022-09-01 DIAGNOSIS — M51.369 DDD (DEGENERATIVE DISC DISEASE), LUMBAR: ICD-10-CM

## 2022-09-02 RX ORDER — CYCLOBENZAPRINE HCL 10 MG
TABLET ORAL
Qty: 90 TABLET | Refills: 0 | Status: SHIPPED | OUTPATIENT
Start: 2022-09-02

## 2022-09-07 ENCOUNTER — TRANSFERRED RECORDS (OUTPATIENT)
Dept: HEALTH INFORMATION MANAGEMENT | Facility: CLINIC | Age: 62
End: 2022-09-07

## 2022-09-09 ENCOUNTER — TRANSFERRED RECORDS (OUTPATIENT)
Dept: HEALTH INFORMATION MANAGEMENT | Facility: CLINIC | Age: 62
End: 2022-09-09

## 2022-09-20 ENCOUNTER — TRANSFERRED RECORDS (OUTPATIENT)
Dept: HEALTH INFORMATION MANAGEMENT | Facility: CLINIC | Age: 62
End: 2022-09-20

## 2022-10-04 PROBLEM — R11.2 INTRACTABLE VOMITING WITH NAUSEA: Status: ACTIVE | Noted: 2018-10-25

## 2022-10-13 ENCOUNTER — TRANSFERRED RECORDS (OUTPATIENT)
Dept: HEALTH INFORMATION MANAGEMENT | Facility: CLINIC | Age: 62
End: 2022-10-13

## 2022-10-23 ENCOUNTER — HEALTH MAINTENANCE LETTER (OUTPATIENT)
Age: 62
End: 2022-10-23

## 2022-12-27 ENCOUNTER — TRANSFERRED RECORDS (OUTPATIENT)
Dept: HEALTH INFORMATION MANAGEMENT | Facility: CLINIC | Age: 62
End: 2022-12-27

## 2023-02-02 ENCOUNTER — TRANSFERRED RECORDS (OUTPATIENT)
Dept: HEALTH INFORMATION MANAGEMENT | Facility: CLINIC | Age: 63
End: 2023-02-02

## 2023-02-27 ENCOUNTER — TRANSFERRED RECORDS (OUTPATIENT)
Dept: HEALTH INFORMATION MANAGEMENT | Facility: CLINIC | Age: 63
End: 2023-02-27

## 2023-03-27 ENCOUNTER — TRANSFERRED RECORDS (OUTPATIENT)
Dept: HEALTH INFORMATION MANAGEMENT | Facility: CLINIC | Age: 63
End: 2023-03-27

## 2023-04-17 ENCOUNTER — TRANSFERRED RECORDS (OUTPATIENT)
Dept: HEALTH INFORMATION MANAGEMENT | Facility: CLINIC | Age: 63
End: 2023-04-17
Payer: COMMERCIAL

## 2023-04-25 ENCOUNTER — TRANSFERRED RECORDS (OUTPATIENT)
Dept: HEALTH INFORMATION MANAGEMENT | Facility: CLINIC | Age: 63
End: 2023-04-25
Payer: COMMERCIAL

## 2023-05-15 ENCOUNTER — TRANSFERRED RECORDS (OUTPATIENT)
Dept: HEALTH INFORMATION MANAGEMENT | Facility: CLINIC | Age: 63
End: 2023-05-15
Payer: COMMERCIAL

## 2023-06-01 ENCOUNTER — HEALTH MAINTENANCE LETTER (OUTPATIENT)
Age: 63
End: 2023-06-01

## 2023-06-01 ENCOUNTER — TRANSFERRED RECORDS (OUTPATIENT)
Dept: HEALTH INFORMATION MANAGEMENT | Facility: CLINIC | Age: 63
End: 2023-06-01
Payer: COMMERCIAL

## 2023-07-19 ENCOUNTER — TRANSFERRED RECORDS (OUTPATIENT)
Dept: HEALTH INFORMATION MANAGEMENT | Facility: CLINIC | Age: 63
End: 2023-07-19
Payer: COMMERCIAL

## 2023-08-08 NOTE — ANESTHESIA CARE TRANSFER NOTE
Patient: Oleg Ford    Procedure(s):  COMBINED ESOPHAGOSCOPY, GASTROSCOPY, DUODENOSCOPY (EGD) WITH CO2 INSUFFLATION  COMBINED ESOPHAGOSCOPY, GASTROSCOPY, DUODENOSCOPY (EGD), BIOPSY SINGLE OR MULTIPLE    Diagnosis: EGD, [place], Intractable vomiting with nausea, unspecified vomiting type  Weight loss, BMI 38.05  Diagnosis Additional Information: No value filed.    Anesthesia Type:   No value filed.     Note:  Airway :Room Air  Patient transferred to:Phase II        Vitals: (Last set prior to Anesthesia Care Transfer)    [facility] VITALS  11/14/2018 0829 - 11/14/2018 0929      11/14/2018             Pulse: 66    SpO2: 98 %                Electronically Signed By: LIBBY Villar CRNA  November 14, 2018  9:00 AM   Duration Of Freeze Thaw-Cycle (Seconds): 5 Number Of Freeze-Thaw Cycles: 1 freeze-thaw cycle Show Aperture Variable?: Yes Detail Level: Simple Render Note In Bullet Format When Appropriate: No Post-Care Instructions: I reviewed with the patient in detail post-care instructions. Patient is to wear sunprotection, and avoid picking at any of the treated lesions. Pt may apply Vaseline to crusted or scabbing areas. Consent: The patient's consent was obtained including but not limited to risks of crusting, scabbing, blistering, scarring, darker or lighter pigmentary change, recurrence, incomplete removal and infection.

## 2023-09-06 ENCOUNTER — TRANSFERRED RECORDS (OUTPATIENT)
Dept: HEALTH INFORMATION MANAGEMENT | Facility: CLINIC | Age: 63
End: 2023-09-06
Payer: COMMERCIAL

## 2023-10-31 ENCOUNTER — TRANSFERRED RECORDS (OUTPATIENT)
Dept: HEALTH INFORMATION MANAGEMENT | Facility: CLINIC | Age: 63
End: 2023-10-31
Payer: COMMERCIAL

## 2023-11-27 ENCOUNTER — TRANSFERRED RECORDS (OUTPATIENT)
Dept: HEALTH INFORMATION MANAGEMENT | Facility: CLINIC | Age: 63
End: 2023-11-27
Payer: COMMERCIAL

## 2023-12-19 ENCOUNTER — TRANSFERRED RECORDS (OUTPATIENT)
Dept: HEALTH INFORMATION MANAGEMENT | Facility: CLINIC | Age: 63
End: 2023-12-19
Payer: COMMERCIAL

## 2024-02-27 ENCOUNTER — TRANSFERRED RECORDS (OUTPATIENT)
Dept: HEALTH INFORMATION MANAGEMENT | Facility: CLINIC | Age: 64
End: 2024-02-27
Payer: COMMERCIAL

## 2024-04-23 ENCOUNTER — TRANSFERRED RECORDS (OUTPATIENT)
Dept: HEALTH INFORMATION MANAGEMENT | Facility: CLINIC | Age: 64
End: 2024-04-23
Payer: COMMERCIAL

## 2024-06-08 ENCOUNTER — HEALTH MAINTENANCE LETTER (OUTPATIENT)
Age: 64
End: 2024-06-08

## 2024-06-17 PROBLEM — Z71.89 ADVANCED DIRECTIVES, COUNSELING/DISCUSSION: Status: RESOLVED | Noted: 2018-01-24 | Resolved: 2024-06-17

## 2024-07-08 ENCOUNTER — TRANSFERRED RECORDS (OUTPATIENT)
Dept: HEALTH INFORMATION MANAGEMENT | Facility: CLINIC | Age: 64
End: 2024-07-08
Payer: COMMERCIAL

## 2024-07-29 LAB — PHQ9 SCORE: 27

## 2024-08-07 ENCOUNTER — TRANSFERRED RECORDS (OUTPATIENT)
Dept: HEALTH INFORMATION MANAGEMENT | Facility: CLINIC | Age: 64
End: 2024-08-07
Payer: COMMERCIAL

## 2024-08-07 LAB — PHQ9 SCORE: 27

## 2024-09-05 ENCOUNTER — TRANSFERRED RECORDS (OUTPATIENT)
Dept: HEALTH INFORMATION MANAGEMENT | Facility: CLINIC | Age: 64
End: 2024-09-05
Payer: COMMERCIAL

## 2024-09-12 ENCOUNTER — TRANSFERRED RECORDS (OUTPATIENT)
Dept: HEALTH INFORMATION MANAGEMENT | Facility: CLINIC | Age: 64
End: 2024-09-12
Payer: COMMERCIAL

## 2024-10-02 ENCOUNTER — TRANSFERRED RECORDS (OUTPATIENT)
Dept: HEALTH INFORMATION MANAGEMENT | Facility: CLINIC | Age: 64
End: 2024-10-02
Payer: COMMERCIAL

## 2025-02-13 ENCOUNTER — TRANSFERRED RECORDS (OUTPATIENT)
Dept: HEALTH INFORMATION MANAGEMENT | Facility: CLINIC | Age: 65
End: 2025-02-13
Payer: COMMERCIAL

## 2025-04-01 ENCOUNTER — TRANSFERRED RECORDS (OUTPATIENT)
Dept: HEALTH INFORMATION MANAGEMENT | Facility: CLINIC | Age: 65
End: 2025-04-01
Payer: COMMERCIAL

## 2025-04-16 ENCOUNTER — TRANSFERRED RECORDS (OUTPATIENT)
Dept: HEALTH INFORMATION MANAGEMENT | Facility: CLINIC | Age: 65
End: 2025-04-16
Payer: COMMERCIAL

## 2025-04-24 ENCOUNTER — TRANSFERRED RECORDS (OUTPATIENT)
Dept: HEALTH INFORMATION MANAGEMENT | Facility: CLINIC | Age: 65
End: 2025-04-24
Payer: COMMERCIAL

## 2025-05-07 ENCOUNTER — TRANSFERRED RECORDS (OUTPATIENT)
Dept: HEALTH INFORMATION MANAGEMENT | Facility: CLINIC | Age: 65
End: 2025-05-07
Payer: COMMERCIAL

## 2025-05-15 ENCOUNTER — TRANSFERRED RECORDS (OUTPATIENT)
Dept: HEALTH INFORMATION MANAGEMENT | Facility: CLINIC | Age: 65
End: 2025-05-15
Payer: COMMERCIAL

## 2025-07-01 ENCOUNTER — TRANSFERRED RECORDS (OUTPATIENT)
Dept: HEALTH INFORMATION MANAGEMENT | Facility: CLINIC | Age: 65
End: 2025-07-01
Payer: COMMERCIAL

## 2025-07-17 ENCOUNTER — TRANSFERRED RECORDS (OUTPATIENT)
Dept: HEALTH INFORMATION MANAGEMENT | Facility: CLINIC | Age: 65
End: 2025-07-17
Payer: COMMERCIAL

## 2025-07-28 ENCOUNTER — TRANSFERRED RECORDS (OUTPATIENT)
Dept: HEALTH INFORMATION MANAGEMENT | Facility: CLINIC | Age: 65
End: 2025-07-28
Payer: COMMERCIAL

## 2025-08-05 ENCOUNTER — TRANSFERRED RECORDS (OUTPATIENT)
Dept: HEALTH INFORMATION MANAGEMENT | Facility: CLINIC | Age: 65
End: 2025-08-05
Payer: COMMERCIAL

## 2025-08-20 ENCOUNTER — TRANSFERRED RECORDS (OUTPATIENT)
Dept: HEALTH INFORMATION MANAGEMENT | Facility: CLINIC | Age: 65
End: 2025-08-20
Payer: COMMERCIAL

## (undated) DEVICE — BLADE KNIFE SURG 11 371111

## (undated) DEVICE — SU VICRYL 2-0 CT-2 CR 8X18" J726D

## (undated) DEVICE — Device

## (undated) DEVICE — DRAPE MAYO STAND 23X54 8337

## (undated) DEVICE — LINEN ORTHO ACL PACK 5447

## (undated) DEVICE — PREP DURAPREP 26ML APL 8630

## (undated) DEVICE — DRAPE C-ARM 60X42" 1013

## (undated) DEVICE — DRAPE STERI TOWEL LG 1010

## (undated) DEVICE — DRAPE SLEEVE 599

## (undated) DEVICE — SOL WATER IRRIG 1000ML BOTTLE 07139-09

## (undated) DEVICE — ESU GROUND PAD ADULT W/CORD E7507

## (undated) DEVICE — GLOVE PROTEXIS POWDER FREE 7.5 ORTHOPEDIC 2D73ET75

## (undated) DEVICE — LINEN DRAPE 54X72" 5467

## (undated) DEVICE — BAG CLEAR TRASH 1.3M 39X33" P4040C

## (undated) DEVICE — DRAPE IOBAN ISOLATION VERTICAL 6619

## (undated) DEVICE — BNDG ABDOMINAL BINDER 09X46-62"

## (undated) DEVICE — GLOVE PROTEXIS POWDER FREE 6.5 ORTHOPEDIC 2D73ET65

## (undated) DEVICE — PACK SMALL SPINE RIDGES

## (undated) DEVICE — LIGHT HANDLE X2

## (undated) DEVICE — DRAPE X-RAY TUBE 00-901169-01-OEC

## (undated) RX ORDER — PHENYLEPHRINE HCL IN 0.9% NACL 1 MG/10 ML
SYRINGE (ML) INTRAVENOUS
Status: DISPENSED
Start: 2018-01-11

## (undated) RX ORDER — PROPOFOL 10 MG/ML
INJECTION, EMULSION INTRAVENOUS
Status: DISPENSED
Start: 2018-01-11

## (undated) RX ORDER — FENTANYL CITRATE 50 UG/ML
INJECTION, SOLUTION INTRAMUSCULAR; INTRAVENOUS
Status: DISPENSED
Start: 2018-01-11

## (undated) RX ORDER — EPHEDRINE SULFATE 50 MG/ML
INJECTION, SOLUTION INTRAMUSCULAR; INTRAVENOUS; SUBCUTANEOUS
Status: DISPENSED
Start: 2018-01-11

## (undated) RX ORDER — CEFAZOLIN SODIUM 1 G/50ML
SOLUTION INTRAVENOUS
Status: DISPENSED
Start: 2017-10-19

## (undated) RX ORDER — HYDROMORPHONE HYDROCHLORIDE 1 MG/ML
INJECTION, SOLUTION INTRAMUSCULAR; INTRAVENOUS; SUBCUTANEOUS
Status: DISPENSED
Start: 2018-01-11

## (undated) RX ORDER — HYDRALAZINE HYDROCHLORIDE 20 MG/ML
INJECTION INTRAMUSCULAR; INTRAVENOUS
Status: DISPENSED
Start: 2018-01-11

## (undated) RX ORDER — FENTANYL CITRATE 50 UG/ML
INJECTION, SOLUTION INTRAMUSCULAR; INTRAVENOUS
Status: DISPENSED
Start: 2017-10-19

## (undated) RX ORDER — HYDROMORPHONE HYDROCHLORIDE 1 MG/ML
INJECTION, SOLUTION INTRAMUSCULAR; INTRAVENOUS; SUBCUTANEOUS
Status: DISPENSED
Start: 2017-10-19

## (undated) RX ORDER — DIAZEPAM 10 MG/2ML
INJECTION, SOLUTION INTRAMUSCULAR; INTRAVENOUS
Status: DISPENSED
Start: 2017-10-19

## (undated) RX ORDER — SIMETHICONE 40MG/0.6ML
SUSPENSION, DROPS(FINAL DOSAGE FORM)(ML) ORAL
Status: DISPENSED
Start: 2018-11-14

## (undated) RX ORDER — LIDOCAINE HYDROCHLORIDE 10 MG/ML
INJECTION, SOLUTION EPIDURAL; INFILTRATION; INTRACAUDAL; PERINEURAL
Status: DISPENSED
Start: 2018-01-11

## (undated) RX ORDER — LABETALOL HYDROCHLORIDE 5 MG/ML
INJECTION, SOLUTION INTRAVENOUS
Status: DISPENSED
Start: 2017-10-19

## (undated) RX ORDER — GINSENG 100 MG
CAPSULE ORAL
Status: DISPENSED
Start: 2017-10-19

## (undated) RX ORDER — BUPIVACAINE HYDROCHLORIDE 2.5 MG/ML
INJECTION, SOLUTION EPIDURAL; INFILTRATION; INTRACAUDAL
Status: DISPENSED
Start: 2017-10-19

## (undated) RX ORDER — ONDANSETRON 2 MG/ML
INJECTION INTRAMUSCULAR; INTRAVENOUS
Status: DISPENSED
Start: 2018-01-11

## (undated) RX ORDER — DEXAMETHASONE SODIUM PHOSPHATE 4 MG/ML
INJECTION, SOLUTION INTRA-ARTICULAR; INTRALESIONAL; INTRAMUSCULAR; INTRAVENOUS; SOFT TISSUE
Status: DISPENSED
Start: 2018-01-11

## (undated) RX ORDER — PROPOFOL 10 MG/ML
INJECTION, EMULSION INTRAVENOUS
Status: DISPENSED
Start: 2017-10-19

## (undated) RX ORDER — GLYCOPYRROLATE 0.2 MG/ML
INJECTION INTRAMUSCULAR; INTRAVENOUS
Status: DISPENSED
Start: 2017-10-19

## (undated) RX ORDER — ONDANSETRON 2 MG/ML
INJECTION INTRAMUSCULAR; INTRAVENOUS
Status: DISPENSED
Start: 2017-10-19

## (undated) RX ORDER — LIDOCAINE HYDROCHLORIDE AND EPINEPHRINE 10; 10 MG/ML; UG/ML
INJECTION, SOLUTION INFILTRATION; PERINEURAL
Status: DISPENSED
Start: 2018-01-11

## (undated) RX ORDER — CEFAZOLIN SODIUM 1 G/50ML
SOLUTION INTRAVENOUS
Status: DISPENSED
Start: 2018-01-11

## (undated) RX ORDER — BUPIVACAINE HYDROCHLORIDE 2.5 MG/ML
INJECTION, SOLUTION EPIDURAL; INFILTRATION; INTRACAUDAL
Status: DISPENSED
Start: 2018-01-11

## (undated) RX ORDER — GINSENG 100 MG
CAPSULE ORAL
Status: DISPENSED
Start: 2018-01-11

## (undated) RX ORDER — DEXAMETHASONE SODIUM PHOSPHATE 4 MG/ML
INJECTION, SOLUTION INTRA-ARTICULAR; INTRALESIONAL; INTRAMUSCULAR; INTRAVENOUS; SOFT TISSUE
Status: DISPENSED
Start: 2017-10-19

## (undated) RX ORDER — LIDOCAINE HYDROCHLORIDE AND EPINEPHRINE 10; 10 MG/ML; UG/ML
INJECTION, SOLUTION INFILTRATION; PERINEURAL
Status: DISPENSED
Start: 2017-10-19

## (undated) RX ORDER — ACETAMINOPHEN 10 MG/ML
INJECTION, SOLUTION INTRAVENOUS
Status: DISPENSED
Start: 2018-01-11